# Patient Record
Sex: MALE | Race: WHITE | NOT HISPANIC OR LATINO | Employment: OTHER | URBAN - METROPOLITAN AREA
[De-identification: names, ages, dates, MRNs, and addresses within clinical notes are randomized per-mention and may not be internally consistent; named-entity substitution may affect disease eponyms.]

---

## 2019-11-04 PROBLEM — H26.9 CATARACT OF BOTH EYES: Status: ACTIVE | Noted: 2017-03-31

## 2019-11-04 PROBLEM — E66.9 OBESITY WITH BODY MASS INDEX 30 OR GREATER: Status: ACTIVE | Noted: 2019-11-04

## 2019-11-04 PROBLEM — R06.00 DYSPNEA: Status: ACTIVE | Noted: 2018-02-05

## 2019-11-04 PROBLEM — R06.2 WHEEZING: Status: ACTIVE | Noted: 2019-11-04

## 2020-01-10 PROBLEM — E11.65 TYPE 2 DIABETES MELLITUS WITH HYPERGLYCEMIA, WITHOUT LONG-TERM CURRENT USE OF INSULIN (HCC): Status: ACTIVE | Noted: 2020-01-10

## 2020-01-10 PROBLEM — N17.9 AKI (ACUTE KIDNEY INJURY) (HCC): Status: ACTIVE | Noted: 2020-01-10

## 2020-01-10 PROBLEM — J18.9 SEPSIS DUE TO PNEUMONIA (HCC): Status: ACTIVE | Noted: 2020-01-10

## 2020-01-10 PROBLEM — A41.9 SEPSIS DUE TO PNEUMONIA (HCC): Status: ACTIVE | Noted: 2020-01-10

## 2020-01-10 PROBLEM — E11.9 TYPE 2 DIABETES MELLITUS WITHOUT COMPLICATION, WITHOUT LONG-TERM CURRENT USE OF INSULIN (HCC): Status: ACTIVE | Noted: 2020-01-10

## 2020-01-11 PROBLEM — R65.20 SEPSIS DUE TO STREPTOCOCCUS PNEUMONIAE WITH ACUTE RENAL FAILURE WITHOUT SEPTIC SHOCK (HCC): Status: ACTIVE | Noted: 2020-01-10

## 2020-01-11 PROBLEM — B95.3 BACTEREMIA DUE TO STREPTOCOCCUS PNEUMONIAE: Status: ACTIVE | Noted: 2020-01-11

## 2020-01-11 PROBLEM — N17.9 AKI (ACUTE KIDNEY INJURY) (HCC): Status: RESOLVED | Noted: 2020-01-10 | Resolved: 2020-01-11

## 2020-01-11 PROBLEM — N17.9 SEPSIS DUE TO STREPTOCOCCUS PNEUMONIAE WITH ACUTE RENAL FAILURE WITHOUT SEPTIC SHOCK (HCC): Status: ACTIVE | Noted: 2020-01-10

## 2020-01-11 PROBLEM — A40.3 SEPSIS DUE TO STREPTOCOCCUS PNEUMONIAE WITHOUT ACUTE ORGAN DYSFUNCTION (HCC): Status: ACTIVE | Noted: 2020-01-10

## 2020-01-14 PROBLEM — I50.21 ACUTE SYSTOLIC HEART FAILURE (HCC): Status: RESOLVED | Noted: 2020-01-12 | Resolved: 2020-01-14

## 2020-01-14 PROBLEM — N17.9 SEPSIS DUE TO STREPTOCOCCUS PNEUMONIAE WITH ACUTE RENAL FAILURE WITHOUT SEPTIC SHOCK (HCC): Status: RESOLVED | Noted: 2020-01-10 | Resolved: 2020-01-14

## 2020-01-17 PROBLEM — F33.1 MODERATE EPISODE OF RECURRENT MAJOR DEPRESSIVE DISORDER (HCC): Status: ACTIVE | Noted: 2020-01-17

## 2020-01-17 PROBLEM — R06.2 WHEEZING: Status: RESOLVED | Noted: 2019-11-04 | Resolved: 2020-01-17

## 2020-01-17 PROBLEM — A40.3 SEPSIS DUE TO STREPTOCOCCUS PNEUMONIAE WITH ACUTE RENAL FAILURE WITHOUT SEPTIC SHOCK (HCC): Status: RESOLVED | Noted: 2020-01-10 | Resolved: 2020-01-17

## 2020-01-17 PROBLEM — Z86.19 HISTORY OF INFECTION DUE TO STREPTOCOCCUS PNEUMONIAE: Status: ACTIVE | Noted: 2020-01-17

## 2020-01-17 PROBLEM — N17.9 SEPSIS DUE TO STREPTOCOCCUS PNEUMONIAE WITH ACUTE RENAL FAILURE WITHOUT SEPTIC SHOCK (HCC): Status: RESOLVED | Noted: 2020-01-10 | Resolved: 2020-01-17

## 2020-02-17 PROBLEM — H57.12 LEFT EYE PAIN: Status: ACTIVE | Noted: 2020-02-17

## 2022-01-04 PROBLEM — J40 BRONCHITIS: Status: ACTIVE | Noted: 2022-01-04

## 2022-01-04 PROBLEM — J98.01 BRONCHOSPASM: Status: ACTIVE | Noted: 2022-01-04

## 2022-02-02 PROBLEM — I10 PRIMARY HYPERTENSION: Status: ACTIVE | Noted: 2022-02-02

## 2022-03-23 PROBLEM — J40 BRONCHITIS: Status: RESOLVED | Noted: 2022-01-04 | Resolved: 2022-03-23

## 2022-05-18 DIAGNOSIS — I10 HYPERTENSION GOAL BP (BLOOD PRESSURE) < 140/90: ICD-10-CM

## 2022-05-18 DIAGNOSIS — I50.21 ACUTE SYSTOLIC (CONGESTIVE) HEART FAILURE (HCC): ICD-10-CM

## 2022-05-18 DIAGNOSIS — E11.65 TYPE 2 DIABETES MELLITUS WITH HYPERGLYCEMIA, WITHOUT LONG-TERM CURRENT USE OF INSULIN (HCC): Primary | ICD-10-CM

## 2022-07-13 ENCOUNTER — TELEPHONE (OUTPATIENT)
Dept: SPEECH THERAPY | Facility: CLINIC | Age: 77
End: 2022-07-13

## 2022-07-20 ENCOUNTER — OFFICE VISIT (OUTPATIENT)
Dept: FAMILY MEDICINE CLINIC | Facility: CLINIC | Age: 77
End: 2022-07-20
Payer: MEDICARE

## 2022-07-20 VITALS
SYSTOLIC BLOOD PRESSURE: 120 MMHG | HEART RATE: 56 BPM | RESPIRATION RATE: 14 BRPM | WEIGHT: 238 LBS | HEIGHT: 72 IN | DIASTOLIC BLOOD PRESSURE: 70 MMHG | BODY MASS INDEX: 32.23 KG/M2 | TEMPERATURE: 97 F | OXYGEN SATURATION: 96 %

## 2022-07-20 DIAGNOSIS — F43.22 ADJUSTMENT DISORDER WITH ANXIOUS MOOD: ICD-10-CM

## 2022-07-20 DIAGNOSIS — F33.1 MODERATE EPISODE OF RECURRENT MAJOR DEPRESSIVE DISORDER (HCC): Primary | ICD-10-CM

## 2022-07-20 PROCEDURE — 99213 OFFICE O/P EST LOW 20 MIN: CPT | Performed by: NURSE PRACTITIONER

## 2022-07-20 NOTE — PROGRESS NOTES
Assessment/Plan:    1  Moderate episode of recurrent major depressive disorder ([address] )    2  Adjustment disorder with anxious mood    pt has been doing well on increased dosing of sertraline 100 mg daily  Continue medications as directed  Return for Next scheduled follow up  Subjective:      Patient ID: Anna Doctor is a 64 y o  male  Chief Complaint   Patient presents with   Alessandrasd Ruiz is a 64year old male with depression and anxiety who returns to the office, accompanied by his wife, for a recheck of anxiety and mood swings  Pt has increased his dosing of sertraline and reports that he feels well overall  Pt's wife reports a notable difference  No new acute comlaints today         The following portions of the patient's history were reviewed and updated as appropriate: allergies, current medications, past family history, past medical history, past social history, past surgical history and problem list     Review of Systems      Current Outpatient Medications   Medication Sig Dispense Refill    acetaminophen (TYLENOL) 325 mg tablet Take 2 tablets (650 mg total) by mouth every 6 (six) hours as needed for mild pain 30 tablet 0    apixaban (Eliquis) 5 mg Take 1 tablet (5 mg total) by mouth 2 (two) times a day 180 tablet 3    Blood Glucose Monitoring Suppl (BLOOD GLUCOSE MONITOR SYSTEM) w/Device KIT by Does not apply route      finasteride (PROSCAR) 5 mg tablet Take 1 tablet (5 mg total) by mouth daily 30 tablet 5    glipiZIDE (GLUCOTROL XL) 10 mg 24 hr tablet TAKE 1 TABLET BY MOUTH EVERY DAY WITH BREAKFAST 90 tablet 3    Glucosamine-Chondroitin (GLUCOSAMINE CHONDR COMPLEX PO) Take by mouth 2 (two) times a day      LORazepam (Ativan) 0 5 mg tablet Take 1 tablet (0 5 mg total) by mouth daily as needed for anxiety 10 tablet 0    metFORMIN (GLUCOPHAGE) 500 mg tablet TAKE TWO TABLETS BY MOUTH EVERY MORNING AND TAKE one TABLET BY MOUTH IN THE EVENING (GENERIC GLUCOPHAGE) 270 tablet 3    metoprolol succinate (TOPROL-XL) 25 mg 24 hr tablet Take 1 tablet (25 mg total) by mouth every morning 90 tablet 3    Multiple Vitamins-Minerals (MEGAVITE FRUITS & VEGGIES PO) Take by mouth in the morning      Multiple Vitamins-Minerals (MULTIVITAMIN ADULT PO) Take 1 tablet by mouth      ramipril (ALTACE) 5 mg capsule Take 1 capsule (5 mg total) by mouth every morning 90 capsule 3    sertraline (ZOLOFT) 100 mg tablet Take 1 tablet (100 mg total) by mouth daily 90 tablet 0    tamsulosin (FLOMAX) 0 4 mg TAKE 1 CAPSULE BY MOUTH EVERYDAY AT BEDTIME 90 capsule 3     No current facility-administered medications for this visit         Objective:    /70   Pulse 56   Temp (!) 97 °F (36 1 °C) (Temporal)   Resp 14   Ht 6' (1 829 m)   Wt 108 kg (238 lb)   SpO2 96%   BMI 32 28 kg/m²        Physical Exam           LATANYA Donald

## 2022-08-11 ENCOUNTER — OFFICE VISIT (OUTPATIENT)
Dept: FAMILY MEDICINE CLINIC | Facility: CLINIC | Age: 77
End: 2022-08-11
Payer: MEDICARE

## 2022-08-11 VITALS
BODY MASS INDEX: 31.69 KG/M2 | RESPIRATION RATE: 20 BRPM | WEIGHT: 234 LBS | HEART RATE: 85 BPM | TEMPERATURE: 97.6 F | DIASTOLIC BLOOD PRESSURE: 72 MMHG | HEIGHT: 72 IN | OXYGEN SATURATION: 95 % | SYSTOLIC BLOOD PRESSURE: 122 MMHG

## 2022-08-11 DIAGNOSIS — J40 BRONCHITIS: Primary | ICD-10-CM

## 2022-08-11 DIAGNOSIS — J06.9 UPPER RESPIRATORY TRACT INFECTION, UNSPECIFIED TYPE: ICD-10-CM

## 2022-08-11 PROCEDURE — 99213 OFFICE O/P EST LOW 20 MIN: CPT | Performed by: NURSE PRACTITIONER

## 2022-08-11 RX ORDER — DOXYCYCLINE HYCLATE 100 MG/1
100 CAPSULE ORAL EVERY 12 HOURS SCHEDULED
Qty: 14 CAPSULE | Refills: 0 | Status: SHIPPED | OUTPATIENT
Start: 2022-08-11 | End: 2022-08-18

## 2022-08-11 RX ORDER — PREDNISONE 20 MG/1
TABLET ORAL
Qty: 11 TABLET | Refills: 0 | Status: SHIPPED | OUTPATIENT
Start: 2022-08-11 | End: 2022-08-19 | Stop reason: ALTCHOICE

## 2022-08-11 NOTE — PROGRESS NOTES
Assessment/Plan:    1  Bronchitis  -     doxycycline hyclate (VIBRAMYCIN) 100 mg capsule; Take 1 capsule (100 mg total) by mouth every 12 (twelve) hours for 7 days  -     predniSONE 20 mg tablet; Take 2 tablets PO daily x's 3 days, then take 1 tablet daily x's 3 days, then take 1/2 tablet daily x's 3 days    2  Upper respiratory tract infection, unspecified type  -     doxycycline hyclate (VIBRAMYCIN) 100 mg capsule; Take 1 capsule (100 mg total) by mouth every 12 (twelve) hours for 7 days  -     predniSONE 20 mg tablet; Take 2 tablets PO daily x's 3 days, then take 1 tablet daily x's 3 days, then take 1/2 tablet daily x's 3 days            Patient Instructions   Increase fluid intake as tolerated  Rest and humidification   Continue medications as directed   - antibiotic for full course  - pro-biotic to protect stomach while on medication   - Flonase OTC 1-2 sprays each nostril daily PRN post nasal drip   - Mucinex OTC to loosen secretions   Return to office in one week if symptoms persist or worsen        Return in about 1 week (around 8/18/2022), or if symptoms worsen or fail to improve  Subjective:      Patient ID: Marleny Caro is a 64 y o  male  Chief Complaint   Patient presents with    Cough    Nasal Congestion    Fatigue       Cough  This is a new problem  The current episode started in the past 7 days (4 days ago)  The problem has been unchanged  The cough is non-productive  Associated symptoms include nasal congestion, shortness of breath and wheezing  Pertinent negatives include no chest pain, chills, ear congestion, ear pain, fever, headaches, myalgias, postnasal drip, rash, rhinorrhea or sore throat  Nothing aggravates the symptoms  He has tried nothing for the symptoms  His past medical history is significant for bronchitis         The following portions of the patient's history were reviewed and updated as appropriate: allergies, current medications, past family history, past medical history, past social history, past surgical history and problem list     Review of Systems   Constitutional: Positive for fatigue  Negative for chills, diaphoresis and fever  HENT: Positive for congestion  Negative for ear discharge, ear pain, postnasal drip, rhinorrhea, sinus pressure, sinus pain and sore throat  Eyes: Negative for pain and discharge  Respiratory: Positive for shortness of breath and wheezing  Negative for cough and chest tightness  Cardiovascular: Negative for chest pain  Gastrointestinal: Negative for diarrhea, nausea and vomiting  Musculoskeletal: Negative for myalgias  Skin: Negative for rash  Neurological: Negative for dizziness and headaches  Hematological: Negative for adenopathy           Current Outpatient Medications   Medication Sig Dispense Refill    acetaminophen (TYLENOL) 325 mg tablet Take 2 tablets (650 mg total) by mouth every 6 (six) hours as needed for mild pain 30 tablet 0    apixaban (Eliquis) 5 mg Take 1 tablet (5 mg total) by mouth 2 (two) times a day 180 tablet 3    Blood Glucose Monitoring Suppl (BLOOD GLUCOSE MONITOR SYSTEM) w/Device KIT by Does not apply route      doxycycline hyclate (VIBRAMYCIN) 100 mg capsule Take 1 capsule (100 mg total) by mouth every 12 (twelve) hours for 7 days 14 capsule 0    finasteride (PROSCAR) 5 mg tablet Take 1 tablet (5 mg total) by mouth daily 30 tablet 5    glipiZIDE (GLUCOTROL XL) 10 mg 24 hr tablet TAKE 1 TABLET BY MOUTH EVERY DAY WITH BREAKFAST 90 tablet 3    Glucosamine-Chondroitin (GLUCOSAMINE CHONDR COMPLEX PO) Take by mouth 2 (two) times a day      LORazepam (Ativan) 0 5 mg tablet Take 1 tablet (0 5 mg total) by mouth daily as needed for anxiety 10 tablet 0    metFORMIN (GLUCOPHAGE) 500 mg tablet TAKE TWO TABLETS BY MOUTH EVERY MORNING AND TAKE one TABLET BY MOUTH IN THE EVENING (GENERIC GLUCOPHAGE) 270 tablet 3    metoprolol succinate (TOPROL-XL) 25 mg 24 hr tablet Take 1 tablet (25 mg total) by mouth every morning 90 tablet 3    Multiple Vitamins-Minerals (MEGAVITE FRUITS & VEGGIES PO) Take by mouth in the morning      predniSONE 20 mg tablet Take 2 tablets PO daily x's 3 days, then take 1 tablet daily x's 3 days, then take 1/2 tablet daily x's 3 days 11 tablet 0    ramipril (ALTACE) 5 mg capsule Take 1 capsule (5 mg total) by mouth every morning 90 capsule 3    sertraline (ZOLOFT) 100 mg tablet Take 1 tablet (100 mg total) by mouth daily 90 tablet 0    tamsulosin (FLOMAX) 0 4 mg TAKE 1 CAPSULE BY MOUTH EVERYDAY AT BEDTIME 90 capsule 3     No current facility-administered medications for this visit  Objective:    /72   Pulse 85   Temp 97 6 °F (36 4 °C) (Temporal)   Resp 20   Ht 6' (1 829 m)   Wt 106 kg (234 lb)   SpO2 95%   BMI 31 74 kg/m²        Physical Exam  Vitals reviewed  Constitutional:       General: He is not in acute distress  Appearance: Normal appearance  He is well-developed  He is not diaphoretic  HENT:      Head: Normocephalic and atraumatic  Right Ear: Tympanic membrane, ear canal and external ear normal       Left Ear: Tympanic membrane, ear canal and external ear normal       Nose: Nose normal       Mouth/Throat:      Pharynx: Oropharynx is clear  No posterior oropharyngeal erythema  Eyes:      General: Lids are normal          Right eye: No discharge  Left eye: No discharge  Conjunctiva/sclera: Conjunctivae normal    Neck:      Thyroid: No thyromegaly  Cardiovascular:      Rate and Rhythm: Normal rate and regular rhythm  Heart sounds: Normal heart sounds  Pulmonary:      Effort: Pulmonary effort is normal       Breath sounds: Wheezing and rhonchi present  No decreased breath sounds or rales  Comments: Course lung sounds throughout with wheezing  Musculoskeletal:      Cervical back: Normal range of motion and neck supple  Lymphadenopathy:      Cervical: No cervical adenopathy  Skin:     General: Skin is warm and dry        Findings: No rash  Neurological:      Mental Status: He is alert and oriented to person, place, and time  Psychiatric:         Behavior: Behavior normal          Thought Content:  Thought content normal          Judgment: Judgment normal                 LATANYA Morrison

## 2022-08-19 ENCOUNTER — OFFICE VISIT (OUTPATIENT)
Dept: FAMILY MEDICINE CLINIC | Facility: CLINIC | Age: 77
End: 2022-08-19
Payer: MEDICARE

## 2022-08-19 ENCOUNTER — APPOINTMENT (OUTPATIENT)
Dept: RADIOLOGY | Facility: CLINIC | Age: 77
End: 2022-08-19
Payer: MEDICARE

## 2022-08-19 VITALS
HEIGHT: 72 IN | SYSTOLIC BLOOD PRESSURE: 128 MMHG | DIASTOLIC BLOOD PRESSURE: 64 MMHG | HEART RATE: 84 BPM | BODY MASS INDEX: 31.15 KG/M2 | RESPIRATION RATE: 16 BRPM | WEIGHT: 230 LBS | TEMPERATURE: 96.8 F | OXYGEN SATURATION: 95 %

## 2022-08-19 DIAGNOSIS — J98.01 BRONCHOSPASM: ICD-10-CM

## 2022-08-19 DIAGNOSIS — I48.0 PAROXYSMAL ATRIAL FIBRILLATION (HCC): ICD-10-CM

## 2022-08-19 DIAGNOSIS — J40 BRONCHITIS: ICD-10-CM

## 2022-08-19 DIAGNOSIS — I10 HYPERTENSION GOAL BP (BLOOD PRESSURE) < 140/90: ICD-10-CM

## 2022-08-19 DIAGNOSIS — J40 BRONCHITIS: Primary | ICD-10-CM

## 2022-08-19 PROBLEM — H57.12 LEFT EYE PAIN: Status: RESOLVED | Noted: 2020-02-17 | Resolved: 2022-08-19

## 2022-08-19 PROBLEM — I50.21 ACUTE SYSTOLIC (CONGESTIVE) HEART FAILURE (HCC): Status: RESOLVED | Noted: 2020-01-12 | Resolved: 2022-08-19

## 2022-08-19 PROBLEM — J45.31 MILD PERSISTENT ASTHMA WITH ACUTE EXACERBATION: Status: ACTIVE | Noted: 2017-04-12

## 2022-08-19 PROBLEM — U07.1 COVID: Status: RESOLVED | Noted: 2022-01-11 | Resolved: 2022-08-19

## 2022-08-19 PROCEDURE — 71046 X-RAY EXAM CHEST 2 VIEWS: CPT

## 2022-08-19 PROCEDURE — 99213 OFFICE O/P EST LOW 20 MIN: CPT | Performed by: FAMILY MEDICINE

## 2022-08-19 RX ORDER — PREDNISONE 20 MG/1
TABLET ORAL
Qty: 14 TABLET | Refills: 0 | Status: SHIPPED | OUTPATIENT
Start: 2022-08-19 | End: 2022-09-01 | Stop reason: ALTCHOICE

## 2022-08-19 RX ORDER — LEVOFLOXACIN 500 MG/1
500 TABLET, FILM COATED ORAL EVERY 24 HOURS
Qty: 10 TABLET | Refills: 0 | Status: SHIPPED | OUTPATIENT
Start: 2022-08-19 | End: 2022-08-29

## 2022-08-19 NOTE — PROGRESS NOTES
Assessment/Plan:    No problem-specific Assessment & Plan notes found for this encounter  Diagnoses and all orders for this visit:    Bronchitis  -     XR chest pa & lateral; Future  -     levofloxacin (LEVAQUIN) 500 mg tablet; Take 1 tablet (500 mg total) by mouth every 24 hours for 10 days  -     predniSONE 20 mg tablet; 2 PO QD X 4 DAYS, THEN 1 PO QD X 4 DAYS, THEN 1/2 PO QD X 4 DAYS    Paroxysmal atrial fibrillation (HCC)    Hypertension goal BP (blood pressure) < 140/90    Bronchospasm        Patient Instructions   PLENTY FLUIDS  REST  MUCINEX  MEDICATION AS DIRECTED  IF SYMPTOMS PERSIST OR WORSEN, PLEASE CALL    CXR    Rv monday        Return in about 3 days (around 8/22/2022) for Recheck  Subjective:      Patient ID: Marleny Caro is a 64 y o  male  Chief Complaint   Patient presents with    Cold Like Symptoms     Pt still c/o cough, chest congestion and fatigue  FOLLOW UP    PATIENT COMPLETED COURSE OF ANTIBIOTIC / PREDNISONE  CONTINUES TO COUGH AND WHEEZE  DENIES ANY FEVER OR CHILLS  NOTES NO CP, SOB, PALPITATIONS  NO NVD    HAS NOT NOTED ANY RASHES      The following portions of the patient's history were reviewed and updated as appropriate: allergies, current medications, past family history, past medical history, past social history, past surgical history and problem list     Review of Systems   Constitutional: Negative for chills, fatigue and fever  HENT: Positive for congestion  Negative for sore throat  Eyes: Negative for discharge  Respiratory: Positive for cough and wheezing  Negative for chest tightness  Cardiovascular: Negative for chest pain and palpitations  Gastrointestinal: Negative for abdominal pain, diarrhea, nausea and vomiting  Musculoskeletal: Negative for arthralgias and gait problem  Neurological: Negative for dizziness, weakness and headaches  Hematological: Negative for adenopathy  Psychiatric/Behavioral: The patient is not nervous/anxious  Current Outpatient Medications   Medication Sig Dispense Refill    acetaminophen (TYLENOL) 325 mg tablet Take 2 tablets (650 mg total) by mouth every 6 (six) hours as needed for mild pain 30 tablet 0    apixaban (Eliquis) 5 mg Take 1 tablet (5 mg total) by mouth 2 (two) times a day 180 tablet 3    Blood Glucose Monitoring Suppl (BLOOD GLUCOSE MONITOR SYSTEM) w/Device KIT by Does not apply route      finasteride (PROSCAR) 5 mg tablet Take 1 tablet (5 mg total) by mouth daily 30 tablet 5    glipiZIDE (GLUCOTROL XL) 10 mg 24 hr tablet TAKE 1 TABLET BY MOUTH EVERY DAY WITH BREAKFAST 90 tablet 3    Glucosamine-Chondroitin (GLUCOSAMINE CHONDR COMPLEX PO) Take by mouth 2 (two) times a day      levofloxacin (LEVAQUIN) 500 mg tablet Take 1 tablet (500 mg total) by mouth every 24 hours for 10 days 10 tablet 0    LORazepam (Ativan) 0 5 mg tablet Take 1 tablet (0 5 mg total) by mouth daily as needed for anxiety 10 tablet 0    metFORMIN (GLUCOPHAGE) 500 mg tablet TAKE TWO TABLETS BY MOUTH EVERY MORNING AND TAKE one TABLET BY MOUTH IN THE EVENING (GENERIC GLUCOPHAGE) 270 tablet 3    metoprolol succinate (TOPROL-XL) 25 mg 24 hr tablet Take 1 tablet (25 mg total) by mouth every morning 90 tablet 3    Multiple Vitamins-Minerals (MEGAVITE FRUITS & VEGGIES PO) Take by mouth in the morning      predniSONE 20 mg tablet 2 PO QD X 4 DAYS, THEN 1 PO QD X 4 DAYS, THEN 1/2 PO QD X 4 DAYS 14 tablet 0    ramipril (ALTACE) 5 mg capsule Take 1 capsule (5 mg total) by mouth every morning 90 capsule 3    sertraline (ZOLOFT) 100 mg tablet Take 1 tablet (100 mg total) by mouth daily 90 tablet 0    tamsulosin (FLOMAX) 0 4 mg TAKE 1 CAPSULE BY MOUTH EVERYDAY AT BEDTIME 90 capsule 3     No current facility-administered medications for this visit         Objective:    /64 (BP Location: Left arm, Patient Position: Sitting, Cuff Size: Large)   Pulse 84   Temp (!) 96 8 °F (36 °C) (Temporal)   Resp 16   Ht 6' (1 829 m)   Wt 104 kg (230 lb)   SpO2 95%   BMI 31 19 kg/m²        Physical Exam  Constitutional:       Appearance: He is well-developed  HENT:      Head: Normocephalic and atraumatic  Eyes:      General:         Right eye: No discharge  Left eye: No discharge  Conjunctiva/sclera: Conjunctivae normal       Pupils: Pupils are equal, round, and reactive to light  Neck:      Thyroid: No thyromegaly  Vascular: No JVD  Comments: MINIMAL JVD  Cardiovascular:      Rate and Rhythm: Normal rate and regular rhythm  Heart sounds: Murmur heard  Pulmonary:      Effort: Pulmonary effort is normal       Breath sounds: Wheezing, rhonchi and rales present  Comments: ADEQUATE AIR MOVEMENT  BILATERAL LOWER FIELD RALES  SCATTERED RHONCHI  EXP WHEEZING  Abdominal:      General: Bowel sounds are normal       Palpations: Abdomen is soft  There is no mass  Tenderness: There is no abdominal tenderness  There is no guarding or rebound  Musculoskeletal:         General: No tenderness or deformity  Normal range of motion  Cervical back: Neck supple  Lymphadenopathy:      Cervical: No cervical adenopathy  Skin:     General: Skin is warm and dry  Findings: No erythema or rash  Neurological:      Mental Status: He is alert and oriented to person, place, and time  Psychiatric:         Behavior: Behavior normal          Thought Content:  Thought content normal          Judgment: Judgment normal                 Virginia Jiménez MD

## 2022-08-19 NOTE — PATIENT INSTRUCTIONS
PLENTY FLUIDS  REST  MUCINEX  MEDICATION AS DIRECTED  IF SYMPTOMS PERSIST OR WORSEN, PLEASE CALL    CXR    Rv monday

## 2022-08-22 ENCOUNTER — OFFICE VISIT (OUTPATIENT)
Dept: FAMILY MEDICINE CLINIC | Facility: CLINIC | Age: 77
End: 2022-08-22
Payer: MEDICARE

## 2022-08-22 VITALS
OXYGEN SATURATION: 99 % | BODY MASS INDEX: 30.88 KG/M2 | SYSTOLIC BLOOD PRESSURE: 126 MMHG | HEART RATE: 72 BPM | RESPIRATION RATE: 16 BRPM | TEMPERATURE: 96.4 F | HEIGHT: 72 IN | WEIGHT: 228 LBS | DIASTOLIC BLOOD PRESSURE: 70 MMHG

## 2022-08-22 DIAGNOSIS — J45.31 MILD PERSISTENT ASTHMA WITH ACUTE EXACERBATION: ICD-10-CM

## 2022-08-22 DIAGNOSIS — E11.65 TYPE 2 DIABETES MELLITUS WITH HYPERGLYCEMIA, WITHOUT LONG-TERM CURRENT USE OF INSULIN (HCC): ICD-10-CM

## 2022-08-22 DIAGNOSIS — J40 BRONCHITIS: Primary | ICD-10-CM

## 2022-08-22 PROCEDURE — 99213 OFFICE O/P EST LOW 20 MIN: CPT | Performed by: FAMILY MEDICINE

## 2022-08-22 NOTE — PATIENT INSTRUCTIONS
CONTINUE CURRENT TREATMENT  DISCUSSED FURTHER USE OF A CONTROLLER MEDICATION    RV 1 WEEK, CALL SOONER PRN

## 2022-08-22 NOTE — PROGRESS NOTES
Assessment/Plan:    No problem-specific Assessment & Plan notes found for this encounter  Diagnoses and all orders for this visit:    Bronchitis    Mild persistent asthma with acute exacerbation    Type 2 diabetes mellitus with hyperglycemia, without long-term current use of insulin ([facility] [address] )        Patient Instructions   CONTINUE CURRENT TREATMENT  DISCUSSED FURTHER USE OF A CONTROLLER MEDICATION    RV 1 WEEK, CALL SOONER PRN      Return in about 1 week (around 8/29/2022) for Recheck  Subjective:      Patient ID: Gabby Hdze is a 64 y o  male  Chief Complaint   Patient presents with    Follow-up     THREE DAY        PATIENT FEELS BETTER  BREATHING EASIER  COUGH IMPROVED    CONTINUES TO WHEEZE  DENIES ANY FEVER OR CHILLS  NO NVD      The following portions of the patient's history were reviewed and updated as appropriate: allergies, current medications, past family history, past medical history, past social history, past surgical history and problem list     Review of Systems   Constitutional: Negative for chills, fatigue and fever  HENT: Negative for sore throat  Eyes: Negative for discharge  Respiratory: Positive for cough and wheezing  Negative for chest tightness  Cardiovascular: Negative for chest pain and palpitations  Gastrointestinal: Negative for abdominal pain, diarrhea, nausea and vomiting  Musculoskeletal: Negative for arthralgias and gait problem  Neurological: Negative for dizziness, weakness and headaches  Hematological: Negative for adenopathy  Psychiatric/Behavioral: The patient is not nervous/anxious            Current Outpatient Medications   Medication Sig Dispense Refill    acetaminophen (TYLENOL) 325 mg tablet Take 2 tablets (650 mg total) by mouth every 6 (six) hours as needed for mild pain 30 tablet 0    apixaban (Eliquis) 5 mg Take 1 tablet (5 mg total) by mouth 2 (two) times a day 180 tablet 3    Blood Glucose Monitoring Suppl (BLOOD GLUCOSE MONITOR SYSTEM) w/Device KIT by Does not apply route      glipiZIDE (GLUCOTROL XL) 10 mg 24 hr tablet TAKE 1 TABLET BY MOUTH EVERY DAY WITH BREAKFAST 90 tablet 3    Glucosamine-Chondroitin (GLUCOSAMINE CHONDR COMPLEX PO) Take by mouth 2 (two) times a day      levofloxacin (LEVAQUIN) 500 mg tablet Take 1 tablet (500 mg total) by mouth every 24 hours for 10 days 10 tablet 0    LORazepam (Ativan) 0 5 mg tablet Take 1 tablet (0 5 mg total) by mouth daily as needed for anxiety 10 tablet 0    metFORMIN (GLUCOPHAGE) 500 mg tablet TAKE TWO TABLETS BY MOUTH EVERY MORNING AND TAKE one TABLET BY MOUTH IN THE EVENING (GENERIC GLUCOPHAGE) 270 tablet 3    metoprolol succinate (TOPROL-XL) 25 mg 24 hr tablet Take 1 tablet (25 mg total) by mouth every morning 90 tablet 3    Multiple Vitamins-Minerals (MEGAVITE FRUITS & VEGGIES PO) Take by mouth in the morning      predniSONE 20 mg tablet 2 PO QD X 4 DAYS, THEN 1 PO QD X 4 DAYS, THEN 1/2 PO QD X 4 DAYS 14 tablet 0    ramipril (ALTACE) 5 mg capsule Take 1 capsule (5 mg total) by mouth every morning 90 capsule 3    sertraline (ZOLOFT) 100 mg tablet Take 1 tablet (100 mg total) by mouth daily 90 tablet 0    tamsulosin (FLOMAX) 0 4 mg TAKE 1 CAPSULE BY MOUTH EVERYDAY AT BEDTIME 90 capsule 3    finasteride (PROSCAR) 5 mg tablet Take 1 tablet (5 mg total) by mouth daily 30 tablet 5     No current facility-administered medications for this visit  Objective:    /70   Pulse 72   Temp (!) 96 4 °F (35 8 °C) (Temporal)   Resp 16   Ht 6' (1 829 m)   Wt 103 kg (228 lb)   SpO2 99%   BMI 30 92 kg/m²        Physical Exam  Constitutional:       Appearance: He is well-developed  HENT:      Head: Normocephalic and atraumatic  Eyes:      General:         Right eye: No discharge  Left eye: No discharge  Conjunctiva/sclera: Conjunctivae normal       Pupils: Pupils are equal, round, and reactive to light  Neck:      Thyroid: No thyromegaly  Vascular: No JVD  Cardiovascular:      Rate and Rhythm: Normal rate and regular rhythm  Heart sounds: Normal heart sounds  No murmur heard  Pulmonary:      Effort: Pulmonary effort is normal       Breath sounds: Wheezing present  No rales  Comments: IMPROVED AIR MOVEMENT  CONTINUE EXP WHEEZE  SCATTERED RHONCHI - IMPROVED  Abdominal:      General: Bowel sounds are normal       Palpations: Abdomen is soft  There is no mass  Tenderness: There is no abdominal tenderness  There is no guarding or rebound  Musculoskeletal:         General: No tenderness or deformity  Normal range of motion  Cervical back: Neck supple  Lymphadenopathy:      Cervical: No cervical adenopathy  Skin:     General: Skin is warm and dry  Findings: No erythema or rash  Neurological:      Mental Status: He is alert and oriented to person, place, and time  Psychiatric:         Behavior: Behavior normal          Thought Content:  Thought content normal          Judgment: Judgment normal                 Mariana Dsouza MD

## 2022-08-23 ENCOUNTER — RA CDI HCC (OUTPATIENT)
Dept: OTHER | Facility: HOSPITAL | Age: 77
End: 2022-08-23

## 2022-08-23 NOTE — PROGRESS NOTES
[facility]  coding opportunities        I11 0  Chart Reviewed number of suggestions sent to Provider: 1     Patients Insurance     Medicare Insurance: [employer]

## 2022-09-01 ENCOUNTER — OFFICE VISIT (OUTPATIENT)
Dept: FAMILY MEDICINE CLINIC | Facility: CLINIC | Age: 77
End: 2022-09-01
Payer: MEDICARE

## 2022-09-01 VITALS
WEIGHT: 235 LBS | HEIGHT: 72 IN | OXYGEN SATURATION: 96 % | HEART RATE: 88 BPM | BODY MASS INDEX: 31.83 KG/M2 | DIASTOLIC BLOOD PRESSURE: 70 MMHG | SYSTOLIC BLOOD PRESSURE: 140 MMHG | TEMPERATURE: 97.5 F | RESPIRATION RATE: 20 BRPM

## 2022-09-01 DIAGNOSIS — J40 BRONCHITIS: Primary | ICD-10-CM

## 2022-09-01 DIAGNOSIS — J45.31 MILD PERSISTENT ASTHMA WITH ACUTE EXACERBATION: ICD-10-CM

## 2022-09-01 PROCEDURE — 99213 OFFICE O/P EST LOW 20 MIN: CPT | Performed by: FAMILY MEDICINE

## 2022-09-01 NOTE — PROGRESS NOTES
Assessment/Plan:    No problem-specific Assessment & Plan notes found for this encounter  Diagnoses and all orders for this visit:    Bronchitis    Mild persistent asthma with acute exacerbation        Patient Instructions   CONTINUE CURRENT TREATMENT PLAN  TRIAL OF ADVAIR  MONITOR SYMPTOMS    CALL IF SYMPTOMS WORSEN    WARM COMPRESS TO LEG    IF INCREASED SWELLING OR PAIN - CALL ASAP      No follow-ups on file  Subjective:      Patient ID: Aleta Ernandez is a 64 y o  male  Chief Complaint   Patient presents with    Follow-up     Pt here for a lung f/u  FOLLOW UP    PATIENT FEELS MUCH BETTER  NO FEVER OR CHILLS  BREATHING EASIER    STILL SOME WHEEZING    RECENT LOWER L CALF PAIN  NO SWELLING  IMPROVING DAILY  ON ELIQUIS      The following portions of the patient's history were reviewed and updated as appropriate: allergies, current medications, past family history, past medical history, past social history, past surgical history and problem list     Review of Systems   Constitutional: Negative for chills, fatigue and fever  HENT: Negative for sore throat  Eyes: Negative for discharge  Respiratory: Positive for wheezing  Negative for cough and chest tightness  Cardiovascular: Negative for chest pain and palpitations  Gastrointestinal: Negative for abdominal pain, diarrhea, nausea and vomiting  Musculoskeletal: Positive for arthralgias  Negative for gait problem  Neurological: Negative for dizziness, weakness and headaches  Hematological: Negative for adenopathy  Psychiatric/Behavioral: The patient is not nervous/anxious            Current Outpatient Medications   Medication Sig Dispense Refill    acetaminophen (TYLENOL) 325 mg tablet Take 2 tablets (650 mg total) by mouth every 6 (six) hours as needed for mild pain 30 tablet 0    apixaban (Eliquis) 5 mg Take 1 tablet (5 mg total) by mouth 2 (two) times a day 180 tablet 3    Blood Glucose Monitoring Suppl (BLOOD GLUCOSE MONITOR SYSTEM) w/Device KIT by Does not apply route      finasteride (PROSCAR) 5 mg tablet Take 1 tablet (5 mg total) by mouth daily 30 tablet 5    glipiZIDE (GLUCOTROL XL) 10 mg 24 hr tablet TAKE 1 TABLET BY MOUTH EVERY DAY WITH BREAKFAST 90 tablet 3    Glucosamine-Chondroitin (GLUCOSAMINE CHONDR COMPLEX PO) Take by mouth 2 (two) times a day      LORazepam (Ativan) 0 5 mg tablet Take 1 tablet (0 5 mg total) by mouth daily as needed for anxiety 10 tablet 0    metFORMIN (GLUCOPHAGE) 500 mg tablet TAKE TWO TABLETS BY MOUTH EVERY MORNING AND TAKE one TABLET BY MOUTH IN THE EVENING (GENERIC GLUCOPHAGE) 270 tablet 3    metoprolol succinate (TOPROL-XL) 25 mg 24 hr tablet Take 1 tablet (25 mg total) by mouth every morning 90 tablet 3    Multiple Vitamins-Minerals (MEGAVITE FRUITS & VEGGIES PO) Take by mouth in the morning      ramipril (ALTACE) 5 mg capsule Take 1 capsule (5 mg total) by mouth every morning 90 capsule 3    sertraline (ZOLOFT) 100 mg tablet Take 1 tablet (100 mg total) by mouth daily 90 tablet 0    tamsulosin (FLOMAX) 0 4 mg TAKE 1 CAPSULE BY MOUTH EVERYDAY AT BEDTIME 90 capsule 3     No current facility-administered medications for this visit  Objective:    /70 (BP Location: Left arm, Patient Position: Sitting, Cuff Size: Adult)   Pulse 88   Temp 97 5 °F (36 4 °C) (Temporal)   Resp 20   Ht 6' (1 829 m)   Wt 107 kg (235 lb)   SpO2 96%   BMI 31 87 kg/m²        Physical Exam  Constitutional:       Appearance: He is well-developed  HENT:      Head: Normocephalic and atraumatic  Eyes:      General:         Right eye: No discharge  Left eye: No discharge  Conjunctiva/sclera: Conjunctivae normal       Pupils: Pupils are equal, round, and reactive to light  Neck:      Thyroid: No thyromegaly  Vascular: No JVD  Cardiovascular:      Rate and Rhythm: Normal rate and regular rhythm  Heart sounds: Normal heart sounds  No murmur heard    Pulmonary:      Effort: Pulmonary effort is normal       Breath sounds: Wheezing present  No rales  Comments: COARSE BS  PROLONGED EXP PHASE  OCC WHEEZE  Abdominal:      General: Bowel sounds are normal       Palpations: Abdomen is soft  There is no mass  Tenderness: There is no abdominal tenderness  There is no guarding or rebound  Musculoskeletal:         General: Tenderness present  No deformity  Normal range of motion  Cervical back: Neck supple  Comments: MILD MID L CALF TENDERNESS  NO SWELLING  NEG HOMANS   Lymphadenopathy:      Cervical: No cervical adenopathy  Skin:     General: Skin is warm and dry  Findings: No erythema or rash  Neurological:      Mental Status: He is alert and oriented to person, place, and time  Psychiatric:         Behavior: Behavior normal          Thought Content:  Thought content normal          Judgment: Judgment normal                 Qing Castellanos MD

## 2022-09-01 NOTE — PATIENT INSTRUCTIONS
CONTINUE CURRENT TREATMENT PLAN  TRIAL OF ADVAIR  MONITOR SYMPTOMS    CALL IF SYMPTOMS WORSEN    WARM COMPRESS TO LEG    IF INCREASED SWELLING OR PAIN - CALL ASAP

## 2022-09-03 ENCOUNTER — HOSPITAL ENCOUNTER (EMERGENCY)
Facility: HOSPITAL | Age: 77
Discharge: HOME | End: 2022-09-03
Attending: EMERGENCY MEDICINE | Admitting: EMERGENCY MEDICINE
Payer: MEDICARE

## 2022-09-03 ENCOUNTER — APPOINTMENT (EMERGENCY)
Dept: RADIOLOGY | Facility: HOSPITAL | Age: 77
End: 2022-09-03
Payer: MEDICARE

## 2022-09-03 VITALS
BODY MASS INDEX: 32.35 KG/M2 | TEMPERATURE: 98.7 F | OXYGEN SATURATION: 98 % | RESPIRATION RATE: 20 BRPM | HEART RATE: 89 BPM | DIASTOLIC BLOOD PRESSURE: 80 MMHG | SYSTOLIC BLOOD PRESSURE: 163 MMHG | WEIGHT: 238.54 LBS

## 2022-09-03 DIAGNOSIS — M79.662 PAIN OF LEFT CALF: Primary | ICD-10-CM

## 2022-09-03 DIAGNOSIS — M79.89 LEFT LEG SWELLING: ICD-10-CM

## 2022-09-03 LAB
ALBUMIN SERPL BCP-MCNC: 3 G/DL (ref 3.5–5)
ALP SERPL-CCNC: 50 U/L (ref 46–116)
ALT SERPL W P-5'-P-CCNC: 19 U/L (ref 12–78)
AST SERPL W P-5'-P-CCNC: 16 U/L (ref 5–45)
BASOPHILS # BLD AUTO: 0.03 THOUSANDS/ΜL (ref 0–0.1)
BASOPHILS NFR BLD AUTO: 0 % (ref 0–1)
BILIRUB DIRECT SERPL-MCNC: 0.15 MG/DL (ref 0–0.2)
BILIRUB SERPL-MCNC: 0.51 MG/DL (ref 0.2–1)
CK SERPL-CCNC: 65 U/L (ref 39–308)
D DIMER PPP FEU-MCNC: 1.99 UG/ML FEU
EOSINOPHIL # BLD AUTO: 0.17 THOUSAND/ΜL (ref 0–0.61)
EOSINOPHIL NFR BLD AUTO: 2 % (ref 0–6)
ERYTHROCYTE [DISTWIDTH] IN BLOOD BY AUTOMATED COUNT: 16 % (ref 11.6–15.1)
HCT VFR BLD AUTO: 33.7 % (ref 36.5–49.3)
HGB BLD-MCNC: 10.3 G/DL (ref 12–17)
IMM GRANULOCYTES # BLD AUTO: 0.03 THOUSAND/UL (ref 0–0.2)
IMM GRANULOCYTES NFR BLD AUTO: 0 % (ref 0–2)
LYMPHOCYTES # BLD AUTO: 2.11 THOUSANDS/ΜL (ref 0.6–4.47)
LYMPHOCYTES NFR BLD AUTO: 21 % (ref 14–44)
MCH RBC QN AUTO: 28 PG (ref 26.8–34.3)
MCHC RBC AUTO-ENTMCNC: 30.6 G/DL (ref 31.4–37.4)
MCV RBC AUTO: 92 FL (ref 82–98)
MONOCYTES # BLD AUTO: 0.77 THOUSAND/ΜL (ref 0.17–1.22)
MONOCYTES NFR BLD AUTO: 8 % (ref 4–12)
NEUTROPHILS # BLD AUTO: 7.06 THOUSANDS/ΜL (ref 1.85–7.62)
NEUTS SEG NFR BLD AUTO: 69 % (ref 43–75)
NRBC BLD AUTO-RTO: 0 /100 WBCS
PLATELET # BLD AUTO: 185 THOUSANDS/UL (ref 149–390)
PMV BLD AUTO: 10.1 FL (ref 8.9–12.7)
PROT SERPL-MCNC: 6.7 G/DL (ref 6.4–8.4)
RBC # BLD AUTO: 3.68 MILLION/UL (ref 3.88–5.62)
WBC # BLD AUTO: 10.17 THOUSAND/UL (ref 4.31–10.16)

## 2022-09-03 PROCEDURE — 82550 ASSAY OF CK (CPK): CPT | Performed by: EMERGENCY MEDICINE

## 2022-09-03 PROCEDURE — 36415 COLL VENOUS BLD VENIPUNCTURE: CPT | Performed by: EMERGENCY MEDICINE

## 2022-09-03 PROCEDURE — 99282 EMERGENCY DEPT VISIT SF MDM: CPT | Performed by: EMERGENCY MEDICINE

## 2022-09-03 PROCEDURE — 85025 COMPLETE CBC W/AUTO DIFF WBC: CPT | Performed by: EMERGENCY MEDICINE

## 2022-09-03 PROCEDURE — 85379 FIBRIN DEGRADATION QUANT: CPT | Performed by: EMERGENCY MEDICINE

## 2022-09-03 PROCEDURE — 93971 EXTREMITY STUDY: CPT

## 2022-09-03 PROCEDURE — 80076 HEPATIC FUNCTION PANEL: CPT | Performed by: EMERGENCY MEDICINE

## 2022-09-03 PROCEDURE — 99284 EMERGENCY DEPT VISIT MOD MDM: CPT

## 2022-09-03 NOTE — ED PROVIDER NOTES
History  Chief Complaint   Patient presents with    Leg Pain     Started with tightness in LLE 5 days ago  Progressed, Just came from care now for eval of poss dvt  Currently on eliquis     Patient is a 28-year-old male  He was sent here from urgent care for evaluation of possible DVT  Patient has no prior history of DVT  Currently is on Eliquis for atrial fibrillation  Starting on Monday he started having left calf pain and swelling  This did seem to improve a little mid week  However, now it is back  He has no associated chest pain or shortness of breath  No fever  No injury  Symptoms are moderate in severity without aggravating or relieving factors  Prior to Admission Medications   Prescriptions Last Dose Informant Patient Reported? Taking?    Blood Glucose Monitoring Suppl (BLOOD GLUCOSE MONITOR SYSTEM) w/Device KIT  Self Yes No   Sig: by Does not apply route   Glucosamine-Chondroitin (GLUCOSAMINE CHONDR COMPLEX PO)  Self Yes No   Sig: Take by mouth 2 (two) times a day   LORazepam (Ativan) 0 5 mg tablet  Self No No   Sig: Take 1 tablet (0 5 mg total) by mouth daily as needed for anxiety   Multiple Vitamins-Minerals (MEGAVITE FRUITS & VEGGIES PO)  Self Yes No   Sig: Take by mouth in the morning   acetaminophen (TYLENOL) 325 mg tablet  Self No No   Sig: Take 2 tablets (650 mg total) by mouth every 6 (six) hours as needed for mild pain   apixaban (Eliquis) 5 mg  Self No No   Sig: Take 1 tablet (5 mg total) by mouth 2 (two) times a day   finasteride (PROSCAR) 5 mg tablet  Self No No   Sig: Take 1 tablet (5 mg total) by mouth daily   glipiZIDE (GLUCOTROL XL) 10 mg 24 hr tablet  Self No No   Sig: TAKE 1 TABLET BY MOUTH EVERY DAY WITH BREAKFAST   metFORMIN (GLUCOPHAGE) 500 mg tablet  Self No No   Sig: TAKE TWO TABLETS BY MOUTH EVERY MORNING AND TAKE one TABLET BY MOUTH IN THE EVENING (GENERIC GLUCOPHAGE)   metoprolol succinate (TOPROL-XL) 25 mg 24 hr tablet  Self No No   Sig: Take 1 tablet (25 mg total) by mouth every morning   ramipril (ALTACE) 5 mg capsule  Self No No   Sig: Take 1 capsule (5 mg total) by mouth every morning   sertraline (ZOLOFT) 100 mg tablet  Self No No   Sig: Take 1 tablet (100 mg total) by mouth daily   tamsulosin (FLOMAX) 0 4 mg  Self No No   Sig: TAKE 1 CAPSULE BY MOUTH EVERYDAY AT BEDTIME      Facility-Administered Medications: None       Past Medical History:   Diagnosis Date    Anxiety     Arthritis     fingers , knee    BPH (benign prostatic hypertrophy)     Cataract     currently left eye- to have surgery on 4/10/17    Cough variant asthma 2017    Diabetes mellitus ([facility] )     type 2, last assessed 2017    Hernia, umbilical     currently has    HL (hearing loss)     b/l hearing aids    Labyrinthitis     Moderate obstructive sleep apnea 2017    New onset a-fib ([facility] ) 1/10/2020    Obesity with body mass index 30 or greater     Umbilical hernia        Past Surgical History:   Procedure Laterality Date    CATARACT EXTRACTION Right 2017    COLONOSCOPY      yrs ago    EYE SURGERY Bilateral     cataracts with IOL       Family History   Problem Relation Age of Onset    Cancer Mother         ovarian    Diabetes Father     Cancer Sister         stomach to brain    Substance Abuse Family     Substance Abuse Son     Mental illness Neg Hx      I have reviewed and agree with the history as documented  E-Cigarette/Vaping    E-Cigarette Use Never User      E-Cigarette/Vaping Substances    Nicotine No     THC No     CBD No     Flavoring No     Other No     Unknown No      Social History     Tobacco Use    Smoking status: Former Smoker     Packs/day: 0 50     Years: 15 00     Pack years: 7 50     Types: Cigarettes     Quit date:      Years since quittin 6    Smokeless tobacco: Never Used   Vaping Use    Vaping Use: Never used   Substance Use Topics    Alcohol use:  Yes     Alcohol/week: 3 0 standard drinks     Types: 3 Standard drinks or equivalent per week     Comment: socially    Drug use: Yes     Types: Marijuana     Comment: most everyday        Review of Systems   Constitutional: Negative for chills and fever  HENT: Negative for rhinorrhea and sore throat  Eyes: Negative for pain, redness and visual disturbance  Respiratory: Negative for cough and shortness of breath  Cardiovascular: Positive for leg swelling  Negative for chest pain  Gastrointestinal: Negative for abdominal pain, diarrhea and vomiting  Endocrine: Negative for polydipsia and polyuria  Genitourinary: Negative for dysuria, frequency and hematuria  Musculoskeletal: Negative for back pain and neck pain  Skin: Negative for rash and wound  Allergic/Immunologic: Negative for immunocompromised state  Neurological: Negative for weakness, numbness and headaches  Psychiatric/Behavioral: Negative for hallucinations and suicidal ideas  All other systems reviewed and are negative  Physical Exam  Physical Exam  Vitals reviewed  Constitutional:       General: He is not in acute distress  Appearance: He is not toxic-appearing  HENT:      Head: Normocephalic and atraumatic  Nose: Nose normal       Mouth/Throat:      Mouth: Mucous membranes are moist    Eyes:      General:         Right eye: No discharge  Left eye: No discharge  Conjunctiva/sclera: Conjunctivae normal    Cardiovascular:      Rate and Rhythm: Normal rate and regular rhythm  Pulses: Normal pulses  Heart sounds: Normal heart sounds  No murmur heard  No friction rub  No gallop  Pulmonary:      Effort: Pulmonary effort is normal  No respiratory distress  Breath sounds: Normal breath sounds  No stridor  No wheezing, rhonchi or rales  Abdominal:      General: Bowel sounds are normal  There is no distension  Palpations: Abdomen is soft  Tenderness: There is no abdominal tenderness   There is no right CVA tenderness, left CVA tenderness, guarding or rebound  Musculoskeletal:         General: Swelling present  No tenderness, deformity or signs of injury  Normal range of motion  Cervical back: Normal range of motion and neck supple  No rigidity  Right lower leg: No edema  Left lower leg: Edema present  Comments: The left calf is swollen and tight  There is normal distal pulse  Skin:     General: Skin is warm and dry  Coloration: Skin is not jaundiced or pale  Findings: No bruising, erythema or rash  Neurological:      General: No focal deficit present  Mental Status: He is alert and oriented to person, place, and time  Cranial Nerves: No facial asymmetry  Sensory: No sensory deficit  Motor: Motor function is intact     Psychiatric:         Mood and Affect: Mood normal          Behavior: Behavior normal          Vital Signs  ED Triage Vitals [09/03/22 1740]   Temperature Pulse Respirations Blood Pressure SpO2   98 9 °F (37 2 °C) (!) 110 22 170/75 97 %      Temp Source Heart Rate Source Patient Position - Orthostatic VS BP Location FiO2 (%)   Tympanic Monitor Sitting Right arm --      Pain Score       3           Vitals:    09/03/22 1740 09/03/22 1801 09/03/22 1845 09/03/22 1915   BP: 170/75 155/68  163/80   Pulse: (!) 110  102 89   Patient Position - Orthostatic VS: Sitting   Sitting         Visual Acuity      ED Medications  Medications - No data to display    Diagnostic Studies  Results Reviewed     Procedure Component Value Units Date/Time    Hepatic function panel [[id number]]  (Abnormal) Collected: 09/03/22 1825    Lab Status: Final result Specimen: Blood Updated: 09/03/22 1847     Total Bilirubin 0 51 mg/dL      Bilirubin, Direct 0 15 mg/dL      Alkaline Phosphatase 50 U/L      AST 16 U/L      ALT 19 U/L      Total Protein 6 7 g/dL      Albumin 3 0 g/dL     CK Total with Reflex CKMB [[id number]]  (Normal) Collected: 09/03/22 1825    Lab Status: Final result Specimen: Blood Updated: 09/03/22 1847     Total CK 65 U/L     D-Dimer [[id number]]  (Abnormal) Collected: 09/03/22 1825    Lab Status: Final result Specimen: Blood Updated: 09/03/22 1845     D-Dimer, Quant 1 99 ug/ml FEU     Narrative: In the evaluation for possible pulmonary embolism, in the appropriate (Well's Score of 4 or less) patient, the age adjusted d-dimer cutoff for this patient can be calculated as:    Age x 0 01 (in ug/mL) for Age-adjusted D-dimer exclusion threshold for a patient over 50 years  CBC and differential [[id number]]  (Abnormal) Collected: 09/03/22 1825    Lab Status: Final result Specimen: Blood Updated: 09/03/22 1830     WBC 10 17 Thousand/uL      RBC 3 68 Million/uL      Hemoglobin 10 3 g/dL      Hematocrit 33 7 %      MCV 92 fL      MCH 28 0 pg      MCHC 30 6 g/dL      RDW 16 0 %      MPV 10 1 fL      Platelets 787 Thousands/uL      nRBC 0 /100 WBCs      Neutrophils Relative 69 %      Immat GRANS % 0 %      Lymphocytes Relative 21 %      Monocytes Relative 8 %      Eosinophils Relative 2 %      Basophils Relative 0 %      Neutrophils Absolute 7 06 Thousands/µL      Immature Grans Absolute 0 03 Thousand/uL      Lymphocytes Absolute 2 11 Thousands/µL      Monocytes Absolute 0 77 Thousand/µL      Eosinophils Absolute 0 17 Thousand/µL      Basophils Absolute 0 03 Thousands/µL                  VAS lower limb venous duplex study, unilateral/limited    (Results Pending)              Procedures  Procedures         ED Course                               SBIRT 20yo+    Flowsheet Row Most Recent Value   SBIRT (23 yo +)    In order to provide better care to our patients, we are screening all of our patients for alcohol and drug use  Would it be okay to ask you these screening questions? Yes Filed at: 09/03/2022 1830   Initial Alcohol Screen: US AUDIT-C     1  How often do you have a drink containing alcohol? 1 Filed at: 09/03/2022 1830   2   How many drinks containing alcohol do you have on a typical day you are drinking? 0 Filed at: 09/03/2022 1830   3a  Male UNDER 65: How often do you have five or more drinks on one occasion? 0 Filed at: 09/03/2022 1830   3b  FEMALE Any Age, or MALE 65+: How often do you have 4 or more drinks on one occassion? 0 Filed at: 09/03/2022 1830   Audit-C Score 1 Filed at: 09/03/2022 1830   DAVON: How many times in the past year have you    Used an illegal drug or used a prescription medication for non-medical reasons? Never Filed at: 09/03/2022 1830                    MDM  Number of Diagnoses or Management Options  Diagnosis management comments: Ultrasound was negative for DVT  Laboratory evaluation was nonspecific  This is not compartment syndrome  Patient does not have significant pain  There is no trauma  There is no rhabdomyolysis  No hematoma  No Baker cyst   Etiology of the pain is unclear  Will recommend ice and elevation  Follow-up primary MD  Appropriate for discharge and outpatient management  Amount and/or Complexity of Data Reviewed  Clinical lab tests: ordered and reviewed  Tests in the radiology section of CPT®: ordered and reviewed        Disposition  Final diagnoses:   Pain of left calf   Left leg swelling     Time reflects when diagnosis was documented in both MDM as applicable and the Disposition within this note     Time User Action Codes Description Comment    9/3/2022  8:06 PM Ndaira Kingsley Add [R10 [id number]] Pain of left calf     9/3/2022  8:06 PM Nadira Kingsley Add [Q09 47] Left leg swelling       ED Disposition     ED Disposition   Discharge    Condition   Stable    Date/Time   Sat Sep 3, 2022  8:05 PM    Comment   [facility] discharge to home/self care                 Follow-up Information     Follow up With Specialties Details Why Contact Info    Eric Nelson MD Family Medicine In 3 days  [address] [id number] [phone]            Patient's Medications   Discharge Prescriptions    No medications on file       No discharge procedures on file      PDMP Review       Value Time User    PDMP Reviewed  Yes 6/22/2022  2:49 PM Y[facility] Wynn, [facility]           ED Provider  Electronically Signed by           Elizabeth Wong MD  09/03/22 2007

## 2022-09-04 PROCEDURE — 93971 EXTREMITY STUDY: CPT | Performed by: SURGERY

## 2022-09-06 ENCOUNTER — OFFICE VISIT (OUTPATIENT)
Dept: FAMILY MEDICINE CLINIC | Facility: CLINIC | Age: 77
End: 2022-09-06
Payer: MEDICARE

## 2022-09-06 VITALS
HEIGHT: 72 IN | WEIGHT: 237 LBS | TEMPERATURE: 97.3 F | RESPIRATION RATE: 12 BRPM | OXYGEN SATURATION: 97 % | HEART RATE: 92 BPM | BODY MASS INDEX: 32.1 KG/M2 | SYSTOLIC BLOOD PRESSURE: 140 MMHG | DIASTOLIC BLOOD PRESSURE: 80 MMHG

## 2022-09-06 DIAGNOSIS — M79.89 LEG SWELLING: Primary | ICD-10-CM

## 2022-09-06 DIAGNOSIS — T14.8XXA MUSCLE TEAR: ICD-10-CM

## 2022-09-06 PROBLEM — J40 BRONCHITIS: Status: RESOLVED | Noted: 2022-01-04 | Resolved: 2022-09-06

## 2022-09-06 PROBLEM — J98.01 BRONCHOSPASM: Status: RESOLVED | Noted: 2022-01-04 | Resolved: 2022-09-06

## 2022-09-06 PROBLEM — R61 DIAPHORESIS: Status: RESOLVED | Noted: 2020-02-17 | Resolved: 2022-09-06

## 2022-09-06 PROCEDURE — 99213 OFFICE O/P EST LOW 20 MIN: CPT | Performed by: FAMILY MEDICINE

## 2022-09-06 NOTE — PATIENT INSTRUCTIONS
REST  REASSURANCE  WARM COMPRESS TO THE AREA  TYLENOL FOR PAIN    IF SYMPTOMS WORSEN - CALL ASAP / RV

## 2022-09-06 NOTE — PROGRESS NOTES
Assessment/Plan:    No problem-specific Assessment & Plan notes found for this encounter  Diagnoses and all orders for this visit:    Leg swelling    Muscle tear        Patient Instructions   REST  REASSURANCE  WARM COMPRESS TO THE AREA  TYLENOL FOR PAIN    IF SYMPTOMS WORSEN - CALL ASAP / RV      Return if symptoms worsen or fail to improve, for Next scheduled follow up  Subjective:      Patient ID: Carolyn Norwood is a 64 y o  male  Chief Complaint   Patient presents with    Leg Swelling     Pt here for left leg pain and swelling  Did start with pain  Went to ER on 9/3       FOLLOW UP    S/P ER  VISIT    L LEG INCREASED IN SWELLING  HAD BEEN INSTRUCTED TO GO TO THE ER    US DOPPLER WAS NEGATIVE    PAIN IMPROVED SLIGHTLY  SOME BLACK AND BLUE BY L ANKLE    DENIES ANY CP, SOB, PALPITATIONS      The following portions of the patient's history were reviewed and updated as appropriate: allergies, current medications, past family history, past medical history, past social history, past surgical history and problem list     Review of Systems   Constitutional: Negative for chills, fatigue and fever  HENT: Negative for sore throat  Eyes: Negative for discharge  Respiratory: Negative for cough and chest tightness  Cardiovascular: Positive for leg swelling  Negative for chest pain and palpitations  Gastrointestinal: Negative for abdominal pain, diarrhea, nausea and vomiting  Musculoskeletal: Positive for arthralgias  Negative for gait problem  Neurological: Negative for dizziness, weakness and headaches  Hematological: Negative for adenopathy  Psychiatric/Behavioral: The patient is not nervous/anxious            Current Outpatient Medications   Medication Sig Dispense Refill    apixaban (Eliquis) 5 mg Take 1 tablet (5 mg total) by mouth 2 (two) times a day 180 tablet 3    Blood Glucose Monitoring Suppl (BLOOD GLUCOSE MONITOR SYSTEM) w/Device KIT by Does not apply route      finasteride (PROSCAR) 5 mg tablet Take 1 tablet (5 mg total) by mouth daily 30 tablet 5    glipiZIDE (GLUCOTROL XL) 10 mg 24 hr tablet TAKE 1 TABLET BY MOUTH EVERY DAY WITH BREAKFAST 90 tablet 3    Glucosamine-Chondroitin (GLUCOSAMINE CHONDR COMPLEX PO) Take by mouth 2 (two) times a day      LORazepam (Ativan) 0 5 mg tablet Take 1 tablet (0 5 mg total) by mouth daily as needed for anxiety 10 tablet 0    metFORMIN (GLUCOPHAGE) 500 mg tablet TAKE TWO TABLETS BY MOUTH EVERY MORNING AND TAKE one TABLET BY MOUTH IN THE EVENING (GENERIC GLUCOPHAGE) 270 tablet 3    metoprolol succinate (TOPROL-XL) 25 mg 24 hr tablet Take 1 tablet (25 mg total) by mouth every morning 90 tablet 3    Multiple Vitamins-Minerals (MEGAVITE FRUITS & VEGGIES PO) Take by mouth in the morning      ramipril (ALTACE) 5 mg capsule Take 1 capsule (5 mg total) by mouth every morning 90 capsule 3    sertraline (ZOLOFT) 100 mg tablet Take 1 tablet (100 mg total) by mouth daily 90 tablet 0    acetaminophen (TYLENOL) 325 mg tablet Take 2 tablets (650 mg total) by mouth every 6 (six) hours as needed for mild pain (Patient not taking: Reported on 9/6/2022) 30 tablet 0    tamsulosin (FLOMAX) 0 4 mg TAKE 1 CAPSULE BY MOUTH EVERYDAY AT BEDTIME 90 capsule 3     No current facility-administered medications for this visit  Objective:    /80   Pulse 92   Temp (!) 97 3 °F (36 3 °C) (Temporal)   Resp 12   Ht 6' (1 829 m)   Wt 108 kg (237 lb)   SpO2 97%   BMI 32 14 kg/m²        Physical Exam  Constitutional:       Appearance: He is well-developed  HENT:      Head: Normocephalic and atraumatic  Eyes:      General:         Right eye: No discharge  Left eye: No discharge  Conjunctiva/sclera: Conjunctivae normal       Pupils: Pupils are equal, round, and reactive to light  Neck:      Thyroid: No thyromegaly  Vascular: No JVD  Cardiovascular:      Rate and Rhythm: Normal rate and regular rhythm        Heart sounds: Normal heart sounds  No murmur heard  Pulmonary:      Effort: Pulmonary effort is normal       Breath sounds: Normal breath sounds  No wheezing or rales  Abdominal:      General: Bowel sounds are normal       Palpations: Abdomen is soft  There is no mass  Tenderness: There is no abdominal tenderness  There is no guarding or rebound  Musculoskeletal:         General: Swelling present  No tenderness or deformity  Normal range of motion  Cervical back: Neck supple  Comments: TENDER MID L CALF  LEG SL MORE SWOLLEN  SOME ECCHYMOSIS NOTED LAYERING OUT L ANKLE   Lymphadenopathy:      Cervical: No cervical adenopathy  Skin:     General: Skin is warm and dry  Findings: No erythema or rash  Neurological:      Mental Status: He is alert and oriented to person, place, and time  Psychiatric:         Behavior: Behavior normal          Thought Content:  Thought content normal          Judgment: Judgment normal                 Geovanna Carney MD

## 2022-09-27 ENCOUNTER — TELEPHONE (OUTPATIENT)
Dept: CARDIOLOGY CLINIC | Facility: CLINIC | Age: 77
End: 2022-09-27

## 2022-09-27 NOTE — TELEPHONE ENCOUNTER
Please call wife Dr Kennedy Loud office faxed clearance over and have been calling  Patient having dental procedure tomorrow   Needs to know if Eliquis needs to be stopped and also if antibiotic is needed prior to procedure  Please get back to wife   Sees dr Anshul Santo

## 2022-09-27 NOTE — TELEPHONE ENCOUNTER
Pre  Op  Clearance Note- Cardiology    Jolynn Garcia   64 y o   male  1945    Dr Khanh Chapman:     Jolynn Garcia :   Patient's chart was reviewed for preop clearance and has no major cardiac contra-indication to proceed  Patient cardiac risk for surgery is ***  Continue current cardiac medications  Patient can hold  Aspirin for  5-7 days as required for surgery  Patient can hold Eliquis/Xarelto/Pradaxa for 3 days before the procedure  Please restart after the procedure when okay from surgical point of view and advise patient to contact our office  If you have any question please do not hesitate to call us at our office of [facility]    Phone # [phone]        Lab Results   Component Value Date    WBC 10 17 (H) 09/03/2022    HGB 10 3 (L) 09/03/2022    HCT 33 7 (L) 09/03/2022    MCV 92 09/03/2022     09/03/2022     Lab Results   Component Value Date    CREATININE 0 97 11/16/2020     Lab Results   Component Value Date    GLUF 204 (H) 10/22/2020       Vianey Tang MD  9/27/2022  4:31 PM

## 2022-10-17 ENCOUNTER — OFFICE VISIT (OUTPATIENT)
Dept: FAMILY MEDICINE CLINIC | Facility: CLINIC | Age: 77
End: 2022-10-17
Payer: MEDICARE

## 2022-10-17 VITALS
DIASTOLIC BLOOD PRESSURE: 82 MMHG | SYSTOLIC BLOOD PRESSURE: 136 MMHG | HEART RATE: 93 BPM | TEMPERATURE: 97.8 F | RESPIRATION RATE: 16 BRPM | HEIGHT: 72 IN | BODY MASS INDEX: 30.88 KG/M2 | WEIGHT: 228 LBS | OXYGEN SATURATION: 95 %

## 2022-10-17 DIAGNOSIS — I48.91 NEW ONSET A-FIB (HCC): ICD-10-CM

## 2022-10-17 DIAGNOSIS — E11.65 TYPE 2 DIABETES MELLITUS WITH HYPERGLYCEMIA, WITHOUT LONG-TERM CURRENT USE OF INSULIN (HCC): ICD-10-CM

## 2022-10-17 DIAGNOSIS — F33.1 MODERATE EPISODE OF RECURRENT MAJOR DEPRESSIVE DISORDER (HCC): ICD-10-CM

## 2022-10-17 DIAGNOSIS — J40 BRONCHITIS: Primary | ICD-10-CM

## 2022-10-17 DIAGNOSIS — J98.01 BRONCHOSPASM: ICD-10-CM

## 2022-10-17 PROCEDURE — 99213 OFFICE O/P EST LOW 20 MIN: CPT | Performed by: FAMILY MEDICINE

## 2022-10-17 RX ORDER — PROMETHAZINE HYDROCHLORIDE AND CODEINE PHOSPHATE 6.25; 1 MG/5ML; MG/5ML
5 SYRUP ORAL EVERY 4 HOURS PRN
Qty: 240 ML | Refills: 0 | Status: SHIPPED | OUTPATIENT
Start: 2022-10-17

## 2022-10-17 RX ORDER — PREDNISONE 20 MG/1
TABLET ORAL
Qty: 14 TABLET | Refills: 0 | Status: SHIPPED | OUTPATIENT
Start: 2022-10-17

## 2022-10-17 RX ORDER — SERTRALINE HYDROCHLORIDE 100 MG/1
100 TABLET, FILM COATED ORAL DAILY
Qty: 90 TABLET | Refills: 1 | Status: SHIPPED | OUTPATIENT
Start: 2022-10-17

## 2022-10-17 RX ORDER — AMOXICILLIN AND CLAVULANATE POTASSIUM 500; 125 MG/1; MG/1
1 TABLET, FILM COATED ORAL EVERY 12 HOURS SCHEDULED
Qty: 20 TABLET | Refills: 0 | Status: SHIPPED | OUTPATIENT
Start: 2022-10-17 | End: 2022-10-27

## 2022-10-17 RX ORDER — METOPROLOL SUCCINATE 25 MG/1
25 TABLET, EXTENDED RELEASE ORAL EVERY MORNING
Qty: 90 TABLET | Refills: 3 | Status: SHIPPED | OUTPATIENT
Start: 2022-10-17

## 2022-10-17 NOTE — PROGRESS NOTES
Name: Natalya Carpenter      : 1945      MRN: [id number]  Encounter Provider: Mariana Dsouza MD  Encounter Date: 10/17/2022   Encounter department: [id number]     1  Bronchitis  -     amoxicillin-clavulanate (AUGMENTIN) 500-125 mg per tablet; Take 1 tablet by mouth every 12 (twelve) hours for 10 days  -     predniSONE 20 mg tablet; 2 PO QD X 4 DAYS, THEN 1 PO QD X 4 DAYS, THEN 1/2 PO QD X 4 DAYS  -     promethazine-codeine (PHENERGAN WITH CODEINE) 6 25-10 mg/5 mL syrup; Take 5 mL by mouth every 4 (four) hours as needed for cough    2  Bronchospasm  -     amoxicillin-clavulanate (AUGMENTIN) 500-125 mg per tablet; Take 1 tablet by mouth every 12 (twelve) hours for 10 days  -     predniSONE 20 mg tablet; 2 PO QD X 4 DAYS, THEN 1 PO QD X 4 DAYS, THEN 1/2 PO QD X 4 DAYS  -     promethazine-codeine (PHENERGAN WITH CODEINE) 6 25-10 mg/5 mL syrup; Take 5 mL by mouth every 4 (four) hours as needed for cough         Subjective      Cough  This is a recurrent problem  The current episode started 1 to 4 weeks ago  The problem occurs every few minutes  The cough is productive of sputum  Associated symptoms include nasal congestion, postnasal drip, rhinorrhea, a sore throat and wheezing  Pertinent negatives include no chest pain, chills, ear congestion, ear pain, fever, headaches, heartburn, hemoptysis, myalgias, rash, shortness of breath, sweats or weight loss  Nothing aggravates the symptoms  He has tried OTC cough suppressant for the symptoms  The treatment provided mild relief  His past medical history is significant for asthma  Review of Systems   Constitutional: Negative for chills, fatigue, fever and weight loss  HENT: Positive for postnasal drip, rhinorrhea and sore throat  Negative for ear pain  Eyes: Negative for discharge  Respiratory: Positive for cough and wheezing  Negative for hemoptysis, chest tightness and shortness of breath  Cardiovascular: Negative for chest pain and palpitations  Gastrointestinal: Negative for abdominal pain, diarrhea, heartburn, nausea and vomiting  Musculoskeletal: Negative for arthralgias, gait problem and myalgias  Skin: Negative for rash  Neurological: Negative for dizziness, weakness and headaches  Hematological: Negative for adenopathy  Psychiatric/Behavioral: The patient is not nervous/anxious          Current Outpatient Medications on File Prior to Visit   Medication Sig   • apixaban (Eliquis) 5 mg Take 1 tablet (5 mg total) by mouth 2 (two) times a day   • Blood Glucose Monitoring Suppl (BLOOD GLUCOSE MONITOR SYSTEM) w/Device KIT by Does not apply route   • finasteride (PROSCAR) 5 mg tablet Take 1 tablet (5 mg total) by mouth daily   • Glucosamine-Chondroitin (GLUCOSAMINE CHONDR COMPLEX PO) Take by mouth 2 (two) times a day   • metoprolol succinate (TOPROL-XL) 25 mg 24 hr tablet Take 1 tablet (25 mg total) by mouth every morning   • Multiple Vitamins-Minerals (MEGAVITE FRUITS & VEGGIES PO) Take by mouth in the morning   • ramipril (ALTACE) 5 mg capsule Take 1 capsule (5 mg total) by mouth every morning   • sertraline (ZOLOFT) 100 mg tablet Take 1 tablet (100 mg total) by mouth daily   • tamsulosin (FLOMAX) 0 4 mg TAKE 1 CAPSULE BY MOUTH EVERYDAY AT BEDTIME   • acetaminophen (TYLENOL) 325 mg tablet Take 2 tablets (650 mg total) by mouth every 6 (six) hours as needed for mild pain (Patient not taking: No sig reported)   • glipiZIDE (GLUCOTROL XL) 10 mg 24 hr tablet TAKE 1 TABLET BY MOUTH EVERY DAY WITH BREAKFAST   • LORazepam (Ativan) 0 5 mg tablet Take 1 tablet (0 5 mg total) by mouth daily as needed for anxiety   • metFORMIN (GLUCOPHAGE) 500 mg tablet TAKE TWO TABLETS BY MOUTH EVERY MORNING AND TAKE one TABLET BY MOUTH IN THE EVENING (GENERIC GLUCOPHAGE)       Objective     /82   Pulse 93   Temp 97 8 °F (36 6 °C) (Temporal)   Resp 16   Ht 6' (1 829 m)   Wt 103 kg (228 lb)   SpO2 95% BMI 30 92 kg/m²     Physical Exam  Constitutional:       Appearance: He is well-developed  HENT:      Head: Normocephalic and atraumatic  Right Ear: Ear canal normal  A middle ear effusion is present  Left Ear: Ear canal normal  A middle ear effusion is present  Nose: Mucosal edema and rhinorrhea present  Mouth/Throat:      Pharynx: Uvula midline  Posterior oropharyngeal erythema present  Eyes:      General:         Right eye: No discharge  Left eye: No discharge  Conjunctiva/sclera:      Right eye: Right conjunctiva is injected  Left eye: Left conjunctiva is injected  Pupils: Pupils are equal, round, and reactive to light  Neck:      Thyroid: No thyromegaly  Cardiovascular:      Rate and Rhythm: Normal rate and regular rhythm  Heart sounds: Normal heart sounds  No murmur heard  Pulmonary:      Effort: Pulmonary effort is normal  No accessory muscle usage or respiratory distress  Breath sounds: Examination of the right-middle field reveals rhonchi  Examination of the left-middle field reveals rhonchi  Examination of the right-lower field reveals rhonchi  Examination of the left-lower field reveals rhonchi  Wheezing and rhonchi present  Chest:      Chest wall: No tenderness  Abdominal:      General: Bowel sounds are normal  There is no distension  Palpations: Abdomen is soft  There is no mass  Tenderness: There is no abdominal tenderness  There is no guarding or rebound  Musculoskeletal:         General: No tenderness  Normal range of motion  Cervical back: Normal range of motion and neck supple  Lymphadenopathy:      Cervical: No cervical adenopathy  Skin:     General: Skin is warm and dry  Neurological:      Mental Status: He is alert and oriented to person, place, and time  Psychiatric:         Behavior: Behavior normal          Thought Content:  Thought content normal          Judgment: Judgment normal        [facility] Susan Fox MD

## 2022-10-25 ENCOUNTER — RA CDI HCC (OUTPATIENT)
Dept: OTHER | Facility: HOSPITAL | Age: 77
End: 2022-10-25

## 2022-11-05 DIAGNOSIS — N40.0 BENIGN PROSTATIC HYPERPLASIA, UNSPECIFIED WHETHER LOWER URINARY TRACT SYMPTOMS PRESENT: ICD-10-CM

## 2022-11-07 RX ORDER — FINASTERIDE 5 MG/1
TABLET, FILM COATED ORAL
Qty: 30 TABLET | Refills: 5 | Status: SHIPPED | OUTPATIENT
Start: 2022-11-07

## 2022-11-09 ENCOUNTER — OFFICE VISIT (OUTPATIENT)
Dept: UROLOGY | Facility: CLINIC | Age: 77
End: 2022-11-09

## 2022-11-09 VITALS
HEIGHT: 72 IN | RESPIRATION RATE: 18 BRPM | BODY MASS INDEX: 32.32 KG/M2 | SYSTOLIC BLOOD PRESSURE: 150 MMHG | HEART RATE: 75 BPM | DIASTOLIC BLOOD PRESSURE: 80 MMHG | WEIGHT: 238.6 LBS | OXYGEN SATURATION: 97 %

## 2022-11-09 DIAGNOSIS — N40.0 ENLARGED PROSTATE: Primary | ICD-10-CM

## 2022-11-09 NOTE — PROGRESS NOTES
11/9/2022   Assessment and Plan    1  BPH with LUTS  - continue tamsulosin and finasteride dual therapy  - f/u cystoscopy and TRUS for workup of LOVELACE      History of Present Illness  Patricia Hein is a 76 y o  male here for follow up evaluation of BPH with lower urinary tract symptoms  Patient had been managed on tamsulosin monotherapy  As last visit he was endorsing some worsening symptoms  He was started on finasteride  Initially noticed improvement, however over the past 6 months symptoms are progressively worsening  He denies any prior  surgical manipulation  He does endorse drinking a lot of diet soda  PSA 2 1 (11/15/19)  Denies any family history of  malignancy  Urine dip leukocyte, nitrite, blood negative  PVR 0mL    AUA SYMPTOM SCORE      Most Recent Value   AUA SYMPTOM SCORE    How often have you had a sensation of not emptying your bladder completely after you finished urinating? 2 (P)     How often have you had to urinate again less than two hours after you finished urinating? 3 (P)     How often have you found you stopped and started again several times when you urinate? 3 (P)     How often have you found it difficult to postpone urination? 2 (P)     How often have you had a weak urinary stream? 2 (P)     How often have you had to push or strain to begin urination? 3 (P)     How many times did you most typically get up to urinate from the time you went to bed at night until the time you got up in the morning? 1 (P)     Quality of Life: If you were to spend the rest of your life with your urinary condition just the way it is now, how would you feel about that? 2 (P)     AUA SYMPTOM SCORE 16 (P)               Review of Systems   Constitutional: Negative for chills and fever  Respiratory: Negative for shortness of breath  Cardiovascular: Negative for chest pain  Gastrointestinal: Negative for abdominal pain     Genitourinary: Positive for difficulty urinating, frequency and urgency  Negative for dysuria, flank pain and hematuria  Neurological: Negative for dizziness                    Past Medical History  Past Medical History:   Diagnosis Date   • Anxiety    • Arthritis     fingers , knee   • BPH (benign prostatic hypertrophy)    • Cataract     currently left eye- to have surgery on 4/10/17   • Cough variant asthma 2017   • Diabetes mellitus ([place] [address] )     type 2, last assessed 2017   • Hernia, umbilical     currently has   • HL (hearing loss)     b/l hearing aids   • Labyrinthitis    • Moderate obstructive sleep apnea 2017   • New onset a-fib ([facility] ) 1/10/2020   • Obesity with body mass index 30 or greater    • Umbilical hernia        Past Social History  Past Surgical History:   Procedure Laterality Date   • CATARACT EXTRACTION Right 2017   • COLONOSCOPY      yrs ago   • EYE SURGERY Bilateral     cataracts with IOL     Social History     Tobacco Use   Smoking Status Former Smoker   • Packs/day: 0 50   • Years: 15 00   • Pack years: 7 50   • Types: Cigarettes   • Quit date:    • Years since quittin 8   Smokeless Tobacco Never Used       Past Family History  Family History   Problem Relation Age of Onset   • Cancer Mother         ovarian   • Diabetes Father    • Cancer Sister         stomach to brain   • Substance Abuse Family    • Substance Abuse Son    • Mental illness Neg Hx        Past Social history  Social History     Socioeconomic History   • Marital status: /Civil Union     Spouse name: Not on file   • Number of children: Not on file   • Years of education: Not on file   • Highest education level: Not on file   Occupational History   • Not on file   Tobacco Use   • Smoking status: Former Smoker     Packs/day: 0 50     Years: 15 00     Pack years: 7 50     Types: Cigarettes     Quit date:      Years since quittin 8   • Smokeless tobacco: Never Used   Vaping Use   • Vaping Use: Never used   Substance and Sexual Activity   • Alcohol use:  Yes     Alcohol/week: 3 0 standard drinks     Types: 3 Standard drinks or equivalent per week     Comment: socially   • Drug use: Yes     Types: Marijuana     Comment: most everyday    • Sexual activity: Not on file   Other Topics Concern   • Not on file   Social History Narrative    Active advance directive    Caffeine use    Dental care, regularly    Drinks coffee     Social Determinants of Health     Financial Resource Strain: Not on file   Food Insecurity: Not on file   Transportation Needs: Not on file   Physical Activity: Not on file   Stress: Not on file   Social Connections: Not on file   Intimate Partner Violence: Not on file   Housing Stability: Not on file       Current Medications  Current Outpatient Medications   Medication Sig Dispense Refill   • acetaminophen (TYLENOL) 325 mg tablet Take 2 tablets (650 mg total) by mouth every 6 (six) hours as needed for mild pain (Patient not taking: No sig reported) 30 tablet 0   • apixaban (Eliquis) 5 mg Take 1 tablet (5 mg total) by mouth 2 (two) times a day 180 tablet 3   • Blood Glucose Monitoring Suppl (BLOOD GLUCOSE MONITOR SYSTEM) w/Device KIT by Does not apply route     • finasteride (PROSCAR) 5 mg tablet TAKE ONE TABLET BY MOUTH EVERY DAY 30 tablet 5   • glipiZIDE (GLUCOTROL XL) 10 mg 24 hr tablet TAKE 1 TABLET BY MOUTH EVERY DAY WITH BREAKFAST 90 tablet 3   • Glucosamine-Chondroitin (GLUCOSAMINE CHONDR COMPLEX PO) Take by mouth 2 (two) times a day     • LORazepam (Ativan) 0 5 mg tablet Take 1 tablet (0 5 mg total) by mouth daily as needed for anxiety 10 tablet 0   • metFORMIN (GLUCOPHAGE) 500 mg tablet TAKE TWO TABLETS BY MOUTH EVERY MORNING AND TAKE one TABLET BY MOUTH IN THE EVENING (GENERIC GLUCOPHAGE) 270 tablet 3   • metoprolol succinate (TOPROL-XL) 25 mg 24 hr tablet Take 1 tablet (25 mg total) by mouth every morning 90 tablet 3   • Multiple Vitamins-Minerals (MEGAVITE FRUITS & VEGGIES PO) Take by mouth in the morning     • predniSONE 20 mg tablet 2 PO QD X 4 DAYS, THEN 1 PO QD X 4 DAYS, THEN 1/2 PO QD X 4 DAYS 14 tablet 0   • promethazine-codeine (PHENERGAN WITH CODEINE) 6 25-10 mg/5 mL syrup Take 5 mL by mouth every 4 (four) hours as needed for cough 240 mL 0   • ramipril (ALTACE) 5 mg capsule Take 1 capsule (5 mg total) by mouth every morning 90 capsule 3   • sertraline (ZOLOFT) 100 mg tablet Take 1 tablet (100 mg total) by mouth daily 90 tablet 1   • tamsulosin (FLOMAX) 0 4 mg TAKE 1 CAPSULE BY MOUTH EVERYDAY AT BEDTIME 90 capsule 3     No current facility-administered medications for this visit  Allergies  Allergies   Allergen Reactions   • Ceftin [Cefuroxime] GI Intolerance and Vomiting         The following portions of the patient's history were reviewed and updated as appropriate: allergies, current medications, past medical history, past social history, past surgical history and problem list       Vitals  There were no vitals filed for this visit  Physical Exam  Physical Exam  Constitutional:       Appearance: Normal appearance  HENT:      Head: Normocephalic and atraumatic  Right Ear: External ear normal       Left Ear: External ear normal    Eyes:      General: No scleral icterus  Conjunctiva/sclera: Conjunctivae normal    Cardiovascular:      Pulses: Normal pulses  Pulmonary:      Effort: Pulmonary effort is normal    Musculoskeletal:         General: Normal range of motion  Cervical back: Normal range of motion  Neurological:      General: No focal deficit present  Mental Status: He is alert and oriented to person, place, and time  Psychiatric:         Mood and Affect: Mood normal          Behavior: Behavior normal          Thought Content:  Thought content normal          Judgment: Judgment normal            Results  No results found for this or any previous visit (from the past 1 hour(s)) ]  Lab Results   Component Value Date    PSA 2 1 11/15/2019    PSA 4 8 (H) 03/29/2017     Lab Results Component Value Date    GLUCOSE 132 (H) 03/31/2017    CALCIUM 9 1 10/22/2020     03/31/2017    K 5 6 (H) 11/16/2020    CO2 26 11/16/2020     11/16/2020    BUN 17 11/16/2020    CREATININE 0 97 11/16/2020     Lab Results   Component Value Date    WBC 10 17 (H) 09/03/2022    HGB 10 3 (L) 09/03/2022    HCT 33 7 (L) 09/03/2022    MCV 92 09/03/2022     09/03/2022           Orders  No orders of the defined types were placed in this encounter        Erving Anger

## 2022-12-09 ENCOUNTER — CLINICAL SUPPORT (OUTPATIENT)
Dept: FAMILY MEDICINE CLINIC | Facility: CLINIC | Age: 77
End: 2022-12-09

## 2022-12-09 DIAGNOSIS — Z23 NEED FOR INFLUENZA VACCINATION: Primary | ICD-10-CM

## 2022-12-14 DIAGNOSIS — I48.91 NEW ONSET A-FIB (HCC): ICD-10-CM

## 2022-12-14 RX ORDER — APIXABAN 5 MG/1
TABLET, FILM COATED ORAL
Qty: 60 TABLET | Refills: 8 | Status: SHIPPED | OUTPATIENT
Start: 2022-12-14

## 2023-01-10 ENCOUNTER — PROCEDURE VISIT (OUTPATIENT)
Dept: UROLOGY | Facility: CLINIC | Age: 78
End: 2023-01-10

## 2023-01-10 VITALS
SYSTOLIC BLOOD PRESSURE: 126 MMHG | BODY MASS INDEX: 32.23 KG/M2 | WEIGHT: 238 LBS | DIASTOLIC BLOOD PRESSURE: 74 MMHG | HEIGHT: 72 IN

## 2023-01-10 DIAGNOSIS — N40.0 BENIGN PROSTATIC HYPERPLASIA, UNSPECIFIED WHETHER LOWER URINARY TRACT SYMPTOMS PRESENT: Primary | ICD-10-CM

## 2023-01-10 NOTE — PROGRESS NOTES
Cystoscopy     Date/Time 1/10/2023 2:32 PM     Performed by  Charleen Rasheed MD     Authorized by Charleen Rasheed MD          Procedure Details:  Procedure type: cystoscopy       Office Cystoscopy Procedure Note    Indication:    Medically refractory lower urinary tract symptoms    Informed consent   The risks, benefits, complications, treatment options, and expected outcomes were discussed with the patient  The patient concurred with the proposed plan and provided informed consent  Anesthesia  Lidocaine jelly 2%    Procedure  The patient was placed in the supineposition, was prepped and draped in the usual manner using sterile technique, and 2% lidocaine jelly instilled into the urethra  A 17 F flexible cystoscope was then inserted into the urethra and the urethra and bladder carefully examined  The following findings were noted:    Findings:  Urethra:  Normal  Prostate:    Bilobar hyperplasia  Bladder:  Normal  Ureteral orifices:  Normal  Other findings:  None       Office TRUS    Indication    Prostate volumetrics for surgical planning    Transrectal ultrasonography  After completing the cystoscopy, the patient was placed in the left lateral decubitus position  After an attentive digital rectal examination, a 7 5 mHz sidefire ultrasound probe was gently inserted into the rectum and biplanar imaging of the prostate was done with the findings noted below  Images were taken of any abnormal findings and also to document prostate size  Bladder  The bladder base appeared normal     Prostate      Ultrasound size measurements:  -Volume:  70 cm3    Ultrasound findings:  -Cysts: None  -Masses: None  -Median lobe: absent        Specimens: None                 Complications:    None; patient tolerated the procedure well           Disposition: To home after 30 minute observation             Condition: Stable

## 2023-01-10 NOTE — PROGRESS NOTES
Referring Physician: Smith Simms MD  A copy of this note was sent to the referring physician  Diagnoses and all orders for this visit:    Benign prostatic hyperplasia, unspecified whether lower urinary tract symptoms present  -     Cystoscopy            Assessment and plan:       1  Refractory lower urinary tract symptoms  -Patient has failed tamsulosin and finasteride in  Combination      We reviewed the options for treating BPH/LUTS which include but are not limited to expectant management, medical therapy, transurethral resection of prostate (TURP)  We also discussed minimally invasive options  Compared and contrasted the differential effectiveness in terms of AUA symptom score, symptom complex improvement, as well as the data regarding longevity of each surgical option  We also discussed the differential recovery time between each modality  At this point, the patient wishes to proceed with Urolift  This is a great option for the patient based on the following criteria:    - cystoscopy revealed pure bilobar hyperplasia  - no active urinary tract infection  - no documented allergy to nickel    - He has failed the following treatment options: Tamsulosin, finasteride  The risks of Urolift include but are not limited to bleeding, infection, reaction to anesthesia such as heart attack, stroke, DVT/PE, hyponatremia, bladder neck contracture, urethral stricture, injury to surrounding structures (ureters, rectum, etc), complications from implants including capsular tap perforation, development of bladder calculi  We discussed that additional risks of trans urethral resection procedures such as incontinence, retrograde ejaculation, and erectile dysfunction have been only rarely reported with the Uro lift procedure  We did discuss that this is a new were procedure and a permanent implant    We discussed that there may be some long-term implications that her on for seen with this newer technology, such as perhaps complicating treatment for prostate cancer if indicated down the line  Finally, I told him that he may require additional procedures secondary to some of these complications  Informed consent was obtained for the Uro lift procedure  This will be scheduled in the near future to be performed under IV sedation  We will request medical clearance and the patient's primary care physician to discontinue Eliquis 2 days prior to the UroLift, and to be resumed on postoperative day #2-4 100 upon the degree of postoperative hematuria (3-5 days total)    Daily Mejia MD      Chief Complaint     BPH follow-up      History of Present Illness     Elie Javier is a 76 y o  returns in follow-up for medically refractory BPH    Detailed Urologic History     - please refer to HPI    Review of Systems     Review of Systems   Constitutional: Negative for activity change and fatigue  HENT: Negative for congestion  Eyes: Negative for visual disturbance  Respiratory: Negative for shortness of breath and wheezing  Cardiovascular: Negative for chest pain and leg swelling  Gastrointestinal: Negative for abdominal pain  Endocrine: Negative for polyuria  Genitourinary: Negative for dysuria, flank pain, hematuria and urgency  Musculoskeletal: Negative for back pain  Allergic/Immunologic: Negative for immunocompromised state  Neurological: Negative for dizziness and numbness  Psychiatric/Behavioral: Negative for dysphoric mood  All other systems reviewed and are negative  AUA SYMPTOM SCORE    Flowsheet Row Most Recent Value   AUA SYMPTOM SCORE    How often have you had a sensation of not emptying your bladder completely after you finished urinating? 3   How often have you had to urinate again less than two hours after you finished urinating? 2   How often have you found you stopped and started again several times when you urinate?  3   How often have you found it difficult to postpone urination? 0   How often have you had a weak urinary stream? 2   How often have you had to push or strain to begin urination? 2   How many times did you most typically get up to urinate from the time you went to bed at night until the time you got up in the morning? 2   Quality of Life: If you were to spend the rest of your life with your urinary condition just the way it is now, how would you feel about that? 3   AUA SYMPTOM SCORE 14            Allergies     Allergies   Allergen Reactions   • Ceftin [Cefuroxime] GI Intolerance and Vomiting       Physical Exam       Physical Exam  Constitutional:       General: He is not in acute distress  Appearance: He is well-developed  HENT:      Head: Normocephalic and atraumatic  Cardiovascular:      Comments: Negative lower extremity edema  Pulmonary:      Effort: Pulmonary effort is normal       Breath sounds: Normal breath sounds  Abdominal:      Palpations: Abdomen is soft  Musculoskeletal:         General: Normal range of motion  Cervical back: Normal range of motion  Skin:     General: Skin is warm  Neurological:      Mental Status: He is alert and oriented to person, place, and time     Psychiatric:         Behavior: Behavior normal            Vital Signs  Vitals:    01/10/23 1345   BP: 126/74   BP Location: Left arm   Patient Position: Sitting   Cuff Size: Adult   Weight: 108 kg (238 lb)   Height: 6' (1 829 m)         Current Medications       Current Outpatient Medications:   •  acetaminophen (TYLENOL) 325 mg tablet, Take 2 tablets (650 mg total) by mouth every 6 (six) hours as needed for mild pain, Disp: 30 tablet, Rfl: 0  •  Blood Glucose Monitoring Suppl (BLOOD GLUCOSE MONITOR SYSTEM) w/Device KIT, by Does not apply route, Disp: , Rfl:   •  Eliquis 5 MG, TAKE 1 TABLET BY MOUTH TWICE A DAY, Disp: 60 tablet, Rfl: 8  •  finasteride (PROSCAR) 5 mg tablet, TAKE ONE TABLET BY MOUTH EVERY DAY, Disp: 30 tablet, Rfl: 5  •  glipiZIDE (GLUCOTROL XL) 10 mg 24 hr tablet, TAKE 1 TABLET BY MOUTH EVERY DAY WITH BREAKFAST, Disp: 90 tablet, Rfl: 3  •  Glucosamine-Chondroitin (GLUCOSAMINE CHONDR COMPLEX PO), Take by mouth 2 (two) times a day, Disp: , Rfl:   •  LORazepam (Ativan) 0 5 mg tablet, Take 1 tablet (0 5 mg total) by mouth daily as needed for anxiety, Disp: 10 tablet, Rfl: 0  •  metFORMIN (GLUCOPHAGE) 500 mg tablet, TAKE TWO TABLETS BY MOUTH EVERY MORNING AND TAKE one TABLET BY MOUTH IN THE EVENING (GENERIC GLUCOPHAGE), Disp: 270 tablet, Rfl: 3  •  metoprolol succinate (TOPROL-XL) 25 mg 24 hr tablet, Take 1 tablet (25 mg total) by mouth every morning, Disp: 90 tablet, Rfl: 3  •  Multiple Vitamins-Minerals (MEGAVITE FRUITS & VEGGIES PO), Take by mouth in the morning, Disp: , Rfl:   •  predniSONE 20 mg tablet, 2 PO QD X 4 DAYS, THEN 1 PO QD X 4 DAYS, THEN 1/2 PO QD X 4 DAYS, Disp: 14 tablet, Rfl: 0  •  promethazine-codeine (PHENERGAN WITH CODEINE) 6 25-10 mg/5 mL syrup, Take 5 mL by mouth every 4 (four) hours as needed for cough, Disp: 240 mL, Rfl: 0  •  ramipril (ALTACE) 5 mg capsule, Take 1 capsule (5 mg total) by mouth every morning, Disp: 90 capsule, Rfl: 3  •  sertraline (ZOLOFT) 100 mg tablet, Take 1 tablet (100 mg total) by mouth daily, Disp: 90 tablet, Rfl: 1  •  tamsulosin (FLOMAX) 0 4 mg, TAKE 1 CAPSULE BY MOUTH EVERYDAY AT BEDTIME, Disp: 90 capsule, Rfl: 3      Active Problems     Patient Active Problem List   Diagnosis   • Adjustment disorder with anxious mood   • Cataract of both eyes   • Chronic allergic rhinitis   • Cough variant asthma   • Dyspnea   • Enlarged prostate   • Knee pain   • Mass of knee   • Mild persistent asthma without complication   • Moderate obstructive sleep apnea   • Obesity with body mass index 30 or greater   • Left knee DJD   • Pes anserinus tendinitis or bursitis   • Restrictive lung disease   • Sciatica   • Tear of medial meniscus of knee   • Tinnitus   • Paroxysmal atrial fibrillation (HCC)   • Type 2 diabetes mellitus with hyperglycemia, without long-term current use of insulin (HCC)   • Moderate episode of recurrent major depressive disorder (HCC)   • History of infection due to Streptococcus pneumoniae   • Arthralgia   • Hypertension goal BP (blood pressure) < 140/90         Past Medical History     Past Medical History:   Diagnosis Date   • Anxiety    • Arthritis     fingers , knee   • BPH (benign prostatic hypertrophy)    • Cataract     currently left eye- to have surgery on 4/10/17   • Cough variant asthma 2017   • Diabetes mellitus ([facility] )     type 2, last assessed 2017   • Hernia, umbilical     currently has   • HL (hearing loss)     b/l hearing aids   • Labyrinthitis    • Moderate obstructive sleep apnea 2017   • [facility] onset [facility]fib ([facility] ) 1/10/2020   • Obesity with body mass index 30 or greater 7990   • Umbilical hernia          Surgical History     Past Surgical History:   Procedure Laterality Date   • CATARACT EXTRACTION Right 2017   • COLONOSCOPY      yrs ago   • EYE SURGERY Bilateral     cataracts with IOL         Family History     Family History   Problem Relation Age of Onset   • Cancer Mother         ovarian   • Diabetes Father    • Cancer Sister         stomach to brain   • Substance Abuse Family    • Substance Abuse Son    • Mental illness Neg Hx          Social History     Social History     Social History     Tobacco Use   Smoking Status Former   • Packs/day: 0 50   • Years: 15 00   • Pack years: 7 50   • Types: Cigarettes   • Quit date:    • Years since quittin 0   Smokeless Tobacco Never         Pertinent Lab Values     Lab Results   Component Value Date    CREATININE 0 97 2020       Lab Results   Component Value Date    PSA 2 1 11/15/2019    PSA 4 8 (H) 2017       @RESULTRCNT(1H])@      Pertinent Imaging      - n/a    Portions of the record may have been created with voice recognition software    Occasional wrong word or "sound a like" substitutions may have occurred due to the inherent limitations of voice recognition software  Read the chart carefully and recognize, using context, where substitutions have occurred

## 2023-01-17 ENCOUNTER — OFFICE VISIT (OUTPATIENT)
Dept: CARDIOLOGY CLINIC | Facility: CLINIC | Age: 78
End: 2023-01-17

## 2023-01-17 VITALS
OXYGEN SATURATION: 95 % | BODY MASS INDEX: 32.51 KG/M2 | TEMPERATURE: 98 F | SYSTOLIC BLOOD PRESSURE: 138 MMHG | WEIGHT: 240 LBS | DIASTOLIC BLOOD PRESSURE: 82 MMHG | HEART RATE: 60 BPM | HEIGHT: 72 IN

## 2023-01-17 DIAGNOSIS — G47.33 MODERATE OBSTRUCTIVE SLEEP APNEA: ICD-10-CM

## 2023-01-17 DIAGNOSIS — I10 ESSENTIAL HYPERTENSION: ICD-10-CM

## 2023-01-17 DIAGNOSIS — E11.65 TYPE 2 DIABETES MELLITUS WITH HYPERGLYCEMIA, WITHOUT LONG-TERM CURRENT USE OF INSULIN (HCC): ICD-10-CM

## 2023-01-17 DIAGNOSIS — I48.91 NEW ONSET A-FIB (HCC): Primary | ICD-10-CM

## 2023-01-17 RX ORDER — RAMIPRIL 5 MG/1
5 CAPSULE ORAL EVERY MORNING
Qty: 90 CAPSULE | Refills: 3 | Status: SHIPPED | OUTPATIENT
Start: 2023-01-17

## 2023-01-17 NOTE — PROGRESS NOTES
[facility]  [address]  [address], [address]   [place], [address]  Cardiology Consultation  Taran Flow  1945  [phone]  [facility]  [address]  [address] [place] [address] 100, [address]  [phone]    1  New onset a-fib [facility])  POCT ECG      2  Essential hypertension  POCT ECG      3  Type 2 diabetes mellitus with hyperglycemia, without long-term current use of insulin (HCC)  POCT ECG      4  Moderate obstructive sleep apnea  POCT ECG         Discussion/Summary:   Paroxysmal afib- eliquis 5mg bid + [id number] in rmoning  He understands his elevated chads Vasc score-4  Acute systolic cardiomyopathy- likely secondary to AFib with RVR  Continue metoprolol plus lisinopril  No evidence of decompensated heart failure  Dm2- tight glucose control  TRISTIN- moderate sleep apnea  Will follow-up with sleep specialist   Htn- ramipril 5mg morning    Interval History:   65 yo gentleman with recent hospitalization for upper respiratory infection found to have paroxysmal atrial fibrillation  He had successful cardioversion and remains in normal sinus rhythm  His heart rates have been adequately controlled  He denies having any significant exertional dyspnea  Denies having significant bleeding or bruising  Denies feeling dizziness or lightheadedness  His heart rate and blood pressure controlled  He is currently in sinus bradycardia 57 beats per minute  10/27/2020:  He will follow up with a sleep specialist   We discussed about need for continued anticoagulation with his paroxysmal atrial fibrillation  Currently his heart rate is suppressed  His blood pressure is mildly elevated  He will start back his ramipril  12/02/2021: We reviewed through his last sleep study  He denies having major palpitations  We reviewed his medications  He understands about the increased risk of stroke with atrial fibrillation    He is willing to try CPAP  2023: Reviewed through his last abnormal sleep study  He is compliant with medications  Past Medical History:   Diagnosis Date   • Anxiety    • Arthritis     fingers , knee   • BPH (benign prostatic hypertrophy)    • Cataract     currently left eye- to have surgery on 4/10/17   • Cough variant asthma 2017   • Diabetes mellitus ([place] [address] )     type 2, last assessed 2017   • Hernia, umbilical     currently has   • HL (hearing loss)     b/l hearing aids   • Labyrinthitis    • Moderate obstructive sleep apnea 2017   • New onset a-fib ([facility] ) 1/10/2020   • Obesity with body mass index 30 or greater    • Umbilical hernia      Social History     Socioeconomic History   • Marital status: /Civil Union     Spouse name: Not on file   • Number of children: Not on file   • Years of education: Not on file   • Highest education level: Not on file   Occupational History   • Not on file   Tobacco Use   • Smoking status: Former     Packs/day: 0 50     Years: 15 00     Pack years: 7 50     Types: Cigarettes     Quit date: 26     Years since quittin 0   • Smokeless tobacco: Never   Vaping Use   • Vaping Use: Never used   Substance and Sexual Activity   • Alcohol use:  Yes     Alcohol/week: 3 0 standard drinks     Types: 3 Standard drinks or equivalent per week     Comment: socially   • Drug use: Yes     Types: Marijuana     Comment: most everyday    • Sexual activity: Not on file   Other Topics Concern   • Not on file   Social History Narrative    Active advance directive    Caffeine use    Dental care, regularly    Drinks coffee     Social Determinants of Health     Financial Resource Strain: Not on file   Food Insecurity: Not on file   Transportation Needs: Not on file   Physical Activity: Not on file   Stress: Not on file   Social Connections: Not on file   Intimate Partner Violence: Not on file   Housing Stability: Not on file      Family History   Problem Relation Age of Onset   • Cancer Mother         ovarian   • Diabetes Father    • Cancer Sister         stomach to brain   • Substance Abuse Family    • Substance Abuse Son    • Mental illness Neg Hx      Past Surgical History:   Procedure Laterality Date   • CATARACT EXTRACTION Right 04/04/2017   • COLONOSCOPY      yrs ago   • EYE SURGERY Bilateral     cataracts with IOL       Current Outpatient Medications:   •  Blood Glucose Monitoring Suppl (BLOOD GLUCOSE MONITOR SYSTEM) w/Device KIT, by Does not apply route, Disp: , Rfl:   •  Eliquis 5 MG, TAKE 1 TABLET BY MOUTH TWICE A DAY, Disp: 60 tablet, Rfl: 8  •  finasteride (PROSCAR) 5 mg tablet, TAKE ONE TABLET BY MOUTH EVERY DAY, Disp: 30 tablet, Rfl: 5  •  glipiZIDE (GLUCOTROL XL) 10 mg 24 hr tablet, TAKE 1 TABLET BY MOUTH EVERY DAY WITH BREAKFAST, Disp: 90 tablet, Rfl: 3  •  Glucosamine-Chondroitin (GLUCOSAMINE CHONDR COMPLEX PO), Take by mouth 2 (two) times a day, Disp: , Rfl:   •  LORazepam (Ativan) 0 5 mg tablet, Take 1 tablet (0 5 mg total) by mouth daily as needed for anxiety, Disp: 10 tablet, Rfl: 0  •  metFORMIN (GLUCOPHAGE) 500 mg tablet, TAKE TWO TABLETS BY MOUTH EVERY MORNING AND TAKE one TABLET BY MOUTH IN THE EVENING (GENERIC GLUCOPHAGE), Disp: 270 tablet, Rfl: 3  •  metoprolol succinate (TOPROL-XL) 25 mg 24 hr tablet, Take 1 tablet (25 mg total) by mouth every morning, Disp: 90 tablet, Rfl: 3  •  Multiple Vitamins-Minerals (MEGAVITE FRUITS & VEGGIES PO), Take by mouth in the morning, Disp: , Rfl:   •  ramipril (ALTACE) 5 mg capsule, Take 1 capsule (5 mg total) by mouth every morning, Disp: 90 capsule, Rfl: 3  •  sertraline (ZOLOFT) 100 mg tablet, Take 1 tablet (100 mg total) by mouth daily, Disp: 90 tablet, Rfl: 1  •  tamsulosin (FLOMAX) 0 4 mg, TAKE 1 CAPSULE BY MOUTH EVERYDAY AT BEDTIME, Disp: 90 capsule, Rfl: 3  •  acetaminophen (TYLENOL) 325 mg tablet, Take 2 tablets (650 mg total) by mouth every 6 (six) hours as needed for mild pain, Disp: 30 tablet, Rfl: 0  • predniSONE 20 mg tablet, 2 PO QD X 4 DAYS, THEN 1 PO QD X 4 DAYS, THEN 1/2 PO QD X 4 DAYS, Disp: 14 tablet, Rfl: 0  •  promethazine-codeine (PHENERGAN WITH CODEINE) 6 25-10 mg/5 mL syrup, Take 5 mL by mouth every 4 (four) hours as needed for cough, Disp: 240 mL, Rfl: 0  Allergies   Allergen Reactions   • Ceftin [Cefuroxime] GI Intolerance and Vomiting     Vitals:    01/17/23 1512   BP: 138/82   BP Location: Right arm   Patient Position: Sitting   Cuff Size: Standard   Pulse: 60   Temp: 98 °F (36 7 °C)   SpO2: 95%   Weight: 109 kg (240 lb)   Height: 6' (1 829 m)       Review of Systems:   Review of Systems   Constitutional: Negative  HENT: Negative  Eyes: Negative  Respiratory: Negative  Cardiovascular: Positive for palpitations  Gastrointestinal: Negative  Endocrine: Negative  Genitourinary: Negative  Musculoskeletal: Negative  Skin: Negative  Allergic/Immunologic: Negative  Neurological: Negative  Hematological: Negative  Psychiatric/Behavioral: Negative  Vitals:    01/17/23 1512   BP: 138/82   BP Location: Right arm   Patient Position: Sitting   Cuff Size: Standard   Pulse: 60   Temp: 98 °F (36 7 °C)   SpO2: 95%   Weight: 109 kg (240 lb)   Height: 6' (1 829 m)     Physical Examination:   Physical Exam  Constitutional:       General: He is not in acute distress  Appearance: He is well-developed  He is not diaphoretic  HENT:      Head: Normocephalic and atraumatic  Right Ear: External ear normal       Left Ear: External ear normal    Eyes:      General: No scleral icterus  Right eye: No discharge  Left eye: No discharge  Conjunctiva/sclera: Conjunctivae normal       Pupils: Pupils are equal, round, and reactive to light  Neck:      Thyroid: No thyromegaly  Vascular: No JVD  Trachea: No tracheal deviation  Cardiovascular:      Rate and Rhythm: Normal rate  Rhythm irregular  Heart sounds: Murmur heard  No friction rub  Gallop present  Pulmonary:      Effort: Pulmonary effort is normal  No respiratory distress  Breath sounds: Normal breath sounds  No stridor  No wheezing or rales  Chest:      Chest wall: No tenderness  Abdominal:      General: Bowel sounds are normal  There is no distension  Palpations: Abdomen is soft  There is no mass  Tenderness: There is no abdominal tenderness  There is no guarding or rebound  Musculoskeletal:         General: No tenderness or deformity  Normal range of motion  Cervical back: Normal range of motion and neck supple  Skin:     General: Skin is warm and dry  Coloration: Skin is not pale  Findings: No erythema or rash  Neurological:      Mental Status: He is alert and oriented to person, place, and time  Cranial Nerves: No cranial nerve deficit  Motor: No abnormal muscle tone  Coordination: Coordination normal       Deep Tendon Reflexes: Reflexes are normal and symmetric  Reflexes normal    Psychiatric:         Behavior: Behavior normal          Thought Content:  Thought content normal          Judgment: Judgment normal          Labs:     Lab Results   Component Value Date    WBC 10 17 (H) 09/03/2022    HGB 10 3 (L) 09/03/2022    HCT 33 7 (L) 09/03/2022    MCV 92 09/03/2022    RDW 16 0 (H) 09/03/2022     09/03/2022     BMP:  Lab Results   Component Value Date    SODIUM 138 11/16/2020    K 5 6 (H) 11/16/2020     11/16/2020    CO2 26 11/16/2020    BUN 17 11/16/2020    CREATININE 0 97 11/16/2020    GLUC 162 (H) 11/16/2020    GLUF 204 (H) 10/22/2020    CALCIUM 9 1 10/22/2020    EGFR 59 10/22/2020    MG 1 6 01/13/2020     LFT:  Lab Results   Component Value Date    AST 16 09/03/2022    ALT 19 09/03/2022    ALKPHOS 50 09/03/2022    TP 6 7 09/03/2022    ALB 3 0 (L) 09/03/2022      Lab Results   Component Value Date    [id number] 0 831 10/22/2020     Lab Results   Component Value Date    HGBA1C 7 3 (A) 06/22/2022     Lipid Profile: Lab Results   Component Value Date    CHOLESTEROL 184 10/22/2020    HDL 44 10/22/2020    LDLCALC 104 (H) 10/22/2020    TRIG 178 (H) 10/22/2020     Lab Results   Component Value Date    CHOLESTEROL 184 10/22/2020     Lab Results   Component Value Date    CKTOTAL 65 2022    TROPONINI <0 02 01/10/2020     Lab Results   Component Value Date    NTBNP 1,781 (H) 01/10/2020      No results found for this or any previous visit (from the past 672 hour(s))  Imaging & Testing   I have personally reviewed pertinent reports  Cardiac Testing     Results for orders placed during the hospital encounter of 01/10/20   Echo complete with contrast if indicated    Narrative [address]  [address] [facility] [phone]    Transthoracic Echocardiogram  2D, M-mode, Doppler, and Color Doppler    Study date:  2020    Patient: Gagandeep Silva  MR number: [id number]  Account number: [de-identified]  : 1945  Age: 66 years  Gender: Male  Status: Inpatient  Location: Bedside  Height: 72 in  Weight: 249 5 lb  BP: 139/ 73 mmHg    Indications: Atrial Fibrillation    Diagnoses: I48 0 - Atrial fibrillation    Sonographer:  Dominguez Bonner RDCS  Referring Physician:  Juana Hyatt MD  Group:  [facility]  Interpreting Physician:  Frankie Parra MD    SUMMARY    LEFT VENTRICLE:  Systolic function was mildly reduced  Ejection fraction was estimated in the range of 45 % to 50 %  There was mild diffuse hypokinesis  Wall thickness was mildly increased  LEFT ATRIUM:  The atrium was moderately dilated  RIGHT ATRIUM:  The atrium was moderately dilated  AORTIC VALVE:  Although there was no diagnostic evidence for vegetation, this study is not adequate to completely exclude the possibility  HISTORY: PRIOR HISTORY: Diabetes Mellitus, Obstructive Sleep Apnea, Asthma    PROCEDURE: The procedure was performed at the bedside  This was a routine study   The transthoracic approach was used  The study included complete 2D imaging, M-mode, complete spectral Doppler, and color Doppler  The heart rate was 88 bpm,  at the start of the study  Intravenous contrast ( 1 2 ml Definity in NSS, 1 ml) was administered to opacify the left ventricle  Echocardiographic views were limited due to decreased penetration and lung interference  This was a technically  difficult study  LEFT VENTRICLE: Size was normal  Systolic function was mildly reduced  Ejection fraction was estimated in the range of 45 % to 50 %  There was mild diffuse hypokinesis  Wall thickness was mildly increased  No evidence of apical thrombus  DOPPLER: The study was not technically sufficient to allow evaluation of LV diastolic function  RIGHT VENTRICLE: The size was normal  Systolic function was normal with TAPSE-2 2cm Wall thickness was normal     LEFT ATRIUM: The atrium was moderately dilated  RIGHT ATRIUM: The atrium was moderately dilated  MITRAL VALVE: Valve structure was normal  There was normal leaflet separation  DOPPLER: The transmitral velocity was within the normal range  There was no evidence for stenosis  There was no significant regurgitation  AORTIC VALVE: Leaflets exhibited mildly increased thickness, mild calcification, lower normal cuspal separation, and sclerosis  Although there was no diagnostic evidence for vegetation, this study is not adequate to completely exclude the  possibility  DOPPLER: Transaortic velocity was within the normal range  There was no evidence for stenosis  There was no significant regurgitation  TRICUSPID VALVE: The valve structure was normal  There was normal leaflet separation  DOPPLER: The transtricuspid velocity was within the normal range  There was no evidence for stenosis  There was no significant regurgitation  PULMONIC VALVE: Leaflets exhibited normal thickness, no calcification, and normal cuspal separation   DOPPLER: The transpulmonic velocity was within the normal range  There was no significant regurgitation  PERICARDIUM: There was no pericardial effusion  The pericardium was normal in appearance  AORTA: The root exhibited normal size  SYSTEMIC VEINS: IVC: The inferior vena cava was normal in size  SYSTEM MEASUREMENT TABLES    2D mode  AoR Diam 2D: 3 4 cm  LA Diam (2D): 3 7 cm  LA/Ao (2D): 1 09  FS (2D Teich): 22 7 %  IVSd (2D): 1 17 cm  LVDEV: 98 8 cmï¾³  LVESV: 53 7 cmï¾³  LVIDd(2D): 4 63 cm  LVISd (2D): 3 58 cm  LVOT Area 2D: 3 14 cmï¾²  LVPWd (2D): 1 15 cm  SV (Teich): 45 1 cmï¾³    Apical four chamber  LVEF A4C: 46 %    Unspecified Scan Mode  KENTON Cont Eq (Peak Josh): 2 21 cmï¾²  LVOT Diam : 2 cm  LVOT Vmax: 824 mm/s  LVOT Vmax; Mean: 824 mm/s  Peak Grad ; Mean: 3 mm[Hg]  MV Peak A Josh: 326 mm/s  MV Peak E Josh   Mean: 928 mm/s  MVA (PHT): 2 97 cmï¾²  PHT: 74 ms  Max P mm[Hg]  V Max: 2460 mm/s  Vmax: 2310 mm/s  RA Area: 17 cmï¾²  RA Volume: 45 1 cmï¾³  TAPSE: 2 2 cm    [facility]    Prepared and electronically signed by    Leticia Crawley MD  Signed 2020 13:46:20       Results for orders placed during the hospital encounter of 01/10/20   ANGELA    Narrative [address]  [address] [phone]    Transesophageal Echocardiogram    Study date:  2020    Patient: Mick Ibarra  MR number: [id number]  Account number: [de-identified]  : 1945  Age: 66 years  Gender: Male  Status: Inpatient  Location: Cath lab  Height: 72 in  Weight: 250 lb  BP: 118/ 60 mmHg    Indications: Atrial Fibrillation    Diagnoses: 427 31 - ATRIAL FIBRILLATION    Sonographer:  JAGUAR Serrano  Primary Physician:  Harriet Reyna  Referring Physician:  Leticia Crawley MD  Group:  [facility]  RN:  Elizabeth Messer RN  Interpreting Physician:  Leticia Crawley MD    SUMMARY    LEFT VENTRICLE:  Systolic function was mildly reduced  Ejection fraction was estimated in the range of 45 % to 50 % to be 50 %  There was mild diffuse hypokinesis  LEFT ATRIUM:  The atrium was dilated  ATRIAL SEPTUM:  No defect or patent foramen ovale was identified  Contrast injection was performed  There was no right-to-left shunt, with provocative maneuvers to increase right atrial pressure  RIGHT ATRIUM:  The atrium was dilated  MITRAL VALVE:  There was mild regurgitation  There was no echocardiographic evidence of vegetation  AORTIC VALVE:  There was no echocardiographic evidence of vegetation  HISTORY: PRIOR HISTORY: DM, TRISTIN, Asthma    PROCEDURE: The procedure was performed in the catheterization laboratory  This was a routine study  The risks and alternatives of the procedure were explained to the patient and informed consent was obtained  The transesophageal approach  was used  The heart rate was 92 bpm, at the start of the study  An adult omniplane probe was inserted by the attending cardiologist  Intubated with ease  One intubation attempt(s)  There was no blood detected on the probe  Intravenous  contrast (agitated saline, 10 ml) was administered to evaluate shunting  LEFT VENTRICLE: Size was normal  Systolic function was mildly reduced  Ejection fraction was estimated in the range of 45 % to 50 % to be 50 %  There was mild diffuse hypokinesis  Wall thickness was normal  No evidence of apical thrombus  DOPPLER: Left ventricular diastolic function parameters were normal     RIGHT VENTRICLE: The size was normal  Systolic function was normal  Wall thickness was normal     LEFT ATRIUM: The atrium was dilated  ATRIAL SEPTUM: No defect or patent foramen ovale was identified  Contrast injection was performed  There was no right-to-left shunt, with provocative maneuvers to increase right atrial pressure  RIGHT ATRIUM: The atrium was dilated      MITRAL VALVE: Valve structure was normal  There was normal leaflet separation  There was no echocardiographic evidence of vegetation  DOPPLER: The transmitral velocity was within the normal range  There was no evidence for stenosis  There  was mild regurgitation  AORTIC VALVE: The valve was trileaflet  Leaflets exhibited mildly increased thickness, calcification, and normal cuspal separation  There was no echocardiographic evidence of vegetation  DOPPLER: Transaortic velocity was within the normal  range  There was no evidence for stenosis  There was no significant regurgitation  TRICUSPID VALVE: The valve structure was normal  There was normal leaflet separation  DOPPLER: The transtricuspid velocity was within the normal range  There was no evidence for stenosis  There was no significant regurgitation  PULMONIC VALVE: Leaflets exhibited normal thickness, no calcification, and normal cuspal separation  DOPPLER: The transpulmonic velocity was within the normal range  There was no significant regurgitation  PERICARDIUM: There was no pericardial effusion  The pericardium was normal in appearance  AORTA: The root exhibited normal size  There was no significant atheroma noted of the transverse and descending aorta    SYSTEMIC VEINS: IVC: The inferior vena cava was normal in size  [address] Accredited Echocardiography Laboratory    Prepared and electronically signed by    Jeff Sexton MD  Signed 14-Jan-2020 10:49:20         EKG: Personally reviewed  Sinus bradycardia no acute st/t wave changes    AHI- 19 time an hour      Jeff Sexton MD [facility]  [phone]  Please call with any questions or suggestions    Counseling :  A description of the counseling:   Goals and Barriers:  Patient's ability to self care:  Medication side effect reviewed with patient in detail and all their questions answered  "Portions of the record may have been created with voice recognition software   Occasional wrong word or "sound a like" substitutions may have occurred due to the inherent limitations of voice recognition software  Read the chart carefully and recognize, using context, where substitutions have occurred   Please call if you have any questions  "

## 2023-01-18 DIAGNOSIS — I48.91 NEW ONSET A-FIB (HCC): ICD-10-CM

## 2023-02-06 ENCOUNTER — TELEMEDICINE (OUTPATIENT)
Dept: FAMILY MEDICINE CLINIC | Facility: CLINIC | Age: 78
End: 2023-02-06

## 2023-02-06 VITALS — BODY MASS INDEX: 32.51 KG/M2 | WEIGHT: 240 LBS | HEIGHT: 72 IN | TEMPERATURE: 100.6 F

## 2023-02-06 DIAGNOSIS — U07.1 COVID-19: Primary | ICD-10-CM

## 2023-02-06 RX ORDER — MULTIVIT WITH MINERALS/LUTEIN
1000 TABLET ORAL DAILY
COMMUNITY

## 2023-02-06 RX ORDER — MULTIVITAMIN WITH IRON
TABLET ORAL DAILY
COMMUNITY

## 2023-02-06 RX ORDER — VITAMIN B COMPLEX
1 CAPSULE ORAL DAILY
COMMUNITY

## 2023-02-06 RX ORDER — NIRMATRELVIR AND RITONAVIR 150-100 MG
2 KIT ORAL 2 TIMES DAILY
Qty: 20 TABLET | Refills: 0 | Status: SHIPPED | OUTPATIENT
Start: 2023-02-06 | End: 2023-02-11

## 2023-02-06 NOTE — PROGRESS NOTES
19 y o    female at 35w1d EGA for PNV  BP : 130/78  TW  NST reassuring  Denies contractions/lOF or vaginal bleeding  Good FM  BPs good at home per patient  MELONIE 14 today  GBS collected  SVE /-3  Discussed IOL at 37 wks, scheduled for  at 8pm  Consent signed  Discusssed perez/cytotec/pitocin/Epidural  RTO in one week  COVID-19 Outpatient Progress Note    Assessment/Plan:    Problem List Items Addressed This Visit    None  Visit Diagnoses     COVID-19    -  Primary    Relevant Medications    nirmatrelvir & ritonavir (Paxlovid, 150/100,) tablet therapy pack         Disposition:     Discussed symptom directed medication options with patient  Discussed vitamin D, vitamin C, and/or zinc supplementation with patient  POSITIVE COVID TEST 2/5    Patient meets criteria for PAXLOVID and they have been counseled appropriately according to EUA documentation released by the FDA  After discussion, patient agrees to treatment  Wilhemenia Landau is an investigational medicine used to treat mild-to-moderate COVID-19 in adults and children (15years of age and older weighing at least 80 pounds (40 kg)) with positive results of direct SARS-CoV-2 viral testing, and who are at high risk for progression to severe COVID-19, including hospitalization or death  PAXLOVID is investigational because it is still being studied  There is limited information about the safety and effectiveness of using PAXLOVID to treat people with mild-to-moderate COVID-19  The FDA has authorized the emergency use of PAXLOVID for the treatment of mild-tomoderate COVID-19 in adults and children (15years of age and older weighing at least 80 pounds (40 kg)) with a positive test for the virus that causes COVID-19, and who are at high risk for progression to severe COVID-19, including hospitalization or death, under an EUA  What should I tell my healthcare provider before I take PAXLOVID? Tell your healthcare provider if you:  - Have any allergies  - Have liver or kidney disease  - Are pregnant or plan to become pregnant  - Are breastfeeding a child  - Have any serious illnesses    Tell your healthcare provider about all the medicines you take, including prescription and over-the-counter medicines, vitamins, and herbal supplements   Some medicines may interact with PAXLOVID and may cause serious side effects  Keep a list of your medicines to show your healthcare provider and pharmacist when you get a new medicine  You can ask your healthcare provider or pharmacist for a list of medicines that interact with PAXLOVID  Do not start taking a new medicine without telling your healthcare provider  Your healthcare provider can tell you if it is safe to take PAXLOVID with other medicines  Tell your healthcare provider if you are taking combined hormonal contraceptive  PAXLOVID may affect how your birth control pills work  Females who are able to become pregnant should use another effective alternative form of contraception or an additional barrier method of contraception  Talk to your healthcare provider if you have any questions about contraceptive methods that might be right for you  How do I take PAXLOVID? PAXLOVID consists of 2 medicines: nirmatrelvir and ritonavir  - Take 2 pink tablets of nirmatrelvir with 1 white tablet of ritonavir by mouth 2 times each day (in the morning and in the evening) for 5 days  For each dose, take all 3 tablets at the same time  - If you have kidney disease, talk to your healthcare provider  You may need a different dose  - Swallow the tablets whole  Do not chew, break, or crush the tablets  - Take PAXLOVID with or without food  - Do not stop taking PAXLOVID without talking to your healthcare provider, even if you feel better  - If you miss a dose of PAXLOVID within 8 hours of the time it is usually taken, take it as soon as you remember  If you miss a dose by more than 8 hours, skip the missed dose and take the next dose at your regular time  Do not take 2 doses of PAXLOVID at the same time  - If you take too much PAXLOVID, call your healthcare provider or go to the nearest hospital emergency room right away    - If you are taking a ritonavir- or cobicistat-containing medicine to treat hepatitis C or Human Immunodeficiency Virus (HIV), you should continue to take your medicine as prescribed by your healthcare provider   - Talk to your healthcare provider if you do not feel better or if you feel worse after 5 days  Who should generally not take PAXLOVID? Do not take PAXLOVID if:  You are allergic to nirmatrelvir, ritonavir, or any of the ingredients in PAXLOVID  You are taking any of the following medicines:  - Alfuzosin  - Pethidine, piroxicam, propoxyphene  - Ranolazine  - Amiodarone, dronedarone, flecainide, propafenone, quinidine  - Colchicine  - Lurasidone, pimozide, clozapine  - Dihydroergotamine, ergotamine, methylergonovine  - Lovastatin, simvastatin  - Sildenafil (Revatio®) for pulmonary arterial hypertension (PAH)  - Triazolam, oral midazolam  - Apalutamide  - Carbamazepine, phenobarbital, phenytoin  - Rifampin  - St  Jamel’s Wort (hypericum perforatum)    What are the important possible side effects of PAXLOVID? Possible side effects of PAXLOVID are:  - Liver Problems  Tell your healthcare provider right away if you have any of these signs and symptoms of liver problems: loss of appetite, yellowing of your skin and the whites of eyes (jaundice), dark-colored urine, pale colored stools and itchy skin, stomach area (abdominal) pain  - Resistance to HIV Medicines  If you have untreated HIV infection, PAXLOVID may lead to some HIV medicines not working as well in the future  - Other possible side effects include: altered sense of taste, diarrhea, high blood pressure, or muscle aches    These are not all the possible side effects of PAXLOVID  Not many people have taken PAXLOVID  Serious and unexpected side effects may happen  Creed Layman is still being studied, so it is possible that all of the risks are not known at this time  What other treatment choices are there? Like Jodine Breenice may allow for the emergency use of other medicines to treat people with COVID-19   Go to https://CLIPPATE/ for information on the emergency use of other medicines that are authorized by FDA to treat people with COVID-19  Your healthcare provider may talk with you about clinical trials for which you may be eligible  It is your choice to be treated or not to be treated with PAXLOVID  Should you decide not to receive it or for your child not to receive it, it will not change your standard medical care  What if I am pregnant or breastfeeding? There is no experience treating pregnant women or breastfeeding mothers with PAXLOVID  For a mother and unborn baby, the benefit of taking PAXLOVID may be greater than the risk from the treatment  If you are pregnant, discuss your options and specific situation with your healthcare provider  It is recommended that you use effective barrier contraception or do not have sexual activity while taking PAXLOVID  If you are breastfeeding, discuss your options and specific situation with your healthcare provider  How do I report side effects with PAXLOVID? Contact your healthcare provider if you have any side effects that bother you or do not go away  Report side effects to FDA MedWatch at www fda gov/medwatch or call 2-217-SBM8851 or you can report side effects to [employer]  at the contact information provided below  Website Fax number Telephone number   [employer] [phone]     How should I store [address]? Store PAXLOVID tablets at room temperature between 68°F to 77°F (20°C to 25°C)  Full fact sheet for patients, parents, and caregivers can be found at: [employer]    I have spent 15 minutes directly with the patient  Greater than 50% of this time was spent in counseling/coordination of care regarding: instructions for management and impressions         Encounter provider: Evangelist Banda MD     Provider located at: [address] [place] [id number]     Recent Visits  No visits were found meeting these conditions  Showing recent visits within past 7 days and meeting all other requirements  Today's Visits  Date Type Provider Dept   02/06/23 Telemedicine Evangelist Banda MD [facility]   Showing today's visits and meeting all other requirements  Future Appointments  No visits were found meeting these conditions  Showing future appointments within next 150 days and meeting all other requirements     This virtual check-in was done via [employer] [address] and patient was informed that this is a secure, HIPAA-compliant platform  He agrees to proceed  Patient agrees to participate in a virtual check in via telephone or video visit instead of presenting to the office to address urgent/immediate medical needs  Patient is aware this is a billable service  He acknowledged consent and understanding of privacy and security of the video platform  The patient has agreed to participate and understands they can discontinue the visit at any time  After connecting through [facility], the patient was identified by name and date of birth  Nyla Martinez was informed that this was a telemedicine visit and that the exam was being conducted confidentially over secure lines  My office door was closed  No one else was in the room  Nyla Martinez acknowledged consent and understanding of privacy and security of the telemedicine visit  I informed the patient that I have reviewed his record in Epic and presented the opportunity for him to ask any questions regarding the visit today  The patient agreed to participate  Verification of patient location:  Patient is located in the following state in which I hold an active license: [place]    Subjective:    Nyla Martinez is a 76 y o  male who is concerned about COVID-19  Patient's symptoms include fatigue, nasal congestion, sore throat and cough  Patient denies fever, chills, malaise, rhinorrhea, anosmia, loss of taste, shortness of breath, chest tightness, abdominal pain, nausea, vomiting, diarrhea, myalgias and headaches  - Date of symptom onset: 2/5/2023  - Date of exposure: 2/4/2023      COVID-19 vaccination status: Fully vaccinated (primary series)    Exposure:   Contact with a person who is under investigation (PUI) for or who is positive for COVID-19 within the last 14 days?: Yes    Hospitalized recently for fever and/or lower respiratory symptoms?: No      Currently a healthcare worker that is involved in direct patient care?: No      Works in a special setting where the risk of COVID-19 transmission may be high? (this may include long-term care, correctional and long-term facilities; homeless shelters; assisted-living facilities and group homes ): No      Resident in a special setting where the risk of COVID-19 transmission may be high? (this may include long-term care, correctional and long-term facilities; homeless shelters; assisted-living facilities and group homes ): No      Lab Results   Component Value Date    SARSCOV2 Positive (A) 01/04/2022    SARSCOV2 NOT DETECTED 01/03/2021       Review of Systems   Constitutional: Positive for fatigue  Negative for chills and fever  HENT: Positive for congestion and sore throat  Negative for rhinorrhea  Eyes: Negative for discharge  Respiratory: Positive for cough  Negative for chest tightness and shortness of breath  Cardiovascular: Negative for chest pain and palpitations  Gastrointestinal: Negative for abdominal pain, diarrhea, nausea and vomiting  Musculoskeletal: Negative for arthralgias, gait problem and myalgias  Neurological: Negative for dizziness, weakness and headaches  Hematological: Negative for adenopathy  Psychiatric/Behavioral: The patient is not nervous/anxious  Current Outpatient Medications on File Prior to Visit   Medication Sig   • apixaban (Eliquis) 5 mg Take 1 tablet (5 mg total) by mouth 2 (two) times a day   • Ascorbic Acid (vitamin C) 1000 MG tablet Take 1,000 mg by mouth daily   • b complex vitamins capsule Take 1 capsule by mouth daily   • Blood Glucose Monitoring Suppl (BLOOD GLUCOSE MONITOR SYSTEM) w/Device KIT by Does not apply route   • MYRIAM ZINC PO Take by mouth in the morning   • finasteride (PROSCAR) 5 mg tablet TAKE ONE TABLET BY MOUTH EVERY DAY   • glipiZIDE (GLUCOTROL XL) 10 mg 24 hr tablet TAKE 1 TABLET BY MOUTH EVERY DAY WITH BREAKFAST   • Glucosamine-Chondroitin (GLUCOSAMINE CHONDR COMPLEX PO) Take by mouth 2 (two) times a day   • LORazepam (Ativan) 0 5 mg tablet Take 1 tablet (0 5 mg total) by mouth daily as needed for anxiety   • Magnesium 250 MG TABS Take by mouth in the morning   • metFORMIN (GLUCOPHAGE) 500 mg tablet TAKE TWO TABLETS BY MOUTH EVERY MORNING AND TAKE one TABLET BY MOUTH IN THE EVENING (GENERIC GLUCOPHAGE)   • metoprolol succinate (TOPROL-XL) 25 mg 24 hr tablet Take 1 tablet (25 mg total) by mouth every morning   • Multiple Vitamins-Minerals (MEGAVITE FRUITS & VEGGIES PO) Take by mouth in the morning   • ramipril (ALTACE) 5 mg capsule Take 1 capsule (5 mg total) by mouth every morning   • sertraline (ZOLOFT) 100 mg tablet Take 1 tablet (100 mg total) by mouth daily   • tamsulosin (FLOMAX) 0 4 mg TAKE 1 CAPSULE BY MOUTH EVERYDAY AT BEDTIME   • [DISCONTINUED] acetaminophen (TYLENOL) 325 mg tablet Take 2 tablets (650 mg total) by mouth every 6 (six) hours as needed for mild pain   • [DISCONTINUED] predniSONE 20 mg tablet 2 PO QD X 4 DAYS, THEN 1 PO QD X 4 DAYS, THEN 1/2 PO QD X 4 DAYS   • [DISCONTINUED] promethazine-codeine (PHENERGAN WITH CODEINE) 6 25-10 mg/5 mL syrup Take 5 mL by mouth every 4 (four) hours as needed for cough       Objective:    Temp (!) 100 6 °F (38 1 °C) (Oral)   Ht 6' (1 829 m)   Wt 109 kg (240 lb)   BMI 32 55 kg/m²      Physical Exam     PATIENT VISUALLY APPEARS  IN NO OBVIOUS DISTRESS    Asael Stoner MD

## 2023-02-06 NOTE — PATIENT INSTRUCTIONS
PLENTY FLUIDS  REST  MUCINEX  MEDICATION AS DIRECTED  IF SYMPTOMS PERSIST OR WORSEN, PLEASE CALL    DECREASE ELIQUIS TO 5 MG DAILY FOR THE 5 DAYS HE IS ON PAXLOVID  CAN RESUME BID DOSAGE WHEN COMPLETED WITH COURSE    COVID-19 Home Care Guidelines    Your healthcare provider and/or public health staff have evaluated you and have determined that you do not need to remain in the hospital at this time  At this time you can be isolated at home where you will be monitored by staff from your local or state health department  You should carefully follow the prevention and isolation steps below until a healthcare provider or local or state health department says that you can return to your normal activities  Stay home except to get medical care    People who are mildly ill with COVID-19 are able to isolate at home during their illness  You should restrict activities outside your home, except for getting medical care  Do not go to work, school, or public areas  Avoid using public transportation, ride-sharing, or taxis  Separate yourself from other people and animals in your home    People: As much as possible, you should stay in a specific room and away from other people in your home  Also, you should use a separate bathroom, if available  Animals: You should restrict contact with pets and other animals while you are sick with COVID-19, just like you would around other people  Although there have not been reports of pets or other animals becoming sick with COVID-19, it is still recommended that people sick with COVID-19 limit contact with animals until more information is known about the virus  When possible, have another member of your household care for your animals while you are sick  If you are sick with COVID-19, avoid contact with your pet, including petting, snuggling, being kissed or licked, and sharing food   If you must care for your pet or be around animals while you are sick, wash your hands before and after you interact with pets and wear a facemask  See COVID-19 and Animals for more information  Call ahead before visiting your doctor    If you have a medical appointment, call the healthcare provider and tell them that you have or may have COVID-19  This will help the healthcare provider’s office take steps to keep other people from getting infected or exposed  Wear a facemask    You should wear a facemask when you are around other people (e g , sharing a room or vehicle) or pets and before you enter a healthcare provider’s office  If you are not able to wear a facemask (for example, because it causes trouble breathing), then people who live with you should not stay in the same room with you, or they should wear a facemask if they enter your room  Cover your coughs and sneezes    Cover your mouth and nose with a tissue when you cough or sneeze  Throw used tissues in a lined trash can  Immediately wash your hands with soap and water for at least 20 seconds or, if soap and water are not available, clean your hands with an alcohol-based hand  that contains at least 60% alcohol  Clean your hands often    Wash your hands often with soap and water for at least 20 seconds, especially after blowing your nose, coughing, or sneezing; going to the bathroom; and before eating or preparing food  If soap and water are not readily available, use an alcohol-based hand  with at least 60% alcohol, covering all surfaces of your hands and rubbing them together until they feel dry  Soap and water are the best option if hands are visibly dirty  Avoid touching your eyes, nose, and mouth with unwashed hands  Avoid sharing personal household items    You should not share dishes, drinking glasses, cups, eating utensils, towels, or bedding with other people or pets in your home  After using these items, they should be washed thoroughly with soap and water      Clean all “high-touch” surfaces everyday    High touch surfaces include counters, tabletops, doorknobs, bathroom fixtures, toilets, phones, keyboards, tablets, and bedside tables  Also, clean any surfaces that may have blood, stool, or body fluids on them  Use a household cleaning spray or wipe, according to the label instructions  Labels contain instructions for safe and effective use of the cleaning product including precautions you should take when applying the product, such as wearing gloves and making sure you have good ventilation during use of the product  Monitor your symptoms    Seek prompt medical attention if your illness is worsening (e g , difficulty breathing)  Before seeking care, call your healthcare provider and tell them that you have, or are being evaluated for, COVID-19  Put on a facemask before you enter the facility  These steps will help the healthcare provider’s office to keep other people in the office or waiting room from getting infected or exposed  Ask your healthcare provider to call the local or [facility] health department  Persons who are placed under active monitoring or facilitated self-monitoring should follow instructions provided by their local health department or occupational health professionals, as appropriate  If you have a medical emergency and need to call 911, notify the dispatch personnel that you have, or are being evaluated for COVID-19  If possible, put on a facemask before emergency medical services arrive  Discontinuing home isolation    Patients with confirmed COVID-19 should remain under home isolation precautions until the following conditions are met:    They have had no fever for at least 24 hours (that is one full day of no fever without the use medicine that reduces fevers)  AND  other symptoms have improved (for example, when their cough or shortness of breath have improved)  AND  If had mild or moderate illness, at least 10 days have passed since their symptoms first appeared or if severe illness (needed oxygen) or immunosuppressed, at least 20 days have passed since symptoms first appeared  Patients with confirmed COVID-19 should also notify close contacts (including their workplace) and ask that they self-quarantine  Currently, close contact is defined as being within 6 feet for 15 minutes or more from the period 24 hours starting 48 hours before symptom onset to the time at which the patient went into isolation  Close contacts of patients diagnosed with COVID-19 should be instructed by the patient to self-quarantine for 14 days from the last time of their last contact with the patient       Source: [employer]

## 2023-02-11 DIAGNOSIS — E11.9 TYPE 2 DIABETES MELLITUS WITHOUT COMPLICATION, WITHOUT LONG-TERM CURRENT USE OF INSULIN (HCC): ICD-10-CM

## 2023-02-21 ENCOUNTER — TELEPHONE (OUTPATIENT)
Dept: CARDIOLOGY CLINIC | Facility: CLINIC | Age: 78
End: 2023-02-21

## 2023-02-21 NOTE — TELEPHONE ENCOUNTER
Pt's life partner contacted office to inform that Pt was sent xarelto 20 mg for 90 days supply from [address] [place] [address] [place]  She wanted to know if that was a replacement of Eliquis and if 1 tablet a day is the right dosage      Pls advise

## 2023-03-14 ENCOUNTER — OFFICE VISIT (OUTPATIENT)
Dept: FAMILY MEDICINE CLINIC | Facility: CLINIC | Age: 78
End: 2023-03-14

## 2023-03-14 VITALS
BODY MASS INDEX: 32.64 KG/M2 | DIASTOLIC BLOOD PRESSURE: 72 MMHG | HEIGHT: 72 IN | TEMPERATURE: 97.7 F | SYSTOLIC BLOOD PRESSURE: 148 MMHG | WEIGHT: 241 LBS | OXYGEN SATURATION: 98 % | HEART RATE: 62 BPM | RESPIRATION RATE: 14 BRPM

## 2023-03-14 DIAGNOSIS — E11.65 TYPE 2 DIABETES MELLITUS WITH HYPERGLYCEMIA, WITHOUT LONG-TERM CURRENT USE OF INSULIN (HCC): ICD-10-CM

## 2023-03-14 DIAGNOSIS — R39.15 URINARY URGENCY: Primary | ICD-10-CM

## 2023-03-14 PROBLEM — F33.1 MODERATE EPISODE OF RECURRENT MAJOR DEPRESSIVE DISORDER (HCC): Status: RESOLVED | Noted: 2020-01-17 | Resolved: 2023-03-14

## 2023-03-14 LAB
SL AMB  POCT GLUCOSE, UA: ABNORMAL
SL AMB LEUKOCYTE ESTERASE,UA: ABNORMAL
SL AMB POCT BILIRUBIN,UA: ABNORMAL
SL AMB POCT BLOOD,UA: ABNORMAL
SL AMB POCT CLARITY,UA: CLEAR
SL AMB POCT COLOR,UA: YELLOW
SL AMB POCT KETONES,UA: 15
SL AMB POCT NITRITE,UA: ABNORMAL
SL AMB POCT PH,UA: 5
SL AMB POCT SPECIFIC GRAVITY,UA: 1.02
SL AMB POCT URINE PROTEIN: 30
SL AMB POCT UROBILINOGEN: ABNORMAL

## 2023-03-14 NOTE — PROGRESS NOTES
Name: Vlad Garcia      : 1945      MRN: [id number]  Encounter Provider: Bertin Burns MD  Encounter Date: 3/14/2023   Encounter department: [address] [place] [address]     1  Urinary urgency  -     POCT urine dip auto non-scope  -     Urine culture    2  Type 2 diabetes mellitus with hyperglycemia, without long-term current use of insulin (HCC)  -     Microalbumin / creatinine urine ratio    BMI Counseling: Body mass index is 32 69 kg/m²  The BMI is above normal  Nutrition recommendations include encouraging healthy choices of fruits and vegetables and moderation in carbohydrate intake  Exercise recommendations include exercising 3-5 times per week  No pharmacotherapy was ordered  Rationale for BMI follow-up plan is due to patient being overweight or obese  Subjective      PATIENT HAS HAD SEVERAL EPISODES OVER THE PAST SEVERAL MONTHS OF FECAL INCONTINENCE  NO WARNING  FEELS HE HAS TO GO AND LOSES HIS BM  VERY WATER  NO RECENT ILLNESS  EPISODES ARE RANDOM    LAST EPISODE YESTERDAY    HAD NORMAL BM SEVERAL HOURS BEFORE    Review of Systems   Constitutional: Negative for chills, fatigue and fever  HENT: Negative for sore throat  Eyes: Negative for discharge  Respiratory: Negative for cough and chest tightness  Cardiovascular: Negative for chest pain and palpitations  Gastrointestinal: Positive for diarrhea  Negative for abdominal pain, nausea and vomiting  Musculoskeletal: Negative for arthralgias and gait problem  Neurological: Negative for dizziness, weakness and headaches  Hematological: Negative for adenopathy  Psychiatric/Behavioral: The patient is not nervous/anxious          Current Outpatient Medications on File Prior to Visit   Medication Sig   • apixaban (Eliquis) 5 mg Take 1 tablet (5 mg total) by mouth 2 (two) times a day (Patient taking differently: Take 20 mg by mouth in the morning)   • Ascorbic Acid (vitamin C) 1000 MG tablet Take 1,000 mg by mouth daily   • b complex vitamins capsule Take 1 capsule by mouth daily   • Blood Glucose Monitoring Suppl (BLOOD GLUCOSE MONITOR SYSTEM) w/Device KIT by Does not apply route   • MYRIAM ZINC PO Take by mouth in the morning   • finasteride (PROSCAR) 5 mg tablet TAKE ONE TABLET BY MOUTH EVERY DAY   • glipiZIDE (GLUCOTROL XL) 10 mg 24 hr tablet TAKE 1 TABLET BY MOUTH EVERY DAY WITH BREAKFAST   • Glucosamine-Chondroitin (GLUCOSAMINE CHONDR COMPLEX PO) Take by mouth 2 (two) times a day   • LORazepam (Ativan) 0 5 mg tablet Take 1 tablet (0 5 mg total) by mouth daily as needed for anxiety   • metFORMIN (GLUCOPHAGE) 500 mg tablet TAKE 2 TABLETS BY MOUTH EVERY MORNING AND TAKE 1 TABLET BY MOUTH IN THE EVENING (GENERIC FOR GLUCOPHAGE)   • metoprolol succinate (TOPROL-XL) 25 mg 24 hr tablet Take 1 tablet (25 mg total) by mouth every morning   • Multiple Vitamins-Minerals (MEGAVITE FRUITS & VEGGIES PO) Take by mouth in the morning   • ramipril (ALTACE) 5 mg capsule Take 1 capsule (5 mg total) by mouth every morning   • sertraline (ZOLOFT) 100 mg tablet Take 1 tablet (100 mg total) by mouth daily (Patient taking differently: Take 50 mg by mouth daily)   • tamsulosin (FLOMAX) 0 4 mg TAKE 1 CAPSULE BY MOUTH EVERYDAY AT BEDTIME   • [DISCONTINUED] Magnesium 250 MG TABS Take by mouth in the morning (Patient not taking: Reported on 3/14/2023)       Objective     /72 (BP Location: Left arm, Patient Position: Sitting, Cuff Size: Large)   Pulse 62   Temp 97 7 °F (36 5 °C) (Temporal)   Resp 14   Ht 6' (1 829 m)   Wt 109 kg (241 lb)   SpO2 98%   BMI 32 69 kg/m²     Physical Exam  Constitutional:       Appearance: Normal appearance  He is well-developed  He is obese  HENT:      Head: Normocephalic and atraumatic  Eyes:      General:         Right eye: No discharge  Left eye: No discharge  Conjunctiva/sclera: Conjunctivae normal       Pupils: Pupils are equal, round, and reactive to light  Neck:      Thyroid: No thyromegaly  Vascular: No JVD  Cardiovascular:      Rate and Rhythm: Normal rate and regular rhythm  Heart sounds: Normal heart sounds  No murmur heard  Pulmonary:      Effort: Pulmonary effort is normal       Breath sounds: Normal breath sounds  No wheezing or rales  Abdominal:      General: Bowel sounds are normal  There is no distension  Palpations: Abdomen is soft  There is no mass  Tenderness: There is no abdominal tenderness  There is no right CVA tenderness, left CVA tenderness, guarding or rebound  Hernia: A hernia is present  Musculoskeletal:         General: No tenderness or deformity  Normal range of motion  Cervical back: Neck supple  Lymphadenopathy:      Cervical: No cervical adenopathy  Skin:     General: Skin is warm and dry  Findings: No erythema or rash  Neurological:      General: No focal deficit present  Mental Status: He is alert and oriented to person, place, and time  Psychiatric:         Mood and Affect: Mood normal          Behavior: Behavior normal          Thought Content:  Thought content normal          Judgment: Judgment normal        Margareth Melendez MD

## 2023-03-14 NOTE — PATIENT INSTRUCTIONS
HYDRATION  MONITOR SYMPTOMS - DIARY  PROBIOTIC - ALIGN OR CULTURELLE  AVOID RECENT OFFENDING FOODS    IF SYMPTOMS CONTINUE - CONSIDER GI CONSULT

## 2023-03-15 LAB
ALBUMIN/CREAT UR: 219 MG/G CREAT (ref 0–29)
BACTERIA UR CULT: NORMAL
CREAT UR-MCNC: 138.5 MG/DL
Lab: NO GROWTH
MICROALBUMIN UR-MCNC: 303.3 UG/ML

## 2023-03-22 DIAGNOSIS — R19.7 DIARRHEA OF PRESUMED INFECTIOUS ORIGIN: ICD-10-CM

## 2023-03-22 DIAGNOSIS — R15.2 FECAL URGENCY: Primary | ICD-10-CM

## 2023-03-28 ENCOUNTER — OFFICE VISIT (OUTPATIENT)
Dept: SLEEP CENTER | Facility: CLINIC | Age: 78
End: 2023-03-28

## 2023-03-28 VITALS
SYSTOLIC BLOOD PRESSURE: 140 MMHG | DIASTOLIC BLOOD PRESSURE: 70 MMHG | OXYGEN SATURATION: 97 % | HEIGHT: 72 IN | WEIGHT: 239.2 LBS | HEART RATE: 59 BPM | BODY MASS INDEX: 32.4 KG/M2

## 2023-03-28 DIAGNOSIS — R06.2 WHEEZING: ICD-10-CM

## 2023-03-28 DIAGNOSIS — G47.33 MODERATE OBSTRUCTIVE SLEEP APNEA: Primary | ICD-10-CM

## 2023-03-28 NOTE — PATIENT INSTRUCTIONS
Sleep Apnea   AMBULATORY CARE:   Sleep apnea  is a condition that causes you to stop breathing often during sleep  Types of sleep apnea:   Obstructive sleep apnea (TRISTIN)  is the most common kind  The muscles and tissues around your throat relax and block air from passing through  Obesity, use of alcohol or cigarettes, or a family history are common causes  TRISTIN may increase your risk for complications after surgery  Central sleep apnea (CSA)  means your brain does not send signals to the muscles that control breathing  You do not take a breath even though your airway is open  Common causes include medical conditions such as heart failure, being older than 40, or use of opioids  Complex (or mixed) sleep apnea  means you have both obstructive and central sleep apnea  Common signs and symptoms:   Loud snoring or long pauses in breathing    Feeling sleepy, slow, and tired during the day    Snorting, gasping, or choking while you sleep, and waking up suddenly because of these    Feeling irritable during the day    Dry mouth or a headache in the mornings    Heavy night sweating    A hard time thinking, remembering things, or focusing on your tasks the following day    Call your local emergency number (911 in the [address] [facility] [address]) if:   You have chest pain or trouble breathing  Call your doctor if:   You have new or worsening signs or symptoms  You have questions or concerns about your condition or care  Treatment  depends on the kind of apnea you have  A mouth device  may be needed if you have mild sleep apnea  These are designed to keep your throat open  Ask your dentist or healthcare provider about the best mouth device for you  A machine  may be used to help you get more air during sleep  A mask may be placed over your nose and mouth, or just your nose  The mask is hooked to the machine  You will get air through the mask      A continuous positive airway pressure (CPAP) machine  is used to keep your airway open during sleep  The machine blows a gentle stream of air into the mask when you breathe  This helps keep your airway open so you can breathe more regularly  Extra oxygen may be given through the machine  A bilevel positive airway pressure (BiPAP) machine  gives air but lowers the pressure when you breathe out  An adaptive servo-ventilator (ASV)  is a machine that learns your usual breathing pattern  Then, it uses pressure to give you air and prevent stops in your breathing  Surgery  to expand your airway or remove extra tissues may be needed  Surgery is usually only considered if other treatments do not work  Manage or prevent sleep apnea:   Reach and maintain a healthy weight  Ask your healthcare provider what a healthy weight is for you  Ask your provider to help you create a safe weight loss plan if you are overweight  Even a small goal of a 10% weight loss can improve your symptoms  Do not smoke  Nicotine and other chemicals in cigarettes and cigars can cause lung damage  Ask your healthcare provider for information if you currently smoke and need help to quit  E-cigarettes or smokeless tobacco still contain nicotine  Talk to your healthcare provider before you use these products  Do not drink alcohol or take sedative medicine before you go to sleep  Alcohol and sedatives can relax the muscles and tissues around your throat  This can block the airflow to your lungs  Sleep on your side or use pillows designed to prevent sleep apnea  This prevents your tongue or other tissues from blocking your throat  You can also raise the head of your bed  Follow up with your doctor or specialist as directed: You may need to have blood tests during your follow-up visits  Work with your provider to find the right breathing support equipment and settings for you  Write down your questions so you remember to ask them during your visits    © Copyright [employer] 2022 Information is for End User's use only and may not be sold, redistributed or otherwise used for commercial purposes  The above information is an  only  It is not intended as medical advice for individual conditions or treatments  Talk to your doctor, nurse or pharmacist before following any medical regimen to see if it is safe and effective for you

## 2023-03-28 NOTE — PROGRESS NOTES
Sleep Consultation   Shaquille Chan 76 y o  male MRN: [id number]      Reason for consultation: TRISTIN    Requesting physician: Claude Meredith MD    Assessment/Plan  43-year-old male with past medical history of atrial fibrillation on anticoagulation, hypertension, diabetes, depression, BPH, and moderate TRISTIN is referred for evaluation and management of TRISTIN  1  TRISTIN- Moderate TRISTIN on PSG in 2017 and HST in 2020 with AHI of 19 8 and MANDY of 19 respectively  He was never treated for TRISTIN in the past  He is willing to try CPAP  -Auto CPAP 5-20 cm H2O ordered    -Follow up with compliance visit in 31-90 days    -We discussed the consequences of untreated sleep apnea including uncontrolled BP, atrial fibrillation, and stroke  Discussed that adequate management of TRISTIN results in better control of atrial fibrillation    -Follow up in 3 months  2  Wheezing- Minimal wheezing was audible during the conversation  No wheezing on lung exam and no stridor  CXR from the past looks unremarkable  No issues with breathing  Quit smoking about 40 years ago  PFT from 2017 showed mild restriction but not evidence of obstructive disease  -ENT referral to evaluate vocal cords    -Follow up in 3 months  History of Present Illness   HPI:  Shaquille Chan is a 76 y o  male with PMH of atrial fibrillation on anticoagulation, hypertension, diabetes, depression, BPH, and moderate TRISTIN is referred for evaluation and management of TRISTIN  Patient had a diagnostic PSG in 2017 that showed AHI of 19 8 events per hour and home sleep study in 2020 that showed MANDY of 19 events per hour  He does not know why the sleep study was ordered at that time  As reported by wife, he does not have any significant snoring but reports multiple awakenings during the night and excessive daytime sleepiness with ESS of 15/24  He also wakes up occasionally with choking/gasping  Denies any witnessed apneas    He denies any symptoms of restless legs, sleep paralysis, hallucinations, symptoms of narcolepsy/cataplexy, or any other abnormal sleep related behavior  Sleep schedule: He goes to bed at 2 AM and falls asleep by 3 AM  He wakes up about 4 times a night to use the bathroom and falls back asleep easily  He wakes up between 11 AM and noon  He takes naps on most days than not but does not feel refreshed after the naps             Review of Systems      Genitourinary need to urinate more than twice a night   Cardiology none   Gastrointestinal none   Neurology none   Constitutional fatigue   Integumentary none   Psychiatry depression   Musculoskeletal none   Pulmonary wheezing and snoring   ENT none   Endocrine frequent urination   Hematological none               Historical Information   Past Medical History:   Diagnosis Date   • Anxiety    • Arthritis     fingers , knee   • BPH (benign prostatic hypertrophy)    • Cataract     currently left eye- to have surgery on 4/10/17   • Cough variant asthma 4/12/2017   • Diabetes mellitus ([place] [address] )     type 2, last assessed 4/12/2017   • Hernia, umbilical     currently has   • HL (hearing loss)     b/l hearing aids   • Labyrinthitis    • Moderate obstructive sleep apnea 4/11/2017   • [facility] 1/10/2020   • Obesity with body mass index 30 or greater 86/2/5139   • Umbilical hernia      Past Surgical History:   Procedure Laterality Date   • CATARACT EXTRACTION Right 04/04/2017   • COLONOSCOPY      yrs ago   • EYE SURGERY Bilateral     cataracts with IOL     Family History   Problem Relation Age of Onset   • Cancer Mother         ovarian   • Diabetes Father    • Cancer Sister         stomach to brain   • Substance Abuse Family    • Substance Abuse Son    • Mental illness Neg Hx      Social History     Socioeconomic History   • Marital status: /Civil Union     Spouse name: Not on file   • Number of children: Not on file   • Years of education: Not on file   • Highest education level: Not on file   Occupational History   • Not on file   Tobacco Use   • Smoking status: Former     Packs/day: 0 50     Years: 15 00     Pack years: 7 50     Types: Cigarettes     Quit date: 24     Years since quittin 2   • Smokeless tobacco: Never   Vaping Use   • Vaping Use: Never used   Substance and Sexual Activity   • Alcohol use: Yes     Alcohol/week: 3 0 standard drinks     Types: 3 Standard drinks or equivalent per week     Comment: socially   • Drug use: Yes     Types: Marijuana     Comment: most everyday    • Sexual activity: Not on file   Other Topics Concern   • Not on file   Social History Narrative    Active advance directive    Caffeine use    Dental care, regularly    Drinks coffee     Social Determinants of Health     Financial Resource Strain: Not on file   Food Insecurity: Not on file   Transportation Needs: Not on file   Physical Activity: Not on file   Stress: Not on file   Social Connections: Not on file   Intimate Partner Violence: Not on file   Housing Stability: Not on file       Occupational History: Retired    Meds/Allergies   Allergies   Allergen Reactions   • Ceftin [Cefuroxime] GI Intolerance and Vomiting       Home medications:  Prior to Admission medications    Medication Sig Start Date End Date Taking?  Authorizing Provider   apixaban (Eliquis) 5 mg Take 1 tablet (5 mg total) by mouth 2 (two) times a day  Patient taking differently: Take 20 mg by mouth in the morning 23  Yes Saeid Guillen MD   Ascorbic Acid (vitamin C) 1000 MG tablet Take 1,000 mg by mouth daily   Yes Historical Provider, MD   b complex vitamins capsule Take 1 capsule by mouth daily   Yes Historical Provider, MD   Blood Glucose Monitoring Suppl (BLOOD GLUCOSE MONITOR SYSTEM) w/Device KIT by Does not apply route 2/17/15  Yes Historical Provider, MD   [facility] Take by mouth in the morning   Yes Historical Provider, MD   finasteride (PROSCAR) 5 mg tablet TAKE ONE TABLET BY MOUTH EVERY DAY 22  Yes Noah Deleon PA-C   glipiZIDE (GLUCOTROL XL) 10 mg 24 hr tablet TAKE 1 TABLET BY MOUTH EVERY DAY WITH BREAKFAST 8/24/20  Yes Carlos Benitez MD   Glucosamine-Chondroitin (GLUCOSAMINE CHONDR COMPLEX PO) Take by mouth 2 (two) times a day   Yes Historical Provider, MD   LORazepam (Ativan) 0 5 mg tablet Take 1 tablet (0 5 mg total) by mouth daily as needed for anxiety 6/22/22  Yes LATANYA iWlkes   metFORMIN (GLUCOPHAGE) 500 mg tablet TAKE 2 TABLETS BY MOUTH EVERY MORNING AND TAKE 1 TABLET BY MOUTH IN THE EVENING (GENERIC FOR GLUCOPHAGE) 2/13/23  Yes Romayne Saner Mimms,    metoprolol succinate (TOPROL-XL) 25 mg 24 hr tablet Take 1 tablet (25 mg total) by mouth every morning 10/17/22  Yes Carlos Benitez MD   Multiple Vitamins-Minerals (MEGAVITE FRUITS & VEGGIES PO) Take by mouth in the morning   Yes Historical Provider, MD   ramipril (ALTACE) 5 mg capsule Take 1 capsule (5 mg total) by mouth every morning 1/17/23  Yes Janette Romero MD   sertraline (ZOLOFT) 100 mg tablet Take 1 tablet (100 mg total) by mouth daily  Patient taking differently: Take 50 mg by mouth daily 10/17/22  Yes Carlos Benitez MD   tamsulosin (FLOMAX) 0 4 mg TAKE 1 CAPSULE BY MOUTH EVERYDAY AT BEDTIME 3/14/22  Yes LATANYA Wilkes       Vitals:   Blood pressure 140/70, pulse 59, height 6' (1 829 m), weight 109 kg (239 lb 3 2 oz), SpO2 97 %  , Body mass index is 32 44 kg/m²    Neck Circumference: 17    Physical Exam  General: Obese, Awake alert and oriented x 3, conversant without conversational dyspnea, NAD, normal affect  HEENT:  PERRL, Sclera noninjected, nonicteric OU, Nares patent,  no craniofacial abnormalities, Mucous membranes, moist, no oral lesions, normal dentition, Mallampati class 4  NECK: Trachea midline, no accessory muscle use, no stridor, no cervical or supraclavicular adenopathy, JVP not elevated  CARDIAC: Reg, single s1/S2, no m/r/g  PULM: CTA bilaterally no wheezing, rhonchi or rales, mild wheezing noted during conversation but no wheezing on auscultation, no stridor  EXT: No cyanosis, no clubbing, no edema, normal capillary refill  NEURO: no focal neurologic deficits, AAOx3, moving all extremities appropriately    Labs: I have personally reviewed pertinent lab results  metabolic panel, sleep studies, PFTs    Lab Results   Component Value Date    WBC 10 17 (H) 09/03/2022    HGB 10 3 (L) 09/03/2022    HCT 33 7 (L) 09/03/2022    MCV 92 09/03/2022     09/03/2022      Lab Results   Component Value Date    GLUCOSE 132 (H) 03/31/2017    CALCIUM 9 1 10/22/2020     03/31/2017    K 5 6 (H) 11/16/2020    CO2 26 11/16/2020     11/16/2020    BUN 17 11/16/2020    CREATININE 0 97 11/16/2020     No results found for: IRON, TIBC, FERRITIN  No results found for: [id number]  No results found for: FOLATE        Sleep studies:  Diagnostic: HST 3/09/2020, MANDY 19, PSG 4/7/2017, AHI 19 7    Conception Section, MD  [facility]

## 2023-03-28 NOTE — ASSESSMENT & PLAN NOTE
Patient has had moderate TRISTIN diagnosed via PSG and HST in 2017 2020  AHI is 19  He has never been treated with TRISTIN in the past       He is amenable for trying CPAP  We have ordered nasal CPAP 5-20 cm H2O  We will follow-up compliance in 1 to 3 months after CPAP initiation  Symptoms of nocturia could be related to untreated obstructive sleep apnea, but in this case is likely related to his history of BPH  This symptom may not improve with treatment of sleep apnea  He does have excessive daytime sleepiness likely related to untreated obstructive sleep apnea  Hopefully CPAP use would improve energy levels

## 2023-03-28 NOTE — ASSESSMENT & PLAN NOTE
He has been noted to have noisy breathing with expiration in the upper airways by his wife for the past 18 years at least   He had pulmonary function testing done in the past that did not show any flow volume abnormalities in expiratory limb  On examination today he has no lower airway wheezing  He has no wheezing on inspiration  On expiration in his trachea he does have some noisy rhonchi with expiration  He has no reported voice changes  He has no stridor  We referred him to ENT

## 2023-03-29 ENCOUNTER — TELEPHONE (OUTPATIENT)
Dept: SLEEP CENTER | Facility: CLINIC | Age: 78
End: 2023-03-29

## 2023-03-29 NOTE — TELEPHONE ENCOUNTER
Rx for CPAP and clinical information sent to [address] [place], [place] [address]  via [email or web address]

## 2023-03-31 LAB

## 2023-05-09 ENCOUNTER — OFFICE VISIT (OUTPATIENT)
Dept: FAMILY MEDICINE CLINIC | Facility: CLINIC | Age: 78
End: 2023-05-09

## 2023-05-09 VITALS
DIASTOLIC BLOOD PRESSURE: 60 MMHG | HEIGHT: 72 IN | HEART RATE: 75 BPM | BODY MASS INDEX: 31.56 KG/M2 | RESPIRATION RATE: 14 BRPM | WEIGHT: 233 LBS | SYSTOLIC BLOOD PRESSURE: 132 MMHG | TEMPERATURE: 97.1 F | OXYGEN SATURATION: 93 %

## 2023-05-09 DIAGNOSIS — J98.4 RESTRICTIVE LUNG DISEASE: ICD-10-CM

## 2023-05-09 DIAGNOSIS — J98.01 BRONCHOSPASM: Primary | ICD-10-CM

## 2023-05-09 DIAGNOSIS — J40 BRONCHITIS: ICD-10-CM

## 2023-05-09 DIAGNOSIS — R06.2 WHEEZING: ICD-10-CM

## 2023-05-09 RX ORDER — PREDNISONE 20 MG/1
TABLET ORAL
Qty: 11 TABLET | Refills: 0 | Status: SHIPPED | OUTPATIENT
Start: 2023-05-09

## 2023-05-09 RX ORDER — AMOXICILLIN AND CLAVULANATE POTASSIUM 500; 125 MG/1; MG/1
1 TABLET, FILM COATED ORAL EVERY 12 HOURS SCHEDULED
Qty: 20 TABLET | Refills: 0 | Status: SHIPPED | OUTPATIENT
Start: 2023-05-09 | End: 2023-05-19

## 2023-05-09 RX ORDER — FLUTICASONE PROPIONATE AND SALMETEROL 250; 50 UG/1; UG/1
1 POWDER RESPIRATORY (INHALATION) 2 TIMES DAILY
Qty: 60 BLISTER | Refills: 3 | Status: SHIPPED | OUTPATIENT
Start: 2023-05-09

## 2023-05-09 NOTE — PROGRESS NOTES
Assessment/Plan:    1  Bronchospasm  -     Fluticasone-Salmeterol (Advair Diskus) 250-50 mcg/dose inhaler; Inhale 1 puff 2 (two) times a day Rinse mouth after use  -     predniSONE 20 mg tablet; Take 2 tablets PO daily x's 3 days, then take 1 tablet daily x's 3 days, then take 1/2 tablet daily x's 3 days    2  Wheezing  -     Fluticasone-Salmeterol (Advair Diskus) 250-50 mcg/dose inhaler; Inhale 1 puff 2 (two) times a day Rinse mouth after use  -     predniSONE 20 mg tablet; Take 2 tablets PO daily x's 3 days, then take 1 tablet daily x's 3 days, then take 1/2 tablet daily x's 3 days    3  Bronchitis  -     Fluticasone-Salmeterol (Advair Diskus) 250-50 mcg/dose inhaler; Inhale 1 puff 2 (two) times a day Rinse mouth after use  -     amoxicillin-clavulanate (AUGMENTIN) 500-125 mg per tablet; Take 1 tablet by mouth every 12 (twelve) hours for 10 days  -     predniSONE 20 mg tablet; Take 2 tablets PO daily x's 3 days, then take 1 tablet daily x's 3 days, then take 1/2 tablet daily x's 3 days    4  Restrictive lung disease  Assessment & Plan:  Chronic wheezing, recurrent bronchitis  Recommend advair use  Monitor for improvement in symptoms    Orders:  -     Fluticasone-Salmeterol (Advair Diskus) 250-50 mcg/dose inhaler; Inhale 1 puff 2 (two) times a day Rinse mouth after use  Patient Instructions   Increase fluid intake as tolerated  Rest and humidification   Continue medications as directed   - antibiotic for full course  - pro-biotic to protect stomach while on medication   - Flonase OTC 1-2 sprays each nostril daily PRN post nasal drip   - Mucinex OTC to loosen secretions   Return to office in one week if symptoms persist or worsen        Return for Diabetic Follow Up, AWV  Subjective:      Patient ID: Crescencio Marie is a 76 y o  male  Chief Complaint   Patient presents with   • Cold Like Symptoms     Pt here for bronchitis for approximately one week  Cough  This is a new problem   The current episode started 1 to 4 weeks ago  The problem has been unchanged  The problem occurs every few minutes  The cough is non-productive  Associated symptoms include nasal congestion, postnasal drip, rhinorrhea and wheezing  Pertinent negatives include no chest pain, chills, ear congestion, ear pain, fever, headaches, sore throat or shortness of breath  Nothing aggravates the symptoms  He has tried nothing for the symptoms  The treatment provided no relief  His past medical history is significant for bronchitis  The following portions of the patient's history were reviewed and updated as appropriate: allergies, current medications, past family history, past medical history, past social history, past surgical history and problem list     Review of Systems   Constitutional: Negative for chills, fatigue and fever  HENT: Positive for postnasal drip and rhinorrhea  Negative for congestion, ear pain, sinus pressure, sinus pain and sore throat  Respiratory: Positive for cough and wheezing  Negative for chest tightness and shortness of breath  Cardiovascular: Negative for chest pain  Neurological: Negative for headaches  Current Outpatient Medications   Medication Sig Dispense Refill   • amoxicillin-clavulanate (AUGMENTIN) 500-125 mg per tablet Take 1 tablet by mouth every 12 (twelve) hours for 10 days 20 tablet 0   • Ascorbic Acid (vitamin C) 1000 MG tablet Take 1,000 mg by mouth daily     • b complex vitamins capsule Take 1 capsule by mouth daily     • MYRIAM ZINC PO Take by mouth in the morning     • finasteride (PROSCAR) 5 mg tablet TAKE ONE TABLET BY MOUTH EVERY DAY 30 tablet 5   • Fluticasone-Salmeterol (Advair Diskus) 250-50 mcg/dose inhaler Inhale 1 puff 2 (two) times a day Rinse mouth after use   60 blister 3   • glipiZIDE (GLUCOTROL XL) 10 mg 24 hr tablet TAKE 1 TABLET BY MOUTH EVERY DAY WITH BREAKFAST 90 tablet 3   • Glucosamine-Chondroitin (GLUCOSAMINE CHONDR COMPLEX PO) Take by mouth 2 (two) times a day     • LORazepam (Ativan) 0 5 mg tablet Take 1 tablet (0 5 mg total) by mouth daily as needed for anxiety 10 tablet 0   • metFORMIN (GLUCOPHAGE) 500 mg tablet TAKE 2 TABLETS BY MOUTH EVERY MORNING AND TAKE 1 TABLET BY MOUTH IN THE EVENING (GENERIC FOR GLUCOPHAGE) 270 tablet 3   • metoprolol succinate (TOPROL-XL) 25 mg 24 hr tablet Take 1 tablet (25 mg total) by mouth every morning 90 tablet 3   • Multiple Vitamins-Minerals (MEGAVITE FRUITS & VEGGIES PO) Take by mouth in the morning     • predniSONE 20 mg tablet Take 2 tablets PO daily x's 3 days, then take 1 tablet daily x's 3 days, then take 1/2 tablet daily x's 3 days 11 tablet 0   • ramipril (ALTACE) 5 mg capsule Take 1 capsule (5 mg total) by mouth every morning 90 capsule 3   • rivaroxaban (Xarelto) 20 mg tablet in the morning     • sertraline (ZOLOFT) 100 mg tablet TAKE ONE TABLET BY MOUTH EVERY DAY 90 tablet 1   • tamsulosin (FLOMAX) 0 4 mg TAKE 1 CAPSULE BY MOUTH EVERYDAY AT BEDTIME 90 capsule 3   • Blood Glucose Monitoring Suppl (BLOOD GLUCOSE MONITOR SYSTEM) w/Device KIT by Does not apply route (Patient not taking: Reported on 5/9/2023)       No current facility-administered medications for this visit  Objective:    /60 (BP Location: Left arm, Patient Position: Sitting, Cuff Size: Large)   Pulse 75   Temp (!) 97 1 °F (36 2 °C) (Temporal)   Resp 14   Ht 6' (1 829 m)   Wt 106 kg (233 lb)   SpO2 93%   BMI 31 60 kg/m²        Physical Exam  Vitals reviewed  Constitutional:       General: He is not in acute distress  Appearance: Normal appearance  He is well-developed  He is not diaphoretic  HENT:      Head: Normocephalic and atraumatic  Right Ear: Tympanic membrane, ear canal and external ear normal  No drainage, swelling or tenderness  No middle ear effusion  Left Ear: Tympanic membrane, ear canal and external ear normal  No drainage, swelling or tenderness  No middle ear effusion        Nose: No mucosal edema or rhinorrhea  Right Sinus: No maxillary sinus tenderness or frontal sinus tenderness  Left Sinus: No maxillary sinus tenderness or frontal sinus tenderness  Mouth/Throat:      Pharynx: Uvula midline  No oropharyngeal exudate or posterior oropharyngeal erythema  Eyes:      General:         Right eye: No discharge  Left eye: No discharge  Conjunctiva/sclera: Conjunctivae normal    Neck:      Thyroid: No thyromegaly  Cardiovascular:      Rate and Rhythm: Normal rate and regular rhythm  Heart sounds: Normal heart sounds  Pulmonary:      Effort: Pulmonary effort is normal       Breath sounds: Wheezing present  No decreased breath sounds, rhonchi or rales  Comments: Expiratory wheezing throughout   Musculoskeletal:      Cervical back: Normal range of motion and neck supple  Lymphadenopathy:      Cervical: No cervical adenopathy  Skin:     General: Skin is warm and dry  Findings: No rash  Neurological:      Mental Status: He is alert and oriented to person, place, and time  Psychiatric:         Behavior: Behavior normal          Thought Content:  Thought content normal                 LATANYA Joy

## 2023-05-15 DIAGNOSIS — R52 PAINFUL COUGH: Primary | ICD-10-CM

## 2023-05-15 DIAGNOSIS — R05.8 PAINFUL COUGH: Primary | ICD-10-CM

## 2023-05-15 RX ORDER — GUAIFENESIN AND CODEINE PHOSPHATE 100; 10 MG/5ML; MG/5ML
5 SOLUTION ORAL 3 TIMES DAILY PRN
Qty: 75 ML | Refills: 0 | Status: SHIPPED | OUTPATIENT
Start: 2023-05-15 | End: 2023-05-20

## 2023-05-17 DIAGNOSIS — R35.1 BENIGN PROSTATIC HYPERPLASIA WITH NOCTURIA: ICD-10-CM

## 2023-05-17 DIAGNOSIS — N40.1 BENIGN PROSTATIC HYPERPLASIA WITH NOCTURIA: ICD-10-CM

## 2023-05-17 RX ORDER — TAMSULOSIN HYDROCHLORIDE 0.4 MG/1
CAPSULE ORAL
Qty: 90 CAPSULE | Refills: 3 | Status: SHIPPED | OUTPATIENT
Start: 2023-05-17

## 2023-05-22 ENCOUNTER — OFFICE VISIT (OUTPATIENT)
Dept: FAMILY MEDICINE CLINIC | Facility: CLINIC | Age: 78
End: 2023-05-22

## 2023-05-22 VITALS
DIASTOLIC BLOOD PRESSURE: 70 MMHG | WEIGHT: 235 LBS | TEMPERATURE: 97.6 F | HEART RATE: 84 BPM | RESPIRATION RATE: 16 BRPM | SYSTOLIC BLOOD PRESSURE: 122 MMHG | HEIGHT: 72 IN | BODY MASS INDEX: 31.83 KG/M2 | OXYGEN SATURATION: 96 %

## 2023-05-22 DIAGNOSIS — J98.01 BRONCHOSPASM: ICD-10-CM

## 2023-05-22 DIAGNOSIS — J40 BRONCHITIS: Primary | ICD-10-CM

## 2023-05-22 RX ORDER — PREDNISONE 20 MG/1
TABLET ORAL
Qty: 14 TABLET | Refills: 0 | Status: SHIPPED | OUTPATIENT
Start: 2023-05-22

## 2023-05-22 RX ORDER — BENZONATATE 200 MG/1
200 CAPSULE ORAL 3 TIMES DAILY PRN
Qty: 20 CAPSULE | Refills: 0 | Status: SHIPPED | OUTPATIENT
Start: 2023-05-22

## 2023-05-22 RX ORDER — LEVOFLOXACIN 250 MG/1
250 TABLET ORAL DAILY
Qty: 10 TABLET | Refills: 0 | Status: SHIPPED | OUTPATIENT
Start: 2023-05-22 | End: 2023-06-01

## 2023-05-22 NOTE — PROGRESS NOTES
Name: Ema Curry      : 1945      MRN: [id number]  Encounter Provider: Solitario Maguire MD  Encounter Date: 2023   Encounter department: [facility]     1  Bronchitis  -     levofloxacin (LEVAQUIN) 250 mg tablet; Take 1 tablet (250 mg total) by mouth daily for 10 days  -     benzonatate (TESSALON) 200 MG capsule; Take 1 capsule (200 mg total) by mouth 3 (three) times a day as needed for cough    2  Bronchospasm  -     predniSONE 20 mg tablet; 2 PO QD X 4 DAYS, THEN 1 PO QD X 4 DAYS, THEN 1/2 PO QD X 4 DAYS  -     benzonatate (TESSALON) 200 MG capsule; Take 1 capsule (200 mg total) by mouth 3 (three) times a day as needed for cough         Subjective      PERSISTENT COUGH  THICK SPUTUM  WHEEZING  NOT TOO MUCH BETTER WITH MEDICATIONS  DENIES ANY FEVER OR CHILLS  NO NVD  SOME NASAL CONGESTION      Review of Systems   Constitutional: Negative for chills, fatigue and fever  HENT: Negative for sore throat  Eyes: Negative for discharge  Respiratory: Positive for cough and wheezing  Negative for chest tightness  Cardiovascular: Negative for chest pain and palpitations  Gastrointestinal: Negative for abdominal pain, diarrhea, nausea and vomiting  Musculoskeletal: Negative for arthralgias and gait problem  Neurological: Negative for dizziness, weakness and headaches  Hematological: Negative for adenopathy  Psychiatric/Behavioral: The patient is not nervous/anxious  Current Outpatient Medications on File Prior to Visit   Medication Sig   • Ascorbic Acid (vitamin C) 1000 MG tablet Take 1,000 mg by mouth daily   • b complex vitamins capsule Take 1 capsule by mouth daily   • MYRIAM ZINC PO Take by mouth in the morning   • finasteride (PROSCAR) 5 mg tablet TAKE ONE TABLET BY MOUTH EVERY DAY   • Fluticasone-Salmeterol (Advair Diskus) 250-50 mcg/dose inhaler Inhale 1 puff 2 (two) times a day Rinse mouth after use     • glipiZIDE (GLUCOTROL XL) 10 mg 24 hr tablet TAKE 1 TABLET BY MOUTH EVERY DAY WITH BREAKFAST   • Glucosamine-Chondroitin (GLUCOSAMINE CHONDR COMPLEX PO) Take by mouth 2 (two) times a day   • LORazepam (Ativan) 0 5 mg tablet Take 1 tablet (0 5 mg total) by mouth daily as needed for anxiety   • metFORMIN (GLUCOPHAGE) 500 mg tablet TAKE 2 TABLETS BY MOUTH EVERY MORNING AND TAKE 1 TABLET BY MOUTH IN THE EVENING (GENERIC FOR GLUCOPHAGE)   • metoprolol succinate (TOPROL-XL) 25 mg 24 hr tablet Take 1 tablet (25 mg total) by mouth every morning   • Multiple Vitamins-Minerals (MEGAVITE FRUITS & VEGGIES PO) Take by mouth in the morning   • ramipril (ALTACE) 5 mg capsule Take 1 capsule (5 mg total) by mouth every morning   • rivaroxaban (XARELTO) 20 mg tablet in the morning   • sertraline (ZOLOFT) 100 mg tablet TAKE ONE TABLET BY MOUTH EVERY DAY   • tamsulosin (FLOMAX) 0 4 mg TAKE 1 CAPSULE BY MOUTH EVERYDAY AT BEDTIME   • [DISCONTINUED] Blood Glucose Monitoring Suppl (BLOOD GLUCOSE MONITOR SYSTEM) w/Device KIT by Does not apply route (Patient not taking: Reported on 5/9/2023)   • [DISCONTINUED] predniSONE 20 mg tablet Take 2 tablets PO daily x's 3 days, then take 1 tablet daily x's 3 days, then take 1/2 tablet daily x's 3 days (Patient not taking: Reported on 5/22/2023)       Objective     /70 (BP Location: Left arm, Patient Position: Sitting, Cuff Size: Large)   Pulse 84   Temp 97 6 °F (36 4 °C) (Temporal)   Resp 16   Ht 6' (1 829 m)   Wt 107 kg (235 lb)   SpO2 96%   BMI 31 87 kg/m²     Physical Exam  Constitutional:       Appearance: He is well-developed  HENT:      Head: Normocephalic and atraumatic  Right Ear: Ear canal normal  A middle ear effusion is present  Left Ear: Ear canal normal  A middle ear effusion is present  Nose: Mucosal edema and rhinorrhea present  Mouth/Throat:      Pharynx: Uvula midline  Posterior oropharyngeal erythema present  Eyes:      General:         Right eye: No discharge  Left eye: No discharge  Conjunctiva/sclera:      Right eye: Right conjunctiva is injected  Left eye: Left conjunctiva is injected  Pupils: Pupils are equal, round, and reactive to light  Neck:      Thyroid: No thyromegaly  Cardiovascular:      Rate and Rhythm: Normal rate and regular rhythm  Heart sounds: Normal heart sounds  No murmur heard  Pulmonary:      Effort: Pulmonary effort is normal  No accessory muscle usage or respiratory distress  Breath sounds: Examination of the right-middle field reveals rhonchi  Examination of the left-middle field reveals rhonchi  Examination of the right-lower field reveals rhonchi  Examination of the left-lower field reveals rhonchi  Wheezing and rhonchi present  Chest:      Chest wall: No tenderness  Abdominal:      General: Bowel sounds are normal  There is no distension  Palpations: Abdomen is soft  There is no mass  Tenderness: There is no abdominal tenderness  There is no guarding or rebound  Musculoskeletal:         General: No tenderness  Normal range of motion  Cervical back: Normal range of motion and neck supple  Lymphadenopathy:      Cervical: No cervical adenopathy  Skin:     General: Skin is warm and dry  Neurological:      Mental Status: He is alert and oriented to person, place, and time  Psychiatric:         Behavior: Behavior normal          Thought Content:  Thought content normal          Judgment: Judgment normal        Nelly Quiles MD

## 2023-05-22 NOTE — PATIENT INSTRUCTIONS
PLENTY FLUIDS  REST  MUCINEX  MEDICATION AS DIRECTED  IF SYMPTOMS PERSIST OR WORSEN, PLEASE CALL    CONSIDER NEB TX TWICE A DAY    CALL Wednesday / Thursday WITH UPDATE, SOONER PRN    RV 1 WEEK

## 2023-06-05 ENCOUNTER — TELEPHONE (OUTPATIENT)
Dept: ADMINISTRATIVE | Facility: OTHER | Age: 78
End: 2023-06-05

## 2023-06-05 ENCOUNTER — OFFICE VISIT (OUTPATIENT)
Dept: FAMILY MEDICINE CLINIC | Facility: CLINIC | Age: 78
End: 2023-06-05
Payer: MEDICARE

## 2023-06-05 ENCOUNTER — APPOINTMENT (OUTPATIENT)
Dept: RADIOLOGY | Facility: CLINIC | Age: 78
End: 2023-06-05
Payer: MEDICARE

## 2023-06-05 VITALS
SYSTOLIC BLOOD PRESSURE: 140 MMHG | HEIGHT: 72 IN | TEMPERATURE: 97.1 F | BODY MASS INDEX: 31.69 KG/M2 | DIASTOLIC BLOOD PRESSURE: 72 MMHG | WEIGHT: 234 LBS | RESPIRATION RATE: 16 BRPM | OXYGEN SATURATION: 96 % | HEART RATE: 80 BPM

## 2023-06-05 DIAGNOSIS — J98.01 BRONCHOSPASM: ICD-10-CM

## 2023-06-05 DIAGNOSIS — R06.02 SHORTNESS OF BREATH: ICD-10-CM

## 2023-06-05 DIAGNOSIS — J40 BRONCHITIS: ICD-10-CM

## 2023-06-05 DIAGNOSIS — J40 BRONCHITIS: Primary | ICD-10-CM

## 2023-06-05 PROBLEM — R06.2 WHEEZING: Status: RESOLVED | Noted: 2019-11-04 | Resolved: 2023-06-05

## 2023-06-05 PROCEDURE — 93000 ELECTROCARDIOGRAM COMPLETE: CPT | Performed by: FAMILY MEDICINE

## 2023-06-05 PROCEDURE — 71046 X-RAY EXAM CHEST 2 VIEWS: CPT

## 2023-06-05 PROCEDURE — 99214 OFFICE O/P EST MOD 30 MIN: CPT | Performed by: FAMILY MEDICINE

## 2023-06-05 RX ORDER — PREDNISONE 20 MG/1
TABLET ORAL
Qty: 14 TABLET | Refills: 0 | Status: SHIPPED | OUTPATIENT
Start: 2023-06-05

## 2023-06-05 RX ORDER — DOXYCYCLINE HYCLATE 100 MG
100 TABLET ORAL 2 TIMES DAILY
Qty: 28 TABLET | Refills: 0 | Status: SHIPPED | OUTPATIENT
Start: 2023-06-05 | End: 2023-06-19

## 2023-06-05 NOTE — TELEPHONE ENCOUNTER
Upon review of the In Basket request and the patient's chart, initial outreach has been made via fax to facility  Please see Contacts section for details       Thank you  Gigi Spears MA

## 2023-06-05 NOTE — LETTER
Diabetic Eye Exam Form    Date Requested: 23  Patient: Parris Montelongo  Patient : 1945   Referring Provider: Ana John MD      DIABETIC Eye Exam Date _______________________________      Type of Exam MUST be documented for Diabetic Eye Exams  Please CHECK ONE  Retinal Exam       Dilated Retinal Exam       OCT       Optomap-Iris Exam      Fundus Photography       Left Eye - Please check Retinopathy or No Retinopathy        Exam did show retinopathy    Exam did not show retinopathy       Right Eye - Please check Retinopathy or No Retinopathy       Exam did show retinopathy    Exam did not show retinopathy       Comments __________________________________________________________    Practice Providing Exam ______________________________________________    Exam Performed By (print name) _______________________________________      Provider Signature ___________________________________________________      These reports are needed for  compliance  Please fax this completed form and a copy of the Diabetic Eye Exam report to our office located at [place] [address] as soon as possible via Fax [phone] attention Cee: Phone [phone]  We thank you for your assistance in treating our mutual patient

## 2023-06-05 NOTE — PATIENT INSTRUCTIONS
PLENTY FLUIDS  REST  MUCINEX  MEDICATION AS DIRECTED  IF SYMPTOMS PERSIST OR WORSEN, PLEASE CALL  NEB TREATMENT TWICE A DAY    RV THURSDAY      Pt is due for an AWV

## 2023-06-05 NOTE — TELEPHONE ENCOUNTER
----- Message from [place] Jimenez sent at 6/5/2023 10:19 AM EDT -----  Regarding: care gap request - DM eye exam  06/05/23 10:19 AM    Hello, our patient attached above has had Diabetic Eye Exam completed/performed  Please assist in updating the patient chart by making an External outreach to Dr Alexsander Lino facility located in [place], [place]  The date of service is 2022      Thank you,  [place] Jimenez  [address]

## 2023-06-05 NOTE — PROGRESS NOTES
Name: Killian Palacio      : 1945      MRN: [id number]  Encounter Provider: Jossue Thompson MD  Encounter Date: 2023   Encounter department: [facility]     1  Bronchitis  -     XR chest pa & lateral; Future; Expected date: 2023  -     doxycycline hyclate (VIBRA-TABS) 100 mg tablet; Take 1 tablet (100 mg total) by mouth 2 (two) times a day for 14 days  -     POCT ECG    2  Bronchospasm  -     XR chest pa & lateral; Future; Expected date: 2023  -     doxycycline hyclate (VIBRA-TABS) 100 mg tablet; Take 1 tablet (100 mg total) by mouth 2 (two) times a day for 14 days  -     predniSONE 20 mg tablet; 2 PO QD X 4 DAYS, THEN 1 PO QD X 4 DAYS, THEN 1/2 PO QD X 4 DAYS  -     POCT ECG    3  Shortness of breath  -     POCT ECG           Subjective      FOLLOW UP  STILL COUGHING A LOT  SOME SPUTUM PRODUCTION  FEELS SOB AT TIMES - FRANK WITH EXERTION  DENIES ANY CP, PALPITATIONS  NO FEVER OR CHILLS  NO NVD  NOTES NO MELENA OR HEMATOCHEZIA    Review of Systems   Constitutional: Positive for fatigue  Negative for chills and fever  HENT: Negative for sore throat  Eyes: Negative for discharge  Respiratory: Positive for cough, shortness of breath and wheezing  Negative for chest tightness  Cardiovascular: Negative for chest pain and palpitations  Gastrointestinal: Negative for abdominal pain, diarrhea, nausea and vomiting  Musculoskeletal: Negative for arthralgias and gait problem  Neurological: Negative for dizziness, weakness and headaches  Hematological: Negative for adenopathy  Psychiatric/Behavioral: The patient is not nervous/anxious          Current Outpatient Medications on File Prior to Visit   Medication Sig   • Ascorbic Acid (vitamin C) 1000 MG tablet Take 1,000 mg by mouth daily   • b complex vitamins capsule Take 1 capsule by mouth daily   • benzonatate (TESSALON) 200 MG capsule Take 1 capsule (200 mg total) by mouth 3 (three) times a day as needed for cough   • MYRIAM ZINC PO Take by mouth in the morning   • finasteride (PROSCAR) 5 mg tablet TAKE ONE TABLET BY MOUTH EVERY DAY   • Fluticasone-Salmeterol (Advair Diskus) 250-50 mcg/dose inhaler Inhale 1 puff 2 (two) times a day Rinse mouth after use  • glipiZIDE (GLUCOTROL XL) 10 mg 24 hr tablet TAKE 1 TABLET BY MOUTH EVERY DAY WITH BREAKFAST   • Glucosamine-Chondroitin (GLUCOSAMINE CHONDR COMPLEX PO) Take by mouth 2 (two) times a day   • LORazepam (Ativan) 0 5 mg tablet Take 1 tablet (0 5 mg total) by mouth daily as needed for anxiety   • metFORMIN (GLUCOPHAGE) 500 mg tablet TAKE 2 TABLETS BY MOUTH EVERY MORNING AND TAKE 1 TABLET BY MOUTH IN THE EVENING (GENERIC FOR GLUCOPHAGE)   • metoprolol succinate (TOPROL-XL) 25 mg 24 hr tablet Take 1 tablet (25 mg total) by mouth every morning   • Multiple Vitamins-Minerals (MEGAVITE FRUITS & VEGGIES PO) Take by mouth in the morning   • ramipril (ALTACE) 5 mg capsule Take 1 capsule (5 mg total) by mouth every morning   • rivaroxaban (XARELTO) 20 mg tablet in the morning   • sertraline (ZOLOFT) 100 mg tablet TAKE ONE TABLET BY MOUTH EVERY DAY   • tamsulosin (FLOMAX) 0 4 mg TAKE 1 CAPSULE BY MOUTH EVERYDAY AT BEDTIME   • [DISCONTINUED] predniSONE 20 mg tablet 2 PO QD X 4 DAYS, THEN 1 PO QD X 4 DAYS, THEN 1/2 PO QD X 4 DAYS (Patient not taking: Reported on 6/5/2023)       Objective     /72 (BP Location: Right arm, Patient Position: Sitting, Cuff Size: Large)   Pulse 80   Temp (!) 97 1 °F (36 2 °C) (Temporal)   Resp 16   Ht 6' (1 829 m)   Wt 106 kg (234 lb)   SpO2 96%   BMI 31 74 kg/m²     Physical Exam  Constitutional:       General: He is not in acute distress  Appearance: Normal appearance  He is well-developed  He is obese  He is not ill-appearing  HENT:      Head: Normocephalic and atraumatic        Right Ear: Tympanic membrane normal       Left Ear: Tympanic membrane normal       Nose: Nose normal       Mouth/Throat: Pharynx: Oropharynx is clear  Eyes:      General:         Right eye: No discharge  Left eye: No discharge  Conjunctiva/sclera: Conjunctivae normal       Pupils: Pupils are equal, round, and reactive to light  Neck:      Thyroid: No thyromegaly  Vascular: No JVD  Cardiovascular:      Rate and Rhythm: Normal rate and regular rhythm  Heart sounds: Normal heart sounds  No murmur heard  Comments: RATE 95  Pulmonary:      Effort: Pulmonary effort is normal       Breath sounds: Rhonchi present  No wheezing or rales  Comments: COARSE BW  SCATTERED RHONCHI  OCC EXP WHEEZE    Abdominal:      General: Bowel sounds are normal       Palpations: Abdomen is soft  There is no mass  Tenderness: There is no abdominal tenderness  There is no guarding or rebound  Musculoskeletal:         General: No tenderness or deformity  Normal range of motion  Cervical back: Neck supple  Right lower leg: No edema  Left lower leg: No edema  Lymphadenopathy:      Cervical: No cervical adenopathy  Skin:     General: Skin is warm and dry  Findings: No erythema or rash  Neurological:      General: No focal deficit present  Mental Status: He is alert and oriented to person, place, and time  Psychiatric:         Behavior: Behavior normal          Thought Content:  Thought content normal          Judgment: Judgment normal        Shaw Fenton MD

## 2023-06-06 NOTE — TELEPHONE ENCOUNTER
Upon review of the In Basket request we Per Office respond  ,patient last Dm eye exam was 2/24/2020   Already in the chart    Any additional questions or concerns should be emailed to the Practice Liaisons via the appropriate education email address, please do not reply via In Basket      Thank you  Sophia Blair MA

## 2023-06-26 NOTE — PROGRESS NOTES
Sleep Medicine Outpatient Follow Up Note   Zenobia Byrnes 76 y o  male MRN: [id number]  6/27/2023      Reason for Consultation:    Chief Complaint   Patient presents with   • Sleep Apnea       Assessment/Plan:    1  Moderate obstructive sleep apnea  Assessment & Plan: Moderate obstructive sleep apnea with MANDY 19 in March 2020 HST  This is associated with overnight awakenings, snoring  Has had trouble tolerating nasal mask due to the sensation of suffocation with his mask  Very willing to try fullface mask despite history of claustrophobia  Met with DME today for mask fitting  Compliance inadequate due to inadequate mask tolerance    Follow-up in 3 to 4 months to reevaluate compliance  Discussed alternative options including oral appliance therapy, weight loss, inspire (patient not interested in surgery)      2  Moderate persistent asthma without complication  Assessment & Plan:  Patient has a history of lower airway wheezing, chronic allergic rhinitis  Has only been on Advair 250-50 twice daily  Recently had episode of bronchitis with slow recovery  Pulmonary function testing in 2018 show decreased vital capacity, mildly decreased diffusion  This was consistent with moderate restrictive defect, appears extrapulmonary in nature  CT chest and LVHN results noted without significant parenchymal disease  Recommend every 4 to 6 hours albuterol MDI/nebulized as needed, wean as symptoms improved  Recommend use of short acting beta agonist before exercise  Start Singulair 10 mg daily      3  Mild persistent asthma with acute exacerbation  Assessment & Plan:  Patient has a history of lower airway wheezing, chronic allergic rhinitis  Has only been on Advair 250-50 twice daily  Recently had episode of bronchitis with slow recovery  Pulmonary function testing in 2018 show decreased vital capacity, mildly decreased diffusion    This was consistent with moderate restrictive defect, appears extrapulmonary in nature  CT chest and LVHN results noted without significant parenchymal disease  Recommend every 4 to 6 hours albuterol MDI/nebulized as needed, wean as symptoms improved  Recommend use of short acting beta agonist before exercise  Start Singulair 10 mg daily    Orders:  -     montelukast (SINGULAIR) 10 mg tablet; Take 1 tablet (10 mg total) by mouth daily at bedtime  -     [facility] Allergy Panel, Adult; Future  -     CBC and differential; Future  -     albuterol (Ventolin HFA) 90 mcg/act inhaler; Inhale 2 puffs every 6 (six) hours as needed for wheezing  -     albuterol (2 5 mg/3 mL) 0 083 % nebulizer solution; Take 3 mL (2 5 mg total) by nebulization every 6 (six) hours as needed for wheezing or shortness of breath  -     CBC and differential        Health Maintenance  Immunization History   Administered Date(s) Administered   • COVID-19 PFIZER VACCINE 0 3 ML IM 02/23/2021, 03/16/2021   • H1N1, All Formulations 12/03/2009   • INFLUENZA 01/05/2018, 09/02/2019   • Influenza Split High Dose Preservative Free IM 09/07/2016, 01/10/2018   • Influenza, high dose seasonal 0 7 mL 12/09/2022   • Influenza, seasonal, injectable 11/03/2010, 10/06/2015   • Meningococcal C/Y-HIB PRP 12/03/2009   • Pneumococcal Conjugate 13-Valent 09/07/2016   • Pneumococcal Polysaccharide PPV23 11/03/2010   • Tdap 11/03/2010        Return in about 3 months (around 9/27/2023)  History of Present Illness   HPI:  Herson Silver is a 76 y o  male who has a past medical history of past medical history atrial fibrillation on anticoagulation, hypertension, diabetes mellitus, BPH, history of moderate TRISTIN who is presenting for follow up of TRISTIN and wheezing    Patient originally had a diagnostic PSG 2017 that showed AHI 19 8  He never underwent treatment  He underwent repeat home sleep study in 2020 that showed MANDY 19 events per hour  We ordered CPAP last visit in 3/23     Symptoms of nocturia could be related to untreated obstructive sleep apnea, but in this case is likely related to his history of BPH    Last visit on expiration in his trachea he does have some noisy rhonchi with expiration  He has no reported voice changes  He has no stridor  We referred him to ENT  He has not seen them yet  Follow up today - not using CPAP due to claustrophobia associated with nasal mask  Feels like it is very suffocating  Wife still notes some nighttime awakenings, snoring  Mask fitting today with DME - he is willing to try full face mask - he actually thinks he will do better with that despite his claustrophobia  He is willing to try wearing it during the day for acclimation    Him and his wife live with a 9year-old great grandchild, and therefore gets bronchitis here there  Wife notes that he has been wheezing most of his life  He had a bronchitis episode in early June  They have not fully recovered from that  He still endorses some lower airway mucus production, wheezing, fatigue and dyspnea with exertion  He notes some lower extremity swelling as well  Chest x-ray in early June ordered by primary care physician showed no significant pulmonary edema, consolidation, effusions  He notes consistent upper airway congestion, PND, allergies  Works in his garden often  Uses Advair BID but does not have albuterol inhaler/nebulized solution  Wife lets him use her albuterol nebulized at times  Pulmonary function testing in 2018 at [facility] showed normal spirometry  Used to see Dr Rebeca Gray with [facility] pulmonary  Quit smoking 35 years ago  30 pack year history  Still Former marijuana use  Initial Sleep History:    [place] 15  Patient is not snoring  He does have issues with sleep maintenance with multiple awakenings during the night    He has a excessive daytime sleepiness      No significant symptoms of restless legs, sleep paralysis, cataplexy, circadian rhythm disorder, abnormal movements or behaviors of sleep      Patient used to work in [employer] and is used to sleeping late  He goes to bed around 2 AM and falls asleep around 3 AM   He has nocturia approximately 3-4 times a night, and falls back asleep easily  He wakes up between 11 AM and noon  He does have to take naps on most days      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 and 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 working in [employer]      Medications used include sertraline 50 mg daily, lorazepam 0 5 mg as needed, glipizide, metoprolol, ramipril, apixaban  Patient does have BPH and uses finasteride and tamsulosin      Palo: 10 today (15 last visit)    Historical Information   Past Medical History:   Diagnosis Date   • Anxiety    • Arthritis     fingers , knee   • BPH (benign prostatic hypertrophy)    • Cataract     currently left eye- to have surgery on 4/10/17   • Cough variant asthma 4/12/2017   • Diabetes mellitus ([facility] [address] )     type 2, last assessed 4/12/2017   • Hernia, umbilical     currently has   • HL (hearing loss)     b/l hearing aids   • Labyrinthitis    • Moderate obstructive sleep apnea 4/11/2017   • New onset a-fib ([facility] [address] ) 1/10/2020   • Obesity with body mass index 30 or greater 96/4/8184   • Umbilical hernia      Past Surgical History:   Procedure Laterality Date   • CATARACT EXTRACTION Right 04/04/2017   • COLONOSCOPY      yrs ago   • EYE SURGERY Bilateral     cataracts with IOL     Family History   Problem Relation Age of Onset   • Cancer Mother         ovarian   • Diabetes Father    • Cancer Sister         stomach to brain   • Substance Abuse Family    • Substance Abuse Son    • Mental illness Neg Hx          Meds/Allergies     Current Outpatient Medications:   •  albuterol (2 5 mg/3 mL) 0 083 % nebulizer solution, Take 3 mL (2 5 mg total) by nebulization every 6 (six) hours as needed for wheezing or shortness of breath, Disp: 60 mL, Rfl: 6  •  albuterol (Ventolin HFA) 90 mcg/act inhaler, Inhale 2 puffs every 6 (six) hours as needed for wheezing, Disp: 18 g, Rfl: 3  •  Ascorbic Acid (vitamin C) 1000 MG tablet, Take 1,000 mg by mouth daily, Disp: , Rfl:   •  b complex vitamins capsule, Take 1 capsule by mouth daily, Disp: , Rfl:   •  benzonatate (TESSALON) 200 MG capsule, Take 1 capsule (200 mg total) by mouth 3 (three) times a day as needed for cough, Disp: 20 capsule, Rfl: 0  •  finasteride (PROSCAR) 5 mg tablet, TAKE ONE TABLET BY MOUTH EVERY DAY, Disp: 30 tablet, Rfl: 5  •  Fluticasone-Salmeterol (Advair Diskus) 250-50 mcg/dose inhaler, Inhale 1 puff 2 (two) times a day Rinse mouth after use , Disp: 60 blister, Rfl: 3  •  glipiZIDE (GLUCOTROL XL) 10 mg 24 hr tablet, TAKE 1 TABLET BY MOUTH EVERY DAY WITH BREAKFAST, Disp: 90 tablet, Rfl: 3  •  Glucosamine-Chondroitin (GLUCOSAMINE CHONDR COMPLEX PO), Take by mouth 2 (two) times a day, Disp: , Rfl:   •  LORazepam (Ativan) 0 5 mg tablet, Take 1 tablet (0 5 mg total) by mouth daily as needed for anxiety, Disp: 10 tablet, Rfl: 0  •  metFORMIN (GLUCOPHAGE) 500 mg tablet, TAKE 2 TABLETS BY MOUTH EVERY MORNING AND TAKE 1 TABLET BY MOUTH IN THE EVENING (GENERIC FOR GLUCOPHAGE), Disp: 270 tablet, Rfl: 3  •  metoprolol succinate (TOPROL-XL) 25 mg 24 hr tablet, Take 1 tablet (25 mg total) by mouth every morning, Disp: 90 tablet, Rfl: 3  •  montelukast (SINGULAIR) 10 mg tablet, Take 1 tablet (10 mg total) by mouth daily at bedtime, Disp: 90 tablet, Rfl: 3  •  Multiple Vitamins-Minerals (MEGAVITE FRUITS & VEGGIES PO), Take by mouth in the morning, Disp: , Rfl:   •  ramipril (ALTACE) 5 mg capsule, Take 1 capsule (5 mg total) by mouth every morning, Disp: 90 capsule, Rfl: 3  •  rivaroxaban (XARELTO) 20 mg tablet, in the morning, Disp: , Rfl:   •  sertraline (ZOLOFT) 100 mg tablet, TAKE ONE TABLET BY MOUTH EVERY DAY, Disp: 90 tablet, Rfl: 1  •  tamsulosin (FLOMAX) 0 4 mg, TAKE 1 CAPSULE BY MOUTH EVERYDAY AT BEDTIME, Disp: 90 capsule, Rfl: 3  •  MYRIAM ZINC PO, Take by mouth in the morning, "Disp: , Rfl:   •  predniSONE 20 mg tablet, 2 PO QD X 4 DAYS, THEN 1 PO QD X 4 DAYS, THEN 1/2 PO QD X 4 DAYS (Patient not taking: Reported on 6/27/2023), Disp: 14 tablet, Rfl: 0  Allergies   Allergen Reactions   • Ceftin [Cefuroxime] GI Intolerance and Vomiting       Vitals: Blood pressure 128/68, pulse 95, height 6' (1 829 m), weight 107 kg (235 lb), SpO2 97 %  Body mass index is 31 87 kg/m²  Oxygen Therapy  SpO2: 97 %    Physical Exam  Vitals and nursing note reviewed  Constitutional:       General: He is not in acute distress  Appearance: He is well-developed  He is not ill-appearing, toxic-appearing or diaphoretic  HENT:      Head: Normocephalic and atraumatic  Eyes:      Conjunctiva/sclera: Conjunctivae normal    Cardiovascular:      Rate and Rhythm: Normal rate and regular rhythm  Heart sounds: No murmur heard  Pulmonary:      Effort: Pulmonary effort is normal  No respiratory distress  Breath sounds: No stridor  Wheezing present  Comments: Right lower lobe wheezing  Abdominal:      Palpations: Abdomen is soft  Tenderness: There is no abdominal tenderness  Musculoskeletal:         General: No swelling  Cervical back: Normal range of motion and neck supple  Right lower leg: Edema present  Left lower leg: Edema present  Skin:     General: Skin is warm and dry  Capillary Refill: Capillary refill takes less than 2 seconds  Neurological:      Mental Status: He is alert  Psychiatric:         Mood and Affect: Mood normal              Labs: I have personally reviewed pertinent lab results      ABG: No results found for: \"PHART\", \"YGQ1XAI\", \"PO2ART\", \"GMX2KEU\", \"T6LYCKXG\", \"BEART\", \"SOURCE\",   BNP: No results found for: \"BNP\",   CBC:  Lab Results   Component Value Date    WBC 10 17 (H) 09/03/2022    HGB 10 3 (L) 09/03/2022    HCT 33 7 (L) 09/03/2022    MCV 92 09/03/2022     09/03/2022    EOSPCT 2 09/03/2022    EOSABS 0 17 09/03/2022    NEUTOPHILPCT 69 " "09/03/2022    LYMPHOPCT 21 09/03/2022   ,   CMP:   Lab Results   Component Value Date    SODIUM 138 11/16/2020    K 5 6 (H) 11/16/2020     11/16/2020    CO2 26 11/16/2020    BUN 17 11/16/2020    CREATININE 0 97 11/16/2020    GLUCOSE 132 (H) 03/31/2017    CALCIUM 9 1 10/22/2020    AST 16 09/03/2022    ALT 19 09/03/2022    ALKPHOS 50 09/03/2022    EGFR 59 10/22/2020   ,   PT/INR:   Lab Results   Component Value Date    INR 1 10 (H) 01/10/2020   ,   Ferrtin: No components found for: \"FERRTIN\",  Magensium: No results found for: \"MAGNESIUM\",      Imaging and other studies: I have personally reviewed pertinent reports  and I have personally reviewed pertinent films in PACS  Pulmonary function testing February 2018 at [address] [place] [address] volumes show that   total lung capacity is normal but vital capacity is decreased   Spirometry   shows adequate effort   Forced vital capacity is decreased   Flow rates   are decreased except FEV1/FVC is normal   Diffusing capacity is decreased     The patient has a moderate restrictive ventilatory defect which appears   extrapulmonary in nature  Sleep Study:  Diagnostic: HST 3/09/2020, MANDY 19, PSG 4/7/2017, AHI 19 7    Compliance data:  Compliance inadequate due to difficulty with mask fitting  13% use over the past 76 days  AirSense 11 5-20 cm H2O with EPR set at 1  Median pressure 5, 95th percentile pressure 6 3  Leaks median 3, 95th percentile 24 6  Residual AHI 1 1    Transthoracic Echo:  January 2020  LEFT VENTRICLE:  Systolic function was mildly reduced  Ejection fraction was estimated in the range of 45 % to 50 %  There was mild diffuse hypokinesis  Wall thickness was mildly increased      LEFT ATRIUM:  The atrium was moderately dilated      RIGHT ATRIUM:  The atrium was moderately dilated      AORTIC VALVE:  Although there was no diagnostic evidence for vegetation, this study is not adequate to completely exclude the possibility          Beryle Server, MD  Pulmonary, " "Critical Care and Sleep Medicine  [facility]     Portions of the record may have been created with voice recognition software  Occasional wrong word or \"sound a like\" substitutions may have occurred due to the inherent limitations of voice recognition software  Please read the chart carefully and recognize, using context, where substitutions have occurred     "

## 2023-06-27 ENCOUNTER — OFFICE VISIT (OUTPATIENT)
Dept: SLEEP CENTER | Facility: CLINIC | Age: 78
End: 2023-06-27
Payer: MEDICARE

## 2023-06-27 VITALS
HEIGHT: 72 IN | HEART RATE: 95 BPM | BODY MASS INDEX: 31.83 KG/M2 | DIASTOLIC BLOOD PRESSURE: 68 MMHG | OXYGEN SATURATION: 97 % | SYSTOLIC BLOOD PRESSURE: 128 MMHG | WEIGHT: 235 LBS

## 2023-06-27 DIAGNOSIS — G47.33 MODERATE OBSTRUCTIVE SLEEP APNEA: Primary | ICD-10-CM

## 2023-06-27 DIAGNOSIS — J45.40 MODERATE PERSISTENT ASTHMA WITHOUT COMPLICATION: ICD-10-CM

## 2023-06-27 DIAGNOSIS — J45.31 MILD PERSISTENT ASTHMA WITH ACUTE EXACERBATION: ICD-10-CM

## 2023-06-27 PROCEDURE — 99214 OFFICE O/P EST MOD 30 MIN: CPT | Performed by: INTERNAL MEDICINE

## 2023-06-27 RX ORDER — ALBUTEROL SULFATE 2.5 MG/3ML
2.5 SOLUTION RESPIRATORY (INHALATION) EVERY 6 HOURS PRN
Qty: 60 ML | Refills: 6 | Status: SHIPPED | OUTPATIENT
Start: 2023-06-27

## 2023-06-27 RX ORDER — ALBUTEROL SULFATE 90 UG/1
2 AEROSOL, METERED RESPIRATORY (INHALATION) EVERY 6 HOURS PRN
Qty: 18 G | Refills: 3 | Status: SHIPPED | OUTPATIENT
Start: 2023-06-27

## 2023-06-27 RX ORDER — MONTELUKAST SODIUM 10 MG/1
10 TABLET ORAL
Qty: 90 TABLET | Refills: 3 | Status: SHIPPED | OUTPATIENT
Start: 2023-06-27

## 2023-06-27 NOTE — ASSESSMENT & PLAN NOTE
Moderate obstructive sleep apnea with MANDY 19 in March 2020 HST  This is associated with overnight awakenings, snoring  Has had trouble tolerating nasal mask due to the sensation of suffocation with his mask  Very willing to try fullface mask despite history of claustrophobia  Met with DME today for mask fitting    Compliance inadequate due to inadequate mask tolerance    Follow-up in 3 to 4 months to reevaluate compliance  Discussed alternative options including oral appliance therapy, weight loss, inspire (patient not interested in surgery)

## 2023-06-27 NOTE — PATIENT INSTRUCTIONS
CPAP MAINTENANCE AND MANAGEMENT    1  Continue use of CPAP equipment nightly - use any time you are laying down to rest, watch TV, etc to increase use and in case you fall asleep to prevent falling asleep without it  2  If air is too hot, turn down the tubing heating setting  3  If excessive dry mouth in the morning, turn up humidifier setting and be sure to only use distilled water in your humidifier  You can also turn down the tubing heating setting  4  Change your filter regularly and wash the non-disposable filter  5  Continue to clean your equipment, as discussed, using mild soap and water  You can use unscented baby wipe on the mask  6   Contact the [facility] with any questions or concerns prior to your next visit, as needed  7  Schedule visit for follow-up in 3 months  You should see your sleep physician at least once a year  HOW TO CLEAN YOUR AUTO CPAP MACHINE    Mask: Clean the cushion of your mask daily  Dish soap and warm water  (Usually eligible for mask cushions every 3 months)  2  Headgear: Once a week  I find when you wash it daily it eats the material of the Velcro faster   (Usually eligible for new headgear every 6 months)  3  Heated Tubing: Clean the tube every 3-7 days  One drop of dish soap run warm water through the tube then hang it over your shower curtain and let it drip dry  (Usually eligible every 3 months)  4  Humidifier: Rinse out every 1-3 days  Dish soap warm water or , top shelf  (eligible every 6 months) **DISTILLED WATER ONLY**  5  Filters:  Dark blue (reusable for 6 months)- Rinse under warm water (no soap) sit and drip dry every 2-3 weeks  (eligible for a new one every 6 months)  Light Blue (disposable) throw away every 2-4 weeks depending on how dirty it looks  (eligible for 6 every 3 months)    Check with your insurance and durable medical equipment company regarding supply replacements       Nursing Support:  When: Monday through Friday 7A-5PM except holidays  Where: Our direct line is [phone]  If you are having a true emergency please call 911  In the event that the line is busy or it is after hours please leave a voice message and we will return your call  Please speak clearly, leaving your full name, birth date, best number to reach you and the reason for your call  Medication refills: We will need the name of the medication, the dosage, the ordering provider, whether you get a 30 or 90 day refill, and the pharmacy name and address  Medications will be ordered by the provider only  Nurses cannot call in prescriptions  Please allow 7 days for medication refills  Physician requested updates: If your provider requested that you call with an update after starting medication, please be ready to provide us the medication and dosage, what time you take your medication, the time you attempt to fall asleep, time you fall asleep, when you wake up, and what time you get out of bed  Sleep Study Results: We will contact you with sleep study results and/or next steps after the physician has reviewed your testing  Usually one of our nurses will call to talk about the results within 1-2 weeks of testing

## 2023-06-27 NOTE — ASSESSMENT & PLAN NOTE
Patient has a history of lower airway wheezing, chronic allergic rhinitis  Has only been on Advair 250-50 twice daily  Recently had episode of bronchitis with slow recovery  Pulmonary function testing in 2018 show decreased vital capacity, mildly decreased diffusion  This was consistent with moderate restrictive defect, appears extrapulmonary in nature  CT chest and LVHN results noted without significant parenchymal disease  Recommend every 4 to 6 hours albuterol MDI/nebulized as needed, wean as symptoms improved  Recommend use of short acting beta agonist before exercise      Start Singulair 10 mg daily

## 2023-07-13 ENCOUNTER — TELEPHONE (OUTPATIENT)
Dept: FAMILY MEDICINE CLINIC | Facility: CLINIC | Age: 78
End: 2023-07-13

## 2023-07-13 NOTE — TELEPHONE ENCOUNTER
Spoke to RENETTA - relayed Dr. Krause Prior message to her. I wanted more clarification with HP, I wanted to make sure he saw that he was not currently having chills and fever. He suggested to either be scheduled for tomorrow in his last spot or go to the ER. RENETTA chose to see HP tomorrow. I advised that if he gets any worse to go to the ER.

## 2023-07-13 NOTE — TELEPHONE ENCOUNTER
Called and stated that Zoila Bolden started with the shakes yesterday afternoon,  He had a temp 101.4. Started going down after advil and lots of juice and water. At 4 this morning, it was normal. Covid negative. But he is extremely exhausted, cannot walk from the car to the house. He is totally wiped out. Even walking through the house. His Oxygen was 95. Please advise.  ( you only have 1 opening tomorrow besides the one before your lunch)

## 2023-07-14 ENCOUNTER — HOSPITAL ENCOUNTER (INPATIENT)
Facility: HOSPITAL | Age: 78
LOS: 4 days | Discharge: HOME | DRG: 871 | End: 2023-07-18
Attending: EMERGENCY MEDICINE | Admitting: INTERNAL MEDICINE
Payer: MEDICARE

## 2023-07-14 ENCOUNTER — OFFICE VISIT (OUTPATIENT)
Dept: FAMILY MEDICINE CLINIC | Facility: CLINIC | Age: 78
End: 2023-07-14
Payer: MEDICARE

## 2023-07-14 ENCOUNTER — APPOINTMENT (EMERGENCY)
Dept: RADIOLOGY | Facility: HOSPITAL | Age: 78
DRG: 871 | End: 2023-07-14
Payer: MEDICARE

## 2023-07-14 ENCOUNTER — APPOINTMENT (INPATIENT)
Dept: CT IMAGING | Facility: HOSPITAL | Age: 78
DRG: 871 | End: 2023-07-14
Payer: MEDICARE

## 2023-07-14 VITALS
OXYGEN SATURATION: 94 % | HEART RATE: 117 BPM | SYSTOLIC BLOOD PRESSURE: 158 MMHG | BODY MASS INDEX: 31.97 KG/M2 | TEMPERATURE: 96 F | HEIGHT: 72 IN | DIASTOLIC BLOOD PRESSURE: 80 MMHG | RESPIRATION RATE: 18 BRPM | WEIGHT: 236 LBS

## 2023-07-14 DIAGNOSIS — K59.00 CONSTIPATION: ICD-10-CM

## 2023-07-14 DIAGNOSIS — R68.89 RIGORS: ICD-10-CM

## 2023-07-14 DIAGNOSIS — R50.9 FEVER, UNSPECIFIED FEVER CAUSE: ICD-10-CM

## 2023-07-14 DIAGNOSIS — R65.20 SEVERE SEPSIS (HCC): ICD-10-CM

## 2023-07-14 DIAGNOSIS — K92.2 GI BLEED: ICD-10-CM

## 2023-07-14 DIAGNOSIS — R00.0 TACHYCARDIA: ICD-10-CM

## 2023-07-14 DIAGNOSIS — D50.9 IRON DEFICIENCY ANEMIA, UNSPECIFIED IRON DEFICIENCY ANEMIA TYPE: ICD-10-CM

## 2023-07-14 DIAGNOSIS — R06.09 DYSPNEA ON EXERTION: ICD-10-CM

## 2023-07-14 DIAGNOSIS — R05.1 ACUTE COUGH: Primary | ICD-10-CM

## 2023-07-14 DIAGNOSIS — A41.9 SEVERE SEPSIS (HCC): ICD-10-CM

## 2023-07-14 DIAGNOSIS — D64.9 ANEMIA, UNSPECIFIED TYPE: ICD-10-CM

## 2023-07-14 DIAGNOSIS — J18.9 PNEUMONIA: Primary | ICD-10-CM

## 2023-07-14 LAB
2HR DELTA HS TROPONIN: -1 NG/L
ABO GROUP BLD: NORMAL
ABO GROUP BLD: NORMAL
ALBUMIN SERPL BCP-MCNC: 3.6 G/DL (ref 3.5–5)
ALP SERPL-CCNC: 48 U/L (ref 34–104)
ALT SERPL W P-5'-P-CCNC: 9 U/L (ref 7–52)
ANION GAP SERPL CALCULATED.3IONS-SCNC: 11 MMOL/L
APTT PPP: 48 SECONDS (ref 23–37)
AST SERPL W P-5'-P-CCNC: 13 U/L (ref 13–39)
BACTERIA UR QL AUTO: NORMAL /HPF
BASOPHILS # BLD AUTO: 0.06 THOUSANDS/ÂΜL (ref 0–0.1)
BASOPHILS NFR BLD AUTO: 1 % (ref 0–1)
BILIRUB SERPL-MCNC: 0.53 MG/DL (ref 0.2–1)
BILIRUB UR QL STRIP: NEGATIVE
BLD GP AB SCN SERPL QL: NEGATIVE
BNP SERPL-MCNC: 123 PG/ML (ref 0–100)
BUN SERPL-MCNC: 17 MG/DL (ref 5–25)
CALCIUM SERPL-MCNC: 8.4 MG/DL (ref 8.4–10.2)
CARDIAC TROPONIN I PNL SERPL HS: 5 NG/L
CARDIAC TROPONIN I PNL SERPL HS: 6 NG/L
CHLORIDE SERPL-SCNC: 105 MMOL/L (ref 96–108)
CLARITY UR: CLEAR
CO2 SERPL-SCNC: 21 MMOL/L (ref 21–32)
COLOR UR: ABNORMAL
CREAT SERPL-MCNC: 0.99 MG/DL (ref 0.6–1.3)
EOSINOPHIL # BLD AUTO: 0.04 THOUSAND/ÂΜL (ref 0–0.61)
EOSINOPHIL NFR BLD AUTO: 0 % (ref 0–6)
ERYTHROCYTE [DISTWIDTH] IN BLOOD BY AUTOMATED COUNT: 16.8 % (ref 11.6–15.1)
FLUAV RNA RESP QL NAA+PROBE: NEGATIVE
FLUBV RNA RESP QL NAA+PROBE: NEGATIVE
GFR SERPL CREATININE-BSD FRML MDRD: 72 ML/MIN/1.73SQ M
GLUCOSE SERPL-MCNC: 280 MG/DL (ref 65–140)
GLUCOSE UR STRIP-MCNC: ABNORMAL MG/DL
HCT VFR BLD AUTO: 22.3 % (ref 36.5–49.3)
HCT VFR BLD AUTO: 24.1 % (ref 36.5–49.3)
HGB BLD-MCNC: 6.6 G/DL (ref 12–17)
HGB BLD-MCNC: 7 G/DL (ref 12–17)
HGB UR QL STRIP.AUTO: NEGATIVE
IMM GRANULOCYTES # BLD AUTO: 0.09 THOUSAND/UL (ref 0–0.2)
IMM GRANULOCYTES NFR BLD AUTO: 1 % (ref 0–2)
INR PPP: 2.04 (ref 0.84–1.19)
KETONES UR STRIP-MCNC: NEGATIVE MG/DL
LACTATE SERPL-SCNC: 1.9 MMOL/L (ref 0.5–2)
LACTATE SERPL-SCNC: 2.7 MMOL/L (ref 0.5–2)
LACTATE SERPL-SCNC: 2.8 MMOL/L (ref 0.5–2)
LACTATE SERPL-SCNC: 2.8 MMOL/L (ref 0.5–2)
LACTATE SERPL-SCNC: 3.2 MMOL/L (ref 0.5–2)
LEUKOCYTE ESTERASE UR QL STRIP: NEGATIVE
LYMPHOCYTES # BLD AUTO: 1.68 THOUSANDS/ÂΜL (ref 0.6–4.47)
LYMPHOCYTES NFR BLD AUTO: 13 % (ref 14–44)
MCH RBC QN AUTO: 24.2 PG (ref 26.8–34.3)
MCHC RBC AUTO-ENTMCNC: 29 G/DL (ref 31.4–37.4)
MCV RBC AUTO: 83 FL (ref 82–98)
MONOCYTES # BLD AUTO: 0.95 THOUSAND/ÂΜL (ref 0.17–1.22)
MONOCYTES NFR BLD AUTO: 8 % (ref 4–12)
NEUTROPHILS # BLD AUTO: 9.88 THOUSANDS/ÂΜL (ref 1.85–7.62)
NEUTS SEG NFR BLD AUTO: 77 % (ref 43–75)
NITRITE UR QL STRIP: NEGATIVE
NON-SQ EPI CELLS URNS QL MICRO: NORMAL /HPF
NRBC BLD AUTO-RTO: 0 /100 WBCS
PH UR STRIP.AUTO: 5 [PH]
PLATELET # BLD AUTO: 257 THOUSANDS/UL (ref 149–390)
PMV BLD AUTO: 9.7 FL (ref 8.9–12.7)
POTASSIUM SERPL-SCNC: 4.1 MMOL/L (ref 3.5–5.3)
PROCALCITONIN SERPL-MCNC: 0.38 NG/ML
PROT SERPL-MCNC: 6.5 G/DL (ref 6.4–8.4)
PROT UR STRIP-MCNC: ABNORMAL MG/DL
PROTHROMBIN TIME: 23.3 SECONDS (ref 11.6–14.5)
RBC # BLD AUTO: 2.89 MILLION/UL (ref 3.88–5.62)
RBC #/AREA URNS AUTO: NORMAL /HPF
RH BLD: POSITIVE
RH BLD: POSITIVE
RSV RNA RESP QL NAA+PROBE: NEGATIVE
SARS-COV-2 RNA RESP QL NAA+PROBE: NEGATIVE
SL AMB  POCT GLUCOSE, UA: ABNORMAL
SL AMB LEUKOCYTE ESTERASE,UA: 25
SL AMB POCT BILIRUBIN,UA: ABNORMAL
SL AMB POCT BLOOD,UA: ABNORMAL
SL AMB POCT CLARITY,UA: CLEAR
SL AMB POCT COLOR,UA: YELLOW
SL AMB POCT KETONES,UA: ABNORMAL
SL AMB POCT NITRITE,UA: ABNORMAL
SL AMB POCT PH,UA: 5
SL AMB POCT SPECIFIC GRAVITY,UA: 1.02
SL AMB POCT URINE PROTEIN: ABNORMAL
SL AMB POCT UROBILINOGEN: ABNORMAL
SODIUM SERPL-SCNC: 137 MMOL/L (ref 135–147)
SP GR UR STRIP.AUTO: 1.02 (ref 1–1.03)
SPECIMEN EXPIRATION DATE: NORMAL
UROBILINOGEN UR STRIP-ACNC: <2 MG/DL
WBC # BLD AUTO: 12.7 THOUSAND/UL (ref 4.31–10.16)
WBC #/AREA URNS AUTO: NORMAL /HPF

## 2023-07-14 PROCEDURE — 81001 URINALYSIS AUTO W/SCOPE: CPT | Performed by: EMERGENCY MEDICINE

## 2023-07-14 PROCEDURE — 83605 ASSAY OF LACTIC ACID: CPT

## 2023-07-14 PROCEDURE — 83540 ASSAY OF IRON: CPT

## 2023-07-14 PROCEDURE — P9016 RBC LEUKOCYTES REDUCED: HCPCS

## 2023-07-14 PROCEDURE — 36415 COLL VENOUS BLD VENIPUNCTURE: CPT

## 2023-07-14 PROCEDURE — 85730 THROMBOPLASTIN TIME PARTIAL: CPT | Performed by: EMERGENCY MEDICINE

## 2023-07-14 PROCEDURE — 86900 BLOOD TYPING SEROLOGIC ABO: CPT

## 2023-07-14 PROCEDURE — 93005 ELECTROCARDIOGRAM TRACING: CPT

## 2023-07-14 PROCEDURE — 96375 TX/PRO/DX INJ NEW DRUG ADDON: CPT

## 2023-07-14 PROCEDURE — 87040 BLOOD CULTURE FOR BACTERIA: CPT | Performed by: EMERGENCY MEDICINE

## 2023-07-14 PROCEDURE — 85014 HEMATOCRIT: CPT

## 2023-07-14 PROCEDURE — 74177 CT ABD & PELVIS W/CONTRAST: CPT

## 2023-07-14 PROCEDURE — 84145 PROCALCITONIN (PCT): CPT | Performed by: EMERGENCY MEDICINE

## 2023-07-14 PROCEDURE — 86901 BLOOD TYPING SEROLOGIC RH(D): CPT

## 2023-07-14 PROCEDURE — 71045 X-RAY EXAM CHEST 1 VIEW: CPT

## 2023-07-14 PROCEDURE — 81003 URINALYSIS AUTO W/O SCOPE: CPT | Performed by: FAMILY MEDICINE

## 2023-07-14 PROCEDURE — 80053 COMPREHEN METABOLIC PANEL: CPT | Performed by: EMERGENCY MEDICINE

## 2023-07-14 PROCEDURE — 83605 ASSAY OF LACTIC ACID: CPT | Performed by: INTERNAL MEDICINE

## 2023-07-14 PROCEDURE — 99285 EMERGENCY DEPT VISIT HI MDM: CPT

## 2023-07-14 PROCEDURE — 86850 RBC ANTIBODY SCREEN: CPT

## 2023-07-14 PROCEDURE — 85025 COMPLETE CBC W/AUTO DIFF WBC: CPT | Performed by: EMERGENCY MEDICINE

## 2023-07-14 PROCEDURE — 83605 ASSAY OF LACTIC ACID: CPT | Performed by: EMERGENCY MEDICINE

## 2023-07-14 PROCEDURE — 84484 ASSAY OF TROPONIN QUANT: CPT | Performed by: EMERGENCY MEDICINE

## 2023-07-14 PROCEDURE — 96368 THER/DIAG CONCURRENT INF: CPT

## 2023-07-14 PROCEDURE — 85018 HEMOGLOBIN: CPT

## 2023-07-14 PROCEDURE — 96367 TX/PROPH/DG ADDL SEQ IV INF: CPT

## 2023-07-14 PROCEDURE — 83550 IRON BINDING TEST: CPT

## 2023-07-14 PROCEDURE — 96365 THER/PROPH/DIAG IV INF INIT: CPT

## 2023-07-14 PROCEDURE — 93000 ELECTROCARDIOGRAM COMPLETE: CPT | Performed by: FAMILY MEDICINE

## 2023-07-14 PROCEDURE — C9113 INJ PANTOPRAZOLE SODIUM, VIA: HCPCS | Performed by: EMERGENCY MEDICINE

## 2023-07-14 PROCEDURE — 85610 PROTHROMBIN TIME: CPT | Performed by: EMERGENCY MEDICINE

## 2023-07-14 PROCEDURE — 86920 COMPATIBILITY TEST SPIN: CPT

## 2023-07-14 PROCEDURE — 99214 OFFICE O/P EST MOD 30 MIN: CPT | Performed by: FAMILY MEDICINE

## 2023-07-14 PROCEDURE — 0241U HB NFCT DS VIR RESP RNA 4 TRGT: CPT | Performed by: EMERGENCY MEDICINE

## 2023-07-14 PROCEDURE — 82728 ASSAY OF FERRITIN: CPT

## 2023-07-14 PROCEDURE — 83880 ASSAY OF NATRIURETIC PEPTIDE: CPT | Performed by: EMERGENCY MEDICINE

## 2023-07-14 PROCEDURE — G1004 CDSM NDSC: HCPCS

## 2023-07-14 RX ORDER — VITAMIN B COMPLEX
1 CAPSULE ORAL DAILY
Status: DISCONTINUED | OUTPATIENT
Start: 2023-07-15 | End: 2023-07-14

## 2023-07-14 RX ORDER — METOPROLOL SUCCINATE 25 MG/1
25 TABLET, EXTENDED RELEASE ORAL EVERY MORNING
Status: DISCONTINUED | OUTPATIENT
Start: 2023-07-15 | End: 2023-07-18 | Stop reason: HOSPADM

## 2023-07-14 RX ORDER — SERTRALINE HYDROCHLORIDE 100 MG/1
100 TABLET, FILM COATED ORAL DAILY
Status: DISCONTINUED | OUTPATIENT
Start: 2023-07-15 | End: 2023-07-18 | Stop reason: HOSPADM

## 2023-07-14 RX ORDER — SODIUM CHLORIDE, SODIUM GLUCONATE, SODIUM ACETATE, POTASSIUM CHLORIDE, MAGNESIUM CHLORIDE, SODIUM PHOSPHATE, DIBASIC, AND POTASSIUM PHOSPHATE .53; .5; .37; .037; .03; .012; .00082 G/100ML; G/100ML; G/100ML; G/100ML; G/100ML; G/100ML; G/100ML
1000 INJECTION, SOLUTION INTRAVENOUS ONCE
Status: COMPLETED | OUTPATIENT
Start: 2023-07-14 | End: 2023-07-14

## 2023-07-14 RX ORDER — ALBUTEROL SULFATE 90 UG/1
2 AEROSOL, METERED RESPIRATORY (INHALATION) EVERY 6 HOURS PRN
Status: DISCONTINUED | OUTPATIENT
Start: 2023-07-14 | End: 2023-07-18 | Stop reason: HOSPADM

## 2023-07-14 RX ORDER — FLUTICASONE FUROATE AND VILANTEROL 200; 25 UG/1; UG/1
1 POWDER RESPIRATORY (INHALATION)
Status: DISCONTINUED | OUTPATIENT
Start: 2023-07-15 | End: 2023-07-18 | Stop reason: HOSPADM

## 2023-07-14 RX ORDER — METRONIDAZOLE 500 MG/100ML
500 INJECTION, SOLUTION INTRAVENOUS EVERY 8 HOURS
Status: DISCONTINUED | OUTPATIENT
Start: 2023-07-14 | End: 2023-07-15

## 2023-07-14 RX ORDER — ACETAMINOPHEN 325 MG/1
650 TABLET ORAL EVERY 6 HOURS PRN
Status: DISCONTINUED | OUTPATIENT
Start: 2023-07-14 | End: 2023-07-18 | Stop reason: HOSPADM

## 2023-07-14 RX ORDER — ASCORBIC ACID 500 MG
1000 TABLET ORAL DAILY
Status: DISCONTINUED | OUTPATIENT
Start: 2023-07-15 | End: 2023-07-18 | Stop reason: HOSPADM

## 2023-07-14 RX ORDER — MONTELUKAST SODIUM 10 MG/1
10 TABLET ORAL
Status: DISCONTINUED | OUTPATIENT
Start: 2023-07-14 | End: 2023-07-18 | Stop reason: HOSPADM

## 2023-07-14 RX ORDER — ONDANSETRON 2 MG/ML
4 INJECTION INTRAMUSCULAR; INTRAVENOUS EVERY 6 HOURS PRN
Status: DISCONTINUED | OUTPATIENT
Start: 2023-07-14 | End: 2023-07-14

## 2023-07-14 RX ORDER — FINASTERIDE 5 MG/1
5 TABLET, FILM COATED ORAL DAILY
Status: DISCONTINUED | OUTPATIENT
Start: 2023-07-15 | End: 2023-07-18 | Stop reason: HOSPADM

## 2023-07-14 RX ORDER — POLYETHYLENE GLYCOL 3350 17 G/17G
17 POWDER, FOR SOLUTION ORAL DAILY PRN
Status: DISCONTINUED | OUTPATIENT
Start: 2023-07-14 | End: 2023-07-18 | Stop reason: HOSPADM

## 2023-07-14 RX ORDER — LISINOPRIL 20 MG/1
20 TABLET ORAL DAILY
Status: DISCONTINUED | OUTPATIENT
Start: 2023-07-15 | End: 2023-07-18 | Stop reason: HOSPADM

## 2023-07-14 RX ORDER — ALBUTEROL SULFATE 2.5 MG/3ML
2.5 SOLUTION RESPIRATORY (INHALATION) EVERY 6 HOURS PRN
Status: DISCONTINUED | OUTPATIENT
Start: 2023-07-14 | End: 2023-07-18 | Stop reason: HOSPADM

## 2023-07-14 RX ORDER — TAMSULOSIN HYDROCHLORIDE 0.4 MG/1
0.4 CAPSULE ORAL
Status: DISCONTINUED | OUTPATIENT
Start: 2023-07-14 | End: 2023-07-18 | Stop reason: HOSPADM

## 2023-07-14 RX ORDER — PANTOPRAZOLE SODIUM 40 MG/10ML
40 INJECTION, POWDER, LYOPHILIZED, FOR SOLUTION INTRAVENOUS ONCE
Status: COMPLETED | OUTPATIENT
Start: 2023-07-14 | End: 2023-07-14

## 2023-07-14 RX ORDER — SODIUM CHLORIDE, SODIUM GLUCONATE, SODIUM ACETATE, POTASSIUM CHLORIDE, MAGNESIUM CHLORIDE, SODIUM PHOSPHATE, DIBASIC, AND POTASSIUM PHOSPHATE .53; .5; .37; .037; .03; .012; .00082 G/100ML; G/100ML; G/100ML; G/100ML; G/100ML; G/100ML; G/100ML
75 INJECTION, SOLUTION INTRAVENOUS CONTINUOUS
Status: DISCONTINUED | OUTPATIENT
Start: 2023-07-14 | End: 2023-07-15

## 2023-07-14 RX ADMIN — SODIUM CHLORIDE, SODIUM GLUCONATE, SODIUM ACETATE, POTASSIUM CHLORIDE, MAGNESIUM CHLORIDE, SODIUM PHOSPHATE, DIBASIC, AND POTASSIUM PHOSPHATE 150 ML/HR: .53; .5; .37; .037; .03; .012; .00082 INJECTION, SOLUTION INTRAVENOUS at 20:29

## 2023-07-14 RX ADMIN — SODIUM CHLORIDE, SODIUM GLUCONATE, SODIUM ACETATE, POTASSIUM CHLORIDE, MAGNESIUM CHLORIDE, SODIUM PHOSPHATE, DIBASIC, AND POTASSIUM PHOSPHATE 1000 ML: .53; .5; .37; .037; .03; .012; .00082 INJECTION, SOLUTION INTRAVENOUS at 14:31

## 2023-07-14 RX ADMIN — TAMSULOSIN HYDROCHLORIDE 0.4 MG: 0.4 CAPSULE ORAL at 21:28

## 2023-07-14 RX ADMIN — MONTELUKAST 10 MG: 10 TABLET, FILM COATED ORAL at 21:28

## 2023-07-14 RX ADMIN — PANTOPRAZOLE SODIUM 40 MG: 40 INJECTION, POWDER, FOR SOLUTION INTRAVENOUS at 14:46

## 2023-07-14 RX ADMIN — IOHEXOL 100 ML: 350 INJECTION, SOLUTION INTRAVENOUS at 19:47

## 2023-07-14 RX ADMIN — CEFEPIME 2000 MG: 2 INJECTION, POWDER, FOR SOLUTION INTRAVENOUS at 14:46

## 2023-07-14 RX ADMIN — METRONIDAZOLE 500 MG: 500 INJECTION, SOLUTION INTRAVENOUS at 22:07

## 2023-07-14 RX ADMIN — VANCOMYCIN HYDROCHLORIDE 2000 MG: 5 INJECTION, POWDER, LYOPHILIZED, FOR SOLUTION INTRAVENOUS at 14:50

## 2023-07-14 RX ADMIN — CEFTRIAXONE 2000 MG: 2 INJECTION, POWDER, FOR SOLUTION INTRAMUSCULAR; INTRAVENOUS at 21:25

## 2023-07-14 RX ADMIN — SODIUM CHLORIDE, SODIUM GLUCONATE, SODIUM ACETATE, POTASSIUM CHLORIDE, MAGNESIUM CHLORIDE, SODIUM PHOSPHATE, DIBASIC, AND POTASSIUM PHOSPHATE 1000 ML: .53; .5; .37; .037; .03; .012; .00082 INJECTION, SOLUTION INTRAVENOUS at 15:37

## 2023-07-14 NOTE — ASSESSMENT & PLAN NOTE
Lab Results   Component Value Date    HGBA1C 7.3 (A) 06/22/2022       No results for input(s): "POCGLU" in the last 72 hours.     Blood Sugar Average: Last 72 hrs:  · Insulin sliding scale

## 2023-07-14 NOTE — QUICK NOTE
Hemoglobin dropped from 7.0 to 6.6. Blood transfusion consent obtained. Ordered transfusion RBC x1.     Criselda Anderson MD  Transitional Year PGY-1

## 2023-07-14 NOTE — SEPSIS NOTE
Sepsis Note   Ying Heart 76 y.o. male MRN: [id number]  Unit/Bed#: ED-22 Encounter: [id number]       Initial Sepsis Screening     Row Name 07/14/23 1545 07/14/23 1453             Is the patient's history suggestive of a new or worsening infection? -- Yes (Proceed)  -RP       Suspected source of infection -- pneumonia  -RP       Indicate SIRS criteria -- Tachycardia > 90 bpm;Tachypnea > 20 resp per min  -RP       Are two or more of the above signs & symptoms of infection both present and new to the patient? -- Yes (Proceed)  -RP       Assess for evidence of organ dysfunction: Are any of the below criteria present within 6 hours of suspected infection and SIRS criteria that are NOT considered to be chronic conditions? -- Lactate > 2.0  -RP       Date of presentation of severe sepsis 07/14/23  -RP --       Time of presentation of severe sepsis 1400  -RP --       Sepsis Note: Click "NEXT" below (NOT "close") to generate sepsis note based on above information. -- --             User Key  (r) = Recorded By, (t) = Taken By, (c) = Cosigned By    [address] [place] [address] Name Provider Type    RP Rick Hilton MD Physician                    Body mass index is 32.11 kg/m².   Wt Readings from Last 1 Encounters:   07/14/23 107 kg (236 lb 12.4 oz)     IBW (Ideal Body Weight): 77.6 kg    Ideal body weight: 77.6 kg (171 lb 1.2 oz)  Adjusted ideal body weight: 89.5 kg (197 lb 5.7 oz)

## 2023-07-14 NOTE — ED PROVIDER NOTES
History  Chief Complaint   Patient presents with   • Shortness of Breath     Chest congestion, fatigue, fever, and SOB, sent from pcp to r/o PNA. Wife reported he was running fevers a few days ago, but not today. Pt also noted urinary frequency, but not going very much, and feeling constipated. Patient with hx of asthma. Recent several courses of antibiotics and prednisone for bronchitis. Ongoing cough, wheezing and sputum. 2 days ago 101.4 at home. Describes rigors. Improved with advil. Increased weakness. Ongoing cough. Saw PCP today and sent for concern for pneumonia. Has been on several different antibiotics including prednisone. No abdominal pain. No black or bloody stool. Had a negative COVID test at home 2 days ago. No urinary complaints. He has had a persistent cough and wheezing. Wife notes that the patient has had COVID 3 times. Differential diagnosis includes no pneumonia, UTI, evaluate for sepsis,        External note was reviewed and patient had UA in office and had + leuks in office. Prior to Admission Medications   Prescriptions Last Dose Informant Patient Reported? Taking? Ascorbic Acid (vitamin C) 1000 MG tablet 7/13/2023 Spouse/Significant Other Yes Yes   Sig: Take 1,000 mg by mouth daily   MYRIAM ZINC PO 7/13/2023 Spouse/Significant Other Yes Yes   Sig: Take by mouth in the morning   Fluticasone-Salmeterol (Advair Diskus) 250-50 mcg/dose inhaler Past Week Spouse/Significant Other No Yes   Sig: Inhale 1 puff 2 (two) times a day Rinse mouth after use.    Glucosamine-Chondroitin (GLUCOSAMINE CHONDR COMPLEX PO) 7/13/2023 Spouse/Significant Other Yes Yes   Sig: Take by mouth 2 (two) times a day   Multiple Vitamins-Minerals (MEGAVITE FRUITS & VEGGIES PO) 7/13/2023 Spouse/Significant Other Yes Yes   Sig: Take by mouth in the morning   albuterol (2.5 mg/3 mL) 0.083 % nebulizer solution Past Week Spouse/Significant Other No Yes   Sig: Take 3 mL (2.5 mg total) by nebulization every 6 (six) hours as needed for wheezing or shortness of breath   albuterol (Ventolin HFA) 90 mcg/act inhaler Past Week Spouse/Significant Other No Yes   Sig: Inhale 2 puffs every 6 (six) hours as needed for wheezing   b complex vitamins capsule 7/13/2023 Spouse/Significant Other Yes Yes   Sig: Take 1 capsule by mouth daily   finasteride (PROSCAR) 5 mg tablet 7/13/2023 Spouse/Significant Other No Yes   Sig: TAKE ONE TABLET BY MOUTH EVERY DAY   glipiZIDE (GLUCOTROL XL) 10 mg 24 hr tablet 7/13/2023 Spouse/Significant Other No Yes   Sig: TAKE 1 TABLET BY MOUTH EVERY DAY WITH BREAKFAST   metFORMIN (GLUCOPHAGE) 500 mg tablet 7/14/2023 Spouse/Significant Other No Yes   Sig: TAKE 2 TABLETS BY MOUTH EVERY MORNING AND TAKE 1 TABLET BY MOUTH IN THE EVENING (GENERIC FOR GLUCOPHAGE)   metoprolol succinate (TOPROL-XL) 25 mg 24 hr tablet 7/14/2023 Spouse/Significant Other No Yes   Sig: Take 1 tablet (25 mg total) by mouth every morning   montelukast (SINGULAIR) 10 mg tablet 7/14/2023 Spouse/Significant Other No Yes   Sig: Take 1 tablet (10 mg total) by mouth daily at bedtime   ramipril (ALTACE) 5 mg capsule 7/13/2023 Spouse/Significant Other No Yes   Sig: Take 1 capsule (5 mg total) by mouth every morning   rivaroxaban (XARELTO) 20 mg tablet 7/14/2023 Spouse/Significant Other Yes Yes   Sig: in the morning   sertraline (ZOLOFT) 100 mg tablet 7/14/2023 Spouse/Significant Other No Yes   Sig: TAKE ONE TABLET BY MOUTH EVERY DAY   tamsulosin (FLOMAX) 0.4 mg 7/13/2023 Spouse/Significant Other No Yes   Sig: TAKE 1 CAPSULE BY MOUTH EVERYDAY AT BEDTIME      Facility-Administered Medications: None       Past Medical History:   Diagnosis Date   • Anxiety    • Arthritis     fingers , knee   • BPH (benign prostatic hypertrophy)    • Cataract     currently left eye- to have surgery on 4/10/17   • Cough variant asthma 4/12/2017   • Diabetes mellitus ([address])     type 2, last assessed 4/12/2017   • Hernia, umbilical     currently has   • HL (hearing loss) b/l hearing aids   • Labyrinthitis    • Moderate obstructive sleep apnea 2017   • New onset a-fib ([address]) 1/10/2020   • Obesity with body mass index 30 or greater    • Umbilical hernia        Past Surgical History:   Procedure Laterality Date   • CATARACT EXTRACTION Right 2017   • COLONOSCOPY      yrs ago   • EYE SURGERY Bilateral     cataracts with IOL       Family History   Problem Relation Age of Onset   • Cancer Mother         ovarian   • Diabetes Father    • Cancer Sister         stomach to brain   • Substance Abuse Family    • Substance Abuse Son    • Mental illness Neg Hx      I have reviewed and agree with the history as documented. E-Cigarette/Vaping   • E-Cigarette Use Never User      E-Cigarette/Vaping Substances   • Nicotine No    • THC No    • CBD No    • Flavoring No    • Other No    • Unknown No      Social History     Tobacco Use   • Smoking status: Former     Packs/day: 0.50     Years: 15.00     Total pack years: 7.50     Types: Cigarettes     Quit date: 24     Years since quittin.5   • Smokeless tobacco: Never   Vaping Use   • Vaping Use: Never used   Substance Use Topics   • Alcohol use: Yes     Alcohol/week: 3.0 standard drinks of alcohol     Types: 3 Standard drinks or equivalent per week     Comment: socially   • Drug use: Yes     Types: Marijuana     Comment: most everyday        Review of Systems   Constitutional: Positive for chills and fever. Negative for activity change and appetite change. HENT: Negative for congestion, ear pain, rhinorrhea and sore throat. Eyes: Negative for pain and redness. Respiratory: Positive for cough, shortness of breath and wheezing. Cardiovascular: Negative for chest pain and palpitations. Gastrointestinal: Negative for abdominal pain, diarrhea, nausea and vomiting. Endocrine: Negative for polyuria. Genitourinary: Negative for difficulty urinating, dysuria, frequency and urgency.    Musculoskeletal: Negative for arthralgias and myalgias. Skin: Negative for color change and rash. Allergic/Immunologic: Negative for immunocompromised state. Neurological: Negative for dizziness, syncope and light-headedness. Hematological: Does not bruise/bleed easily. Psychiatric/Behavioral: Negative for confusion. All other systems reviewed and are negative. Physical Exam  Physical Exam  Vitals and nursing note reviewed. Constitutional:       General: He is not in acute distress. Appearance: He is well-developed. HENT:      Head: Normocephalic and atraumatic. Nose: Nose normal.   Eyes:      General: No scleral icterus. Conjunctiva/sclera: Conjunctivae normal.   Cardiovascular:      Rate and Rhythm: Normal rate and regular rhythm. Heart sounds: Normal heart sounds. Pulmonary:      Effort: Pulmonary effort is normal. No respiratory distress. Breath sounds: No stridor. Examination of the right-upper field reveals wheezing. Examination of the left-upper field reveals wheezing. Examination of the right-middle field reveals wheezing. Examination of the left-middle field reveals wheezing. Examination of the right-lower field reveals wheezing. Examination of the left-lower field reveals wheezing. Wheezing present. No decreased breath sounds, rhonchi or rales. Abdominal:      General: There is no distension. Palpations: Abdomen is soft. Tenderness: There is no abdominal tenderness. There is no guarding or rebound. Genitourinary:     Rectum: Guaiac result positive ( Brown stool). Musculoskeletal:         General: No deformity. Cervical back: Normal range of motion and neck supple. Skin:     General: Skin is warm and dry. Findings: No rash. Neurological:      Mental Status: He is alert and oriented to person, place, and time. Psychiatric:         Thought Content:  Thought content normal.         Vital Signs  ED Triage Vitals   Temperature Pulse Respirations Blood Pressure SpO2 07/14/23 1232 07/14/23 1232 07/14/23 1232 07/14/23 1232 07/14/23 1232   98.2 °F (36.8 °C) (!) 111 18 139/63 95 %      Temp Source Heart Rate Source Patient Position - Orthostatic VS BP Location FiO2 (%)   07/14/23 1232 07/14/23 1232 07/14/23 1238 07/14/23 1232 --   Oral Monitor Lying Right arm       Pain Score       07/14/23 1238       No Pain           Vitals:    07/14/23 1630 07/14/23 1743 07/14/23 2139 07/14/23 2210   BP: 111/58 147/65 159/70 161/69   Pulse: 86 92 79 76   Patient Position - Orthostatic VS: Sitting Lying Lying          Visual Acuity      ED Medications  Medications   multi-electrolyte (PLASMALYTE-A/ISOLYTE-S PH 7.4) IV solution (150 mL/hr Intravenous New Bag 7/14/23 2029)   albuterol inhalation solution 2.5 mg (has no administration in time range)   albuterol (PROVENTIL HFA,VENTOLIN HFA) inhaler 2 puff (has no administration in time range)   ascorbic acid (VITAMIN C) tablet 1,000 mg (has no administration in time range)   finasteride (PROSCAR) tablet 5 mg (has no administration in time range)   fluticasone-vilanterol 200-25 mcg/actuation 1 puff (has no administration in time range)   metoprolol succinate (TOPROL-XL) 24 hr tablet 25 mg (has no administration in time range)   montelukast (SINGULAIR) tablet 10 mg (10 mg Oral Given 7/14/23 2128)   multivitamin-minerals (CENTRUM) tablet 1 tablet (1 tablet Oral Not Given 7/14/23 1737)   lisinopril (ZESTRIL) tablet 20 mg (has no administration in time range)   rivaroxaban (XARELTO) tablet 20 mg (20 mg Oral Not Given 7/14/23 1738)   sertraline (ZOLOFT) tablet 100 mg (has no administration in time range)   tamsulosin (FLOMAX) capsule 0.4 mg (0.4 mg Oral Given 7/14/23 2128)   polyethylene glycol (MIRALAX) packet 17 g (has no administration in time range)   acetaminophen (TYLENOL) tablet 650 mg (has no administration in time range)   multivitamin stress formula tablet 1 tablet (has no administration in time range)   trimethobenzamide (TIGAN) IM injection 200 mg (has no administration in time range)   cefTRIAXone (ROCEPHIN) 2,000 mg in dextrose 5 % 50 mL IVPB (0 mg Intravenous Stopped 7/14/23 2207)   metroNIDAZOLE (FLAGYL) IVPB (premix) 500 mg 100 mL (500 mg Intravenous New Bag 7/14/23 2207)   vancomycin (VANCOCIN) 2,000 mg in sodium chloride 0.9 % 500 mL IVPB (0 mg Intravenous Stopped 7/14/23 1650)   multi-electrolyte (ISOLYTE-S PH 7.4) bolus 1,000 mL (0 mL Intravenous Stopped 7/14/23 1509)   cefepime (MAXIPIME) 2 g/50 mL dextrose IVPB (0 mg Intravenous Stopped 7/14/23 1516)   pantoprazole (PROTONIX) injection 40 mg (40 mg Intravenous Given 7/14/23 1446)   multi-electrolyte (ISOLYTE-S PH 7.4) bolus 1,000 mL (0 mL Intravenous Stopped 7/14/23 1650)   iohexol (OMNIPAQUE) 350 MG/ML injection (SINGLE-DOSE) 100 mL (100 mL Intravenous Given 7/14/23 1947)       Diagnostic Studies  Results Reviewed     Procedure Component Value Units Date/Time    Iron Saturation % [[id number]] Collected: 07/14/23 1254    Lab Status: In process Specimen: Blood from Arm, Left Updated: 07/14/23 1744    Ferritin [[id number]] Collected: 07/14/23 1254    Lab Status: In process Specimen: Blood from Arm, Left Updated: 07/14/23 1744    Blood culture #1 [[id number]] Collected: 07/14/23 1254    Lab Status: Preliminary result Specimen: Blood from Arm, Right Updated: 07/14/23 1701     Blood Culture Received in Microbiology Lab. Culture in Progress. Blood culture #2 [[id number]] Collected: 07/14/23 1254    Lab Status: Preliminary result Specimen: Blood from Arm, Left Updated: 07/14/23 1701     Blood Culture Received in Microbiology Lab. Culture in Progress.     FLU/RSV/COVID - if FLU/RSV clinically relevant [[id number]]  (Normal) Collected: 07/14/23 1502    Lab Status: Final result Specimen: Nares from Nose Updated: 07/14/23 9654     SARS-CoV-2 Negative     INFLUENZA A PCR Negative     INFLUENZA B PCR Negative     RSV PCR Negative    Narrative:      FOR PEDIATRIC PATIENTS - copy/paste COVID Guidelines URL to browser: [email or web address]    SARS-CoV-2 assay is a Nucleic Acid Amplification assay intended for the  qualitative detection of nucleic acid from SARS-CoV-2 in nasopharyngeal  swabs. Results are for the presumptive identification of SARS-CoV-2 RNA. Positive results are indicative of infection with SARS-CoV-2, the virus  causing COVID-19, but do not rule out bacterial infection or co-infection  with other viruses. Laboratories within the [facility] and its  territories are required to report all positive results to the appropriate  public health authorities. Negative results do not preclude SARS-CoV-2  infection and should not be used as the sole basis for treatment or other  patient management decisions. Negative results must be combined with  clinical observations, patient history, and epidemiological information. This test has not been FDA cleared or approved. This test has been authorized by FDA under an Emergency Use Authorization  (EUA). This test is only authorized for the duration of time the  declaration that circumstances exist justifying the authorization of the  emergency use of an in vitro diagnostic tests for detection of SARS-CoV-2  virus and/or diagnosis of COVID-19 infection under section 564(b)(1) of  the Act, 21 U. S.C. 319NZI-8(Z)(0), unless the authorization is terminated  or revoked sooner. The test has been validated but independent review by FDA  and CLIA is pending. Test performed using [employer] GeneXpert: This RT-PCR assay targets N2,  a region unique to SARS-CoV-2. A conserved region in the E-gene was chosen  for pan-Sarbecovirus detection which includes SARS-CoV-2. According to CMS-2020-01-R, this platform meets the definition of high-throughput technology.     HS Troponin I 2hr [[id number]]  (Normal) Collected: 07/14/23 1502    Lab Status: Final result Specimen: Blood from Arm, Left Updated: 07/14/23 1532     hs TnI 2hr 5 ng/L      Delta 2hr hsTnI -1 ng/L     Lactic acid 2 Hours [[id number]]  (Abnormal) Collected: 07/14/23 1502    Lab Status: Final result Specimen: Blood from Arm, Left Updated: 07/14/23 1530     LACTIC ACID 3.2 mmol/L     Narrative:      Result may be elevated if tourniquet was used during collection. Urine Microscopic [[id number]]  (Normal) Collected: 07/14/23 1430    Lab Status: Final result Specimen: Urine, Clean Catch Updated: 07/14/23 1501     RBC, UA None Seen /hpf      WBC, UA 1-2 /hpf      Epithelial Cells None Seen /hpf      Bacteria, UA None Seen /hpf     UA w Reflex to Microscopic w Reflex to Culture [[id number]]  (Abnormal) Collected: 07/14/23 1430    Lab Status: Final result Specimen: Urine, Clean Catch Updated: 07/14/23 1500     Color, UA Light Yellow     Clarity, UA Clear     Specific Gravity, UA 1.019     pH, UA 5.0     Leukocytes, UA Negative     Nitrite, UA Negative     Protein, UA Trace mg/dl      Glucose,  (1/2%) mg/dl      Ketones, UA Negative mg/dl      Urobilinogen, UA <2.0 mg/dl      Bilirubin, UA Negative     Occult Blood, UA Negative    Lactic acid, plasma (w/reflex if result > 2.0) [[id number]]  (Abnormal) Collected: 07/14/23 1254    Lab Status: Final result Specimen: Blood from Arm, Left Updated: 07/14/23 1357     LACTIC ACID 2.8 mmol/L     Narrative:      Result may be elevated if tourniquet was used during collection.     Protime-INR [[id number]]  (Abnormal) Collected: 07/14/23 1254    Lab Status: Final result Specimen: Blood from Arm, Left Updated: 07/14/23 1343     Protime 23.3 seconds      INR 2.04    APTT [[id number]]  (Abnormal) Collected: 07/14/23 1254    Lab Status: Final result Specimen: Blood from Arm, Left Updated: 07/14/23 1343     PTT 48 seconds     Procalcitonin [[id number]]  (Abnormal) Collected: 07/14/23 1254    Lab Status: Final result Specimen: Blood from Arm, Left Updated: 07/14/23 1342     Procalcitonin 0.38 ng/ml     HS Troponin 0hr (reflex protocol) [[id number]] (Normal) Collected: 07/14/23 1254    Lab Status: Final result Specimen: Blood from Arm, Left Updated: 07/14/23 1339     hs TnI 0hr 6 ng/L     B-Type Natriuretic Peptide(BNP) [[id number]]  (Abnormal) Collected: 07/14/23 1254    Lab Status: Final result Specimen: Blood from Arm, Left Updated: 07/14/23 1338      pg/mL     Comprehensive metabolic panel [[id number]]  (Abnormal) Collected: 07/14/23 1254    Lab Status: Final result Specimen: Blood from Arm, Left Updated: 07/14/23 1332     Sodium 137 mmol/L      Potassium 4.1 mmol/L      Chloride 105 mmol/L      CO2 21 mmol/L      ANION GAP 11 mmol/L      BUN 17 mg/dL      Creatinine 0.99 mg/dL      Glucose 280 mg/dL      Calcium 8.4 mg/dL      AST 13 U/L      ALT 9 U/L      Alkaline Phosphatase 48 U/L      Total Protein 6.5 g/dL      Albumin 3.6 g/dL      Total Bilirubin 0.53 mg/dL      eGFR 72 ml/min/1.73sq m     Narrative:      National Kidney Disease Foundation guidelines for Chronic Kidney Disease (CKD):   •  Stage 1 with normal or high GFR (GFR > 90 mL/min/1.73 square meters)  •  Stage 2 Mild CKD (GFR = 60-89 mL/min/1.73 square meters)  •  Stage 3A Moderate CKD (GFR = 45-59 mL/min/1.73 square meters)  •  Stage 3B Moderate CKD (GFR = 30-44 mL/min/1.73 square meters)  •  Stage 4 Severe CKD (GFR = 15-29 mL/min/1.73 square meters)  •  Stage 5 End Stage CKD (GFR <15 mL/min/1.73 square meters)  Note: GFR calculation is accurate only with a steady state creatinine    CBC and differential [[id number]]  (Abnormal) Collected: 07/14/23 1254    Lab Status: Final result Specimen: Blood from Arm, Left Updated: 07/14/23 1316     WBC 12.70 Thousand/uL      RBC 2.89 Million/uL      Hemoglobin 7.0 g/dL      Hematocrit 24.1 %      MCV 83 fL      MCH 24.2 pg      MCHC 29.0 g/dL      RDW 16.8 %      MPV 9.7 fL      Platelets 408 Thousands/uL      nRBC 0 /100 WBCs      Neutrophils Relative 77 %      Immat GRANS % 1 %      Lymphocytes Relative 13 %      Monocytes Relative 8 % Eosinophils Relative 0 %      Basophils Relative 1 %      Neutrophils Absolute 9.88 Thousands/µL      Immature Grans Absolute 0.09 Thousand/uL      Lymphocytes Absolute 1.68 Thousands/µL      Monocytes Absolute 0.95 Thousand/µL      Eosinophils Absolute 0.04 Thousand/µL      Basophils Absolute 0.06 Thousands/µL                  XR chest 1 view portable   Final Result by Yudy Mcclure MD (07/14 1614)      No acute cardiopulmonary disease. Workstation performed: [id number]         CT abdomen pelvis w contrast    (Results Pending)              Procedures  CriticalCare Time    Date/Time: 7/14/2023 10:23 PM    Performed by: Johnson Tarango MD  Authorized by: Johnson Tarango MD    Critical care provider statement:     Critical care time (minutes):  35    Critical care time was exclusive of:  Separately billable procedures and treating other patients and teaching time    Critical care was necessary to treat or prevent imminent or life-threatening deterioration of the following conditions:  Sepsis    Critical care was time spent personally by me on the following activities:  Obtaining history from patient or surrogate, development of treatment plan with patient or surrogate, discussions with consultants, examination of patient, evaluation of patient's response to treatment, interpretation of cardiac output measurements, ordering and performing treatments and interventions, ordering and review of laboratory studies, ordering and review of radiographic studies and re-evaluation of patient's condition             ED Course  ED Course as of 07/14/23 2225 Fri Jul 14, 2023   1415 External chart review shows the patient has been on doxycycline, levofloxacin, and doses of prednisone since May 2023.   1428 Drip score is 4. Patient has been on recent antibiotics. History of chronic lung disease. 1535 Lactate increased. Recheck of patient. Heart rate is 90.   Blood pressure within normal limits. Vancomycin and is currently infusing. Patient advised of admission. Normal cap refill. Not in any acute distress at this time. Initial Sepsis Screening     Row Name 07/14/23 1545 07/14/23 1453             Is the patient's history suggestive of a new or worsening infection? -- Yes (Proceed)  -RP       Suspected source of infection -- pneumonia  -RP       Indicate SIRS criteria -- Tachycardia > 90 bpm;Tachypnea > 20 resp per min  -RP       Are two or more of the above signs & symptoms of infection both present and new to the patient? -- Yes (Proceed)  -RP       Assess for evidence of organ dysfunction: Are any of the below criteria present within 6 hours of suspected infection and SIRS criteria that are NOT considered to be chronic conditions? -- Lactate > 2.0  -RP       Date of presentation of severe sepsis 07/14/23  -RP --       Time of presentation of severe sepsis 1400  -RP --       Sepsis Note: Click "NEXT" below (NOT "close") to generate sepsis note based on above information. -- --             User Key  (r) = Recorded By, (t) = Taken By, (c) = Cosigned By    [address] [place] [address] Name Provider Type    RP Miguel Walls MD Physician              Default Flowsheet Data (last 720 hours)     Sepsis Reassess     Row Name 07/14/23 1546                   Repeat Volume Status and Tissue Perfusion Assessment Performed    Date of Reassessment: 07/14/23  -        Time of Reassessment: 0697  -RP        Sepsis Reassessment Note: Click "NEXT" below (NOT "close") to generate sepsis reassessment note. --        Repeat Volume Status and Tissue Perfusion Assessment Performed --              User Key  (r) = Recorded By, (t) = Taken By, (c) = Cosigned By    [address] [place] [address] Name Provider Type    RP Miguel Walls MD Physician              SBIRT 22yo+    Flowsheet Row Most Recent Value   Initial Alcohol Screen: US AUDIT-C     1.  How often do you have a drink containing alcohol? 0 Filed at: 07/14/2023 1249   2. How many drinks containing alcohol do you have on a typical day you are drinking? 0 Filed at: 07/14/2023 1249   3a. Male UNDER 65: How often do you have five or more drinks on one occasion? 0 Filed at: 07/14/2023 1249   3b. FEMALE Any Age, or MALE 65+: How often do you have 4 or more drinks on one occassion? 0 Filed at: 07/14/2023 1249   Audit-C Score 0 Filed at: 07/14/2023 1249   DAVON: How many times in the past year have you. .. Used an illegal drug or used a prescription medication for non-medical reasons? Never Filed at: 07/14/2023 1249                    Medical Decision Making  Patient with suspected pneumonia, started on cefepime and vancomycin. He does meet severe sepsis criteria. Sepsis alert was called. Patient received IV fluids. Reassessment was  performed. Patient was also noted to be trace heme positive on guaiac exam.    Amount and/or Complexity of Data Reviewed  External Data Reviewed: notes. Details: External note reviewed. Patient was seen by his PCP today and concern for pneumonia sent to the emergency department for evaluation. Labs: ordered. Radiology: ordered. Risk  Prescription drug management. Decision regarding hospitalization. Disposition  Final diagnoses:   Pneumonia   Severe sepsis ([address])   GI bleed     Time reflects when diagnosis was documented in both MDM as applicable and the Disposition within this note     Time User Action Codes Description Comment    7/14/2023  3:52 PM Christi Antis Add [J18.9] Pneumonia     7/14/2023  3:52 PM Jimmy Gonzalez Add [A41.9,  R65.20] Severe sepsis ([address])     7/14/2023  3:52 PM Christi Antis Add [K92.2] GI bleed       ED Disposition     ED Disposition   Admit    Condition   Stable    Date/Time   Fri Jul 14, 2023  3:52 PM    Comment   Case was discussed with Dr. Nolberto Garcia and the patient's admission status was agreed to be Admission Status: inpatient status to the service of Dr. Nolberto Garcia . Follow-up Information    None         Current Discharge Medication List      CONTINUE these medications which have NOT CHANGED    Details   albuterol (2.5 mg/3 mL) 0.083 % nebulizer solution Take 3 mL (2.5 mg total) by nebulization every 6 (six) hours as needed for wheezing or shortness of breath  Qty: 60 mL, Refills: 6    Associated Diagnoses: Mild persistent asthma with acute exacerbation      albuterol (Ventolin HFA) 90 mcg/act inhaler Inhale 2 puffs every 6 (six) hours as needed for wheezing  Qty: 18 g, Refills: 3    Comments: Substitution to a formulary equivalent within the same pharmaceutical class is authorized. Associated Diagnoses: Mild persistent asthma with acute exacerbation      Ascorbic Acid (vitamin C) 1000 MG tablet Take 1,000 mg by mouth daily      b complex vitamins capsule Take 1 capsule by mouth daily      MYRIAM ZINC PO Take by mouth in the morning      finasteride (PROSCAR) 5 mg tablet TAKE ONE TABLET BY MOUTH EVERY DAY  Qty: 30 tablet, Refills: 5    Associated Diagnoses: Benign prostatic hyperplasia, unspecified whether lower urinary tract symptoms present      Fluticasone-Salmeterol (Advair Diskus) 250-50 mcg/dose inhaler Inhale 1 puff 2 (two) times a day Rinse mouth after use. Qty: 60 blister, Refills: 3    Comments: Substitution to a formulary equivalent within the same pharmaceutical class is authorized. Associated Diagnoses: Bronchospasm; Wheezing; Bronchitis;  Restrictive lung disease      glipiZIDE (GLUCOTROL XL) 10 mg 24 hr tablet TAKE 1 TABLET BY MOUTH EVERY DAY WITH BREAKFAST  Qty: 90 tablet, Refills: 3    Associated Diagnoses: Type 2 diabetes mellitus without complication, without long-term current use of insulin (HCC)      Glucosamine-Chondroitin (GLUCOSAMINE CHONDR COMPLEX PO) Take by mouth 2 (two) times a day      metFORMIN (GLUCOPHAGE) 500 mg tablet TAKE 2 TABLETS BY MOUTH EVERY MORNING AND TAKE 1 TABLET BY MOUTH IN THE EVENING (GENERIC FOR GLUCOPHAGE)  Qty: 270 tablet, Refills: 3    Associated Diagnoses: Type 2 diabetes mellitus without complication, without long-term current use of insulin ([facility])      metoprolol succinate (TOPROL-XL) 25 mg 24 hr tablet Take 1 tablet (25 mg total) by mouth every morning  Qty: 90 tablet, Refills: 3    Associated Diagnoses: New onset a-fib ([address]); Type 2 diabetes mellitus with hyperglycemia, without long-term current use of insulin ([facility])      montelukast (SINGULAIR) 10 mg tablet Take 1 tablet (10 mg total) by mouth daily at bedtime  Qty: 90 tablet, Refills: 3    Associated Diagnoses: Mild persistent asthma with acute exacerbation      Multiple Vitamins-Minerals (MEGAVITE FRUITS & VEGGIES PO) Take by mouth in the morning      ramipril (ALTACE) 5 mg capsule Take 1 capsule (5 mg total) by mouth every morning  Qty: 90 capsule, Refills: 3    Associated Diagnoses: Essential hypertension      rivaroxaban (XARELTO) 20 mg tablet in the morning      sertraline (ZOLOFT) 100 mg tablet TAKE ONE TABLET BY MOUTH EVERY DAY  Qty: 90 tablet, Refills: 1    Associated Diagnoses: Moderate episode of recurrent major depressive disorder ([facility])      tamsulosin (FLOMAX) 0.4 mg TAKE 1 CAPSULE BY MOUTH EVERYDAY AT BEDTIME  Qty: 90 capsule, Refills: 3    Associated Diagnoses: Benign prostatic hyperplasia with nocturia             No discharge procedures on file.     PDMP Review       Value Time User    PDMP Reviewed  Yes 5/15/2023  4:01 PM Evelina Bruner, [address] [place] [address]          ED Provider  Electronically Signed by           Mychal Sánchez MD  07/14/23 [phone]

## 2023-07-14 NOTE — PLAN OF CARE
Problem: Potential for Falls  Goal: Patient will remain free of falls  Description: INTERVENTIONS:  - Educate patient/family on patient safety including physical limitations  - Instruct patient to call for assistance with activity   - Consult OT/PT to assist with strengthening/mobility   - Keep Call bell within reach  - Keep bed low and locked with side rails adjusted as appropriate  - Keep care items and personal belongings within reach  - Initiate and maintain comfort rounds  - Make Fall Risk Sign visible to staff  - Apply yellow socks and bracelet for high fall risk patients  - Consider moving patient to room near nurses station  Outcome: Progressing     Problem: PAIN - ADULT  Goal: Verbalizes/displays adequate comfort level or baseline comfort level  Description: Interventions:  - Encourage patient to monitor pain and request assistance  - Assess pain using appropriate pain scale  - Administer analgesics based on type and severity of pain and evaluate response  - Implement non-pharmacological measures as appropriate and evaluate response  - Consider cultural and social influences on pain and pain management  - Notify physician/advanced practitioner if interventions unsuccessful or patient reports new pain  Outcome: Progressing     Problem: INFECTION - ADULT  Goal: Absence or prevention of progression during hospitalization  Description: INTERVENTIONS:  - Assess and monitor for signs and symptoms of infection  - Monitor lab/diagnostic results  - Monitor all insertion sites, i.e. indwelling lines, tubes, and drains  - Monitor endotracheal if appropriate and nasal secretions for changes in amount and color  - [place] appropriate cooling/warming therapies per order  - Administer medications as ordered  - Instruct and encourage patient and family to use good hand hygiene technique  - Identify and instruct in appropriate isolation precautions for identified infection/condition  Outcome: Progressing     Problem: SAFETY ADULT  Goal: Patient will remain free of falls  Description: INTERVENTIONS:  - Educate patient/family on patient safety including physical limitations  - Instruct patient to call for assistance with activity   - Consult OT/PT to assist with strengthening/mobility   - Keep Call bell within reach  - Keep bed low and locked with side rails adjusted as appropriate  - Keep care items and personal belongings within reach  - Initiate and maintain comfort rounds  - Make Fall Risk Sign visible to staff  - Apply yellow socks and bracelet for high fall risk patients  - Consider moving patient to room near nurses station  Outcome: Progressing  Goal: Maintain or return to baseline ADL function  Description: INTERVENTIONS:  -  Assess patient's ability to carry out ADLs; assess patient's baseline for ADL function and identify physical deficits which impact ability to perform ADLs (bathing, care of mouth/teeth, toileting, grooming, dressing, etc.)  - Assess/evaluate cause of self-care deficits   - Assess range of motion  - Assess patient's mobility; develop plan if impaired  - Assess patient's need for assistive devices and provide as appropriate  - Encourage maximum independence but intervene and supervise when necessary  - Involve family in performance of ADLs  - Assess for home care needs following discharge   - Consider OT consult to assist with ADL evaluation and planning for discharge  - Provide patient education as appropriate  Outcome: Progressing  Goal: Maintains/Returns to pre admission functional level  Description: INTERVENTIONS:  - Perform BMAT or MOVE assessment daily.   - Set and communicate daily mobility goal to care team and patient/family/caregiver.    - Collaborate with rehabilitation services on mobility goals if consulted  - Out of bed for toileting  - Record patient progress and toleration of activity level   Outcome: Progressing     Problem: DISCHARGE PLANNING  Goal: Discharge to home or other facility with appropriate resources  Description: INTERVENTIONS:  - Identify barriers to discharge w/patient and caregiver  - Arrange for needed discharge resources and transportation as appropriate  - Identify discharge learning needs (meds, wound care, etc.)  - Arrange for interpretive services to assist at discharge as needed  - Refer to Case Management Department for coordinating discharge planning if the patient needs post-hospital services based on physician/advanced practitioner order or complex needs related to functional status, cognitive ability, or social support system  Outcome: Progressing     Problem: Knowledge Deficit  Goal: Patient/family/caregiver demonstrates understanding of disease process, treatment plan, medications, and discharge instructions  Description: Complete learning assessment and assess knowledge base.   Interventions:  - Provide teaching at level of understanding  - Provide teaching via preferred learning methods  Outcome: Progressing     Problem: RESPIRATORY - ADULT  Goal: Achieves optimal ventilation and oxygenation  Description: INTERVENTIONS:  - Assess for changes in respiratory status  - Assess for changes in mentation and behavior  - Position to facilitate oxygenation and minimize respiratory effort  - Oxygen administered by appropriate delivery if ordered  - Initiate smoking cessation education as indicated  - Encourage broncho-pulmonary hygiene including cough, deep breathe, Incentive Spirometry  - Assess the need for suctioning and aspirate as needed  - Assess and instruct to report SOB or any respiratory difficulty  - Respiratory Therapy support as indicated  Outcome: Progressing

## 2023-07-14 NOTE — PROGRESS NOTES
Name: Sindhu Thomas      : 1945      MRN: [id number]  Encounter Provider: Varsha King MD  Encounter Date: 2023   Encounter department: [facility]     1. Acute cough    2. Dyspnea on exertion    3. Fever, unspecified fever cause  -     POCT urine dip auto non-scope  -     Urine culture    4. Rigors  -     POCT urine dip auto non-scope  -     Urine culture    5. Tachycardia  -     POCT ECG           Subjective      PATIENT STATES THAT FOR THE PAST WEEK HE HAS BEEN EXPERIENCING INCREASING [place]  ON WEDNESDAY DEVELOPED A FEVER  AND SHAKING CHILLS THROUGHOUT THE DAY  HAS HAD NONE SINCE  FEELS EXHAUSTED  NOTE A SL COUGH  DENIES ANY CP, PALPITATIONS  NO URINARY FREQ OR BURNING  NO NVD  NO EDEMA  SLEPT FLAT LAST NIGHT  NO PND    DENIES ANY RASH OR RECENT TICK REMOVALS  HAS HX OF RECURRENT BRONCHITIS AND RESTRICTIVE LUNG DISEASE    Review of Systems   Constitutional: Positive for appetite change, chills, fatigue and fever. HENT: Negative for congestion, ear discharge, ear pain, mouth sores, postnasal drip, sore throat and trouble swallowing. Eyes: Negative for pain, discharge and visual disturbance. Respiratory: Positive for cough and shortness of breath. Negative for wheezing. Cardiovascular: Negative for chest pain, palpitations and leg swelling. Gastrointestinal: Negative for abdominal distention, abdominal pain, blood in stool, diarrhea and nausea. Endocrine: Negative for polydipsia, polyphagia and polyuria. Genitourinary: Negative for dysuria, frequency, hematuria and urgency. Musculoskeletal: Positive for arthralgias. Negative for gait problem and joint swelling. Skin: Negative for pallor and rash. Neurological: Negative for dizziness, syncope, speech difficulty, weakness, light-headedness, numbness and headaches. Hematological: Negative for adenopathy.    Psychiatric/Behavioral: Negative for behavioral problems, confusion and sleep disturbance. The patient is not nervous/anxious. Current Outpatient Medications on File Prior to Visit   Medication Sig   • albuterol (2.5 mg/3 mL) 0.083 % nebulizer solution Take 3 mL (2.5 mg total) by nebulization every 6 (six) hours as needed for wheezing or shortness of breath   • albuterol (Ventolin HFA) 90 mcg/act inhaler Inhale 2 puffs every 6 (six) hours as needed for wheezing   • finasteride (PROSCAR) 5 mg tablet TAKE ONE TABLET BY MOUTH EVERY DAY   • Fluticasone-Salmeterol (Advair Diskus) 250-50 mcg/dose inhaler Inhale 1 puff 2 (two) times a day Rinse mouth after use.    • glipiZIDE (GLUCOTROL XL) 10 mg 24 hr tablet TAKE 1 TABLET BY MOUTH EVERY DAY WITH BREAKFAST   • Glucosamine-Chondroitin (GLUCOSAMINE CHONDR COMPLEX PO) Take by mouth 2 (two) times a day (Patient not taking: Reported on 7/20/2023)   • metFORMIN (GLUCOPHAGE) 500 mg tablet TAKE 2 TABLETS BY MOUTH EVERY MORNING AND TAKE 1 TABLET BY MOUTH IN THE EVENING (GENERIC FOR GLUCOPHAGE)   • metoprolol succinate (TOPROL-XL) 25 mg 24 hr tablet Take 1 tablet (25 mg total) by mouth every morning   • montelukast (SINGULAIR) 10 mg tablet Take 1 tablet (10 mg total) by mouth daily at bedtime   • Multiple Vitamins-Minerals (MEGAVITE FRUITS & VEGGIES PO) Take by mouth in the morning (Patient not taking: Reported on 7/20/2023)   • ramipril (ALTACE) 5 mg capsule Take 1 capsule (5 mg total) by mouth every morning   • rivaroxaban (XARELTO) 20 mg tablet in the morning   • sertraline (ZOLOFT) 100 mg tablet TAKE ONE TABLET BY MOUTH EVERY DAY   • tamsulosin (FLOMAX) 0.4 mg TAKE 1 CAPSULE BY MOUTH EVERYDAY AT BEDTIME   • [DISCONTINUED] Ascorbic Acid (vitamin C) 1000 MG tablet Take 1,000 mg by mouth daily (Patient not taking: Reported on 7/20/2023)   • [DISCONTINUED] b complex vitamins capsule Take 1 capsule by mouth daily (Patient not taking: Reported on 7/20/2023)   • [DISCONTINUED] MYRIAM ZINC PO Take by mouth in the morning (Patient not taking: Reported on 7/20/2023)       Objective     /80 (BP Location: Left arm, Patient Position: Sitting, Cuff Size: Large)   Pulse (!) 117   Temp (!) 96 °F (35.6 °C) (Temporal)   Resp 18   Ht 6' (1.829 m)   Wt 107 kg (236 lb)   SpO2 94%   BMI 32.01 kg/m²     Physical Exam  Constitutional:       General: He is not in acute distress. Appearance: He is ill-appearing. HENT:      Head: Normocephalic and atraumatic. Right Ear: Tympanic membrane normal.      Left Ear: Tympanic membrane normal.      Nose: Nose normal. No congestion. Mouth/Throat:      Mouth: Mucous membranes are moist.   Eyes:      Extraocular Movements: Extraocular movements intact. Pupils: Pupils are equal, round, and reactive to light. Comments: CONJUNCTIVA PALE   Neck:      Comments: NO JVD NOTED  Cardiovascular:      Rate and Rhythm: Regular rhythm. Tachycardia present. Pulses: Pulses are weak. Dorsalis pedis pulses are 1+ on the right side. Posterior tibial pulses are 1+ on the right side. Pulmonary:      Breath sounds: Rales present. Comments: BILATERAL RALES IN BOTH LOWER LUNG FIELDS  NO E TO A CHANGES  PROLONGED EXP PHASE  Abdominal:      General: Abdomen is flat. Bowel sounds are normal. There is no distension. Palpations: Abdomen is soft. Tenderness: There is no abdominal tenderness. Musculoskeletal:      Cervical back: Normal range of motion and neck supple. Right lower leg: No edema. Left lower leg: No edema. Comments: MODERATE DJD CHANGES   Feet:      Right foot:      Skin integrity: No ulcer, skin breakdown, erythema, warmth, callus or dry skin. Skin:     Coloration: Skin is pale. Neurological:      General: No focal deficit present. Mental Status: He is alert. Psychiatric:         Mood and Affect: Mood normal.         Behavior: Behavior normal.         Thought Content:  Thought content normal.         Judgment: Judgment normal. Diabetic Foot Exam    Patient's shoes and socks removed. Right Foot/Ankle   Right Foot Inspection  Skin Exam: skin normal and skin intact. No dry skin, no warmth, no callus, no erythema, no maceration, no abnormal color, no pre-ulcer, no ulcer and no callus. Toe Exam: ROM and strength within normal limits. Sensory   Monofilament testing: diminished    Vascular  The right DP pulse is 1+. The right PT pulse is 1+.        Assign Risk Category  No deformity present  Loss of protective sensation  Weak pulses  Risk: 2        DISCUSSED FINDINGS WITH PATIENT AND WIFE  RECOMMEND ER EVALUATION    ATTEMPT TO GIVE REPORT - NO ANSWER    Ramona Pina MD

## 2023-07-14 NOTE — ASSESSMENT & PLAN NOTE
Assessment:  · Patient presented tachycardic, tachypneic, and elevated lactate. History of recent bronchitis. · 7/14 CXR: No acute cardiopulmonary process. UA negative for bacteria. Pro-Norberto 0.38. WBC 12.7  · Given IV fluids, vanc, and cefepime in ED    Plan:  · CT abdomen ordered to rule out intra-abdominal source  · Ceftriaxone and metronidazole antibiotics while blood cultures are pending.   Consider discontinuing if no source identified  · Continue aggressive hydration

## 2023-07-14 NOTE — PATIENT INSTRUCTIONS
DISCUSSED FINDINGS WITH FRANKLIN AND HIS WIFE    RECOMMEND ER EVALUATION    PATIENT UNDERSTANDS AND IS AGREEABLE      FURTHER PLANS PENDING EVALUATION

## 2023-07-14 NOTE — SEPSIS NOTE
Sepsis Note   Johny Sigala 76 y.o. male MRN: [id number]  Unit/Bed#: ED-22 Encounter: [id number]       Initial Sepsis Screening     Row Name 07/14/23 1545 07/14/23 1453             Is the patient's history suggestive of a new or worsening infection? -- Yes (Proceed)  -RP       Suspected source of infection -- pneumonia  -RP       Indicate SIRS criteria -- Tachycardia > 90 bpm;Tachypnea > 20 resp per min  -RP       Are two or more of the above signs & symptoms of infection both present and new to the patient? -- Yes (Proceed)  -RP       Assess for evidence of organ dysfunction: Are any of the below criteria present within 6 hours of suspected infection and SIRS criteria that are NOT considered to be chronic conditions? -- Lactate > 2.0  -RP       Date of presentation of severe sepsis 07/14/23  -RP --       Time of presentation of severe sepsis 1400  -RP --       Sepsis Note: Click "NEXT" below (NOT "close") to generate sepsis note based on above information. -- --             User Key  (r) = Recorded By, (t) = Taken By, (c) = Cosigned By    [address] [place] [address] Name Provider Type    RP Alex Gordon MD Physician                Default Flowsheet Data (last 720 hours)     Sepsis Reassess     Row Name 07/14/23 1546                   Repeat Volume Status and Tissue Perfusion Assessment Performed    Date of Reassessment: 07/14/23  -        Time of Reassessment: 8781  -RP        Sepsis Reassessment Note: Click "NEXT" below (NOT "close") to generate sepsis reassessment note. --        Repeat Volume Status and Tissue Perfusion Assessment Performed --              User Key  (r) = Recorded By, (t) = Taken By, (c) = Cosigned By    [address] [place] [address] Name Provider Type    RP Alex Gordon MD Physician                Body mass index is 32.11 kg/m².   Wt Readings from Last 1 Encounters:   07/14/23 107 kg (236 lb 12.4 oz)     IBW (Ideal Body Weight): 77.6 kg    Ideal body weight: 77.6 kg (171 lb 1.2 oz)  Adjusted ideal body weight: 89.5 kg (197 lb 5.7 oz)

## 2023-07-14 NOTE — H&P
[facility]  H&P  Name: Abe Mendenhall 76 y.o. male I MRN: [id number]  Unit/Bed#: S -01 I Date of Admission: 7/14/2023   Date of Service: 7/14/2023 I Hospital Day: 0      Assessment/Plan   Anemia  Assessment & Plan  Assessment:  · Patient presented with hemoglobin 7.0 on admission with last recorded hemoglobin 10.3 in 9/2022  · Stool guaiac "mildly positive" in ED    Plan:  · Transfuse if hemoglobin less than 7. H&H every 8 hours  · GI consulted for further recommendations and to rule out possible GI bleed. Patient plans to be n.p.o. at midnight  · Iron studies pending    * Sepsis [facility])  Assessment & Plan  Assessment:  · Patient presented tachycardic, tachypneic, and elevated lactate. History of recent bronchitis. · 7/14 CXR: No acute cardiopulmonary process. UA negative for bacteria. Pro-Norberto 0.38. WBC 12.7  · Given IV fluids, vanc, and cefepime in ED    Plan:  · CT abdomen ordered to rule out intra-abdominal source  · Ceftriaxone and metronidazole antibiotics while blood cultures are pending. Consider discontinuing if no source identified  · Continue aggressive hydration    Type 2 diabetes mellitus with hyperglycemia, without long-term current use of insulin (formerly [facility])  Assessment & Plan  Lab Results   Component Value Date    HGBA1C 7.3 (A) 06/22/2022       No results for input(s): "POCGLU" in the last 72 hours. Blood Sugar Average: Last 72 hrs:  · Insulin sliding scale       VTE Pharmacologic Prophylaxis: VTE Score: 7 Moderate Risk (Score 3-4) - Pharmacological DVT Prophylaxis Ordered: apixaban (Eliquis). Code Status: Level 1 - Full Code   Discussion with family: Updated  (wife) at bedside. Anticipated Length of Stay: Patient will be admitted on an inpatient basis with an anticipated length of stay of greater than 2 midnights secondary to Sepsis.     Chief Complaint: Dyspnea on exertion    History of Present Illness:  Abe Mendenhall is a 76 y.o. male with a PMH of A-fib on anticoagulation, hypertension, diabetes, BPH, sleep apnea, asthma, who presents with 1 week history of shortness of breath. He reports having a fever of 101 2 days ago and experienced shaking chills. He reports having bronchitis in the past 1 to 2 months and was treated with levo, Doxy and steroids. He still reports having cough and sputum. Patient was recently prescribed Singulair, Advair and also CPAP for his TRISTIN which she has used 2-3 times in the past 2 weeks. Patient endorses fevers, shortness of breath on exertion, coughing, frequent nocturnal urination and urgency and 2-day history of constipation. In the ED, patient was tachypneic, tachycardic, elevated WBC initially met sepsis criteria. Initial lactate 2.8 and hemoglobin 7. Patient was given cefepime and vancomycin. Patient was admitted for further work-up of his sepsis. Review of Systems:  Review of Systems   Constitutional: Positive for fatigue and fever. Negative for chills. HENT: Negative for ear pain and sore throat. Eyes: Negative for pain and visual disturbance. Respiratory: Positive for cough and shortness of breath. Negative for chest tightness. Cardiovascular: Negative for chest pain, palpitations and leg swelling. Gastrointestinal: Positive for constipation. Negative for abdominal pain, nausea and vomiting. Genitourinary: Positive for frequency and urgency. Negative for dysuria and hematuria. Musculoskeletal: Negative for arthralgias and back pain. Skin: Negative for color change and rash. Neurological: Negative for seizures and syncope. All other systems reviewed and are negative.       Past Medical and Surgical History:   Past Medical History:   Diagnosis Date   • Anxiety    • Arthritis     fingers , knee   • BPH (benign prostatic hypertrophy)    • Cataract     currently left eye- to have surgery on 4/10/17   • Cough variant asthma 4/12/2017   • Diabetes mellitus ([address])     type 2, last assessed 4/12/2017   • Hernia, umbilical     currently has   • HL (hearing loss)     b/l hearing aids   • Labyrinthitis    • Moderate obstructive sleep apnea 4/11/2017   • New onset a-fib ([address]) 1/10/2020   • Obesity with body mass index 30 or greater 35/5/8467   • Umbilical hernia        Past Surgical History:   Procedure Laterality Date   • CATARACT EXTRACTION Right 04/04/2017   • COLONOSCOPY      yrs ago   • EYE SURGERY Bilateral     cataracts with IOL       Meds/Allergies:  Prior to Admission medications    Medication Sig Start Date End Date Taking? Authorizing Provider   albuterol (2.5 mg/3 mL) 0.083 % nebulizer solution Take 3 mL (2.5 mg total) by nebulization every 6 (six) hours as needed for wheezing or shortness of breath 6/27/23   Solo Clements MD   albuterol (Ventolin HFA) 90 mcg/act inhaler Inhale 2 puffs every 6 (six) hours as needed for wheezing 6/27/23   Solo Clements MD   Ascorbic Acid (vitamin C) 1000 MG tablet Take 1,000 mg by mouth daily    Historical Provider, MD   b complex vitamins capsule Take 1 capsule by mouth daily    Historical Provider, MD Charlie Matute Take by mouth in the morning    Historical Provider, MD   finasteride (PROSCAR) 5 mg tablet TAKE ONE TABLET BY MOUTH EVERY DAY 11/7/22   Meryle Holm, PA-C   Fluticasone-Salmeterol (Advair Diskus) 250-50 mcg/dose inhaler Inhale 1 puff 2 (two) times a day Rinse mouth after use.  5/9/23   LATANYA Mary   glipiZIDE (GLUCOTROL XL) 10 mg 24 hr tablet TAKE 1 TABLET BY MOUTH EVERY DAY WITH BREAKFAST 8/24/20   Rick He MD   Glucosamine-Chondroitin (GLUCOSAMINE CHONDR COMPLEX PO) Take by mouth 2 (two) times a day    Historical Provider, MD   metFORMIN (GLUCOPHAGE) 500 mg tablet TAKE 2 TABLETS BY MOUTH EVERY MORNING AND TAKE 1 TABLET BY MOUTH IN THE EVENING (GENERIC FOR GLUCOPHAGE) 2/13/23   Maura Schmitz DO   metoprolol succinate (TOPROL-XL) 25 mg 24 hr tablet Take 1 tablet (25 mg total) by mouth every morning 10/17/22   Yoni Main MD   montelukast (SINGULAIR) 10 mg tablet Take 1 tablet (10 mg total) by mouth daily at bedtime 6/27/23   Alvina Holder MD   Multiple Vitamins-Minerals (MEGAVITE FRUITS & VEGGIES PO) Take by mouth in the morning    Historical Provider, MD   ramipril (ALTACE) 5 mg capsule Take 1 capsule (5 mg total) by mouth every morning 1/17/23   Chito Quick MD   rivaroxaban (XARELTO) 20 mg tablet in the morning    Historical Provider, MD   sertraline (ZOLOFT) 100 mg tablet TAKE ONE TABLET BY MOUTH EVERY DAY 4/13/23   Yoni Main MD   tamsulosin (FLOMAX) 0.4 mg TAKE 1 CAPSULE BY MOUTH EVERYDAY AT BEDTIME 5/17/23   LATANYA Carey   benzonatate (TESSALON) 200 MG capsule Take 1 capsule (200 mg total) by mouth 3 (three) times a day as needed for cough  Patient not taking: Reported on 7/14/2023 5/22/23 7/14/23  Yoni Main MD   LORazepam (Ativan) 0.5 mg tablet Take 1 tablet (0.5 mg total) by mouth daily as needed for anxiety  Patient not taking: Reported on 7/14/2023 6/22/22 7/14/23  LATANYA Carey   predniSONE 20 mg tablet 2 PO QD X 4 DAYS, THEN 1 PO QD X 4 DAYS, THEN 1/2 PO QD X 4 DAYS 6/5/23 7/14/23  Yoni Main MD     I have reviewed home medications with patient personally. Allergies:    Allergies   Allergen Reactions   • Ceftin [Cefuroxime] GI Intolerance and Vomiting       Social History:  Marital Status: /Civil Union   Occupation:   Patient Pre-hospital Living Situation: Home  Patient Pre-hospital Level of Mobility: Walks becomes short of breath  Patient Pre-hospital Diet Restrictions:   Substance Use History:   Social History     Substance and Sexual Activity   Alcohol Use Yes   • Alcohol/week: 3.0 standard drinks of alcohol   • Types: 3 Standard drinks or equivalent per week    Comment: socially     Social History     Tobacco Use   Smoking Status Former   • Packs/day: 0.50   • Years: 15.00   • Total pack years: 7.50   • Types: Cigarettes   • Quit date: 26   • Years since quittin.5   Smokeless Tobacco Never     Social History     Substance and Sexual Activity   Drug Use Yes   • Types: Marijuana    Comment: most everyday        Family History:  Family History   Problem Relation Age of Onset   • Cancer Mother         ovarian   • Diabetes Father    • Cancer Sister         stomach to brain   • Substance Abuse Family    • Substance Abuse Son    • Mental illness Neg Hx        Physical Exam:     Vitals:   Blood Pressure: 147/65 (23)  Pulse: 92 (23)  Temperature: 98.7 °F (37.1 °C) (23)  Temp Source: Oral (23)  Respirations: 20 (23)  Height: 6' 1" (185.4 cm) (23)  Weight - Scale: 108 kg (238 lb) (23)  SpO2: 94 % (23)    Physical Exam  Vitals and nursing note reviewed. Constitutional:       General: He is not in acute distress. Appearance: He is obese. He is not ill-appearing. HENT:      Head: Normocephalic and atraumatic. Mouth/Throat:      Mouth: Mucous membranes are moist.   Eyes:      Conjunctiva/sclera: Conjunctivae normal.   Cardiovascular:      Rate and Rhythm: Normal rate and regular rhythm. Pulses: Normal pulses. Heart sounds: Normal heart sounds. No murmur heard. Pulmonary:      Effort: No respiratory distress. Comments: Increased work of breathing. Abdominal:      General: Bowel sounds are normal. There is no distension. Palpations: Abdomen is soft. Tenderness: There is no abdominal tenderness. Musculoskeletal:         General: No swelling or tenderness. Cervical back: Neck supple. Right lower leg: No edema. Left lower leg: No edema. Skin:     General: Skin is warm and dry. Capillary Refill: Capillary refill takes less than 2 seconds. Neurological:      Mental Status: He is alert and oriented to person, place, and time.    Psychiatric:         Mood and Affect: Mood normal.         Additional Data:     Lab Results:  Results from last 7 days   Lab Units 07/14/23  1813 07/14/23  1254   WBC Thousand/uL  --  12.70*   HEMOGLOBIN g/dL 6.6* 7.0*   HEMATOCRIT % 22.3* 24.1*   PLATELETS Thousands/uL  --  257   NEUTROS PCT %  --  77*   LYMPHS PCT %  --  13*   MONOS PCT %  --  8   EOS PCT %  --  0     Results from last 7 days   Lab Units 07/14/23  1254   SODIUM mmol/L 137   POTASSIUM mmol/L 4.1   CHLORIDE mmol/L 105   CO2 mmol/L 21   BUN mg/dL 17   CREATININE mg/dL 0.99   ANION GAP mmol/L 11   CALCIUM mg/dL 8.4   ALBUMIN g/dL 3.6   TOTAL BILIRUBIN mg/dL 0.53   ALK PHOS U/L 48   ALT U/L 9   AST U/L 13   GLUCOSE RANDOM mg/dL 280*     Results from last 7 days   Lab Units 07/14/23  1254   INR  2.04*             Results from last 7 days   Lab Units 07/14/23  1757 07/14/23  1502 07/14/23  1254   LACTIC ACID mmol/L 2.8* 3.2* 2.8*   PROCALCITONIN ng/ml  --   --  0.38*       Lines/Drains:  Invasive Devices     Peripheral Intravenous Line  Duration           Peripheral IV 07/14/23 Left;Proximal;Ventral (anterior) Forearm <1 day    Peripheral IV 07/14/23 Proximal;Right;Ventral (anterior) Forearm <1 day                    Imaging: Reviewed radiology reports from this admission including: chest xray  XR chest 1 view portable   Final Result by Rossy Sinclair MD (07/14 1614)      No acute cardiopulmonary disease. Workstation performed: [id number]         CT abdomen pelvis w contrast    (Results Pending)       EKG and Other Studies Reviewed on Admission:   · EKG: Sinus tach and RBBB. ** Please Note: This note has been constructed using a voice recognition system.  **

## 2023-07-14 NOTE — ASSESSMENT & PLAN NOTE
Assessment:  · Patient presented with hemoglobin 7.0 on admission with last recorded hemoglobin 10.3 in 9/2022  · Stool guaiac "mildly positive" in ED    Plan:  · Transfuse if hemoglobin less than 7. H&H every 8 hours  · GI consulted for further recommendations and to rule out possible GI bleed.   Patient plans to be n.p.o. at midnight  · Iron studies pending

## 2023-07-15 LAB
ABO GROUP BLD BPU: NORMAL
ANION GAP SERPL CALCULATED.3IONS-SCNC: 6 MMOL/L
BACTERIA UR CULT: NORMAL
BASOPHILS # BLD AUTO: 0.04 THOUSANDS/ÂΜL (ref 0–0.1)
BASOPHILS NFR BLD AUTO: 0 % (ref 0–1)
BPU ID: NORMAL
BUN SERPL-MCNC: 11 MG/DL (ref 5–25)
CALCIUM SERPL-MCNC: 7.9 MG/DL (ref 8.4–10.2)
CHLORIDE SERPL-SCNC: 107 MMOL/L (ref 96–108)
CO2 SERPL-SCNC: 23 MMOL/L (ref 21–32)
CREAT SERPL-MCNC: 0.85 MG/DL (ref 0.6–1.3)
CROSSMATCH: NORMAL
EOSINOPHIL # BLD AUTO: 0.14 THOUSAND/ÂΜL (ref 0–0.61)
EOSINOPHIL NFR BLD AUTO: 1 % (ref 0–6)
ERYTHROCYTE [DISTWIDTH] IN BLOOD BY AUTOMATED COUNT: 16.9 % (ref 11.6–15.1)
FERRITIN SERPL-MCNC: 18 NG/ML (ref 24–336)
GFR SERPL CREATININE-BSD FRML MDRD: 82 ML/MIN/1.73SQ M
GLUCOSE SERPL-MCNC: 177 MG/DL (ref 65–140)
HCT VFR BLD AUTO: 24.5 % (ref 36.5–49.3)
HCT VFR BLD AUTO: 24.6 % (ref 36.5–49.3)
HCT VFR BLD AUTO: 25.5 % (ref 36.5–49.3)
HCT VFR BLD AUTO: 27.3 % (ref 36.5–49.3)
HGB BLD-MCNC: 7.3 G/DL (ref 12–17)
HGB BLD-MCNC: 7.4 G/DL (ref 12–17)
HGB BLD-MCNC: 7.5 G/DL (ref 12–17)
HGB BLD-MCNC: 8 G/DL (ref 12–17)
IMM GRANULOCYTES # BLD AUTO: 0.08 THOUSAND/UL (ref 0–0.2)
IMM GRANULOCYTES NFR BLD AUTO: 1 % (ref 0–2)
IRON SATN MFR SERPL: 4 % (ref 20–50)
IRON SERPL-MCNC: 15 UG/DL (ref 65–175)
LYMPHOCYTES # BLD AUTO: 1.08 THOUSANDS/ÂΜL (ref 0.6–4.47)
LYMPHOCYTES NFR BLD AUTO: 11 % (ref 14–44)
Lab: NO GROWTH
MCH RBC QN AUTO: 25.3 PG (ref 26.8–34.3)
MCHC RBC AUTO-ENTMCNC: 30.2 G/DL (ref 31.4–37.4)
MCV RBC AUTO: 84 FL (ref 82–98)
MONOCYTES # BLD AUTO: 0.8 THOUSAND/ÂΜL (ref 0.17–1.22)
MONOCYTES NFR BLD AUTO: 8 % (ref 4–12)
NEUTROPHILS # BLD AUTO: 8.16 THOUSANDS/ÂΜL (ref 1.85–7.62)
NEUTS SEG NFR BLD AUTO: 79 % (ref 43–75)
NRBC BLD AUTO-RTO: 0 /100 WBCS
PLATELET # BLD AUTO: 232 THOUSANDS/UL (ref 149–390)
PMV BLD AUTO: 9.9 FL (ref 8.9–12.7)
POTASSIUM SERPL-SCNC: 4.3 MMOL/L (ref 3.5–5.3)
PROCALCITONIN SERPL-MCNC: 0.31 NG/ML
RBC # BLD AUTO: 2.92 MILLION/UL (ref 3.88–5.62)
SODIUM SERPL-SCNC: 136 MMOL/L (ref 135–147)
TIBC SERPL-MCNC: 400 UG/DL (ref 250–450)
UNIT DISPENSE STATUS: NORMAL
UNIT PRODUCT CODE: NORMAL
UNIT PRODUCT VOLUME: 350 ML
UNIT RH: NORMAL
WBC # BLD AUTO: 10.3 THOUSAND/UL (ref 4.31–10.16)

## 2023-07-15 PROCEDURE — 85014 HEMATOCRIT: CPT

## 2023-07-15 PROCEDURE — 99222 1ST HOSP IP/OBS MODERATE 55: CPT | Performed by: INTERNAL MEDICINE

## 2023-07-15 PROCEDURE — 84145 PROCALCITONIN (PCT): CPT

## 2023-07-15 PROCEDURE — 99232 SBSQ HOSP IP/OBS MODERATE 35: CPT | Performed by: INTERNAL MEDICINE

## 2023-07-15 PROCEDURE — 85025 COMPLETE CBC W/AUTO DIFF WBC: CPT

## 2023-07-15 PROCEDURE — 85018 HEMOGLOBIN: CPT

## 2023-07-15 PROCEDURE — 80048 BASIC METABOLIC PNL TOTAL CA: CPT

## 2023-07-15 RX ORDER — PANTOPRAZOLE SODIUM 40 MG/1
40 TABLET, DELAYED RELEASE ORAL
Status: DISCONTINUED | OUTPATIENT
Start: 2023-07-15 | End: 2023-07-18 | Stop reason: HOSPADM

## 2023-07-15 RX ADMIN — METOPROLOL SUCCINATE 25 MG: 25 TABLET, EXTENDED RELEASE ORAL at 08:02

## 2023-07-15 RX ADMIN — PANTOPRAZOLE SODIUM 40 MG: 40 TABLET, DELAYED RELEASE ORAL at 18:41

## 2023-07-15 RX ADMIN — MONTELUKAST 10 MG: 10 TABLET, FILM COATED ORAL at 20:50

## 2023-07-15 RX ADMIN — CEFTRIAXONE 2000 MG: 2 INJECTION, POWDER, FOR SOLUTION INTRAMUSCULAR; INTRAVENOUS at 20:50

## 2023-07-15 RX ADMIN — SODIUM CHLORIDE, SODIUM GLUCONATE, SODIUM ACETATE, POTASSIUM CHLORIDE, MAGNESIUM CHLORIDE, SODIUM PHOSPHATE, DIBASIC, AND POTASSIUM PHOSPHATE 150 ML/HR: .53; .5; .37; .037; .03; .012; .00082 INJECTION, SOLUTION INTRAVENOUS at 12:01

## 2023-07-15 RX ADMIN — FLUTICASONE FUROATE AND VILANTEROL TRIFENATATE 1 PUFF: 200; 25 POWDER RESPIRATORY (INHALATION) at 07:56

## 2023-07-15 RX ADMIN — FINASTERIDE 5 MG: 5 TABLET, FILM COATED ORAL at 08:02

## 2023-07-15 RX ADMIN — B-COMPLEX W/ C & FOLIC ACID TAB 1 TABLET: TAB at 08:02

## 2023-07-15 RX ADMIN — METRONIDAZOLE 500 MG: 500 INJECTION, SOLUTION INTRAVENOUS at 06:10

## 2023-07-15 RX ADMIN — TRIMETHOBENZAMIDE HYDROCHLORIDE 200 MG: 100 INJECTION INTRAMUSCULAR at 07:56

## 2023-07-15 RX ADMIN — TAMSULOSIN HYDROCHLORIDE 0.4 MG: 0.4 CAPSULE ORAL at 20:50

## 2023-07-15 RX ADMIN — LISINOPRIL 20 MG: 20 TABLET ORAL at 08:02

## 2023-07-15 RX ADMIN — SERTRALINE 100 MG: 100 TABLET, FILM COATED ORAL at 08:02

## 2023-07-15 RX ADMIN — OXYCODONE HYDROCHLORIDE AND ACETAMINOPHEN 1000 MG: 500 TABLET ORAL at 08:02

## 2023-07-15 NOTE — PLAN OF CARE
Problem: Potential for Falls  Goal: Patient will remain free of falls  Description: INTERVENTIONS:  - Educate patient/family on patient safety including physical limitations  - Instruct patient to call for assistance with activity   - Consult OT/PT to assist with strengthening/mobility   - Keep Call bell within reach  - Keep bed low and locked with side rails adjusted as appropriate  - Keep care items and personal belongings within reach  - Initiate and maintain comfort rounds  - Make Fall Risk Sign visible to staff  - Apply yellow socks and bracelet for high fall risk patients  - Consider moving patient to room near nurses station  Outcome: Progressing     Problem: PAIN - ADULT  Goal: Verbalizes/displays adequate comfort level or baseline comfort level  Description: Interventions:  - Encourage patient to monitor pain and request assistance  - Assess pain using appropriate pain scale  - Administer analgesics based on type and severity of pain and evaluate response  - Implement non-pharmacological measures as appropriate and evaluate response  - Consider cultural and social influences on pain and pain management  - Notify physician/advanced practitioner if interventions unsuccessful or patient reports new pain  Outcome: Progressing     Problem: INFECTION - ADULT  Goal: Absence or prevention of progression during hospitalization  Description: INTERVENTIONS:  - Assess and monitor for signs and symptoms of infection  - Monitor lab/diagnostic results  - Monitor all insertion sites, i.e. indwelling lines, tubes, and drains  - Monitor endotracheal if appropriate and nasal secretions for changes in amount and color  - Omer appropriate cooling/warming therapies per order  - Administer medications as ordered  - Instruct and encourage patient and family to use good hand hygiene technique  - Identify and instruct in appropriate isolation precautions for identified infection/condition  Outcome: Progressing     Problem: SAFETY ADULT  Goal: Patient will remain free of falls  Description: INTERVENTIONS:  - Educate patient/family on patient safety including physical limitations  - Instruct patient to call for assistance with activity   - Consult OT/PT to assist with strengthening/mobility   - Keep Call bell within reach  - Keep bed low and locked with side rails adjusted as appropriate  - Keep care items and personal belongings within reach  - Initiate and maintain comfort rounds  - Make Fall Risk Sign visible to staffs  - Consider moving patient to room near nurses station  Outcome: Progressing  Goal: Maintain or return to baseline ADL function  Description: INTERVENTIONS:  -  Assess patient's ability to carry out ADLs; assess patient's baseline for ADL function and identify physical deficits which impact ability to perform ADLs (bathing, care of mouth/teeth, toileting, grooming, dressing, etc.)  - Assess/evaluate cause of self-care deficits   - Assess range of motion  - Assess patient's mobility; develop plan if impaired  - Assess patient's need for assistive devices and provide as appropriate  - Encourage maximum independence but intervene and supervise when necessary  - Involve family in performance of ADLs  - Assess for home care needs following discharge   - Consider OT consult to assist with ADL evaluation and planning for discharge  - Provide patient education as appropriate  Outcome: Progressing  Goal: Maintains/Returns to pre admission functional level  Description: INTERVENTIONS:  - Perform BMAT or MOVE assessment daily.   - Set and communicate daily mobility goal to care team and patient/family/caregiver.    - Collaborate with rehabilitation services on mobility goals if consulted  - Out of bed for toileting  - Record patient progress and toleration of activity level   Outcome: Progressing     Problem: DISCHARGE PLANNING  Goal: Discharge to home or other facility with appropriate resources  Description: INTERVENTIONS:  - Identify barriers to discharge w/patient and caregiver  - Arrange for needed discharge resources and transportation as appropriate  - Identify discharge learning needs (meds, wound care, etc.)  - Arrange for interpretive services to assist at discharge as needed  - Refer to Case Management Department for coordinating discharge planning if the patient needs post-hospital services based on physician/advanced practitioner order or complex needs related to functional status, cognitive ability, or social support system  Outcome: Progressing     Problem: Knowledge Deficit  Goal: Patient/family/caregiver demonstrates understanding of disease process, treatment plan, medications, and discharge instructions  Description: Complete learning assessment and assess knowledge base.   Interventions:  - Provide teaching at level of understanding  - Provide teaching via preferred learning methods  Outcome: Progressing     Problem: RESPIRATORY - ADULT  Goal: Achieves optimal ventilation and oxygenation  Description: INTERVENTIONS:  - Assess for changes in respiratory status  - Assess for changes in mentation and behavior  - Position to facilitate oxygenation and minimize respiratory effort  - Oxygen administered by appropriate delivery if ordered  - Initiate smoking cessation education as indicated  - Encourage broncho-pulmonary hygiene including cough, deep breathe, Incentive Spirometry  - Assess the need for suctioning and aspirate as needed  - Assess and instruct to report SOB or any respiratory difficulty  - Respiratory Therapy support as indicated  Outcome: Progressing

## 2023-07-15 NOTE — ASSESSMENT & PLAN NOTE
Assessment:  · Patient presented with hemoglobin 7.0 on admission with last recorded hemoglobin 10.3 in 9/2022  · Stool guaiac "mildly positive" in ED  · GI consulted appreciated input currently on clear liquids. No endoscopy for over the weekend. · Patient s/p 1 unit PRBC.   Denies actively bleeding  · Iron panel showing low saturation, low iron and normal TIBC 400    Plan:  · Transfuse if hemoglobin less than 7.    · H&H every 8 hours  Follow-up GI recommendations

## 2023-07-15 NOTE — PROGRESS NOTES
[address] [facility]  Progress Note  Name: Jessica Vila  MRN: [id number]  Unit/Bed#: S -01 I Date of Admission: 7/14/2023   Date of Service: 7/15/2023 I Hospital Day: 1    Assessment/Plan   * Sepsis [facility])  Assessment & Plan  Assessment:  · Patient presented tachycardic, tachypneic, and elevated lactate. History of recent bronchitis. · 7/14 CXR: No acute cardiopulmonary process. UA negative for bacteria. Pro-Norberto 0.38. WBC 12.7  · Given IV fluids, vanc, and cefepime in ED  · Procalcitonin slight elevated 0.3  · CT abdomen and pelvis showing Probable bilateral lung base pneumonia which may be on the basis of aspiration given the distribution. Plan:  · We will continue ceftriaxone and will stop metronidazole in the setting of no abdominal acute infection  · Monitor a.m. labs    Type 2 diabetes mellitus with hyperglycemia, without long-term current use of insulin ([facility])  Assessment & Plan  Lab Results   Component Value Date    HGBA1C 7.3 (A) 06/22/2022       No results for input(s): "POCGLU" in the last 72 hours. Blood Sugar Average: Last 72 hrs:  · Insulin sliding scale    Anemia  Assessment & Plan  Assessment:  · Patient presented with hemoglobin 7.0 on admission with last recorded hemoglobin 10.3 in 9/2022  · Stool guaiac "mildly positive" in ED  · GI consulted appreciated input currently on clear liquids. No endoscopy for over the weekend. · Patient s/p 1 unit PRBC. Denies actively bleeding  · Iron panel showing low saturation, low iron and normal TIBC 400    Plan:  · Transfuse if hemoglobin less than 7.    · H&H every 8 hours  Follow-up GI recommendations    Paroxysmal atrial fibrillation (HCC)  Assessment & Plan  Currently on Xarelto 20 mg daily  Following up with cardiology. Stable, no changes today. VTE Pharmacologic Prophylaxis: VTE Score: 7 High Risk (Score >/= 5) - Pharmacological DVT Prophylaxis Ordered: rivaroxaban (Xarelto).  Sequential Compression Devices Ordered. Patient Centered Rounds: I performed bedside rounds with nursing staff today. Discussions with Specialists or Other Care Team Provider:     Education and Discussions with Family / Patient: Patient declined call to . Current Length of Stay: 1 day(s)  Current Patient Status: Inpatient   Discharge Plan: Anticipate discharge in 48-72 hrs to home. Code Status: Level 1 - Full Code    Subjective:   Patient was lying in bed comfortable. Denied any acute events. Reports fatigue, shortness of breath however with some improvement after 1 unit of RBC. Objective:     Vitals:   Temp (24hrs), Av.4 °F (36.9 °C), Min:98.1 °F (36.7 °C), Max:98.7 °F (37.1 °C)    Temp:  [98.1 °F (36.7 °C)-98.7 °F (37.1 °C)] 98.3 °F (36.8 °C)  HR:  [76-92] 84  Resp:  [18-21] 18  BP: (111-161)/(58-72) 142/64  SpO2:  [94 %-98 %] 95 %  Body mass index is 31.4 kg/m². Input and Output Summary (last 24 hours): Intake/Output Summary (Last 24 hours) at 7/15/2023 1625  Last data filed at 7/15/2023 1401  Gross per 24 hour   Intake 2992.5 ml   Output 2050 ml   Net 942.5 ml       Physical Exam:   Physical Exam  Vitals and nursing note reviewed. Constitutional:       General: He is not in acute distress. Appearance: He is well-developed. He is not ill-appearing. HENT:      Head: Normocephalic and atraumatic. Eyes:      Conjunctiva/sclera: Conjunctivae normal.   Cardiovascular:      Rate and Rhythm: Normal rate and regular rhythm. Heart sounds: Normal heart sounds. No murmur heard. Pulmonary:      Effort: Pulmonary effort is normal. No respiratory distress. Breath sounds: Normal breath sounds. Abdominal:      Palpations: Abdomen is soft. Tenderness: There is no abdominal tenderness. Musculoskeletal:         General: No swelling. Cervical back: Neck supple. Skin:     General: Skin is warm and dry. Capillary Refill: Capillary refill takes less than 2 seconds. Coloration: Skin is pale. Neurological:      General: No focal deficit present. Mental Status: He is alert and oriented to person, place, and time. Mental status is at baseline. Psychiatric:         Mood and Affect: Mood normal.          Additional Data:     Labs:  Results from last 7 days   Lab Units 07/15/23  1422 07/15/23  0618   WBC Thousand/uL  --  10.30*   HEMOGLOBIN g/dL 8.0* 7.3*  7.4*   HEMATOCRIT % 27.3* 24.6*  24.5*   PLATELETS Thousands/uL  --  232   NEUTROS PCT %  --  79*   LYMPHS PCT %  --  11*   MONOS PCT %  --  8   EOS PCT %  --  1     Results from last 7 days   Lab Units 07/15/23  0618 07/14/23  1254   SODIUM mmol/L 136 137   POTASSIUM mmol/L 4.3 4.1   CHLORIDE mmol/L 107 105   CO2 mmol/L 23 21   BUN mg/dL 11 17   CREATININE mg/dL 0.85 0.99   ANION GAP mmol/L 6 11   CALCIUM mg/dL 7.9* 8.4   ALBUMIN g/dL  --  3.6   TOTAL BILIRUBIN mg/dL  --  0.53   ALK PHOS U/L  --  48   ALT U/L  --  9   AST U/L  --  13   GLUCOSE RANDOM mg/dL 177* 280*     Results from last 7 days   Lab Units 07/14/23  1254   INR  2.04*             Results from last 7 days   Lab Units 07/15/23  0618 07/14/23  2247 07/14/23  1929 07/14/23  1757 07/14/23  1502 07/14/23  1254   LACTIC ACID mmol/L  --  1.9 2.7* 2.8* 3.2* 2.8*   PROCALCITONIN ng/ml 0.31*  --   --   --   --  0.38*       Lines/Drains:  Invasive Devices     Peripheral Intravenous Line  Duration           Peripheral IV 07/14/23 Left;Proximal;Ventral (anterior) Forearm 1 day    Peripheral IV 07/14/23 Proximal;Right;Ventral (anterior) Forearm 1 day                      Imaging: Reviewed radiology reports from this admission including: abdominal/pelvic CT    Recent Cultures (last 7 days):   Results from last 7 days   Lab Units 07/14/23  1254   BLOOD CULTURE  No Growth at 24 hrs. No Growth at 24 hrs.        Last 24 Hours Medication List:   Current Facility-Administered Medications   Medication Dose Route Frequency Provider Last Rate   • acetaminophen  650 mg Oral Q6H PRSTEVEN Bolivar MD     • albuterol  2 puff Inhalation Q6H PRN Yaz Bolivar MD     • albuterol  2.5 mg Nebulization Q6H PRN Yaz Bolivar MD     • vitamin C  1,000 mg Oral Daily Yaz Bolivar MD     • cefTRIAXone  2,000 mg Intravenous Q24H Yaz Bolivar MD Stopped (07/14/23 6607)   • finasteride  5 mg Oral Daily Yaz Bolivar MD     • fluticasone-vilanterol  1 puff Inhalation Daily Yaz Bolivar MD     • lisinopril  20 mg Oral Daily Yaz Bolivar MD     • metoprolol succinate  25 mg Oral QAM Yaz Bolivar MD     • montelukast  10 mg Oral HS Yaz Bolivar MD     • multi-electrolyte  150 mL/hr Intravenous Continuous Yaz Bolivar  mL/hr (07/15/23 1201)   • multivitamin stress formula  1 tablet Oral Daily Yaz Bolivar MD     • multivitamin-minerals  1 tablet Oral Daily Yaz Bolivar MD     • polyethylene glycol  17 g Oral Daily PRN Yaz Bolivar MD     • rivaroxaban  20 mg Oral Daily Yaz Bolivar MD     • sertraline  100 mg Oral Daily Yaz Bolivar MD     • tamsulosin  0.4 mg Oral HS Yaz Bolivar MD     • trimethobenzamide  200 mg Intramuscular Q6H PRN Yaz Bolivar MD          Today, Patient Was Seen By: Alie Hyde MD    **Please Note: This note may have been constructed using a voice recognition system. **

## 2023-07-15 NOTE — PLAN OF CARE
Problem: Potential for Falls  Goal: Patient will remain free of falls  Description: INTERVENTIONS:  - Educate patient/family on patient safety including physical limitations  - Instruct patient to call for assistance with activity   - Consult OT/PT to assist with strengthening/mobility   - Keep Call bell within reach  - Keep bed low and locked with side rails adjusted as appropriate  - Keep care items and personal belongings within reach  - Initiate and maintain comfort rounds  - Make Fall Risk Sign visible to staff  - Offer Toileting every  Hours, in advance of need  - Initiate/Maintain alarm  - Obtain necessary fall risk management equipment:   - Apply yellow socks and bracelet for high fall risk patients  - Consider moving patient to room near nurses station  Outcome: Progressing     Problem: PAIN - ADULT  Goal: Verbalizes/displays adequate comfort level or baseline comfort level  Description: Interventions:  - Encourage patient to monitor pain and request assistance  - Assess pain using appropriate pain scale  - Administer analgesics based on type and severity of pain and evaluate response  - Implement non-pharmacological measures as appropriate and evaluate response  - Consider cultural and social influences on pain and pain management  - Notify physician/advanced practitioner if interventions unsuccessful or patient reports new pain  Outcome: Progressing     Problem: INFECTION - ADULT  Goal: Absence or prevention of progression during hospitalization  Description: INTERVENTIONS:  - Assess and monitor for signs and symptoms of infection  - Monitor lab/diagnostic results  - Monitor all insertion sites, i.e. indwelling lines, tubes, and drains  - Monitor endotracheal if appropriate and nasal secretions for changes in amount and color  - Dexter appropriate cooling/warming therapies per order  - Administer medications as ordered  - Instruct and encourage patient and family to use good hand hygiene technique  - Identify and instruct in appropriate isolation precautions for identified infection/condition  Outcome: Progressing     Problem: SAFETY ADULT  Goal: Patient will remain free of falls  Description: INTERVENTIONS:  - Educate patient/family on patient safety including physical limitations  - Instruct patient to call for assistance with activity   - Consult OT/PT to assist with strengthening/mobility   - Keep Call bell within reach  - Keep bed low and locked with side rails adjusted as appropriate  - Keep care items and personal belongings within reach  - Initiate and maintain comfort rounds  - Make Fall Risk Sign visible to staff  - Offer Toileting every  Hours, in advance of need  - Initiate/Maintain alarm  - Obtain necessary fall risk management equipment:   - Apply yellow socks and bracelet for high fall risk patients  - Consider moving patient to room near nurses station  Outcome: Progressing  Goal: Maintain or return to baseline ADL function  Description: INTERVENTIONS:  -  Assess patient's ability to carry out ADLs; assess patient's baseline for ADL function and identify physical deficits which impact ability to perform ADLs (bathing, care of mouth/teeth, toileting, grooming, dressing, etc.)  - Assess/evaluate cause of self-care deficits   - Assess range of motion  - Assess patient's mobility; develop plan if impaired  - Assess patient's need for assistive devices and provide as appropriate  - Encourage maximum independence but intervene and supervise when necessary  - Involve family in performance of ADLs  - Assess for home care needs following discharge   - Consider OT consult to assist with ADL evaluation and planning for discharge  - Provide patient education as appropriate  Outcome: Progressing  Goal: Maintains/Returns to pre admission functional level  Description: INTERVENTIONS:  - Perform BMAT or MOVE assessment daily.   - Set and communicate daily mobility goal to care team and patient/family/caregiver. - Collaborate with rehabilitation services on mobility goals if consulted  - Perform Range of Motion  times a day. - Reposition patient every  hours. - Dangle patient  times a day  - Stand patient  times a day  - Ambulate patient  times a day  - Out of bed to chair  times a day   - Out of bed for meals  times a day  - Out of bed for toileting  - Record patient progress and toleration of activity level   Outcome: Progressing     Problem: DISCHARGE PLANNING  Goal: Discharge to home or other facility with appropriate resources  Description: INTERVENTIONS:  - Identify barriers to discharge w/patient and caregiver  - Arrange for needed discharge resources and transportation as appropriate  - Identify discharge learning needs (meds, wound care, etc.)  - Arrange for interpretive services to assist at discharge as needed  - Refer to Case Management Department for coordinating discharge planning if the patient needs post-hospital services based on physician/advanced practitioner order or complex needs related to functional status, cognitive ability, or social support system  Outcome: Progressing     Problem: Knowledge Deficit  Goal: Patient/family/caregiver demonstrates understanding of disease process, treatment plan, medications, and discharge instructions  Description: Complete learning assessment and assess knowledge base.   Interventions:  - Provide teaching at level of understanding  - Provide teaching via preferred learning methods  Outcome: Progressing     Problem: RESPIRATORY - ADULT  Goal: Achieves optimal ventilation and oxygenation  Description: INTERVENTIONS:  - Assess for changes in respiratory status  - Assess for changes in mentation and behavior  - Position to facilitate oxygenation and minimize respiratory effort  - Oxygen administered by appropriate delivery if ordered  - Initiate smoking cessation education as indicated  - Encourage broncho-pulmonary hygiene including cough, deep breathe, Incentive Spirometry  - Assess the need for suctioning and aspirate as needed  - Assess and instruct to report SOB or any respiratory difficulty  - Respiratory Therapy support as indicated  Outcome: Progressing

## 2023-07-15 NOTE — ASSESSMENT & PLAN NOTE
Assessment:  · Patient presented tachycardic, tachypneic, and elevated lactate. History of recent bronchitis. · 7/14 CXR: No acute cardiopulmonary process. UA negative for bacteria. Pro-Norberto 0.38. WBC 12.7  · Given IV fluids, vanc, and cefepime in ED  · Procalcitonin slight elevated 0.3  · CT abdomen and pelvis showing Probable bilateral lung base pneumonia which may be on the basis of aspiration given the distribution.     Plan:  · We will continue ceftriaxone and will stop metronidazole in the setting of no abdominal acute infection  · Monitor a.m. labs

## 2023-07-15 NOTE — CONSULTS
Consultation - [facility]) [facility]  Shashank Stewart 76 y.o. male MRN: [id number]  Unit/Bed#: S -01 Encounter: [id number]        Consults    ASSESSMENT/PLAN:     66-year-old gentleman with a history of atrial fibrillation, on anticoagulation, diabetes presented to the emergency room shortness of breath dyspnea on exertion, suspected pneumonia but found to have significant anemia. No overt GI bleeding. 1.  Anemia: On a patient on anticoagulation, hemoglobin has been stable, responded appropriately to transfusion  -Twice daily PPI therapy  -Patient would benefit from EGD and a colonoscopy, if hemoglobin stable can be deferred to outpatient evaluation if we are able to obtain relatively expedited procedure  -Otherwise we will plan for inpatient endoscopic evaluation          ______________________________________________________________________    Reason for Consult / Principal Problem: Anemia    HPI: Shashank Stewart is a 76y.o. year old male who presents with Sepsis ([address]) anemia. This is a 66-year-old gentleman, admitted with weakness, found to have significant anemia, question of possible pneumonia as he presented with shortness of breath and dyspnea on exertion. Patient is on anticoagulation for history of atrial fibrillation. He denies any significant GI complaints in fact he denies any nausea, vomiting, heartburn, dysphagia, diarrhea, constipation, melena, rectal bleeding. He lost some weight many years ago intentionally with dietary modification, last colonoscopy about 5 years ago. No significant surgical history. Denies any tobacco, quit many years ago denies any significant alcohol drinks occasionally. No significant family history of GI or associated malignancies. Review of Systems: The remainder of the review of systems was negative except for the pertinent positives noted in HPI.      Historical Information   Past Medical History:   Diagnosis Date   • Anxiety    • Arthritis     fingers , knee   • BPH (benign prostatic hypertrophy)    • Cataract     currently left eye- to have surgery on 4/10/17   • Cough variant asthma 2017   • Diabetes mellitus ([address])     type 2, last assessed 2017   • Hernia, umbilical     currently has   • HL (hearing loss)     b/l hearing aids   • Labyrinthitis    • Moderate obstructive sleep apnea 2017   • New onset a-fib ([address]) 1/10/2020   • Obesity with body mass index 30 or greater    • Umbilical hernia      Past Surgical History:   Procedure Laterality Date   • CATARACT EXTRACTION Right 2017   • COLONOSCOPY      yrs ago   • EYE SURGERY Bilateral     cataracts with IOL     Social History   Social History     Substance and Sexual Activity   Alcohol Use Yes   • Alcohol/week: 3.0 standard drinks of alcohol   • Types: 3 Standard drinks or equivalent per week    Comment: socially     Social History     Substance and Sexual Activity   Drug Use Yes   • Types: Marijuana    Comment: most everyday      Social History     Tobacco Use   Smoking Status Former   • Packs/day: 0.50   • Years: 15.00   • Total pack years: 7.50   • Types: Cigarettes   • Quit date: 26   • Years since quittin.5   Smokeless Tobacco Never     Family History   Problem Relation Age of Onset   • Cancer Mother         ovarian   • Diabetes Father    • Cancer Sister         stomach to brain   • Substance Abuse Family    • Substance Abuse Son    • Mental illness Neg Hx        Meds/Allergies     Medications Prior to Admission   Medication   • albuterol (2.5 mg/3 mL) 0.083 % nebulizer solution   • albuterol (Ventolin HFA) 90 mcg/act inhaler   • Ascorbic Acid (vitamin C) 1000 MG tablet   • b complex vitamins capsule   • MYRIAM ZINC PO   • finasteride (PROSCAR) 5 mg tablet   • Fluticasone-Salmeterol (Advair Diskus) 250-50 mcg/dose inhaler   • glipiZIDE (GLUCOTROL XL) 10 mg 24 hr tablet   • Glucosamine-Chondroitin (GLUCOSAMINE CHONDR COMPLEX PO)   • metFORMIN (GLUCOPHAGE) 500 mg tablet   • metoprolol succinate (TOPROL-XL) 25 mg 24 hr tablet   • montelukast (SINGULAIR) 10 mg tablet   • Multiple Vitamins-Minerals (MEGAVITE FRUITS & VEGGIES PO)   • ramipril (ALTACE) 5 mg capsule   • rivaroxaban (XARELTO) 20 mg tablet   • sertraline (ZOLOFT) 100 mg tablet   • tamsulosin (FLOMAX) 0.4 mg     Current Facility-Administered Medications   Medication Dose Route Frequency   • acetaminophen (TYLENOL) tablet 650 mg  650 mg Oral Q6H PRN   • albuterol (PROVENTIL HFA,VENTOLIN HFA) inhaler 2 puff  2 puff Inhalation Q6H PRN   • albuterol inhalation solution 2.5 mg  2.5 mg Nebulization Q6H PRN   • ascorbic acid (VITAMIN C) tablet 1,000 mg  1,000 mg Oral Daily   • cefTRIAXone (ROCEPHIN) 2,000 mg in dextrose 5 % 50 mL IVPB  2,000 mg Intravenous Q24H   • finasteride (PROSCAR) tablet 5 mg  5 mg Oral Daily   • fluticasone-vilanterol 200-25 mcg/actuation 1 puff  1 puff Inhalation Daily   • lisinopril (ZESTRIL) tablet 20 mg  20 mg Oral Daily   • metoprolol succinate (TOPROL-XL) 24 hr tablet 25 mg  25 mg Oral QAM   • montelukast (SINGULAIR) tablet 10 mg  10 mg Oral HS   • multi-electrolyte (PLASMALYTE-A/ISOLYTE-S PH 7.4) IV solution  150 mL/hr Intravenous Continuous   • multivitamin stress formula tablet 1 tablet  1 tablet Oral Daily   • multivitamin-minerals (CENTRUM) tablet 1 tablet  1 tablet Oral Daily   • polyethylene glycol (MIRALAX) packet 17 g  17 g Oral Daily PRN   • rivaroxaban (XARELTO) tablet 20 mg  20 mg Oral Daily   • sertraline (ZOLOFT) tablet 100 mg  100 mg Oral Daily   • tamsulosin (FLOMAX) capsule 0.4 mg  0.4 mg Oral HS   • trimethobenzamide (TIGAN) IM injection 200 mg  200 mg Intramuscular Q6H PRN       Allergies   Allergen Reactions   • Ceftin [Cefuroxime] GI Intolerance and Vomiting       Objective     Blood pressure 142/64, pulse 84, temperature 98.3 °F (36.8 °C), temperature source Oral, resp.  rate 18, height 6' 1" (1.854 m), weight 108 kg (238 lb), SpO2 95 %.      Intake/Output Summary (Last 24 hours) at 7/15/2023 1746  Last data filed at 7/15/2023 1401  Gross per 24 hour   Intake 1492.5 ml   Output 2050 ml   Net -557.5 ml       PHYSICAL EXAM     GEN: well nourished, well developed, no acute distress  HEENT: anicteric, MMM, no cervical or supraclavicular lymphadenopathy  CV: RRR, no m/r/g  CHEST: Coarse breath sounds bilaterally  ABD: +BS, soft, NT/ND, no hepatosplenomegaly  EXT: no c/c/e  SKIN: no rashes,  NEURO: aaox3    Lab Results:   Admission on 07/14/2023   Component Date Value   • Ventricular Rate 07/14/2023 103    • Atrial Rate 07/14/2023 103    • KS Interval 07/14/2023 160    • QRSD Interval 07/14/2023 140    • QT Interval 07/14/2023 388    • QTC Interval 07/14/2023 508    • P Axis 07/14/2023 -28    • QRS Axis 07/14/2023 55    • T Wave Axis 07/14/2023 35    • WBC 07/14/2023 12.70 (H)    • RBC 07/14/2023 2.89 (L)    • Hemoglobin 07/14/2023 7.0 (L)    • Hematocrit 07/14/2023 24.1 (L)    • MCV 07/14/2023 83    • MCH 07/14/2023 24.2 (L)    • MCHC 07/14/2023 29.0 (L)    • RDW 07/14/2023 16.8 (H)    • MPV 07/14/2023 9.7    • Platelets 68/30/9599 257    • nRBC 07/14/2023 0    • Neutrophils Relative 07/14/2023 77 (H)    • Immat GRANS % 07/14/2023 1    • Lymphocytes Relative 07/14/2023 13 (L)    • Monocytes Relative 07/14/2023 8    • Eosinophils Relative 07/14/2023 0    • Basophils Relative 07/14/2023 1    • Neutrophils Absolute 07/14/2023 9.88 (H)    • Immature Grans Absolute 07/14/2023 0.09    • Lymphocytes Absolute 07/14/2023 1.68    • Monocytes Absolute 07/14/2023 0.95    • Eosinophils Absolute 07/14/2023 0.04    • Basophils Absolute 07/14/2023 0.06    • Sodium 07/14/2023 137    • Potassium 07/14/2023 4.1    • Chloride 07/14/2023 105    • CO2 07/14/2023 21    • ANION GAP 07/14/2023 11    • BUN 07/14/2023 17    • Creatinine 07/14/2023 0.99    • Glucose 07/14/2023 280 (H)    • Calcium 07/14/2023 8.4    • AST 07/14/2023 13    • ALT 07/14/2023 9    • Alkaline Phosphatase 07/14/2023 48    • Total Protein 07/14/2023 6.5    • Albumin 07/14/2023 3.6    • Total Bilirubin 07/14/2023 0.53    • eGFR 07/14/2023 72    • hs TnI 0hr 07/14/2023 6    • BNP 07/14/2023 123 (H)    • PTT 07/14/2023 48 (H)    • Protime 07/14/2023 23.3 (H)    • INR 07/14/2023 2.04 (H)    • Blood Culture 07/14/2023 No Growth at 24 hrs. • Blood Culture 07/14/2023 No Growth at 24 hrs.     • LACTIC ACID 07/14/2023 2.8 (HH)    • Procalcitonin 07/14/2023 0.38 (H)    • Color, UA 07/14/2023 Light Yellow    • Clarity, UA 07/14/2023 Clear    • Specific Gravity, UA 07/14/2023 1.019    • pH, UA 07/14/2023 5.0    • Leukocytes, UA 07/14/2023 Negative    • Nitrite, UA 07/14/2023 Negative    • Protein, UA 07/14/2023 Trace (A)    • Glucose, UA 07/14/2023 500 (1/2%) (A)    • Ketones, UA 07/14/2023 Negative    • Urobilinogen, UA 07/14/2023 <2.0    • Bilirubin, UA 07/14/2023 Negative    • Occult Blood, UA 07/14/2023 Negative    • hs TnI 2hr 07/14/2023 5    • Delta 2hr hsTnI 07/14/2023 -1    • LACTIC ACID 07/14/2023 3.2 (HH)    • SARS-CoV-2 07/14/2023 Negative    • INFLUENZA A PCR 07/14/2023 Negative    • INFLUENZA B PCR 07/14/2023 Negative    • RSV PCR 07/14/2023 Negative    • RBC, UA 07/14/2023 None Seen    • WBC, UA 07/14/2023 1-2    • Epithelial Cells 07/14/2023 None Seen    • Bacteria, UA 07/14/2023 None Seen    • LACTIC ACID 07/14/2023 2.8 (HH)    • Hemoglobin 07/14/2023 6.6 (LL)    • Hematocrit 07/14/2023 22.3 (L)    • Iron Saturation 07/14/2023 4 (L)    • TIBC 07/14/2023 400    • Iron 07/14/2023 15 (L)    • Ferritin 07/14/2023 18 (L)    • LACTIC ACID 07/14/2023 2.7 (HH)    • ABO Grouping 07/14/2023 A    • Rh Factor 07/14/2023 Positive    • Antibody Screen 07/14/2023 Negative    • Specimen Expiration Date 07/14/2023 [id number]    • Unit Product Code 07/15/2023 O3443E04    • Unit Number 07/15/2023 [id number]    • Unit ABO 07/15/2023 A    • Unit [facility] 07/15/2023 POS    • Crossmatch 07/15/2023 Compatible    • Unit Dispense Status 07/15/2023 Presumed Trans    • Unit Product Volume 07/15/2023 350    • ABO Grouping 07/14/2023 A    • Rh Factor 07/14/2023 Positive    • LACTIC ACID 07/14/2023 1.9    • Procalcitonin 07/15/2023 0.31 (H)    • Sodium 07/15/2023 136    • Potassium 07/15/2023 4.3    • Chloride 07/15/2023 107    • CO2 07/15/2023 23    • ANION GAP 07/15/2023 6    • BUN 07/15/2023 11    • Creatinine 07/15/2023 0.85    • Glucose 07/15/2023 177 (H)    • Calcium 07/15/2023 7.9 (L)    • eGFR 07/15/2023 82    • WBC 07/15/2023 10.30 (H)    • RBC 07/15/2023 2.92 (L)    • Hemoglobin 07/15/2023 7.4 (L)    • Hematocrit 07/15/2023 24.5 (L)    • MCV 07/15/2023 84    • MCH 07/15/2023 25.3 (L)    • MCHC 07/15/2023 30.2 (L)    • RDW 07/15/2023 16.9 (H)    • MPV 07/15/2023 9.9    • Platelets 83/80/9416 232    • nRBC 07/15/2023 0    • Neutrophils Relative 07/15/2023 79 (H)    • Immat GRANS % 07/15/2023 1    • Lymphocytes Relative 07/15/2023 11 (L)    • Monocytes Relative 07/15/2023 8    • Eosinophils Relative 07/15/2023 1    • Basophils Relative 07/15/2023 0    • Neutrophils Absolute 07/15/2023 8.16 (H)    • Immature Grans Absolute 07/15/2023 0.08    • Lymphocytes Absolute 07/15/2023 1.08    • Monocytes Absolute 07/15/2023 0.80    • Eosinophils Absolute 07/15/2023 0.14    • Basophils Absolute 07/15/2023 0.04    • Hemoglobin 07/15/2023 7.3 (L)    • Hematocrit 07/15/2023 24.6 (L)    • Hemoglobin 07/15/2023 8.0 (L)    • Hematocrit 07/15/2023 27.3 (L)      Imaging Studies: I have personally reviewed pertinent reports.

## 2023-07-16 LAB
ANION GAP SERPL CALCULATED.3IONS-SCNC: 6 MMOL/L
ATRIAL RATE: 103 BPM
BUN SERPL-MCNC: 6 MG/DL (ref 5–25)
CALCIUM SERPL-MCNC: 8 MG/DL (ref 8.4–10.2)
CHLORIDE SERPL-SCNC: 106 MMOL/L (ref 96–108)
CO2 SERPL-SCNC: 24 MMOL/L (ref 21–32)
CREAT SERPL-MCNC: 0.85 MG/DL (ref 0.6–1.3)
GFR SERPL CREATININE-BSD FRML MDRD: 82 ML/MIN/1.73SQ M
GLUCOSE SERPL-MCNC: 159 MG/DL (ref 65–140)
GLUCOSE SERPL-MCNC: 169 MG/DL (ref 65–140)
GLUCOSE SERPL-MCNC: 177 MG/DL (ref 65–140)
GLUCOSE SERPL-MCNC: 222 MG/DL (ref 65–140)
HCT VFR BLD AUTO: 25.7 % (ref 36.5–49.3)
HGB BLD-MCNC: 7.6 G/DL (ref 12–17)
P AXIS: -28 DEGREES
POTASSIUM SERPL-SCNC: 3.8 MMOL/L (ref 3.5–5.3)
PR INTERVAL: 160 MS
QRS AXIS: 55 DEGREES
QRSD INTERVAL: 140 MS
QT INTERVAL: 388 MS
QTC INTERVAL: 508 MS
SODIUM SERPL-SCNC: 136 MMOL/L (ref 135–147)
T WAVE AXIS: 35 DEGREES
VENTRICULAR RATE: 103 BPM

## 2023-07-16 PROCEDURE — 99232 SBSQ HOSP IP/OBS MODERATE 35: CPT | Performed by: INTERNAL MEDICINE

## 2023-07-16 PROCEDURE — 85014 HEMATOCRIT: CPT

## 2023-07-16 PROCEDURE — 80048 BASIC METABOLIC PNL TOTAL CA: CPT

## 2023-07-16 PROCEDURE — 82948 REAGENT STRIP/BLOOD GLUCOSE: CPT

## 2023-07-16 PROCEDURE — 85018 HEMOGLOBIN: CPT

## 2023-07-16 PROCEDURE — 93010 ELECTROCARDIOGRAM REPORT: CPT | Performed by: INTERNAL MEDICINE

## 2023-07-16 RX ORDER — BISACODYL 5 MG/1
10 TABLET, DELAYED RELEASE ORAL ONCE
Status: COMPLETED | OUTPATIENT
Start: 2023-07-16 | End: 2023-07-16

## 2023-07-16 RX ORDER — INSULIN LISPRO 100 [IU]/ML
1-6 INJECTION, SOLUTION INTRAVENOUS; SUBCUTANEOUS
Status: DISCONTINUED | OUTPATIENT
Start: 2023-07-16 | End: 2023-07-18 | Stop reason: HOSPADM

## 2023-07-16 RX ADMIN — OXYCODONE HYDROCHLORIDE AND ACETAMINOPHEN 1000 MG: 500 TABLET ORAL at 09:25

## 2023-07-16 RX ADMIN — FINASTERIDE 5 MG: 5 TABLET, FILM COATED ORAL at 09:26

## 2023-07-16 RX ADMIN — METOPROLOL SUCCINATE 25 MG: 25 TABLET, EXTENDED RELEASE ORAL at 09:26

## 2023-07-16 RX ADMIN — FLUTICASONE FUROATE AND VILANTEROL TRIFENATATE 1 PUFF: 200; 25 POWDER RESPIRATORY (INHALATION) at 09:26

## 2023-07-16 RX ADMIN — POLYETHYLENE GLYCOL 3350, SODIUM SULFATE ANHYDROUS, SODIUM BICARBONATE, SODIUM CHLORIDE, POTASSIUM CHLORIDE 4000 ML: 236; 22.74; 6.74; 5.86; 2.97 POWDER, FOR SOLUTION ORAL at 15:13

## 2023-07-16 RX ADMIN — BISACODYL 10 MG: 5 TABLET, COATED ORAL at 15:17

## 2023-07-16 RX ADMIN — INSULIN LISPRO 1 UNITS: 100 INJECTION, SOLUTION INTRAVENOUS; SUBCUTANEOUS at 21:45

## 2023-07-16 RX ADMIN — PANTOPRAZOLE SODIUM 40 MG: 40 TABLET, DELAYED RELEASE ORAL at 15:17

## 2023-07-16 RX ADMIN — MONTELUKAST 10 MG: 10 TABLET, FILM COATED ORAL at 21:44

## 2023-07-16 RX ADMIN — B-COMPLEX W/ C & FOLIC ACID TAB 1 TABLET: TAB at 09:26

## 2023-07-16 RX ADMIN — CEFTRIAXONE 2000 MG: 2 INJECTION, POWDER, FOR SOLUTION INTRAMUSCULAR; INTRAVENOUS at 21:44

## 2023-07-16 RX ADMIN — INSULIN LISPRO 2 UNITS: 100 INJECTION, SOLUTION INTRAVENOUS; SUBCUTANEOUS at 12:04

## 2023-07-16 RX ADMIN — TAMSULOSIN HYDROCHLORIDE 0.4 MG: 0.4 CAPSULE ORAL at 21:44

## 2023-07-16 RX ADMIN — LISINOPRIL 20 MG: 20 TABLET ORAL at 09:26

## 2023-07-16 RX ADMIN — SERTRALINE 100 MG: 100 TABLET, FILM COATED ORAL at 09:26

## 2023-07-16 RX ADMIN — PANTOPRAZOLE SODIUM 40 MG: 40 TABLET, DELAYED RELEASE ORAL at 05:35

## 2023-07-16 NOTE — ASSESSMENT & PLAN NOTE
Lab Results   Component Value Date    HGBA1C 7.3 (A) 06/22/2022       Recent Labs     07/16/23  1114   POCGLU 222*       Blood Sugar Average: Last 72 hrs:  · Was on Glipizide and Metformin at home  Plan  Sliding scale insulin

## 2023-07-16 NOTE — PLAN OF CARE
Problem: Potential for Falls  Goal: Patient will remain free of falls  Description: INTERVENTIONS:  - Educate patient/family on patient safety including physical limitations  - Instruct patient to call for assistance with activity   - Consult OT/PT to assist with strengthening/mobility   - Keep Call bell within reach  - Keep bed low and locked with side rails adjusted as appropriate  - Keep care items and personal belongings within reach  - Initiate and maintain comfort rounds  - Make Fall Risk Sign visible to staff  - Apply yellow socks and bracelet for high fall risk patients  - Consider moving patient to room near nurses station  Outcome: Progressing     Problem: PAIN - ADULT  Goal: Verbalizes/displays adequate comfort level or baseline comfort level  Description: Interventions:  - Encourage patient to monitor pain and request assistance  - Assess pain using appropriate pain scale  - Administer analgesics based on type and severity of pain and evaluate response  - Implement non-pharmacological measures as appropriate and evaluate response  - Consider cultural and social influences on pain and pain management  - Notify physician/advanced practitioner if interventions unsuccessful or patient reports new pain  Outcome: Progressing     Problem: INFECTION - ADULT  Goal: Absence or prevention of progression during hospitalization  Description: INTERVENTIONS:  - Assess and monitor for signs and symptoms of infection  - Monitor lab/diagnostic results  - Monitor all insertion sites, i.e. indwelling lines, tubes, and drains  - Monitor endotracheal if appropriate and nasal secretions for changes in amount and color  - Macy appropriate cooling/warming therapies per order  - Administer medications as ordered  - Instruct and encourage patient and family to use good hand hygiene technique  - Identify and instruct in appropriate isolation precautions for identified infection/condition  Outcome: Progressing     Problem: SAFETY ADULT  Goal: Patient will remain free of falls  Description: INTERVENTIONS:  - Educate patient/family on patient safety including physical limitations  - Instruct patient to call for assistance with activity   - Consult OT/PT to assist with strengthening/mobility   - Keep Call bell within reach  - Keep bed low and locked with side rails adjusted as appropriate  - Keep care items and personal belongings within reach  - Initiate and maintain comfort rounds  - Make Fall Risk Sign visible to staff  - Apply yellow socks and bracelet for high fall risk patients  - Consider moving patient to room near nurses station  Outcome: Progressing  Goal: Maintain or return to baseline ADL function  Description: INTERVENTIONS:  -  Assess patient's ability to carry out ADLs; assess patient's baseline for ADL function and identify physical deficits which impact ability to perform ADLs (bathing, care of mouth/teeth, toileting, grooming, dressing, etc.)  - Assess/evaluate cause of self-care deficits   - Assess range of motion  - Assess patient's mobility; develop plan if impaired  - Assess patient's need for assistive devices and provide as appropriate  - Encourage maximum independence but intervene and supervise when necessary  - Involve family in performance of ADLs  - Assess for home care needs following discharge   - Consider OT consult to assist with ADL evaluation and planning for discharge  - Provide patient education as appropriate  Outcome: Progressing  Goal: Maintains/Returns to pre admission functional level  Description: INTERVENTIONS:  - Perform BMAT or MOVE assessment daily.   - Set and communicate daily mobility goal to care team and patient/family/caregiver.    - Collaborate with rehabilitation services on mobility goals if consulted  - Out of bed for toileting  - Record patient progress and toleration of activity level   Outcome: Progressing     Problem: DISCHARGE PLANNING  Goal: Discharge to home or other facility with appropriate resources  Description: INTERVENTIONS:  - Identify barriers to discharge w/patient and caregiver  - Arrange for needed discharge resources and transportation as appropriate  - Identify discharge learning needs (meds, wound care, etc.)  - Arrange for interpretive services to assist at discharge as needed  - Refer to Case Management Department for coordinating discharge planning if the patient needs post-hospital services based on physician/advanced practitioner order or complex needs related to functional status, cognitive ability, or social support system  Outcome: Progressing     Problem: Knowledge Deficit  Goal: Patient/family/caregiver demonstrates understanding of disease process, treatment plan, medications, and discharge instructions  Description: Complete learning assessment and assess knowledge base.   Interventions:  - Provide teaching at level of understanding  - Provide teaching via preferred learning methods  Outcome: Progressing     Problem: RESPIRATORY - ADULT  Goal: Achieves optimal ventilation and oxygenation  Description: INTERVENTIONS:  - Assess for changes in respiratory status  - Assess for changes in mentation and behavior  - Position to facilitate oxygenation and minimize respiratory effort  - Oxygen administered by appropriate delivery if ordered  - Initiate smoking cessation education as indicated  - Encourage broncho-pulmonary hygiene including cough, deep breathe, Incentive Spirometry  - Assess the need for suctioning and aspirate as needed  - Assess and instruct to report SOB or any respiratory difficulty  - Respiratory Therapy support as indicated  Outcome: Progressing

## 2023-07-16 NOTE — INCIDENTAL FINDINGS
The following findings require follow up:  Radiographic finding   Finding: Numerous similar-appearing small low-density renal lesions which are statistically most likely cysts.  They are somewhat difficult to confidently characterize   Follow up required: Consider surveillance imaging    Follow up should be done within 6-12 month(s)    Please notify the following clinician to assist with the follow up:   Dr. Enoc Otero MD

## 2023-07-16 NOTE — PROGRESS NOTES
[address] [facility]  Progress Note  Name: Villa Singh  MRN: [id number]  Unit/Bed#: S -01 I Date of Admission: 7/14/2023   Date of Service: 7/16/2023 I Hospital Day: 2    Assessment/Plan   Anemia  Assessment & Plan  Assessment:  · Patient presented with hemoglobin 7.0 on admission with last recorded hemoglobin 10.3 in 9/2022  · Stool guaiac "mildly positive" in ED  · GI consulted appreciated input currently on clear liquids. No endoscopy for over the weekend. · Discopathy and colonoscopy scheduled for 4 PM tomorrow evening  · Patient s/p 1 unit PRBC. Denies actively bleeding  · Iron panel showing low saturation, low iron and normal TIBC 400  · today's hemoglobin is 7.6    Plan:  · Transfuse if hemoglobin less than 7.    · CBC every 24 hours  Follow-up GI recommendations    Type 2 diabetes mellitus with hyperglycemia, without long-term current use of insulin ([facility])  Assessment & Plan  Lab Results   Component Value Date    HGBA1C 7.3 (A) 06/22/2022       Recent Labs     07/16/23  1114   POCGLU 222*       Blood Sugar Average: Last 72 hrs:  · Was on Glipizide and Metformin at home  Plan  Sliding scale insulin    Paroxysmal atrial fibrillation ([address])  Assessment & Plan  Currently on Xarelto 20 mg daily  Following up with cardiology. Stable, no changes today. * Sepsis [facility])  Assessment & Plan  Assessment:  · Patient presented tachycardic, tachypneic, and elevated lactate. History of recent bronchitis. · 7/14 CXR: No acute cardiopulmonary process. UA negative for bacteria. Pro-Norberto 0.38on admission. WBC 12.7  · Given IV fluids, vanc, and cefepime in ED  · Procalcitonin slight elevated 0.3  · CT abdomen and pelvis showing Probable bilateral lung base pneumonia which may be on the basis of aspiration given the distribution.     Plan:  · Monitor a.m. labs  · Continue IV ceftriaxone for pneumonia           VTE Pharmacologic Prophylaxis: VTE Score: 7 High Risk (Score >/= 5) - Pharmacological DVT Prophylaxis Ordered: rivaroxaban (Xarelto). Sequential Compression Devices Ordered. Patient Centered Rounds: I performed bedside rounds with nursing staff today. Discussions with Specialists or Other Care Team Provider: Gastrienterology    Education and Discussions with Family / Patient: Updated  (wife) at bedside. Current Length of Stay: 2 day(s)  Current Patient Status: Inpatient   Discharge Plan: Anticipate discharge in 24-48 hrs to home. Code Status: Level 1 - Full Code    Subjective:   Patient complains of shortness of breath on exertion. Patient was upset about the endoscopy and colonoscopy being tomorrow at 4 PM.  He said he he feels hungry and weak because of the liquid diet. No chest pain, no dizziness, nausea, vomiting or diarrhea. No hematochezia or hematemesis. Objective:     Vitals:   Temp (24hrs), Av.3 °F (36.8 °C), Min:98.1 °F (36.7 °C), Max:98.4 °F (36.9 °C)    Temp:  [98.1 °F (36.7 °C)-98.4 °F (36.9 °C)] 98.1 °F (36.7 °C)  HR:  [84-92] 92  Resp:  [18-19] 19  BP: (142-159)/(64-67) 148/65  SpO2:  [93 %-96 %] 93 %  Body mass index is 31.4 kg/m². Input and Output Summary (last 24 hours): Intake/Output Summary (Last 24 hours) at 2023 1313  Last data filed at 2023 0845  Gross per 24 hour   Intake 120 ml   Output 600 ml   Net -480 ml       Physical Exam:   Physical Exam  Vitals and nursing note reviewed. Constitutional:       General: He is not in acute distress. Appearance: He is well-developed. He is not ill-appearing. HENT:      Head: Normocephalic and atraumatic. Eyes:      Conjunctiva/sclera: Conjunctivae normal.   Cardiovascular:      Rate and Rhythm: Normal rate and regular rhythm. Heart sounds: Normal heart sounds. No murmur heard. Pulmonary:      Effort: Pulmonary effort is normal. No respiratory distress. Breath sounds: Normal breath sounds. Abdominal:      Palpations: Abdomen is soft.       Tenderness: There is no abdominal tenderness. Musculoskeletal:         General: No swelling. Cervical back: Neck supple. Skin:     General: Skin is warm and dry. Capillary Refill: Capillary refill takes less than 2 seconds. Coloration: Skin is pale. Neurological:      General: No focal deficit present. Mental Status: He is alert and oriented to person, place, and time. Mental status is at baseline. Psychiatric:         Mood and Affect: Mood normal.        Additional Data:     Labs:  Results from last 7 days   Lab Units 07/16/23  0653 07/15/23  1422 07/15/23  0618   WBC Thousand/uL  --   --  10.30*   HEMOGLOBIN g/dL 7.6*   < > 7.3*  7.4*   HEMATOCRIT % 25.7*   < > 24.6*  24.5*   PLATELETS Thousands/uL  --   --  232   NEUTROS PCT %  --   --  79*   LYMPHS PCT %  --   --  11*   MONOS PCT %  --   --  8   EOS PCT %  --   --  1    < > = values in this interval not displayed. Results from last 7 days   Lab Units 07/16/23  0653 07/15/23  0618 07/14/23  1254   SODIUM mmol/L 136   < > 137   POTASSIUM mmol/L 3.8   < > 4.1   CHLORIDE mmol/L 106   < > 105   CO2 mmol/L 24   < > 21   BUN mg/dL 6   < > 17   CREATININE mg/dL 0.85   < > 0.99   ANION GAP mmol/L 6   < > 11   CALCIUM mg/dL 8.0*   < > 8.4   ALBUMIN g/dL  --   --  3.6   TOTAL BILIRUBIN mg/dL  --   --  0.53   ALK PHOS U/L  --   --  48   ALT U/L  --   --  9   AST U/L  --   --  13   GLUCOSE RANDOM mg/dL 177*   < > 280*    < > = values in this interval not displayed.      Results from last 7 days   Lab Units 07/14/23  1254   INR  2.04*     Results from last 7 days   Lab Units 07/16/23  1114   POC GLUCOSE mg/dl 222*         Results from last 7 days   Lab Units 07/15/23  0618 07/14/23  2247 07/14/23  1929 07/14/23  1757 07/14/23  1502 07/14/23  1254   LACTIC ACID mmol/L  --  1.9 2.7* 2.8* 3.2* 2.8*   PROCALCITONIN ng/ml 0.31*  --   --   --   --  0.38*       Lines/Drains:  Invasive Devices     Peripheral Intravenous Line  Duration           Peripheral IV 07/14/23 Left;Proximal;Ventral (anterior) Forearm 2 days    Peripheral IV 07/14/23 Proximal;Right;Ventral (anterior) Forearm 1 day                      Imaging: Reviewed radiology reports from this admission including: abdominal/pelvic CT    Recent Cultures (last 7 days):   Results from last 7 days   Lab Units 07/14/23  1254   BLOOD CULTURE  No Growth at 24 hrs. No Growth at 24 hrs.        Last 24 Hours Medication List:   Current Facility-Administered Medications   Medication Dose Route Frequency Provider Last Rate   • acetaminophen  650 mg Oral Q6H PRN Papi Lemos MD     • albuterol  2 puff Inhalation Q6H PRN Papi Lemos MD     • albuterol  2.5 mg Nebulization Q6H PRN Papi Lemos MD     • vitamin C  1,000 mg Oral Daily Papi Lemos MD     • bisacodyl  10 mg Oral Once LATANYA Gautam     • cefTRIAXone  2,000 mg Intravenous Q24H Papi Lemos MD 2,000 mg (07/15/23 2050)   • finasteride  5 mg Oral Daily Papi Lemos MD     • fluticasone-vilanterol  1 puff Inhalation Daily Papi Lemos MD     • insulin lispro  1-6 Units Subcutaneous 4x Daily (AC & HS) Chadd Lockett MD     • lisinopril  20 mg Oral Daily Papi Lemos MD     • metoprolol succinate  25 mg Oral QAM Papi Lemos MD     • montelukast  10 mg Oral HS Papi Lemos MD     • multivitamin stress formula  1 tablet Oral Daily Papi Lemos MD     • multivitamin-minerals  1 tablet Oral Daily Papi Lemos MD     • pantoprazole  40 mg Oral BID AC Bin Lay MD     • polyethylene glycol  4,000 mL Oral Once LATANYA Gautam     • polyethylene glycol  17 g Oral Daily PRN Papi Lemos MD     • rivaroxaban  20 mg Oral Daily Papi Lemos MD     • sertraline  100 mg Oral Daily Papi Lemos MD     • tamsulosin  0.4 mg Oral HS Papi Lemos MD     • trimethobenzamide  200 mg Intramuscular Q6H PRN Papi Lemos MD          Today, Patient Was Seen By: Chadd Lockett MD    **Please Note: This note may have been constructed using a voice recognition system. **

## 2023-07-16 NOTE — PROGRESS NOTES
Progress Note- Delmy Rubio 76 y.o. male MRN: [id number]    Unit/Bed#: S -01 Encounter: [id number]      Assessment and Plan:    75-year-old male with history of A-fib on Xarelto, diabetes who presented to the ER due to shortness of breath on exertion and suspected pneumonia on imaging, found to have significant anemia. No overt GI bleeding,.      1.  Anemia in the setting of underlying anticoagulatio and history of colonic AVMs, no overt bleeding: Hemoglobin  remains stable after transfusion at 7.6. Patient denies any shortness of breath, does have a slight cough. He remains on room air. Has not had any bowel movement since admission which he attributes to not eating. Last colonoscopy in 2018 with right-sided AVMs, diverticulosis. -Continue to hold Xarelto    -Continue PPI twice daily     -monitor CBC, transfuse as needed    -Continue clear liquids, n.p.o. past midnight    -We will schedule EGD/colonoscopy tomorrow for further evaluation due to need for anticoagulation. Prep and procedure explained          ______________________________________________________________________    Subjective:     Patient reports feeling better since admission after blood transfusion. He has not had any bowel movement since admission which she attributes to not eating. He is getting sick of Jell-O and clear liquids.   Would like endoscopic evaluation to an inpatient    Medication Administration - last 24 hours from 07/15/2023 1258 to 07/16/2023 1258       Date/Time Order Dose Route Action Action by     07/15/2023 2348 EDT multi-electrolyte (PLASMALYTE-A/ISOLYTE-S PH 7.4) IV solution -- Intravenous Canceled Entry Adán Paulino RN     07/16/2023 1925 EDT ascorbic acid (VITAMIN C) tablet 1,000 mg 1,000 mg Oral Given Jeff Tanner RN     07/16/2023 0926 EDT finasteride (PROSCAR) tablet 5 mg 5 mg Oral Given Jeff Tanner RN     07/16/2023 0926 EDT fluticasone-vilanterol 200-25 mcg/actuation 1 puff 1 puff Inhalation Given Marlon Arriaga, RN     07/16/2023 5620 EDT metoprolol succinate (TOPROL-XL) 24 hr tablet 25 mg 25 mg Oral Given Marlon rAriaga, RN     07/15/2023 2050 EDT montelukast (SINGULAIR) tablet 10 mg 10 mg Oral Given Gladys Tony, RN     07/16/2023 0926 EDT multivitamin-minerals (CENTRUM) tablet 1 tablet 1 tablet Oral Not Given Marlon Arriaga, RN     07/16/2023 9807 EDT lisinopril (ZESTRIL) tablet 20 mg 20 mg Oral Given Marlon Arriaga, RN     07/16/2023 0857 EDT rivaroxaban (XARELTO) tablet 20 mg 20 mg Oral Not Given Marlon Arriaga, RN     07/16/2023 8027 EDT sertraline (ZOLOFT) tablet 100 mg 100 mg Oral Given Marlon Arriaga, RN     07/15/2023 2050 EDT tamsulosin (FLOMAX) capsule 0.4 mg 0.4 mg Oral Given Gladys Tony, RN     07/16/2023 6587 EDT multivitamin stress formula tablet 1 tablet 1 tablet Oral Given Christel Velasquez, RN     07/15/2023 2050 EDT cefTRIAXone (ROCEPHIN) 2,000 mg in dextrose 5 % 50 mL IVPB 2,000 mg Intravenous New Bag Gladys Tony, RN     07/16/2023 0535 EDT pantoprazole (PROTONIX) EC tablet 40 mg 40 mg Oral Given Gladys Tony, RN     07/15/2023 1841 EDT pantoprazole (PROTONIX) EC tablet 40 mg 40 mg Oral Given Christel Velasquez, RN     07/16/2023 1204 EDT insulin lispro (HumaLOG) 100 units/mL subcutaneous injection 1-6 Units 2 Units Subcutaneous Given Christel Velasquez, SHERRIE          Objective:     Vitals: Blood pressure 148/65, pulse 92, temperature 98.1 °F (36.7 °C), temperature source Oral, resp. rate 19, height 6' 1" (1.854 m), weight 108 kg (238 lb), SpO2 93 %. ,Body mass index is 31.4 kg/m².       Intake/Output Summary (Last 24 hours) at 7/16/2023 1258  Last data filed at 7/16/2023 0845  Gross per 24 hour   Intake 120 ml   Output 600 ml   Net -480 ml       Physical Exam:   General Appearance: Awake and alert, in no acute distress  Chest: clear to auscultation, no rales or rhonchi  Cardiac: S1 & S2 normal, no murmur or gallop  Abdomen: Soft, non-tender, non-distended; bowel sounds normal; no masses or no organomegaly    Invasive Devices     Peripheral Intravenous Line  Duration           Peripheral IV 07/14/23 Left;Proximal;Ventral (anterior) Forearm 2 days    Peripheral IV 07/14/23 Proximal;Right;Ventral (anterior) Forearm 1 day                Lab Results:  Admission on 07/14/2023   Component Date Value   • Ventricular Rate 07/14/2023 103    • Atrial Rate 07/14/2023 103    • IN Interval 07/14/2023 160    • QRSD Interval 07/14/2023 140    • QT Interval 07/14/2023 388    • QTC Interval 07/14/2023 508    • P Axis 07/14/2023 -28    • QRS Axis 07/14/2023 55    • T Wave Axis 07/14/2023 35    • WBC 07/14/2023 12.70 (H)    • RBC 07/14/2023 2.89 (L)    • Hemoglobin 07/14/2023 7.0 (L)    • Hematocrit 07/14/2023 24.1 (L)    • MCV 07/14/2023 83    • MCH 07/14/2023 24.2 (L)    • MCHC 07/14/2023 29.0 (L)    • RDW 07/14/2023 16.8 (H)    • MPV 07/14/2023 9.7    • Platelets 39/47/9587 257    • nRBC 07/14/2023 0    • Neutrophils Relative 07/14/2023 77 (H)    • Immat GRANS % 07/14/2023 1    • Lymphocytes Relative 07/14/2023 13 (L)    • Monocytes Relative 07/14/2023 8    • Eosinophils Relative 07/14/2023 0    • Basophils Relative 07/14/2023 1    • Neutrophils Absolute 07/14/2023 9.88 (H)    • Immature Grans Absolute 07/14/2023 0.09    • Lymphocytes Absolute 07/14/2023 1.68    • Monocytes Absolute 07/14/2023 0.95    • Eosinophils Absolute 07/14/2023 0.04    • Basophils Absolute 07/14/2023 0.06    • Sodium 07/14/2023 137    • Potassium 07/14/2023 4.1    • Chloride 07/14/2023 105    • CO2 07/14/2023 21    • ANION GAP 07/14/2023 11    • BUN 07/14/2023 17    • Creatinine 07/14/2023 0.99    • Glucose 07/14/2023 280 (H)    • Calcium 07/14/2023 8.4    • AST 07/14/2023 13    • ALT 07/14/2023 9    • Alkaline Phosphatase 07/14/2023 48    • Total Protein 07/14/2023 6.5    • Albumin 07/14/2023 3.6    • Total Bilirubin 07/14/2023 0.53    • eGFR 07/14/2023 72    • hs TnI 0hr 07/14/2023 6    • BNP 07/14/2023 123 (H)    • PTT 07/14/2023 48 (H)    • Protime 07/14/2023 23.3 (H)    • INR 07/14/2023 2.04 (H)    • Blood Culture 07/14/2023 No Growth at 24 hrs. • Blood Culture 07/14/2023 No Growth at 24 hrs.     • LACTIC ACID 07/14/2023 2.8 (HH)    • Procalcitonin 07/14/2023 0.38 (H)    • Color, UA 07/14/2023 Light Yellow    • Clarity, UA 07/14/2023 Clear    • Specific Gravity, UA 07/14/2023 1.019    • pH, UA 07/14/2023 5.0    • Leukocytes, UA 07/14/2023 Negative    • Nitrite, UA 07/14/2023 Negative    • Protein, UA 07/14/2023 Trace (A)    • Glucose, UA 07/14/2023 500 (1/2%) (A)    • Ketones, UA 07/14/2023 Negative    • Urobilinogen, UA 07/14/2023 <2.0    • Bilirubin, UA 07/14/2023 Negative    • Occult Blood, UA 07/14/2023 Negative    • hs TnI 2hr 07/14/2023 5    • Delta 2hr hsTnI 07/14/2023 -1    • LACTIC ACID 07/14/2023 3.2 (HH)    • SARS-CoV-2 07/14/2023 Negative    • INFLUENZA A PCR 07/14/2023 Negative    • INFLUENZA B PCR 07/14/2023 Negative    • RSV PCR 07/14/2023 Negative    • RBC, UA 07/14/2023 None Seen    • WBC, UA 07/14/2023 1-2    • Epithelial Cells 07/14/2023 None Seen    • Bacteria, UA 07/14/2023 None Seen    • LACTIC ACID 07/14/2023 2.8 (HH)    • Hemoglobin 07/14/2023 6.6 (LL)    • Hematocrit 07/14/2023 22.3 (L)    • Iron Saturation 07/14/2023 4 (L)    • TIBC 07/14/2023 400    • Iron 07/14/2023 15 (L)    • Ferritin 07/14/2023 18 (L)    • LACTIC ACID 07/14/2023 2.7 (HH)    • ABO Grouping 07/14/2023 A    • Rh Factor 07/14/2023 Positive    • Antibody Screen 07/14/2023 Negative    • Specimen Expiration Date 07/14/2023 [id number]    • Unit Product Code 07/15/2023 [id number]    • Unit Number 07/15/2023 [id number]    • Unit ABO 07/15/2023 A    • Unit [facility] 07/15/2023 POS    • Crossmatch 07/15/2023 Compatible    • Unit Dispense Status 07/15/2023 Presumed Trans    • Unit Product Volume 07/15/2023 350    • ABO Grouping 07/14/2023 A    • Rh Factor 07/14/2023 Positive    • LACTIC ACID 07/14/2023 1.9 • Procalcitonin 07/15/2023 0.31 (H)    • Sodium 07/15/2023 136    • Potassium 07/15/2023 4.3    • Chloride 07/15/2023 107    • CO2 07/15/2023 23    • ANION GAP 07/15/2023 6    • BUN 07/15/2023 11    • Creatinine 07/15/2023 0.85    • Glucose 07/15/2023 177 (H)    • Calcium 07/15/2023 7.9 (L)    • eGFR 07/15/2023 82    • WBC 07/15/2023 10.30 (H)    • RBC 07/15/2023 2.92 (L)    • Hemoglobin 07/15/2023 7.4 (L)    • Hematocrit 07/15/2023 24.5 (L)    • MCV 07/15/2023 84    • MCH 07/15/2023 25.3 (L)    • MCHC 07/15/2023 30.2 (L)    • RDW 07/15/2023 16.9 (H)    • MPV 07/15/2023 9.9    • Platelets 34/21/1260 232    • nRBC 07/15/2023 0    • Neutrophils Relative 07/15/2023 79 (H)    • Immat GRANS % 07/15/2023 1    • Lymphocytes Relative 07/15/2023 11 (L)    • Monocytes Relative 07/15/2023 8    • Eosinophils Relative 07/15/2023 1    • Basophils Relative 07/15/2023 0    • Neutrophils Absolute 07/15/2023 8.16 (H)    • Immature Grans Absolute 07/15/2023 0.08    • Lymphocytes Absolute 07/15/2023 1.08    • Monocytes Absolute 07/15/2023 0.80    • Eosinophils Absolute 07/15/2023 0.14    • Basophils Absolute 07/15/2023 0.04    • Hemoglobin 07/15/2023 7.3 (L)    • Hematocrit 07/15/2023 24.6 (L)    • Hemoglobin 07/15/2023 8.0 (L)    • Hematocrit 07/15/2023 27.3 (L)    • Hemoglobin 07/15/2023 7.5 (L)    • Hematocrit 07/15/2023 25.5 (L)    • Sodium 07/16/2023 136    • Potassium 07/16/2023 3.8    • Chloride 07/16/2023 106    • CO2 07/16/2023 24    • ANION GAP 07/16/2023 6    • BUN 07/16/2023 6    • Creatinine 07/16/2023 0.85    • Glucose 07/16/2023 177 (H)    • Calcium 07/16/2023 8.0 (L)    • eGFR 07/16/2023 82    • Hemoglobin 07/16/2023 7.6 (L)    • Hematocrit 07/16/2023 25.7 (L)    • POC Glucose 07/16/2023 222 (H)        Imaging Studies: I have personally reviewed pertinent imaging studies.

## 2023-07-16 NOTE — ASSESSMENT & PLAN NOTE
Assessment:  · Patient presented with hemoglobin 7.0 on admission with last recorded hemoglobin 10.3 in 9/2022  · Stool guaiac "mildly positive" in ED  · GI consulted appreciated input currently on clear liquids. No endoscopy for over the weekend. · Discopathy and colonoscopy scheduled for 4 PM tomorrow evening  · Patient s/p 1 unit PRBC.   Denies actively bleeding  · Iron panel showing low saturation, low iron and normal TIBC 400  · today's hemoglobin is 7.6    Plan:  · Transfuse if hemoglobin less than 7.    · CBC every 24 hours  Follow-up GI recommendations

## 2023-07-16 NOTE — ASSESSMENT & PLAN NOTE
Assessment:  · Patient presented tachycardic, tachypneic, and elevated lactate. History of recent bronchitis. · 7/14 CXR: No acute cardiopulmonary process. UA negative for bacteria. Pro-Norberto 0.38on admission. WBC 12.7  · Given IV fluids, vanc, and cefepime in ED  · Procalcitonin slight elevated 0.3  · CT abdomen and pelvis showing Probable bilateral lung base pneumonia which may be on the basis of aspiration given the distribution.     Plan:  · Monitor a.m. labs  · Continue IV ceftriaxone for pneumonia

## 2023-07-17 ENCOUNTER — APPOINTMENT (INPATIENT)
Dept: GASTROENTEROLOGY | Facility: HOSPITAL | Age: 78
DRG: 871 | End: 2023-07-17
Payer: MEDICARE

## 2023-07-17 ENCOUNTER — ANESTHESIA (INPATIENT)
Dept: GASTROENTEROLOGY | Facility: HOSPITAL | Age: 78
DRG: 871 | End: 2023-07-17
Payer: MEDICARE

## 2023-07-17 ENCOUNTER — ANESTHESIA EVENT (INPATIENT)
Dept: GASTROENTEROLOGY | Facility: HOSPITAL | Age: 78
DRG: 871 | End: 2023-07-17
Payer: MEDICARE

## 2023-07-17 LAB
ANION GAP SERPL CALCULATED.3IONS-SCNC: 9 MMOL/L
BUN SERPL-MCNC: 6 MG/DL (ref 5–25)
CALCIUM SERPL-MCNC: 8.2 MG/DL (ref 8.4–10.2)
CHLORIDE SERPL-SCNC: 107 MMOL/L (ref 96–108)
CO2 SERPL-SCNC: 24 MMOL/L (ref 21–32)
CREAT SERPL-MCNC: 0.86 MG/DL (ref 0.6–1.3)
ERYTHROCYTE [DISTWIDTH] IN BLOOD BY AUTOMATED COUNT: 17.2 % (ref 11.6–15.1)
GFR SERPL CREATININE-BSD FRML MDRD: 82 ML/MIN/1.73SQ M
GLUCOSE SERPL-MCNC: 143 MG/DL (ref 65–140)
GLUCOSE SERPL-MCNC: 158 MG/DL (ref 65–140)
GLUCOSE SERPL-MCNC: 170 MG/DL (ref 65–140)
GLUCOSE SERPL-MCNC: 183 MG/DL (ref 65–140)
GLUCOSE SERPL-MCNC: 210 MG/DL (ref 65–140)
HCT VFR BLD AUTO: 24.6 % (ref 36.5–49.3)
HGB BLD-MCNC: 7.2 G/DL (ref 12–17)
MCH RBC QN AUTO: 24.7 PG (ref 26.8–34.3)
MCHC RBC AUTO-ENTMCNC: 29.3 G/DL (ref 31.4–37.4)
MCV RBC AUTO: 84 FL (ref 82–98)
PLATELET # BLD AUTO: 247 THOUSANDS/UL (ref 149–390)
PMV BLD AUTO: 10 FL (ref 8.9–12.7)
POTASSIUM SERPL-SCNC: 3.5 MMOL/L (ref 3.5–5.3)
RBC # BLD AUTO: 2.92 MILLION/UL (ref 3.88–5.62)
SODIUM SERPL-SCNC: 140 MMOL/L (ref 135–147)
WBC # BLD AUTO: 9.06 THOUSAND/UL (ref 4.31–10.16)

## 2023-07-17 PROCEDURE — 80048 BASIC METABOLIC PNL TOTAL CA: CPT

## 2023-07-17 PROCEDURE — 82948 REAGENT STRIP/BLOOD GLUCOSE: CPT

## 2023-07-17 PROCEDURE — 85027 COMPLETE CBC AUTOMATED: CPT

## 2023-07-17 PROCEDURE — 99232 SBSQ HOSP IP/OBS MODERATE 35: CPT | Performed by: INTERNAL MEDICINE

## 2023-07-17 PROCEDURE — 0DB78ZX EXCISION OF STOMACH, PYLORUS, VIA NATURAL OR ARTIFICIAL OPENING ENDOSCOPIC, DIAGNOSTIC: ICD-10-PCS | Performed by: INTERNAL MEDICINE

## 2023-07-17 PROCEDURE — 88342 IMHCHEM/IMCYTCHM 1ST ANTB: CPT | Performed by: PATHOLOGY

## 2023-07-17 PROCEDURE — 88305 TISSUE EXAM BY PATHOLOGIST: CPT | Performed by: PATHOLOGY

## 2023-07-17 PROCEDURE — 0DBP8ZZ EXCISION OF RECTUM, VIA NATURAL OR ARTIFICIAL OPENING ENDOSCOPIC: ICD-10-PCS | Performed by: INTERNAL MEDICINE

## 2023-07-17 PROCEDURE — 0DB68ZX EXCISION OF STOMACH, VIA NATURAL OR ARTIFICIAL OPENING ENDOSCOPIC, DIAGNOSTIC: ICD-10-PCS | Performed by: INTERNAL MEDICINE

## 2023-07-17 RX ORDER — LIDOCAINE HYDROCHLORIDE 20 MG/ML
INJECTION, SOLUTION EPIDURAL; INFILTRATION; INTRACAUDAL; PERINEURAL AS NEEDED
Status: DISCONTINUED | OUTPATIENT
Start: 2023-07-17 | End: 2023-07-17

## 2023-07-17 RX ORDER — SODIUM CHLORIDE, SODIUM LACTATE, POTASSIUM CHLORIDE, CALCIUM CHLORIDE 600; 310; 30; 20 MG/100ML; MG/100ML; MG/100ML; MG/100ML
INJECTION, SOLUTION INTRAVENOUS CONTINUOUS PRN
Status: DISCONTINUED | OUTPATIENT
Start: 2023-07-17 | End: 2023-07-17

## 2023-07-17 RX ORDER — PROPOFOL 10 MG/ML
INJECTION, EMULSION INTRAVENOUS AS NEEDED
Status: DISCONTINUED | OUTPATIENT
Start: 2023-07-17 | End: 2023-07-17

## 2023-07-17 RX ADMIN — MULTIPLE VITAMINS W/ MINERALS TAB 1 TABLET: TAB ORAL at 08:58

## 2023-07-17 RX ADMIN — PROPOFOL 20 MG: 10 INJECTION, EMULSION INTRAVENOUS at 16:56

## 2023-07-17 RX ADMIN — FLUTICASONE FUROATE AND VILANTEROL TRIFENATATE 1 PUFF: 200; 25 POWDER RESPIRATORY (INHALATION) at 09:00

## 2023-07-17 RX ADMIN — PROPOFOL 40 MG: 10 INJECTION, EMULSION INTRAVENOUS at 17:09

## 2023-07-17 RX ADMIN — INSULIN LISPRO 2 UNITS: 100 INJECTION, SOLUTION INTRAVENOUS; SUBCUTANEOUS at 22:00

## 2023-07-17 RX ADMIN — CEFTRIAXONE 2000 MG: 2 INJECTION, POWDER, FOR SOLUTION INTRAMUSCULAR; INTRAVENOUS at 22:07

## 2023-07-17 RX ADMIN — PANTOPRAZOLE SODIUM 40 MG: 40 TABLET, DELAYED RELEASE ORAL at 06:21

## 2023-07-17 RX ADMIN — PROPOFOL 30 MG: 10 INJECTION, EMULSION INTRAVENOUS at 17:15

## 2023-07-17 RX ADMIN — PROPOFOL 30 MG: 10 INJECTION, EMULSION INTRAVENOUS at 16:57

## 2023-07-17 RX ADMIN — METOPROLOL SUCCINATE 25 MG: 25 TABLET, EXTENDED RELEASE ORAL at 08:59

## 2023-07-17 RX ADMIN — PROPOFOL 20 MG: 10 INJECTION, EMULSION INTRAVENOUS at 17:02

## 2023-07-17 RX ADMIN — PROPOFOL 40 MG: 10 INJECTION, EMULSION INTRAVENOUS at 16:58

## 2023-07-17 RX ADMIN — PROPOFOL 30 MG: 10 INJECTION, EMULSION INTRAVENOUS at 17:05

## 2023-07-17 RX ADMIN — LIDOCAINE HYDROCHLORIDE 100 MG: 20 INJECTION, SOLUTION EPIDURAL; INFILTRATION; INTRACAUDAL at 16:52

## 2023-07-17 RX ADMIN — LISINOPRIL 20 MG: 20 TABLET ORAL at 08:58

## 2023-07-17 RX ADMIN — SERTRALINE 100 MG: 100 TABLET, FILM COATED ORAL at 08:58

## 2023-07-17 RX ADMIN — PROPOFOL 110 MG: 10 INJECTION, EMULSION INTRAVENOUS at 16:54

## 2023-07-17 RX ADMIN — OXYCODONE HYDROCHLORIDE AND ACETAMINOPHEN 1000 MG: 500 TABLET ORAL at 08:58

## 2023-07-17 RX ADMIN — PROPOFOL 30 MG: 10 INJECTION, EMULSION INTRAVENOUS at 17:01

## 2023-07-17 RX ADMIN — MONTELUKAST 10 MG: 10 TABLET, FILM COATED ORAL at 22:00

## 2023-07-17 RX ADMIN — PROPOFOL 40 MG: 10 INJECTION, EMULSION INTRAVENOUS at 17:11

## 2023-07-17 RX ADMIN — B-COMPLEX W/ C & FOLIC ACID TAB 1 TABLET: TAB at 08:58

## 2023-07-17 RX ADMIN — PROPOFOL 40 MG: 10 INJECTION, EMULSION INTRAVENOUS at 17:07

## 2023-07-17 RX ADMIN — PROPOFOL 20 MG: 10 INJECTION, EMULSION INTRAVENOUS at 17:04

## 2023-07-17 RX ADMIN — PROPOFOL 30 MG: 10 INJECTION, EMULSION INTRAVENOUS at 17:17

## 2023-07-17 RX ADMIN — FINASTERIDE 5 MG: 5 TABLET, FILM COATED ORAL at 08:58

## 2023-07-17 RX ADMIN — INSULIN LISPRO 1 UNITS: 100 INJECTION, SOLUTION INTRAVENOUS; SUBCUTANEOUS at 09:00

## 2023-07-17 RX ADMIN — TAMSULOSIN HYDROCHLORIDE 0.4 MG: 0.4 CAPSULE ORAL at 22:00

## 2023-07-17 RX ADMIN — PROPOFOL 20 MG: 10 INJECTION, EMULSION INTRAVENOUS at 17:21

## 2023-07-17 RX ADMIN — SODIUM CHLORIDE, SODIUM LACTATE, POTASSIUM CHLORIDE, AND CALCIUM CHLORIDE: .6; .31; .03; .02 INJECTION, SOLUTION INTRAVENOUS at 16:51

## 2023-07-17 RX ADMIN — INSULIN LISPRO 1 UNITS: 100 INJECTION, SOLUTION INTRAVENOUS; SUBCUTANEOUS at 12:14

## 2023-07-17 NOTE — ANESTHESIA PREPROCEDURE EVALUATION
Procedure:  COLONOSCOPY  EGD    Relevant Problems   CARDIO   (+) Hypertension goal BP (blood pressure) < 140/90   (+) Paroxysmal atrial fibrillation (HCC)      ENDO   (+) Type 2 diabetes mellitus with hyperglycemia, without long-term current use of insulin (HCC)      HEMATOLOGY   (+) Anemia      MUSCULOSKELETAL   (+) Left knee DJD   (+) Sciatica      PULMONARY   (+) Cough variant asthma   (+) Dyspnea   (+) Moderate obstructive sleep apnea   (+) Moderate persistent asthma without complication        Physical Exam    Airway    Mallampati score: III  TM Distance: >3 FB  Neck ROM: full     Dental   No notable dental hx     Cardiovascular  Cardiovascular exam normal    Pulmonary  Pulmonary exam normal     Other Findings        Anesthesia Plan  ASA Score- 3     Anesthesia Type- IV sedation with anesthesia with ASA Monitors. Additional Monitors:   Airway Plan:           Plan Factors-Exercise tolerance (METS): >4 METS. Chart reviewed. EKG reviewed. Existing labs reviewed. Patient summary reviewed. Patient is not a current smoker. Induction- intravenous. Postoperative Plan- Plan for postoperative opioid use. Informed Consent- Anesthetic plan and risks discussed with patient. I personally reviewed this patient with the CRNA. Discussed and agreed on the Anesthesia Plan with the CRNA. Yany Nayak

## 2023-07-17 NOTE — PLAN OF CARE
Problem: Potential for Falls  Goal: Patient will remain free of falls  Description: INTERVENTIONS:  - Educate patient/family on patient safety including physical limitations  - Instruct patient to call for assistance with activity   - Consult OT/PT to assist with strengthening/mobility   - Keep Call bell within reach  - Keep bed low and locked with side rails adjusted as appropriate  - Keep care items and personal belongings within reach  - Initiate and maintain comfort rounds  - Make Fall Risk Sign visible to staff  - Offer Toileting every  Hours, in advance of need  - Initiate/Maintain alarm  - Obtain necessary fall risk management equipment:   - Apply yellow socks and bracelet for high fall risk patients  - Consider moving patient to room near nurses station  Outcome: Progressing     Problem: PAIN - ADULT  Goal: Verbalizes/displays adequate comfort level or baseline comfort level  Description: Interventions:  - Encourage patient to monitor pain and request assistance  - Assess pain using appropriate pain scale  - Administer analgesics based on type and severity of pain and evaluate response  - Implement non-pharmacological measures as appropriate and evaluate response  - Consider cultural and social influences on pain and pain management  - Notify physician/advanced practitioner if interventions unsuccessful or patient reports new pain  Outcome: Progressing     Problem: INFECTION - ADULT  Goal: Absence or prevention of progression during hospitalization  Description: INTERVENTIONS:  - Assess and monitor for signs and symptoms of infection  - Monitor lab/diagnostic results  - Monitor all insertion sites, i.e. indwelling lines, tubes, and drains  - Monitor endotracheal if appropriate and nasal secretions for changes in amount and color  - [place] appropriate cooling/warming therapies per order  - Administer medications as ordered  - Instruct and encourage patient and family to use good hand hygiene technique  - Identify and instruct in appropriate isolation precautions for identified infection/condition  Outcome: Progressing     Problem: SAFETY ADULT  Goal: Patient will remain free of falls  Description: INTERVENTIONS:  - Educate patient/family on patient safety including physical limitations  - Instruct patient to call for assistance with activity   - Consult OT/PT to assist with strengthening/mobility   - Keep Call bell within reach  - Keep bed low and locked with side rails adjusted as appropriate  - Keep care items and personal belongings within reach  - Initiate and maintain comfort rounds  - Make Fall Risk Sign visible to staff  - Offer Toileting every  Hours, in advance of need  - Initiate/Maintain alarm  - Obtain necessary fall risk management equipment:   - Apply yellow socks and bracelet for high fall risk patients  - Consider moving patient to room near nurses station  Outcome: Progressing  Goal: Maintain or return to baseline ADL function  Description: INTERVENTIONS:  -  Assess patient's ability to carry out ADLs; assess patient's baseline for ADL function and identify physical deficits which impact ability to perform ADLs (bathing, care of mouth/teeth, toileting, grooming, dressing, etc.)  - Assess/evaluate cause of self-care deficits   - Assess range of motion  - Assess patient's mobility; develop plan if impaired  - Assess patient's need for assistive devices and provide as appropriate  - Encourage maximum independence but intervene and supervise when necessary  - Involve family in performance of ADLs  - Assess for home care needs following discharge   - Consider OT consult to assist with ADL evaluation and planning for discharge  - Provide patient education as appropriate  Outcome: Progressing  Goal: Maintains/Returns to pre admission functional level  Description: INTERVENTIONS:  - Perform BMAT or MOVE assessment daily.   - Set and communicate daily mobility goal to care team and patient/family/caregiver. - Collaborate with rehabilitation services on mobility goals if consulted  - Perform Range of Motion  times a day. - Reposition patient every hours.   - Dangle patient  times a day  - Stand patient times a day  - Ambulate patient  times a day  - Out of bed to chair times a day   - Out of bed for meals times a day  - Out of bed for toileting  - Record patient progress and toleration of activity level   Outcome: Progressing

## 2023-07-17 NOTE — ANESTHESIA POSTPROCEDURE EVALUATION
Post-Op Assessment Note    CV Status:  Stable    Pain management: adequate     Mental Status:  Sleepy   Hydration Status:  Euvolemic   PONV Controlled:  Controlled   Airway Patency:  Patent      Post Op Vitals Reviewed: Yes      Staff: Anesthesiologist, CRNA         No notable events documented.     BP      Temp     Pulse     Resp      SpO2

## 2023-07-18 VITALS
HEART RATE: 68 BPM | TEMPERATURE: 97.9 F | WEIGHT: 238 LBS | DIASTOLIC BLOOD PRESSURE: 71 MMHG | BODY MASS INDEX: 31.54 KG/M2 | HEIGHT: 73 IN | OXYGEN SATURATION: 92 % | SYSTOLIC BLOOD PRESSURE: 136 MMHG | RESPIRATION RATE: 18 BRPM

## 2023-07-18 PROBLEM — J18.9 PNEUMONIA: Status: ACTIVE | Noted: 2023-07-18

## 2023-07-18 LAB
ERYTHROCYTE [DISTWIDTH] IN BLOOD BY AUTOMATED COUNT: 17.3 % (ref 11.6–15.1)
GLUCOSE SERPL-MCNC: 220 MG/DL (ref 65–140)
GLUCOSE SERPL-MCNC: 253 MG/DL (ref 65–140)
HCT VFR BLD AUTO: 25.3 % (ref 36.5–49.3)
HGB BLD-MCNC: 7.4 G/DL (ref 12–17)
MCH RBC QN AUTO: 24.9 PG (ref 26.8–34.3)
MCHC RBC AUTO-ENTMCNC: 29.2 G/DL (ref 31.4–37.4)
MCV RBC AUTO: 85 FL (ref 82–98)
PLATELET # BLD AUTO: 239 THOUSANDS/UL (ref 149–390)
PMV BLD AUTO: 9.3 FL (ref 8.9–12.7)
RBC # BLD AUTO: 2.97 MILLION/UL (ref 3.88–5.62)
WBC # BLD AUTO: 8.97 THOUSAND/UL (ref 4.31–10.16)

## 2023-07-18 PROCEDURE — 82948 REAGENT STRIP/BLOOD GLUCOSE: CPT

## 2023-07-18 PROCEDURE — 85027 COMPLETE CBC AUTOMATED: CPT | Performed by: PHYSICIAN ASSISTANT

## 2023-07-18 PROCEDURE — 99232 SBSQ HOSP IP/OBS MODERATE 35: CPT | Performed by: PHYSICIAN ASSISTANT

## 2023-07-18 PROCEDURE — 99239 HOSP IP/OBS DSCHRG MGMT >30: CPT | Performed by: INTERNAL MEDICINE

## 2023-07-18 RX ORDER — LANOLIN ALCOHOL/MO/W.PET/CERES
6 CREAM (GRAM) TOPICAL
Status: DISCONTINUED | OUTPATIENT
Start: 2023-07-18 | End: 2023-07-18

## 2023-07-18 RX ORDER — LANOLIN ALCOHOL/MO/W.PET/CERES
6 CREAM (GRAM) TOPICAL
Status: DISCONTINUED | OUTPATIENT
Start: 2023-07-18 | End: 2023-07-18 | Stop reason: HOSPADM

## 2023-07-18 RX ORDER — ACETAMINOPHEN 325 MG/1
650 TABLET ORAL EVERY 6 HOURS PRN
Qty: 30 TABLET | Refills: 0 | Status: CANCELLED | OUTPATIENT
Start: 2023-07-18

## 2023-07-18 RX ORDER — CEFDINIR 300 MG/1
300 CAPSULE ORAL EVERY 12 HOURS SCHEDULED
Qty: 6 CAPSULE | Refills: 0 | Status: SHIPPED | OUTPATIENT
Start: 2023-07-18 | End: 2023-07-21

## 2023-07-18 RX ORDER — DOCUSATE SODIUM 100 MG/1
100 CAPSULE, LIQUID FILLED ORAL 2 TIMES DAILY PRN
Qty: 60 CAPSULE | Refills: 0 | Status: SHIPPED | OUTPATIENT
Start: 2023-07-18

## 2023-07-18 RX ORDER — FERROUS SULFATE TAB EC 324 MG (65 MG FE EQUIVALENT) 324 (65 FE) MG
324 TABLET DELAYED RESPONSE ORAL
Qty: 60 TABLET | Refills: 0 | Status: SHIPPED | OUTPATIENT
Start: 2023-07-18

## 2023-07-18 RX ADMIN — Medication 6 MG: at 03:29

## 2023-07-18 RX ADMIN — FLUTICASONE FUROATE AND VILANTEROL TRIFENATATE 1 PUFF: 200; 25 POWDER RESPIRATORY (INHALATION) at 08:32

## 2023-07-18 RX ADMIN — PANTOPRAZOLE SODIUM 40 MG: 40 TABLET, DELAYED RELEASE ORAL at 06:50

## 2023-07-18 RX ADMIN — B-COMPLEX W/ C & FOLIC ACID TAB 1 TABLET: TAB at 08:33

## 2023-07-18 RX ADMIN — SERTRALINE 100 MG: 100 TABLET, FILM COATED ORAL at 08:33

## 2023-07-18 RX ADMIN — INSULIN LISPRO 3 UNITS: 100 INJECTION, SOLUTION INTRAVENOUS; SUBCUTANEOUS at 12:23

## 2023-07-18 RX ADMIN — IRON SUCROSE 100 MG: 20 INJECTION, SOLUTION INTRAVENOUS at 12:23

## 2023-07-18 RX ADMIN — RIVAROXABAN 20 MG: 20 TABLET, FILM COATED ORAL at 12:54

## 2023-07-18 RX ADMIN — OXYCODONE HYDROCHLORIDE AND ACETAMINOPHEN 1000 MG: 500 TABLET ORAL at 08:32

## 2023-07-18 RX ADMIN — LISINOPRIL 20 MG: 20 TABLET ORAL at 08:33

## 2023-07-18 RX ADMIN — MULTIPLE VITAMINS W/ MINERALS TAB 1 TABLET: TAB ORAL at 08:32

## 2023-07-18 RX ADMIN — INSULIN LISPRO 2 UNITS: 100 INJECTION, SOLUTION INTRAVENOUS; SUBCUTANEOUS at 08:32

## 2023-07-18 RX ADMIN — METOPROLOL SUCCINATE 25 MG: 25 TABLET, EXTENDED RELEASE ORAL at 08:32

## 2023-07-18 RX ADMIN — FINASTERIDE 5 MG: 5 TABLET, FILM COATED ORAL at 08:33

## 2023-07-18 NOTE — DISCHARGE SUMMARY
[address] [facility]  Discharge- Haylee Price 1945, 76 y.o. male MRN: [id number]  Unit/Bed#: S -01 Encounter: [id number]  Primary Care Provider: Nicolle Bess MD   Date and time admitted to hospital: 7/14/2023 12:33 PM    Pneumonia  Assessment & Plan  Assessment:  · Patient presented tachycardic, tachypneic, and elevated lactate. History of recent bronchitis. · 7/14 CXR: No acute cardiopulmonary process. UA negative for bacteria. Pro-Norberto 0.38on admission. WBC 12.7  · Given IV fluids, vanc, and cefepime in ED  · Patient was treated with IV ceftriaxone for 4 days  · CT abdomen and pelvis showing Probable bilateral lung base pneumonia which may be on the basis of aspiration given the distribution. · CBC today in the normal level. · Patient symptomatically better    Plan:  · Continue oral  Cefdinir next 3 days for pneumonia  · Follow up with PCP in one week      Anemia  Assessment & Plan  Assessment:  · Patient presented with hemoglobin 7.0 on admission with last recorded hemoglobin 10.3 in 9/2022  · Stool guaiac "mildly positive" in ED  · GI consulted appreciated input currently on clear liquids. · Patient s/p 1 unit PRBC. Denies actively bleeding  · Iron panel showing low saturation, low iron and normal TIBC 400  · today's hemoglobin is 7.4  · EGD grossly unremarkable  • Colonoscopy revealed 2 small angioectasias in the ascending colon; no bleeding was identified; induced coagulation and hemostasis achieved with 3 applications of with argon plasma coagulation  • Small diverticula of mild severity in the transverse colon, descending colon, sigmoid colon and rectosigmoid  • Internal small hemorrhoids. Anal tags noted on retroflexion.   · One flat, adenomatous-appearing polyp measuring smaller than 5 mm in the rectum; completely removed en bloc by cold snare and retrieved specimen    Plan:  · CBC on 21 July 2023  · Out patient follow up with PCP  · Outpatient follow-up with GI      Type 2 diabetes mellitus with hyperglycemia, without long-term current use of insulin [facility])  Assessment & Plan  Lab Results   Component Value Date    HGBA1C 7.3 (A) 06/22/2022       Recent Labs     07/17/23  1551 07/17/23  2111 07/18/23  0721 07/18/23  1118   POCGLU 170* 210* 220* 253*       Blood Sugar Average: Last 72 hrs:  Continue Glipizide and Metformin      Paroxysmal atrial fibrillation (HCC)  Assessment & Plan  Continue Xarelto 20 mg daily  Follow up with cardiology. Medical Problems     Resolved Problems  Date Reviewed: 7/14/2023   None       Discharging Resident: Artie Baig MD  Discharging Attending: No att. providers found  PCP: Ramona Pina MD  Admission Date:   Admission Orders (From admission, onward)     Ordered        07/14/23 1553  INPATIENT ADMISSION  Once                      Discharge Date: 07/18/23    Consultations During Hospital Stay:  · Gastroenterology    Procedures Performed:   · Endoscopy  · Colonoscopy    Significant Findings / Test Results:   · Colonoscopy-  • 2 small angioectasias in the ascending colon; induced coagulation and hemostasis achieved with argon plasma coagulation  • Diverticulosis of mild severity in the transverse colon, descending colon, sigmoid colon and rectosigmoid  • Small hemorrhoids  • Subcentimeter flat, adenomatous-appearing polyp in the rectum was removed with cold snare  Endoscopy-    Regular Z-line 44 cm from the incisors  • The body of the stomach and antrum appeared normal. Performed random biopsy using biopsy forceps to rule out H. pylori. Retroflexed view was unremarkable, pylorus was patent. The duodenal bulb, 1st part of the duodenum and 2nd part of the duodenum appeared normal  Incidental findings:    Finding: Numerous similar-appearing small low-density renal lesions which are statistically most likely cysts.  They are somewhat difficult to confidently characterize              Follow up required: Consider surveillance imaging   Follow up with in 6-12 months    Test Results Pending at Discharge (will require follow up): · None     Outpatient Tests Requested:  · CBC    Complications:  None    Reason for Admission: Shortness of breath and fever    Hospital Course:   Abe Mendenhall is a 76 y.o. male with a PMH of A-fib on anticoagulation, hypertension, diabetes, BPH, sleep apnea, asthma, who presents with 1 week history of shortness of breath. He also reported he had a fever of 101 F, 2 days back with shaking chills. Patient also had diarrhea initially during admission. In the  ED patient was noted to be tachycardic .,tachypneic and  had elevated lactate, findings suspicious of sepsis. Also procalcitonin was noted to be elevated. CT abdomen pelvis showed probable bilateral lung base pneumonia which may be on the basis of aspiration given the distribution. Patient was given IV fluids and was started on  on IV ceftriaxone and continued for the next 4 days until 18 July 2023. On discharge, the IV of antibiotics was changed to oral cefdinir for the next 3 days. .  Patient also was noted to have low hemoglobin levels at 7 on admission which weight later declined to 6.8 after which 1 unit of blood was transfused. Since then patient's hemoglobin levels have been in the 7.4-7.6 range. Patient was noted to have iron deficiency based on the lab results. Colonoscopy and endoscopy was done to rule out any GI pathology is leading to anemia. Patient was given IV iron once and discharged with oral iron supplementation. Patient  stable at discharge. Please see above list of diagnoses and related plan for additional information. Condition at Discharge: stable    Discharge Day Visit / Exam:   Subjective: Patient reported that he feels fine today. No shortness of breath, chest pain, nausea, vomiting.      Vitals: Blood Pressure: 136/71 (07/18/23 0722)  Pulse: 68 (07/18/23 0722)  Temperature: 97.9 °F (36.6 °C) (07/18/23 9124)  Temp Source: Oral (07/18/23 0722)  Respirations: 18 (07/18/23 0722)  Height: 6' 1" (185.4 cm) (07/14/23 1743)  Weight - Scale: 108 kg (238 lb) (07/14/23 1743)  SpO2: 92 % (07/18/23 0722)  Exam:   Physical Exam  Vitals and nursing note reviewed. Constitutional:       General: He is not in acute distress. Appearance: Normal appearance. He is well-developed. HENT:      Head: Normocephalic and atraumatic. Nose: No congestion or rhinorrhea. Eyes:      General:         Right eye: No discharge. Conjunctiva/sclera: Conjunctivae normal.      Pupils: Pupils are equal, round, and reactive to light. Cardiovascular:      Rate and Rhythm: Normal rate and regular rhythm. Pulses: Normal pulses. Heart sounds: No murmur heard. Pulmonary:      Effort: Pulmonary effort is normal. No respiratory distress. Breath sounds: Normal breath sounds. Abdominal:      Palpations: Abdomen is soft. Tenderness: There is no abdominal tenderness. Hernia: No hernia is present. Musculoskeletal:         General: No swelling or tenderness. Cervical back: Neck supple. Right lower leg: No edema. Left lower leg: No edema. Skin:     General: Skin is warm and dry. Capillary Refill: Capillary refill takes less than 2 seconds. Neurological:      General: No focal deficit present. Mental Status: He is alert and oriented to person, place, and time. Motor: No weakness. Psychiatric:         Mood and Affect: Mood normal.          Discussion with Family: Updated  (wife) at bedside. Discharge instructions/Information to patient and family:   See after visit summary for information provided to patient and family. Provisions for Follow-Up Care:  See after visit summary for information related to follow-up care and any pertinent home health orders.        Disposition:   Home    Planned Readmission: No    Discharge Medications:  See after visit summary for reconciled discharge medications provided to patient and/or family.       **Please Note: This note may have been constructed using a voice recognition system**

## 2023-07-18 NOTE — ASSESSMENT & PLAN NOTE
Lab Results   Component Value Date    HGBA1C 7.3 (A) 06/22/2022       Recent Labs     07/17/23  1551 07/17/23  2111 07/18/23  0721 07/18/23  1118   POCGLU 170* 210* 220* 253*       Blood Sugar Average: Last 72 hrs:  Continue Glipizide and Metformin

## 2023-07-18 NOTE — PROGRESS NOTES
[address] [facility]  Progress Note  Name: Marily Meier  MRN: [id number]  Unit/Bed#: S -01 I Date of Admission: 7/14/2023   Date of Service: 7/17/2023 I Hospital Day: 3    Assessment/Plan   Anemia  Assessment & Plan  Assessment:  · Patient presented with hemoglobin 7.0 on admission with last recorded hemoglobin 10.3 in 9/2022  · Stool guaiac "mildly positive" in ED  · GI consulted appreciated input currently on clear liquids. · Patient s/p 1 unit PRBC. Denies actively bleeding  · Iron panel showing low saturation, low iron and normal TIBC 400  · today's hemoglobin is 7.2  · EGD grossly unremarkable  • Colonoscopy revealed 2 small angioectasias in the ascending colon; no bleeding was identified; induced coagulation and hemostasis achieved with 3 applications of with argon plasma coagulation  • Small diverticula of mild severity in the transverse colon, descending colon, sigmoid colon and rectosigmoid  • Internal small hemorrhoids. Anal tags noted on retroflexion. · One flat, adenomatous-appearing polyp measuring smaller than 5 mm in the rectum; completely removed en bloc by cold snare and retrieved specimen    Plan:  · Transfuse if hemoglobin less than 7.    · CBC every 24 hours  · Observe the patient, follow-up on hemoglobin levels. · Outpatient follow-up with GI      Type 2 diabetes mellitus with hyperglycemia, without long-term current use of insulin [facility])  Assessment & Plan  Lab Results   Component Value Date    HGBA1C 7.3 (A) 06/22/2022       Recent Labs     07/16/23  2023 07/17/23  0741 07/17/23  1117 07/17/23  1551   POCGLU 169* 158* 183* 170*       Blood Sugar Average: Last 72 hrs:  · Was on Glipizide and Metformin at home  Plan  Sliding scale insulin    Paroxysmal atrial fibrillation (HCC)  Assessment & Plan  Currently on Xarelto 20 mg daily  Following up with cardiology. Stable, no changes today.      * Sepsis [facility])  Assessment & Plan  Assessment:  · Patient presented tachycardic, tachypneic, and elevated lactate. History of recent bronchitis. ·  CXR: No acute cardiopulmonary process. UA negative for bacteria. Pro-Norberto 0.38on admission. WBC 12.7  · Given IV fluids, vanc, and cefepime in ED  · Procalcitonin slight elevated 0.3  · CT abdomen and pelvis showing Probable bilateral lung base pneumonia which may be on the basis of aspiration given the distribution. · CBC today revealed BC count of 9000. · Patient symptomatically better    Plan:  · Monitor a.m. labs  · Continue IV ceftriaxone for pneumonia             VTE Pharmacologic Prophylaxis: VTE Score: 7 High Risk (Score >/= 5) - Pharmacological DVT Prophylaxis Contraindicated. Sequential Compression Devices Ordered. Patient Centered Rounds: I performed bedside rounds with nursing staff today. Discussions with Specialists or Other Care Team Provider: Nursing, GI, case management    Education and Discussions with Family / Patient: Updated  (wife) at bedside. Current Length of Stay: 3 day(s)  Current Patient Status: Inpatient   Discharge Plan: Anticipate discharge in 24-48 hrs to home. Code Status: Level 1 - Full Code    Subjective:   Patient reports feeling well. Denies any headaches, chest pain, shortness of breath, weakness, bladder or bowel changes. Objective:     Vitals:   Temp (24hrs), Av.2 °F (36.2 °C), Min:96.7 °F (35.9 °C), Max:98.7 °F (37.1 °C)    Temp:  [96.7 °F (35.9 °C)-98.7 °F (37.1 °C)] 96.7 °F (35.9 °C)  HR:  [57-90] 64  Resp:  [16-25] 25  BP: (113-159)/(55-72) 134/63  SpO2:  [93 %-100 %] 95 %  Body mass index is 31.4 kg/m². Input and Output Summary (last 24 hours): Intake/Output Summary (Last 24 hours) at 2023  Last data filed at 2023 1724  Gross per 24 hour   Intake 650 ml   Output --   Net 650 ml       Physical Exam:   Physical Exam  Vitals and nursing note reviewed. Constitutional:       General: He is not in acute distress.      Appearance: Normal appearance. He is well-developed. HENT:      Head: Normocephalic and atraumatic. Nose: No congestion or rhinorrhea. Eyes:      General:         Right eye: No discharge. Conjunctiva/sclera: Conjunctivae normal.      Pupils: Pupils are equal, round, and reactive to light. Cardiovascular:      Rate and Rhythm: Normal rate and regular rhythm. Pulses: Normal pulses. Heart sounds: No murmur heard. Pulmonary:      Effort: Pulmonary effort is normal. No respiratory distress. Breath sounds: Normal breath sounds. Abdominal:      Palpations: Abdomen is soft. Tenderness: There is no abdominal tenderness. Hernia: No hernia is present. Musculoskeletal:         General: No swelling or tenderness. Cervical back: Neck supple. Right lower leg: No edema. Left lower leg: No edema. Skin:     General: Skin is warm and dry. Capillary Refill: Capillary refill takes less than 2 seconds. Neurological:      General: No focal deficit present. Mental Status: He is alert and oriented to person, place, and time. Motor: No weakness. Psychiatric:         Mood and Affect: Mood normal.          Additional Data:     Labs:  Results from last 7 days   Lab Units 07/17/23  0521 07/15/23  1422 07/15/23  0618   WBC Thousand/uL 9.06  --  10.30*   HEMOGLOBIN g/dL 7.2*   < > 7.3*  7.4*   HEMATOCRIT % 24.6*   < > 24.6*  24.5*   PLATELETS Thousands/uL 247  --  232   NEUTROS PCT %  --   --  79*   LYMPHS PCT %  --   --  11*   MONOS PCT %  --   --  8   EOS PCT %  --   --  1    < > = values in this interval not displayed.      Results from last 7 days   Lab Units 07/17/23  0521 07/15/23  0618 07/14/23  1254   SODIUM mmol/L 140   < > 137   POTASSIUM mmol/L 3.5   < > 4.1   CHLORIDE mmol/L 107   < > 105   CO2 mmol/L 24   < > 21   BUN mg/dL 6   < > 17   CREATININE mg/dL 0.86   < > 0.99   ANION GAP mmol/L 9   < > 11   CALCIUM mg/dL 8.2*   < > 8.4   ALBUMIN g/dL  --   --  3.6   TOTAL BILIRUBIN mg/dL  --   --  0.53   ALK PHOS U/L  --   --  48   ALT U/L  --   --  9   AST U/L  --   --  13   GLUCOSE RANDOM mg/dL 143*   < > 280*    < > = values in this interval not displayed. Results from last 7 days   Lab Units 07/14/23  1254   INR  2.04*     Results from last 7 days   Lab Units 07/17/23  1551 07/17/23  1117 07/17/23  0741 07/16/23  2023 07/16/23  1605 07/16/23  1114   POC GLUCOSE mg/dl 170* 183* 158* 169* 159* 222*         Results from last 7 days   Lab Units 07/15/23  0618 07/14/23  2247 07/14/23  1929 07/14/23  1757 07/14/23  1502 07/14/23  1254   LACTIC ACID mmol/L  --  1.9 2.7* 2.8* 3.2* 2.8*   PROCALCITONIN ng/ml 0.31*  --   --   --   --  0.38*       Lines/Drains:  Invasive Devices     Peripheral Intravenous Line  Duration           Peripheral IV 07/14/23 Left;Proximal;Ventral (anterior) Forearm 3 days    Peripheral IV 07/14/23 Proximal;Right;Ventral (anterior) Forearm 3 days                      Imaging: No pertinent imaging reviewed. Recent Cultures (last 7 days):   Results from last 7 days   Lab Units 07/14/23  1254   BLOOD CULTURE  No Growth at 72 hrs. No Growth at 72 hrs.        Last 24 Hours Medication List:   Current Facility-Administered Medications   Medication Dose Route Frequency Provider Last Rate   • acetaminophen  650 mg Oral Q6H PRN Kallie Montoya MD     • albuterol  2 puff Inhalation Q6H PRN Kallie Montoya MD     • albuterol  2.5 mg Nebulization Q6H PRN Kallie Montoya MD     • vitamin C  1,000 mg Oral Daily Kallie Montoya MD     • cefTRIAXone  2,000 mg Intravenous Q24H Kallie Montoya MD Stopped (07/16/23 2221)   • finasteride  5 mg Oral Daily Kallie Montoya MD     • fluticasone-vilanterol  1 puff Inhalation Daily Kallie Montoya MD     • insulin lispro  1-6 Units Subcutaneous 4x Daily (AC & HS) Coty Raman MD     • lisinopril  20 mg Oral Daily Kallie Montoya MD     • metoprolol succinate  25 mg Oral QAM Kallie Montoya MD     • montelukast  10 mg Oral HS Carmen Zelaya Romaine Pak MD     • multivitamin stress formula  1 tablet Oral Daily Marlo Will MD     • multivitamin-minerals  1 tablet Oral Daily Marlo Will MD     • pantoprazole  40 mg Oral BID AC Stephanie Gutierrez MD     • polyethylene glycol  17 g Oral Daily PRN Marlo Will MD     • sertraline  100 mg Oral Daily Marlo Will MD     • tamsulosin  0.4 mg Oral HS Marlo Will MD     • trimethobenzamide  200 mg Intramuscular Q6H PRN Marlo Will MD          Today, Patient Was Seen By: Augie Hammond MD    **Please Note: This note may have been constructed using a voice recognition system. **

## 2023-07-18 NOTE — ASSESSMENT & PLAN NOTE
Assessment:  · Patient presented tachycardic, tachypneic, and elevated lactate. History of recent bronchitis. · 7/14 CXR: No acute cardiopulmonary process. UA negative for bacteria. Pro-Norberto 0.38on admission. WBC 12.7  · Given IV fluids, vanc, and cefepime in ED  · Procalcitonin slight elevated 0.3  · CT abdomen and pelvis showing Probable bilateral lung base pneumonia which may be on the basis of aspiration given the distribution. · CBC today revealed BC count of 9000.   · Patient symptomatically better    Plan:  · Monitor a.m. labs  · Continue IV ceftriaxone for pneumonia

## 2023-07-18 NOTE — ASSESSMENT & PLAN NOTE
Assessment:  · Patient presented tachycardic, tachypneic, and elevated lactate. History of recent bronchitis. · 7/14 CXR: No acute cardiopulmonary process. UA negative for bacteria. Pro-Norberto 0.38on admission. WBC 12.7  · Given IV fluids, vanc, and cefepime in ED  · Patient was treated with IV ceftriaxone for 4 days  · CT abdomen and pelvis showing Probable bilateral lung base pneumonia which may be on the basis of aspiration given the distribution. · CBC today in the normal level.   · Patient symptomatically better    Plan:  · Continue oral  Cefdinir next 3 days for pneumonia  · Follow up with PCP in one week

## 2023-07-18 NOTE — PROGRESS NOTES
Progress Note - Jun Santo 76 y.o. male MRN: [id number]    Unit/Bed#: S -01 Encounter: [id number]    Assessment and Plan:   Principal Problem:    Sepsis ([address])  Active Problems:    Paroxysmal atrial fibrillation ([address])    Type 2 diabetes mellitus with hyperglycemia, without long-term current use of insulin (HCC)    Anemia    #1. Microcytic anemia: presumed to be from slow GI blood loss from AVMs, s/p EGD/colon yesterday with a few AVMs nonbleeding cauterized in GI tract. Hgb stable  -can resume Xarelto  -consider outpatient capsule study  -hgb as outpatient on Friday  -diet as tolerated  -follow up as outpatient  -discussed with SLIM, can be d/c from GI standpoint     ----------------------------------------------------------------------------------------------------------------    Subjective:     No pain, didn't sleep well. Objective:     Vitals: Blood pressure 136/71, pulse 68, temperature 97.9 °F (36.6 °C), temperature source Oral, resp. rate 18, height 6' 1" (1.854 m), weight 108 kg (238 lb), SpO2 92 %. ,Body mass index is 31.4 kg/m².       Intake/Output Summary (Last 24 hours) at 7/18/2023 0842  Last data filed at 7/17/2023 2112  Gross per 24 hour   Intake 720 ml   Output --   Net 720 ml       Physical Exam:     General Appearance: Alert, appears stated age and cooperative  Lungs:  Rhonchi in the RLL, no rales, no labored breathing/accessory muscle use  Heart: Regular rate and rhythm, S1, S2 normal, no murmur, click, rub or gallop  Abdomen: Soft, non-tender, obese abdomen, non-distended; bowel sounds normal; no masses or no organomegaly  Extremities: No cyanosis, clubbing, or edema    Invasive Devices     Peripheral Intravenous Line  Duration           Peripheral IV 07/14/23 Left;Proximal;Ventral (anterior) Forearm 3 days    Peripheral IV 07/14/23 Proximal;Right;Ventral (anterior) Forearm 3 days                Lab Results:  Results from last 7 days   Lab Units 07/18/23  0849 07/15/23  1427 07/15/23  0618   WBC Thousand/uL 8.97   < > 10.30*   HEMOGLOBIN g/dL 7.4*   < > 7.3*  7.4*   HEMATOCRIT % 25.3*   < > 24.6*  24.5*   PLATELETS Thousands/uL 239   < > 232   NEUTROS PCT %  --   --  79*   LYMPHS PCT %  --   --  11*   MONOS PCT %  --   --  8   EOS PCT %  --   --  1    < > = values in this interval not displayed. Results from last 7 days   Lab Units 07/17/23  0521 07/15/23  0618 07/14/23  1254   POTASSIUM mmol/L 3.5   < > 4.1   CHLORIDE mmol/L 107   < > 105   CO2 mmol/L 24   < > 21   BUN mg/dL 6   < > 17   CREATININE mg/dL 0.86   < > 0.99   CALCIUM mg/dL 8.2*   < > 8.4   ALK PHOS U/L  --   --  48   ALT U/L  --   --  9   AST U/L  --   --  13    < > = values in this interval not displayed. Invalid input(s): "BILI"  Results from last 7 days   Lab Units 07/14/23  1254   INR  2.04*           Imaging Studies: I have personally reviewed pertinent imaging studies. Colonoscopy    Result Date: 7/17/2023  Impression: 2 small angioectasias in the ascending colon; induced coagulation and hemostasis achieved with argon plasma coagulation Diverticulosis of mild severity in the transverse colon, descending colon, sigmoid colon and rectosigmoid Small hemorrhoids Subcentimeter flat, adenomatous-appearing polyp in the rectum was removed with cold snare RECOMMENDATION:  No further screening colonoscopies necessary  Age greater than 65  Follow biopsy results in 2 to 3 weeks. Monitor H&H, if hemoglobin continues to drop, consider outpatient VCE. May resume anticoagulation tomorrow if hemoglobin remains stable. May start on regular diet. Justyna Hampton MD     EGD    Addendum Date: 7/17/2023    [address] [place] [address] Endoscopy [address] [place] [address] [phone] DATE OF SERVICE: 7/17/23 PHYSICIAN(S): Attending: Justyna Hampton MD Fellow: No Staff Documented INDICATION: Iron deficiency anemia, unspecified iron deficiency anemia type POST-OP DIAGNOSIS: See the impression below. PREPROCEDURE: Informed consent was obtained for the procedure, including sedation. Risks of perforation, hemorrhage, adverse drug reaction and aspiration were discussed. The patient was placed in the left lateral decubitus position. Patient was explained about the risks and benefits of the procedure. Risks including but not limited to bleeding, infection, and perforation were explained in detail. Also explained about less than 100% sensitivity with the exam and other alternatives. PROCEDURE: EGD DETAILS OF PROCEDURE: Patient was taken to the procedure room where a time out was performed to confirm correct patient and correct procedure. The patient underwent monitored anesthesia care, which was administered by an anesthesia professional. The patient's blood pressure, heart rate, level of consciousness, respirations and oxygen were monitored throughout the procedure. The scope was advanced to the second part of the duodenum. Retroflexion was performed in the fundus. The patient experienced no blood loss. The procedure was not difficult. The patient tolerated the procedure well. There were no apparent adverse events. ANESTHESIA INFORMATION: ASA: III Anesthesia Type: Anesthesia type not filed in the log. MEDICATIONS: Totals unavailable because the procedure time range is not set FINDINGS: Regular Z-line 44 cm from the incisors The body of the stomach and antrum appeared normal. Performed random biopsy using biopsy forceps to rule out H. pylori. Retroflexed view was unremarkable, pylorus was patent. The duodenal bulb, 1st part of the duodenum and 2nd part of the duodenum appeared normal. SPECIMENS: ID Type Source Tests Collected by Time Destination 1 : r/o h.pylori Tissue Stomach TISSUE EXAM Emely Kraus MD 7/17/2023  4:57 PM  IMPRESSION: The body of the stomach and antrum appeared normal. Performed random biopsy to rule out H. pylori.  The duodenal bulb, 1st part of the duodenum and 2nd part of the duodenum appeared normal. RECOMMENDATION:  There is no recommended follow-up for this procedure. Follow biopsy results in 2 to 3 weeks. Avoid NSAIDs. We will proceed with colonoscopy to assess for etiology of anemia. Kelsey Bundy MD     Result Date: 7/17/2023  Impression: Irregular Z-line; performed cold forceps biopsy Moderate, patchy erythematous mucosa with erosion in the duodenal bulb and 1st part of the duodenum; performed cold forceps biopsy to rule out celiac disease Mild, generalized nodular mucosa in the body of the stomach; performed cold forceps biopsy RECOMMENDATION:  There is no recommended follow-up for this procedure. Follow biopsy results in 2 to 3 weeks. Avoid NSAIDs. Recommend daily PPI. We will proceed with colonoscopy to assess for etiology of anemia. No Staff Documented     CT abdomen pelvis w contrast    Result Date: 7/15/2023  Impression: Probable bilateral lung base pneumonia which may be on the basis of aspiration given the distribution. Correlate with clinical scenario and lab work and symptomatology. No malignant lesions are seen. Mild splenomegaly. Mild colonic diverticulosis without diverticulitis. Numerous similar-appearing small low-density renal lesions which are statistically most likely cysts. They are somewhat difficult to confidently characterize. Consider surveillance imaging in 6 to 12 months. The study was marked in [facility] for immediate notification. Workstation performed: [id number]     XR chest 1 view portable    Result Date: 7/14/2023  Impression: No acute cardiopulmonary disease.  Workstation performed: [id number]

## 2023-07-18 NOTE — PLAN OF CARE
Problem: Potential for Falls  Goal: Patient will remain free of falls  Description: INTERVENTIONS:  - Educate patient/family on patient safety including physical limitations  - Instruct patient to call for assistance with activity   - Consult OT/PT to assist with strengthening/mobility   - Keep Call bell within reach  - Keep bed low and locked with side rails adjusted as appropriate  - Keep care items and personal belongings within reach  - Initiate and maintain comfort rounds  - Make Fall Risk Sign visible to staff  - Offer Toileting every 2 Hours, in advance of need  - Initiate/Maintain bed/ chair alarm  - Obtain necessary fall risk management equipment  - Apply yellow socks and bracelet for high fall risk patients  - Consider moving patient to room near nurses station  Outcome: Progressing     Problem: PAIN - ADULT  Goal: Verbalizes/displays adequate comfort level or baseline comfort level  Description: Interventions:  - Encourage patient to monitor pain and request assistance  - Assess pain using appropriate pain scale  - Administer analgesics based on type and severity of pain and evaluate response  - Implement non-pharmacological measures as appropriate and evaluate response  - Consider cultural and social influences on pain and pain management  - Notify physician/advanced practitioner if interventions unsuccessful or patient reports new pain  Outcome: Progressing     Problem: INFECTION - ADULT  Goal: Absence or prevention of progression during hospitalization  Description: INTERVENTIONS:  - Assess and monitor for signs and symptoms of infection  - Monitor lab/diagnostic results  - Monitor all insertion sites, i.e. indwelling lines, tubes, and drains  - Monitor endotracheal if appropriate and nasal secretions for changes in amount and color  - [place] appropriate cooling/warming therapies per order  - Administer medications as ordered  - Instruct and encourage patient and family to use good hand hygiene technique  - Identify and instruct in appropriate isolation precautions for identified infection/condition  Outcome: Progressing     Problem: SAFETY ADULT  Goal: Patient will remain free of falls  Description: INTERVENTIONS:  - Educate patient/family on patient safety including physical limitations  - Instruct patient to call for assistance with activity   - Consult OT/PT to assist with strengthening/mobility   - Keep Call bell within reach  - Keep bed low and locked with side rails adjusted as appropriate  - Keep care items and personal belongings within reach  - Initiate and maintain comfort rounds  - Make Fall Risk Sign visible to staff  - Offer Toileting every 2 Hours, in advance of need  - Initiate/Maintain bed/chair alarm  - Obtain necessary fall risk management equipment  - Apply yellow socks and bracelet for high fall risk patients  - Consider moving patient to room near nurses station  Outcome: Progressing  Goal: Maintain or return to baseline ADL function  Description: INTERVENTIONS:  -  Assess patient's ability to carry out ADLs; assess patient's baseline for ADL function and identify physical deficits which impact ability to perform ADLs (bathing, care of mouth/teeth, toileting, grooming, dressing, etc.)  - Assess/evaluate cause of self-care deficits   - Assess range of motion  - Assess patient's mobility; develop plan if impaired  - Assess patient's need for assistive devices and provide as appropriate  - Encourage maximum independence but intervene and supervise when necessary  - Involve family in performance of ADLs  - Assess for home care needs following discharge   - Consider OT consult to assist with ADL evaluation and planning for discharge  - Provide patient education as appropriate  Outcome: Progressing  Goal: Maintains/Returns to pre admission functional level  Description: INTERVENTIONS:  - Perform BMAT or MOVE assessment daily.   - Set and communicate daily mobility goal to care team and patient/family/caregiver. - Collaborate with rehabilitation services on mobility goals if consulted  - Perform Range of Motion  - Reposition patient   - Dangle patient  - Stand patient  - Ambulate patient   - Out of bed to chair    - Out of bed for meals  - Out of bed for toileting  - Record patient progress and toleration of activity level   Outcome: Progressing     Problem: DISCHARGE PLANNING  Goal: Discharge to home or other facility with appropriate resources  Description: INTERVENTIONS:  - Identify barriers to discharge w/patient and caregiver  - Arrange for needed discharge resources and transportation as appropriate  - Identify discharge learning needs (meds, wound care, etc.)  - Arrange for interpretive services to assist at discharge as needed  - Refer to Case Management Department for coordinating discharge planning if the patient needs post-hospital services based on physician/advanced practitioner order or complex needs related to functional status, cognitive ability, or social support system  Outcome: Progressing     Problem: Knowledge Deficit  Goal: Patient/family/caregiver demonstrates understanding of disease process, treatment plan, medications, and discharge instructions  Description: Complete learning assessment and assess knowledge base.   Interventions:  - Provide teaching at level of understanding  - Provide teaching via preferred learning methods  Outcome: Progressing     Problem: RESPIRATORY - ADULT  Goal: Achieves optimal ventilation and oxygenation  Description: INTERVENTIONS:  - Assess for changes in respiratory status  - Assess for changes in mentation and behavior  - Position to facilitate oxygenation and minimize respiratory effort  - Oxygen administered by appropriate delivery if ordered  - Initiate smoking cessation education as indicated  - Encourage broncho-pulmonary hygiene including cough, deep breathe, Incentive Spirometry  - Assess the need for suctioning and aspirate as needed  - Assess and instruct to report SOB or any respiratory difficulty  - Respiratory Therapy support as indicated  Outcome: Progressing

## 2023-07-18 NOTE — DISCHARGE INSTR - AVS FIRST PAGE
Dear Jen Pulido,     It was our pleasure to care for you here at [facility]. It is our hope that we were always able to exceed the expected standards for your care during your stay. You were hospitalized due to shortness of breath. You were cared for on the 3rd floor by Vani Haskins MD under the service of Jo Tolbert MD with the [facility] who covers for your primary care physician (PCP), Aubrey Heller MD, while you were hospitalized. If you have any questions or concerns related to this hospitalization, you may contact us at [phone]. For follow up as well as any medication refills, we recommend that you follow up with your primary care physician. A registered nurse will reach out to you by phone within a few days after your discharge to answer any additional questions that you may have after going home. However, at this time we provide for you here, the most important instructions / recommendations at discharge:     Notable Medication Adjustments -  Start taking Ferrous sulfate 324 mg  one tablet twice a day before meals  Start taking Colace 1 capsule twice a day as needed for constipation  Start taking Cefdinir 300 mg twice a day fro 3 days (from 19 July 2023 to 21 July 2023)  Testing Required after Discharge -   CBC on Friday 21 July 2023 - results to be followed up with gastroenterology  Important follow up information -   Primary care provider-please set up an appointment with your primary care provider within one week of discharge  Gastroenterology  Other Instructions -   CBC on 21 July 2023   Please review this entire after visit summary as additional general instructions including medication list, appointments, activity, diet, any pertinent wound care, and other additional recommendations from your care team that may be provided for you.       Sincerely,     Vani Haskins MD

## 2023-07-18 NOTE — ASSESSMENT & PLAN NOTE
Assessment:  · Patient presented with hemoglobin 7.0 on admission with last recorded hemoglobin 10.3 in 9/2022  · Stool guaiac "mildly positive" in ED  · GI consulted appreciated input currently on clear liquids. · Patient s/p 1 unit PRBC. Denies actively bleeding  · Iron panel showing low saturation, low iron and normal TIBC 400  · today's hemoglobin is 7.4  · EGD grossly unremarkable  • Colonoscopy revealed 2 small angioectasias in the ascending colon; no bleeding was identified; induced coagulation and hemostasis achieved with 3 applications of with argon plasma coagulation  • Small diverticula of mild severity in the transverse colon, descending colon, sigmoid colon and rectosigmoid  • Internal small hemorrhoids. Anal tags noted on retroflexion.   · One flat, adenomatous-appearing polyp measuring smaller than 5 mm in the rectum; completely removed en bloc by cold snare and retrieved specimen    Plan:  · CBC on 21 July 2023  · Out patient follow up with PCP  · Outpatient follow-up with GI

## 2023-07-18 NOTE — ASSESSMENT & PLAN NOTE
Lab Results   Component Value Date    HGBA1C 7.3 (A) 06/22/2022       Recent Labs     07/16/23 2023 07/17/23  0741 07/17/23  1117 07/17/23  1551   POCGLU 169* 158* 183* 170*       Blood Sugar Average: Last 72 hrs:  · Was on Glipizide and Metformin at home  Plan  Sliding scale insulin

## 2023-07-19 ENCOUNTER — TELEPHONE (OUTPATIENT)
Dept: FAMILY MEDICINE CLINIC | Facility: CLINIC | Age: 78
End: 2023-07-19

## 2023-07-19 ENCOUNTER — TRANSITIONAL CARE MANAGEMENT (OUTPATIENT)
Dept: FAMILY MEDICINE CLINIC | Facility: CLINIC | Age: 78
End: 2023-07-19

## 2023-07-19 ENCOUNTER — TELEPHONE (OUTPATIENT)
Age: 78
End: 2023-07-19

## 2023-07-19 DIAGNOSIS — R60.0 BILATERAL LOWER EXTREMITY EDEMA: Primary | ICD-10-CM

## 2023-07-19 LAB
BACTERIA BLD CULT: NORMAL
BACTERIA BLD CULT: NORMAL

## 2023-07-19 NOTE — TELEPHONE ENCOUNTER
Marielena Madrid was in [facility] on 7/14 discharged 7/18    He is scheduled for [facility] on 7/24 with AG.

## 2023-07-19 NOTE — TELEPHONE ENCOUNTER
Patients GI provider:  Dr. Ji Dunaway    Number to return call: (  [phone]    Reason for call: Pt was told to schedule a follow up post his procedure within a week.  I have placed on waitlist but pt's wife would like a call with something sooner    Scheduled procedure/appointment date if applicable:  N/A

## 2023-07-20 ENCOUNTER — TELEPHONE (OUTPATIENT)
Dept: FAMILY MEDICINE CLINIC | Facility: CLINIC | Age: 78
End: 2023-07-20

## 2023-07-20 RX ORDER — FUROSEMIDE 20 MG/1
20 TABLET ORAL DAILY
Qty: 30 TABLET | Refills: 0 | Status: SHIPPED | OUTPATIENT
Start: 2023-07-20

## 2023-07-20 NOTE — PROGRESS NOTES
No concerns for iron and antibiotic use at the same. Try to avoid in the future. I can call in a diuretic and have them call tomorrow morning with update or they can try to come into the office for further evaluation of new onset LE swelling. If he is having any shortness of breath with new symptoms of swelling, this needs to be evaluated in the office.

## 2023-07-20 NOTE — TELEPHONE ENCOUNTER
Spoke to RENETTA and she stated that she was returning [employer]. I spoke to Dalton. Charanjit Heena needs to see a Gastroenterologist. .  They have an appt for September. Pratik Levine stated he needs to be seen now. Dalton spoke to Fabian Uribe and she reached out to [place] and they are suppose to be calling [employer] that they will probably call naveed's number so make sure she keeps his phone by her.

## 2023-07-20 NOTE — PROGRESS NOTES
Chico Harry is aware and understands. Please send the diuretic to Reggie in [place]. She will give it to him if the edma continues or worsens and will call with an update tomorrow.

## 2023-07-21 ENCOUNTER — APPOINTMENT (OUTPATIENT)
Dept: LAB | Facility: CLINIC | Age: 78
End: 2023-07-21
Payer: MEDICARE

## 2023-07-21 ENCOUNTER — TELEPHONE (OUTPATIENT)
Dept: GASTROENTEROLOGY | Facility: CLINIC | Age: 78
End: 2023-07-21

## 2023-07-21 DIAGNOSIS — J45.31 MILD PERSISTENT ASTHMA WITH ACUTE EXACERBATION: ICD-10-CM

## 2023-07-21 DIAGNOSIS — D50.9 IRON DEFICIENCY ANEMIA, UNSPECIFIED IRON DEFICIENCY ANEMIA TYPE: ICD-10-CM

## 2023-07-21 DIAGNOSIS — K92.2 GI BLEED: ICD-10-CM

## 2023-07-21 LAB
ERYTHROCYTE [DISTWIDTH] IN BLOOD BY AUTOMATED COUNT: 17.9 % (ref 11.6–15.1)
HCT VFR BLD AUTO: 26.8 % (ref 36.5–49.3)
HGB BLD-MCNC: 7.8 G/DL (ref 12–17)
MCH RBC QN AUTO: 24.8 PG (ref 26.8–34.3)
MCHC RBC AUTO-ENTMCNC: 29.1 G/DL (ref 31.4–37.4)
MCV RBC AUTO: 85 FL (ref 82–98)
PLATELET # BLD AUTO: 333 THOUSANDS/UL (ref 149–390)
PMV BLD AUTO: 10.4 FL (ref 8.9–12.7)
RBC # BLD AUTO: 3.14 MILLION/UL (ref 3.88–5.62)
WBC # BLD AUTO: 8.52 THOUSAND/UL (ref 4.31–10.16)

## 2023-07-21 PROCEDURE — 86003 ALLG SPEC IGE CRUDE XTRC EA: CPT

## 2023-07-21 PROCEDURE — 36415 COLL VENOUS BLD VENIPUNCTURE: CPT

## 2023-07-21 PROCEDURE — 82785 ASSAY OF IGE: CPT

## 2023-07-21 PROCEDURE — 85027 COMPLETE CBC AUTOMATED: CPT

## 2023-07-21 NOTE — TELEPHONE ENCOUNTER
Lmm for pt to call back and schedule appt with Dr Juan Antonio Savage on 7/26/23 or 7/27/23  in [address] [place] [address].  Thanks Sb

## 2023-07-24 ENCOUNTER — TELEPHONE (OUTPATIENT)
Dept: GASTROENTEROLOGY | Facility: CLINIC | Age: 78
End: 2023-07-24

## 2023-07-24 PROCEDURE — 88342 IMHCHEM/IMCYTCHM 1ST ANTB: CPT | Performed by: PATHOLOGY

## 2023-07-24 PROCEDURE — 88305 TISSUE EXAM BY PATHOLOGIST: CPT | Performed by: PATHOLOGY

## 2023-07-24 NOTE — TELEPHONE ENCOUNTER
Pt's partner Samreenrandy Callejas called back stating pt would like to take the appt on 7/26/23 at 11:40am. Pt's partner would like a call back to confirm date

## 2023-07-25 ENCOUNTER — OFFICE VISIT (OUTPATIENT)
Dept: FAMILY MEDICINE CLINIC | Facility: CLINIC | Age: 78
End: 2023-07-25
Payer: MEDICARE

## 2023-07-25 VITALS
SYSTOLIC BLOOD PRESSURE: 130 MMHG | HEART RATE: 79 BPM | HEIGHT: 73 IN | RESPIRATION RATE: 14 BRPM | BODY MASS INDEX: 31.81 KG/M2 | TEMPERATURE: 96 F | OXYGEN SATURATION: 97 % | WEIGHT: 240 LBS | DIASTOLIC BLOOD PRESSURE: 82 MMHG

## 2023-07-25 DIAGNOSIS — J18.9 PNEUMONIA OF BOTH LOWER LOBES DUE TO INFECTIOUS ORGANISM: ICD-10-CM

## 2023-07-25 DIAGNOSIS — R06.09 DYSPNEA ON EXERTION: ICD-10-CM

## 2023-07-25 DIAGNOSIS — E11.65 TYPE 2 DIABETES MELLITUS WITH HYPERGLYCEMIA, WITHOUT LONG-TERM CURRENT USE OF INSULIN (HCC): ICD-10-CM

## 2023-07-25 DIAGNOSIS — Z76.89 ENCOUNTER FOR SUPPORT AND COORDINATION OF TRANSITION OF CARE: Primary | ICD-10-CM

## 2023-07-25 DIAGNOSIS — D50.0 IRON DEFICIENCY ANEMIA DUE TO CHRONIC BLOOD LOSS: ICD-10-CM

## 2023-07-25 DIAGNOSIS — R60.0 LOWER EXTREMITY EDEMA: ICD-10-CM

## 2023-07-25 LAB

## 2023-07-25 PROCEDURE — 99496 TRANSJ CARE MGMT HIGH F2F 7D: CPT | Performed by: NURSE PRACTITIONER

## 2023-07-25 NOTE — ASSESSMENT & PLAN NOTE
Recheck a1c  Monitor BG at home - pt reports elevated BG. Diabetic diet.      Lab Results   Component Value Date    HGBA1C 7.3 (A) 06/22/2022

## 2023-07-25 NOTE — ASSESSMENT & PLAN NOTE
Pt reports symptoms improved overall. Continues to SOB with exertion. Advise pt to complete follow up CXR.

## 2023-07-25 NOTE — PROGRESS NOTES
Assessment/Plan:    1. Encounter for support and coordination of transition of care  Comments:  Medications and discharge instructions reviewed with pt. Discussed necessary follow ups. 2. Pneumonia of both lower lobes due to infectious organism  Assessment & Plan:  Pt reports symptoms improved overall. Continues to SOB with exertion. Advise pt to complete follow up CXR. Orders:  -     XR chest pa & lateral; Future; Expected date: 07/25/2023    3. Iron deficiency anemia due to chronic blood loss  -     CBC and differential    4. Lower extremity edema  -     Echo complete w/ contrast if indicated; Future; Expected date: 07/25/2023    5. Dyspnea on exertion  -     Echo complete w/ contrast if indicated; Future; Expected date: 07/25/2023    6. Type 2 diabetes mellitus with hyperglycemia, without long-term current use of insulin (HCC)  Assessment & Plan:  Recheck a1c  Monitor BG at home - pt reports elevated BG. Diabetic diet. Lab Results   Component Value Date    HGBA1C 7.3 (A) 06/22/2022       Orders:  -     Hemoglobin A1C          Return if symptoms worsen or fail to improve. Subjective:      Patient ID: Pilar Lozano is a 76 y.o. male. Chief Complaint   Patient presents with   • Transition of Care Management       Laura Burnett is a 76year old male with diabetes, asthma, afib, hypertension and anxiety who presents to the office for a transition of care. Pt was admitted for pneumonia. Additionally, found to have low hemoglobin and received PRBCs during his stay. Pt reports that he has been feeling improved since discharge. States that he continues to have SOB on exertion and he also has BL LE edema. Reports that he has been taking lasix x's 2 days without significant improvement. Denies chest pain or dark stools.        The following portions of the patient's history were reviewed and updated as appropriate: allergies, current medications, past family history, past medical history, past social history, past surgical history and problem list.    Review of Systems   Constitutional: Negative for chills, fatigue and fever. Respiratory: Positive for shortness of breath (on exertion). Negative for cough and chest tightness. Cardiovascular: Positive for leg swelling. Negative for chest pain. Gastrointestinal: Negative for abdominal pain, anal bleeding and blood in stool. Hematological: Does not bruise/bleed easily. Current Outpatient Medications   Medication Sig Dispense Refill   • albuterol (2.5 mg/3 mL) 0.083 % nebulizer solution Take 3 mL (2.5 mg total) by nebulization every 6 (six) hours as needed for wheezing or shortness of breath 60 mL 6   • albuterol (Ventolin HFA) 90 mcg/act inhaler Inhale 2 puffs every 6 (six) hours as needed for wheezing 18 g 3   • docusate sodium (COLACE) 100 mg capsule Take 1 capsule (100 mg total) by mouth 2 (two) times a day as needed for constipation 60 capsule 0   • ferrous sulfate 324 (65 Fe) mg Take 1 tablet (324 mg total) by mouth 2 (two) times a day before meals 60 tablet 0   • finasteride (PROSCAR) 5 mg tablet TAKE ONE TABLET BY MOUTH EVERY DAY 30 tablet 5   • Fluticasone-Salmeterol (Advair Diskus) 250-50 mcg/dose inhaler Inhale 1 puff 2 (two) times a day Rinse mouth after use.  60 blister 3   • furosemide (LASIX) 20 mg tablet Take 1 tablet (20 mg total) by mouth daily 30 tablet 0   • glipiZIDE (GLUCOTROL XL) 10 mg 24 hr tablet TAKE 1 TABLET BY MOUTH EVERY DAY WITH BREAKFAST 90 tablet 3   • metFORMIN (GLUCOPHAGE) 500 mg tablet TAKE 2 TABLETS BY MOUTH EVERY MORNING AND TAKE 1 TABLET BY MOUTH IN THE EVENING (GENERIC FOR GLUCOPHAGE) 270 tablet 3   • metoprolol succinate (TOPROL-XL) 25 mg 24 hr tablet Take 1 tablet (25 mg total) by mouth every morning 90 tablet 3   • montelukast (SINGULAIR) 10 mg tablet Take 1 tablet (10 mg total) by mouth daily at bedtime 90 tablet 3   • ramipril (ALTACE) 5 mg capsule Take 1 capsule (5 mg total) by mouth every morning 90 capsule 3   • rivaroxaban (XARELTO) 20 mg tablet in the morning     • sertraline (ZOLOFT) 100 mg tablet TAKE ONE TABLET BY MOUTH EVERY DAY 90 tablet 1   • tamsulosin (FLOMAX) 0.4 mg TAKE 1 CAPSULE BY MOUTH EVERYDAY AT BEDTIME 90 capsule 3   • Glucosamine-Chondroitin (GLUCOSAMINE CHONDR COMPLEX PO) Take by mouth 2 (two) times a day (Patient not taking: Reported on 2023)     • Multiple Vitamins-Minerals (MEGAVITE FRUITS & VEGGIES PO) Take by mouth in the morning (Patient not taking: Reported on 2023)       No current facility-administered medications for this visit. Objective:    /82 (BP Location: Left arm, Patient Position: Sitting, Cuff Size: Large)   Pulse 79   Temp (!) 96 °F (35.6 °C) (Temporal)   Resp 14   Ht 6' 1" (1.854 m)   Wt 109 kg (240 lb)   SpO2 97%   BMI 31.66 kg/m²        Physical Exam  Vitals reviewed. Constitutional:       Appearance: Normal appearance. HENT:      Head: Normocephalic and atraumatic. Cardiovascular:      Rate and Rhythm: Normal rate and regular rhythm. Pulses: Normal pulses. Heart sounds: Normal heart sounds. Pulmonary:      Breath sounds: Examination of the left-upper field reveals wheezing. Wheezing present. Musculoskeletal:      Right lower le+ Pitting Edema present. Left lower le+ Pitting Edema present. Neurological:      Mental Status: He is alert and oriented to person, place, and time. Psychiatric:         Mood and Affect: Mood normal.                April Russell, [id number]  27 Call     Date and time call was made  2023 10:57 AM    Hospital care reviewed  Records reviewed    Patient was hospitialized at  [facility]    Date of Admission  23    Date of discharge  23    Diagnosis  Pneumonia primary, sepsis, GI bleed    Disposition  Home    Were the patients medications reviewed and updated  Yes    Current Symptoms  Fatigue;  Swelling    Weakness severity  Moderate    Fatigue severity  Mild      TCM Call Clinical progress swelling  Unchanged    Post hospital issues  Reduced activity    Should patient be enrolled in anticoag monitoring? No    Scheduled for follow up?   Yes    Patients specialists  Other (comment)    Cardiologist name  Chuck Barth    Cardiologist contact #  [phone]    Other specialists names  GI    Did you obtain your prescribed medications  Yes    Do you need help managing your prescriptions or medications  No    Is transportation to your appointment needed  No    I have advised the patient to call PCP with any new or worsening symptoms  Lulu Velasquez [facility]  or [place] other    Support System  Family    The type of support provided  Emotional    Do you have social support  Yes, as much as I need    Are you recieving any outpatient services  No    What type of services  cardiology    Are you recieving home care services  No    Are you using any community resources  No    Current waiver services  No    Have you fallen in the last 12 months  No    Interperter language line needed  No    Counseling  Family    Counseling topics  Activities of daily living    Comments  Patient has appointment with Liana Grayson on 01/17/2020 (TCM appointment)

## 2023-07-26 ENCOUNTER — OFFICE VISIT (OUTPATIENT)
Dept: GASTROENTEROLOGY | Facility: CLINIC | Age: 78
End: 2023-07-26
Payer: MEDICARE

## 2023-07-26 VITALS
SYSTOLIC BLOOD PRESSURE: 163 MMHG | HEIGHT: 73 IN | DIASTOLIC BLOOD PRESSURE: 62 MMHG | BODY MASS INDEX: 31.68 KG/M2 | HEART RATE: 66 BPM | WEIGHT: 239 LBS

## 2023-07-26 DIAGNOSIS — D50.9 IRON DEFICIENCY ANEMIA, UNSPECIFIED IRON DEFICIENCY ANEMIA TYPE: ICD-10-CM

## 2023-07-26 DIAGNOSIS — Z86.010 PERSONAL HISTORY OF COLONIC POLYPS: ICD-10-CM

## 2023-07-26 DIAGNOSIS — K55.20 CECAL ANGIODYSPLASIA: Primary | ICD-10-CM

## 2023-07-26 DIAGNOSIS — K57.90 DIVERTICULAR DISEASE: ICD-10-CM

## 2023-07-26 LAB
BASOPHILS # BLD AUTO: 0.1 X10E3/UL (ref 0–0.2)
BASOPHILS NFR BLD AUTO: 1 %
EOSINOPHIL # BLD AUTO: 0.2 X10E3/UL (ref 0–0.4)
EOSINOPHIL NFR BLD AUTO: 2 %
ERYTHROCYTE [DISTWIDTH] IN BLOOD BY AUTOMATED COUNT: 17 % (ref 11.6–15.4)
EST. AVERAGE GLUCOSE BLD GHB EST-MCNC: 171 MG/DL
HBA1C MFR BLD: 7.6 % (ref 4.8–5.6)
HCT VFR BLD AUTO: 27.4 % (ref 37.5–51)
HGB BLD-MCNC: 8.1 G/DL (ref 13–17.7)
IMM GRANULOCYTES # BLD: 0 X10E3/UL (ref 0–0.1)
IMM GRANULOCYTES NFR BLD: 0 %
LYMPHOCYTES # BLD AUTO: 1.9 X10E3/UL (ref 0.7–3.1)
LYMPHOCYTES NFR BLD AUTO: 25 %
MCH RBC QN AUTO: 24.3 PG (ref 26.6–33)
MCHC RBC AUTO-ENTMCNC: 29.6 G/DL (ref 31.5–35.7)
MCV RBC AUTO: 82 FL (ref 79–97)
MONOCYTES # BLD AUTO: 0.5 X10E3/UL (ref 0.1–0.9)
MONOCYTES NFR BLD AUTO: 6 %
NEUTROPHILS # BLD AUTO: 5.1 X10E3/UL (ref 1.4–7)
NEUTROPHILS NFR BLD AUTO: 66 %
PLATELET # BLD AUTO: 323 X10E3/UL (ref 150–450)
RBC # BLD AUTO: 3.34 X10E6/UL (ref 4.14–5.8)
WBC # BLD AUTO: 7.7 X10E3/UL (ref 3.4–10.8)

## 2023-07-26 PROCEDURE — 99214 OFFICE O/P EST MOD 30 MIN: CPT | Performed by: INTERNAL MEDICINE

## 2023-07-26 RX ORDER — QUINIDINE GLUCONATE 324 MG
240 TABLET, EXTENDED RELEASE ORAL DAILY
Qty: 30 TABLET | Refills: 11 | Status: SHIPPED | OUTPATIENT
Start: 2023-07-26

## 2023-07-26 NOTE — PROGRESS NOTES
[facility] - Outpatient Follow-up Note  Tawanda Chu 76 y.o. male MRN: [id number]  Encounter: [id number]          ASSESSMENT AND PLAN:      1. Cecal angiodysplasia    2. Diverticular disease    3. Personal history of colonic polyps    4. Iron deficiency anemia, unspecified iron deficiency anemia type  -     ferrous gluconate (FERGON) 240 (27 FE) MG tablet; Take 1 tablet (240 mg total) by mouth in the morning  -     CBC and differential; Future  -     CBC and differential; Future      Patient with history of symptomatic anemia with iron deficiency indices consistent with iron deficiency weakly heme positive stool on admission, EGD colonoscopy cecal angiodysplasias cauterized. EGD done few years ago it also revealed some cecal angiodysplasia. Patient's hemoglobin somewhat improved clinically feeling better. Symptomatic iron deficiency anemia most likely from chronic GI blood loss especially since he is on Xarelto. Since his hemoglobin is somewhat improved, continue oral iron, repeat CBC every 2 weeks till hemoglobin above 10. Last year his baseline was around 12. If any worsening of symptoms or hemoglobin then may need small bowel evaluation. This plan was discussed with the patient and his wife, they will follow-up as needed. ______________________________________________________________________    SUBJECTIVE:     Patient came in for follow-up of his recent hospitalization for anemia symptomatic with fatigue shortness of breath, hemoglobin around 7, weakly heme positive stool, patient on Xarelto, he had a blood transfusion iron infusion EGD colonoscopy done cecal angiodysplasias cauterized. Repeat hemoglobin up to 8, he is marginally better. Denies any chest pains, has chronic edema of the feet, denies any history of CVA stents pacemakers. Appetite is fair weight stable bowels regular, no overt GI blood loss melena hematochezia. Eats balanced diet.       REVIEW OF SYSTEMS IS OTHERWISE NEGATIVE.       Historical Information   Past Medical History:   Diagnosis Date   • Anxiety    • Arthritis     fingers , knee   • BPH (benign prostatic hypertrophy)    • Cataract     currently left eye- to have surgery on 4/10/17   • Cough variant asthma 2017   • Diabetes mellitus ([address])     type 2, last assessed 2017   • Hernia, umbilical     currently has   • HL (hearing loss)     b/l hearing aids   • Labyrinthitis    • Moderate obstructive sleep apnea 2017   • New onset a-fib ([address]) 1/10/2020   • Obesity with body mass index 30 or greater    • Umbilical hernia      Past Surgical History:   Procedure Laterality Date   • CATARACT EXTRACTION Right 2017   • COLONOSCOPY      yrs ago   • EYE SURGERY Bilateral     cataracts with IOL     Social History   Social History     Substance and Sexual Activity   Alcohol Use Yes   • Alcohol/week: 3.0 standard drinks of alcohol   • Types: 3 Standard drinks or equivalent per week    Comment: socially     Social History     Substance and Sexual Activity   Drug Use Yes   • Types: Marijuana    Comment: most everyday      Social History     Tobacco Use   Smoking Status Former   • Packs/day: 0.50   • Years: 15.00   • Total pack years: 7.50   • Types: Cigarettes   • Quit date: 26   • Years since quittin.5   Smokeless Tobacco Never     Family History   Problem Relation Age of Onset   • Cancer Mother         ovarian   • Diabetes Father    • Cancer Sister         stomach to brain   • Substance Abuse Family    • Substance Abuse Son    • Mental illness Neg Hx        Meds/Allergies       Current Outpatient Medications:   •  albuterol (2.5 mg/3 mL) 0.083 % nebulizer solution  •  albuterol (Ventolin HFA) 90 mcg/act inhaler  •  docusate sodium (COLACE) 100 mg capsule  •  ferrous gluconate (FERGON) 240 (27 FE) MG tablet  •  ferrous sulfate 324 (65 Fe) mg  •  finasteride (PROSCAR) 5 mg tablet  •  Fluticasone-Salmeterol (Advair Diskus) 250-50 mcg/dose inhaler  •  furosemide (LASIX) 20 mg tablet  •  glipiZIDE (GLUCOTROL XL) 10 mg 24 hr tablet  •  metFORMIN (GLUCOPHAGE) 500 mg tablet  •  metoprolol succinate (TOPROL-XL) 25 mg 24 hr tablet  •  montelukast (SINGULAIR) 10 mg tablet  •  ramipril (ALTACE) 5 mg capsule  •  rivaroxaban (XARELTO) 20 mg tablet  •  sertraline (ZOLOFT) 100 mg tablet  •  tamsulosin (FLOMAX) 0.4 mg  •  Glucosamine-Chondroitin (GLUCOSAMINE CHONDR COMPLEX PO)  •  Multiple Vitamins-Minerals (MEGAVITE FRUITS & VEGGIES PO)    Allergies   Allergen Reactions   • Ceftin [Cefuroxime] GI Intolerance and Vomiting           Objective     Blood pressure 163/62, pulse 66, height 6' 1" (1.854 m), weight 108 kg (239 lb). Body mass index is 31.53 kg/m². PHYSICAL EXAM:      General Appearance:   Alert, cooperative, no distress   HEENT:   Normocephalic, atraumatic, anicteric. Neck:  Supple, symmetrical, trachea midline   Lungs:   Clear to auscultation bilaterally; no rales, rhonchi or wheezing; respirations unlabored    Heart[de-identified]   Regular rate and rhythm; no murmur. Abdomen:   Soft, non-tender, non-distended; normal bowel sounds; no masses, no organomegaly    Genitalia:   Deferred    Rectal:   Deferred    Extremities:  No cyanosis, clubbing or edema    Skin:  No jaundice, rashes, or lesions    Lymph nodes:  No palpable cervical lymphadenopathy        Lab Results:   No visits with results within 1 Day(s) from this visit.    Latest known visit with results is:   Office Visit on 07/25/2023   Component Date Value   • White Blood Cell Count 07/25/2023 7.7    • Red Blood Cell Count 07/25/2023 3.34 (L)    • Hemoglobin 07/25/2023 8.1 (L)    • HCT 07/25/2023 27.4 (L)    • MCV 07/25/2023 82    • MCH 07/25/2023 24.3 (L)    • MCHC 07/25/2023 29.6 (L)    • RDW 07/25/2023 17.0 (H)    • Platelet Count 95/88/2148 323    • Neutrophils 07/25/2023 66    • Lymphocytes 07/25/2023 25    • Monocytes 07/25/2023 6    • Eosinophils 07/25/2023 2    • Basophils PCT 07/25/2023 1    • Neutrophils (Absolute) 07/25/2023 5.1    • Lymphocytes (Absolute) 07/25/2023 1.9    • Monocytes (Absolute) 07/25/2023 0.5    • Eosinophils (Absolute) 07/25/2023 0.2    • Basophils ABS 07/25/2023 0.1    • Immature Granulocytes 07/25/2023 0    • Immature Granulocytes (A* 07/25/2023 0.0    • Hemoglobin A1C 07/25/2023 7.6 (H)    • Estimated Average Glucose 07/25/2023 171          Radiology Results:   Colonoscopy    Result Date: 7/17/2023  Narrative: Table formatting from the original result was not included. [address] [place] [id number] [phone] DATE OF SERVICE: 7/17/23 PHYSICIAN(S): Attending: Cruz Montiel MD Fellow: No Staff Documented INDICATION: Iron deficiency anemia, unspecified iron deficiency anemia type POST-OP DIAGNOSIS: See the impression below. HISTORY: Prior colonoscopy: 7 years ago. BOWEL PREPARATION: Golytely/Colyte/Trilyte PREPROCEDURE: Informed consent was obtained for the procedure, including sedation. Risks including but not limited to bleeding, infection, perforation, adverse drug reaction and aspiration were explained in detail. Also explained about less than 100% sensitivity with the exam and other alternatives. The patient was placed in the left lateral decubitus position. Procedure: Colonoscopy DETAILS OF PROCEDURE: Patient was taken to the procedure room where a time out was performed to confirm correct patient and correct procedure. The patient underwent monitored anesthesia care, which was administered by an anesthesia professional. The patient's blood pressure, heart rate, level of consciousness, oxygen, respirations and ECG were monitored throughout the procedure. A digital rectal exam was performed. The scope was introduced through the anus and advanced to the terminal ileum. Retroflexion was performed in the rectum.  The quality of bowel preparation was evaluated using the Groot-Bijgaarden Bowel Preparation Scale with scores of: right colon = 2, transverse colon = 2, left colon = 2. The total BBPS score was 6. Bowel prep was adequate. The patient experienced no blood loss. The procedure was not difficult. The patient tolerated the procedure well. There were no apparent adverse events. ANESTHESIA INFORMATION: ASA: III Anesthesia Type: IV Sedation with Anesthesia MEDICATIONS: No administrations occurring from 1648 to 1723 on 07/17/23 FINDINGS: 2 small angioectasias in the ascending colon; no bleeding was identified; induced coagulation and hemostasis achieved with 3 applications of with argon plasma coagulation Small diverticula of mild severity in the transverse colon, descending colon, sigmoid colon and rectosigmoid Internal small hemorrhoids. Anal tags noted on retroflexion. One flat, adenomatous-appearing polyp measuring smaller than 5 mm in the rectum; completely removed en bloc by cold snare and retrieved specimen EVENTS: Procedure Events Event Event Time ENDO CECUM REACHED 7/17/2023  5:07 PM ENDO SCOPE OUT TIME 7/17/2023  5:23 PM SPECIMENS: ID Type Source Tests Collected by Time Destination 1 : r/o h.pylori Tissue Stomach TISSUE EXAM Jayden Witt MD 7/17/2023  4:57 PM  2 : rectal polyp-cold snare Tissue Polyp, Colorectal TISSUE EXAM Jayden Witt MD 7/17/2023  5:19 PM  EQUIPMENT: Colonoscope -[id number]     Impression: 2 small angioectasias in the ascending colon; induced coagulation and hemostasis achieved with argon plasma coagulation Diverticulosis of mild severity in the transverse colon, descending colon, sigmoid colon and rectosigmoid Small hemorrhoids Subcentimeter flat, adenomatous-appearing polyp in the rectum was removed with cold snare RECOMMENDATION:  No further screening colonoscopies necessary  Age greater than 65  Follow biopsy results in 2 to 3 weeks. Monitor H&H, if hemoglobin continues to drop, consider outpatient VCE. May resume anticoagulation tomorrow if hemoglobin remains stable.  May start on regular diet. Melvin Guerin MD     EGD    Addendum Date: 7/17/2023 Addendum:   [facility] [id number] [address] [phone] DATE OF SERVICE: 7/17/23 PHYSICIAN(S): Attending: Melvin Guerin MD Fellow: No Staff Documented INDICATION: Iron deficiency anemia, unspecified iron deficiency anemia type POST-OP DIAGNOSIS: See the impression below. PREPROCEDURE: Informed consent was obtained for the procedure, including sedation. Risks of perforation, hemorrhage, adverse drug reaction and aspiration were discussed. The patient was placed in the left lateral decubitus position. Patient was explained about the risks and benefits of the procedure. Risks including but not limited to bleeding, infection, and perforation were explained in detail. Also explained about less than 100% sensitivity with the exam and other alternatives. PROCEDURE: EGD DETAILS OF PROCEDURE: Patient was taken to the procedure room where a time out was performed to confirm correct patient and correct procedure. The patient underwent monitored anesthesia care, which was administered by an anesthesia professional. The patient's blood pressure, heart rate, level of consciousness, respirations and oxygen were monitored throughout the procedure. The scope was advanced to the second part of the duodenum. Retroflexion was performed in the fundus. The patient experienced no blood loss. The procedure was not difficult. The patient tolerated the procedure well. There were no apparent adverse events. ANESTHESIA INFORMATION: ASA: III Anesthesia Type: Anesthesia type not filed in the log. MEDICATIONS: Totals unavailable because the procedure time range is not set FINDINGS: Regular Z-line 44 cm from the incisors The body of the stomach and antrum appeared normal. Performed random biopsy using biopsy forceps to rule out H. pylori. Retroflexed view was unremarkable, pylorus was patent.  The duodenal bulb, 1st part of the duodenum and 2nd part of the duodenum appeared normal. SPECIMENS: ID Type Source Tests Collected by Time Destination 1 : r/o h.pylori Tissue Stomach TISSUE EXAM Sierra Landry MD 7/17/2023  4:57 PM  IMPRESSION: The body of the stomach and antrum appeared normal. Performed random biopsy to rule out H. pylori. The duodenal bulb, 1st part of the duodenum and 2nd part of the duodenum appeared normal. RECOMMENDATION:  There is no recommended follow-up for this procedure. Follow biopsy results in 2 to 3 weeks. Avoid NSAIDs. We will proceed with colonoscopy to assess for etiology of anemia. Sierra Landry MD     Result Date: 7/17/2023  Narrative: Table formatting from the original result was not included. [address] [place] [id number] [phone] DATE OF SERVICE: 7/17/23 PHYSICIAN(S): Attending: No Staff Documented Fellow: No Staff Documented INDICATION: Iron deficiency anemia, unspecified iron deficiency anemia type POST-OP DIAGNOSIS: See the impression below. PREPROCEDURE: Informed consent was obtained for the procedure, including sedation. Risks of perforation, hemorrhage, adverse drug reaction and aspiration were discussed. The patient was placed in the left lateral decubitus position. Patient was explained about the risks and benefits of the procedure. Risks including but not limited to bleeding, infection, and perforation were explained in detail. Also explained about less than 100% sensitivity with the exam and other alternatives. PROCEDURE: EGD DETAILS OF PROCEDURE: Patient was taken to the procedure room where a time out was performed to confirm correct patient and correct procedure. The patient underwent monitored anesthesia care, which was administered by an anesthesia professional. The patient's blood pressure, heart rate, level of consciousness, respirations and oxygen were monitored throughout the procedure.  The scope was advanced to the second part of the duodenum. Retroflexion was performed in the fundus. The patient experienced no blood loss. The procedure was not difficult. The patient tolerated the procedure well. There were no apparent adverse events. ANESTHESIA INFORMATION: ASA: ASA status not filed in the log. Anesthesia Type: Anesthesia type not filed in the log. MEDICATIONS: Totals unavailable because the procedure time range is not set FINDINGS: Irregular Z-line 42 cm from the incisors; performed cold forceps biopsy Moderate, patchy erythematous mucosa with erosion in the duodenal bulb and 1st part of the duodenum; performed cold forceps biopsy to rule out celiac disease Mild, generalized nodular mucosa in the body of the stomach; performed cold forceps biopsy to rule out H. pylori. Retroflexed view was unremarkable, pylorus was patent. SPECIMENS: * No specimens in log *     Impression: Irregular Z-line; performed cold forceps biopsy Moderate, patchy erythematous mucosa with erosion in the duodenal bulb and 1st part of the duodenum; performed cold forceps biopsy to rule out celiac disease Mild, generalized nodular mucosa in the body of the stomach; performed cold forceps biopsy RECOMMENDATION:  There is no recommended follow-up for this procedure. Follow biopsy results in 2 to 3 weeks. Avoid NSAIDs. Recommend daily PPI. We will proceed with colonoscopy to assess for etiology of anemia. No Staff Documented     CT abdomen pelvis w contrast    Result Date: 7/15/2023  Narrative: CT ABDOMEN AND PELVIS WITH IV CONTRAST INDICATION:   Abdominal abscess/infection suspected Occult malignancy r/o intra-abdominal infection or occult malignancy. COMPARISON:  None. TECHNIQUE:  CT examination of the abdomen and pelvis was performed. Multiplanar 2D reformatted images were created from the source data.  This examination, like all CT scans performed in the [facility], was performed utilizing techniques to minimize radiation dose exposure, including the use of iterative reconstruction and automated exposure control. Radiation dose length product (DLP) for this visit:  1894 mGy-cm IV Contrast:  100 mL of iohexol (OMNIPAQUE) Enteric Contrast:  Enteric contrast was not administered. FINDINGS: ABDOMEN LOWER CHEST: Moderately dense localized infiltrates are present in both lung bases, left more so than right. The appearance is most suggestive of pneumonia which could be aspiration based on distribution. Correlate with symptoms and patient history. Included portion of the heart appears within normal limits. No pleural effusion or pericardial effusion. LIVER/BILIARY TREE: Coarse benign type calcification of the hepatic dome. Otherwise, unremarkable GALLBLADDER:  No calcified gallstones. No pericholecystic inflammatory change. SPLEEN: Splenomegaly measuring 14.3 cm craniocaudal length. PANCREAS:  Unremarkable. ADRENAL GLANDS:  Unremarkable. KIDNEYS/URETERS: There are several bilateral small circumscribed round low-density renal lesions which are most likely benign cysts although difficult to completely characterize on this exam. Laterally at the mid to upper pole of the right kidney there is an 11 mm lesion. Laterally at the midpole of the right kidney there is a 5 mm lesion. Anteriorly at the midpole of the right kidney there is a 14 mm lesion. Medially at the midpole of the right kidney there is a 7 mm lesion. At the lower pole of the right kidney there are adjacent 12 and 6 mm lesions. On the left side there is an exophytic lesion at the upper pole which is 17 mm. Laterally at the midpole there is a 12 mm lesion. Laterally and anteriorly at the midpole there are 7 and 8 mm lesions. No hydronephrosis or kidney stones. STOMACH AND BOWEL: There is mild colonic diverticulosis without diverticulitis. The stomach is unremarkable. The small bowel appears within normal limits. APPENDIX:  No findings to suggest appendicitis. ABDOMINOPELVIC CAVITY:  No ascites.   No pneumoperitoneum. No lymphadenopathy. VESSELS:  Unremarkable for patient's age. PELVIS REPRODUCTIVE ORGANS:  Unremarkable for patient's age. URINARY BLADDER:  Unremarkable. ABDOMINAL WALL/INGUINAL REGIONS: Uncomplicated fat-containing anterior abdominal wall hernia at or near the umbilicus measures 2.5 cm diameter at the neck. There is a fat-containing right inguinal hernia without complications. It is small. OSSEOUS STRUCTURES: No acute fracture or destructive osseous lesion. Spinal degenerative changes are noted. Impression: Probable bilateral lung base pneumonia which may be on the basis of aspiration given the distribution. Correlate with clinical scenario and lab work and symptomatology. No malignant lesions are seen. Mild splenomegaly. Mild colonic diverticulosis without diverticulitis. Numerous similar-appearing small low-density renal lesions which are statistically most likely cysts. They are somewhat difficult to confidently characterize. Consider surveillance imaging in 6 to 12 months. The study was marked in [facility] for immediate notification. Workstation performed: [id number]     XR chest 1 view portable    Result Date: 7/14/2023  Narrative: CHEST INDICATION:   SOB. COMPARISON: 6/5/2023 EXAM PERFORMED/VIEWS:  XR CHEST PORTABLE FINDINGS: Cardiomediastinal silhouette appears unremarkable. The lungs are clear. No pneumothorax or pleural effusion. Osseous structures appear within normal limits for patient age. Impression: No acute cardiopulmonary disease.  Workstation performed: [id number]

## 2023-08-09 ENCOUNTER — CONSULT (OUTPATIENT)
Dept: PULMONOLOGY | Facility: MEDICAL CENTER | Age: 78
End: 2023-08-09
Payer: MEDICARE

## 2023-08-09 VITALS
OXYGEN SATURATION: 95 % | WEIGHT: 231 LBS | TEMPERATURE: 97.8 F | BODY MASS INDEX: 30.62 KG/M2 | SYSTOLIC BLOOD PRESSURE: 138 MMHG | HEIGHT: 73 IN | DIASTOLIC BLOOD PRESSURE: 70 MMHG | HEART RATE: 58 BPM | RESPIRATION RATE: 16 BRPM

## 2023-08-09 DIAGNOSIS — J45.998 ASTHMA, PERSISTENT CONTROLLED: ICD-10-CM

## 2023-08-09 DIAGNOSIS — T17.908A ASPIRATION INTO AIRWAY, INITIAL ENCOUNTER: Primary | ICD-10-CM

## 2023-08-09 DIAGNOSIS — J45.30 MILD PERSISTENT ASTHMA WITHOUT COMPLICATION: ICD-10-CM

## 2023-08-09 DIAGNOSIS — T17.908A ASPIRATION OF FOREIGN BODY IN RESPIRATORY TRACT, INITIAL ENCOUNTER: ICD-10-CM

## 2023-08-09 PROCEDURE — 99204 OFFICE O/P NEW MOD 45 MIN: CPT | Performed by: HOSPITALIST

## 2023-08-09 RX ORDER — BUDESONIDE AND FORMOTEROL FUMARATE DIHYDRATE 160; 4.5 UG/1; UG/1
2 AEROSOL RESPIRATORY (INHALATION) 2 TIMES DAILY
Qty: 10.2 G | Refills: 3 | Status: SHIPPED | OUTPATIENT
Start: 2023-08-09

## 2023-08-09 NOTE — PROGRESS NOTES
Pulmonary Consult Note     Assessment:   Mild persistent asthma   Chronic cough and congestion   Aspiration      Plan:  - refer to ambulatory SLP for dysphagia evaluation. Suspect there might be a component of chronic aspiration triggering his symptoms. - trial of removing dairy products from diet. - start symbicort 160-4.5 2 puffs BID with spacer   - return to clinic in 6 weeks for re-evaluation. Referring Provider: Rick He MD     Chief Complaint: Cough and Congestion     HPI:   77 yo M presenting for cough and congestion evaluation. No hx of asthma, but reports allergic rhinitis. Symptoms have been ongoing for many years, unable to quantify. Does not correlate with seasonal changes, no specific environmental triggers. Reports worse symptoms when consuming dairy products. No obvious reflux symptoms. He uses albuterol nebulizer which relieves his symptoms (but not completely). He reports improvement in congestion w mucinex. He uses flonase, but it does not appear to help. He also uses advair, but does not find significant relief. He is a former smoker quit in [address] [place] [address]. He was recently hospitalized in 2023 for shortness of breath w fever and chills. He was treated for sepsis due to bibasilar pneumonia. During this admission he also underwent upper and lower endoscopy for anemia. On upper endoscopy with normal upper GI tract findings. Social History:   Social History     Tobacco Use   • Smoking status: Former     Packs/day: 0.50     Years: 15.00     Total pack years: 7.50     Types: Cigarettes     Quit date:      Years since quittin.6   • Smokeless tobacco: Never   Vaping Use   • Vaping Use: Never used   Substance Use Topics   • Alcohol use:  Yes     Alcohol/week: 3.0 standard drinks of alcohol     Types: 3 Standard drinks or equivalent per week     Comment: socially   • Drug use: Yes     Types: Marijuana     Comment: most everyday        Pmhx:   Past Medical History: Diagnosis Date   • Anxiety    • Arthritis     fingers , knee   • BPH (benign prostatic hypertrophy)    • Cataract     currently left eye- to have surgery on 4/10/17   • Cough variant asthma 4/12/2017   • Diabetes mellitus ([address])     type 2, last assessed 4/12/2017   • Hernia, umbilical     currently has   • HL (hearing loss)     b/l hearing aids   • Labyrinthitis    • Moderate obstructive sleep apnea 4/11/2017   • New onset a-fib ([address]) 1/10/2020   • Obesity with body mass index 30 or greater 51/6/7346   • Umbilical hernia         Surgical history:   Past Surgical History:   Procedure Laterality Date   • CATARACT EXTRACTION Right 04/04/2017   • COLONOSCOPY      yrs ago   • EYE SURGERY Bilateral     cataracts with IOL        Family History:   Family History   Problem Relation Age of Onset   • Cancer Mother         ovarian   • Diabetes Father    • Cancer Sister         stomach to brain   • Substance Abuse Family    • Substance Abuse Son    • Mental illness Neg Hx         Allergies:    Allergies   Allergen Reactions   • Ceftin [Cefuroxime] GI Intolerance and Vomiting       Current Meds:   Current Outpatient Medications on File Prior to Visit   Medication Sig Dispense Refill   • albuterol (2.5 mg/3 mL) 0.083 % nebulizer solution Take 3 mL (2.5 mg total) by nebulization every 6 (six) hours as needed for wheezing or shortness of breath 60 mL 6   • albuterol (Ventolin HFA) 90 mcg/act inhaler Inhale 2 puffs every 6 (six) hours as needed for wheezing 18 g 3   • docusate sodium (COLACE) 100 mg capsule Take 1 capsule (100 mg total) by mouth 2 (two) times a day as needed for constipation 60 capsule 0   • ferrous gluconate (FERGON) 240 (27 FE) MG tablet Take 1 tablet (240 mg total) by mouth in the morning 30 tablet 11   • ferrous sulfate 324 (65 Fe) mg Take 1 tablet (324 mg total) by mouth 2 (two) times a day before meals 60 tablet 0   • finasteride (PROSCAR) 5 mg tablet TAKE ONE TABLET BY MOUTH EVERY DAY 30 tablet 5   • furosemide (LASIX) 20 mg tablet Take 1 tablet (20 mg total) by mouth daily 30 tablet 0   • glipiZIDE (GLUCOTROL XL) 10 mg 24 hr tablet TAKE 1 TABLET BY MOUTH EVERY DAY WITH BREAKFAST 90 tablet 3   • metFORMIN (GLUCOPHAGE) 500 mg tablet TAKE 2 TABLETS BY MOUTH EVERY MORNING AND TAKE 1 TABLET BY MOUTH IN THE EVENING (GENERIC FOR GLUCOPHAGE) 270 tablet 3   • metoprolol succinate (TOPROL-XL) 25 mg 24 hr tablet Take 1 tablet (25 mg total) by mouth every morning 90 tablet 3   • montelukast (SINGULAIR) 10 mg tablet Take 1 tablet (10 mg total) by mouth daily at bedtime 90 tablet 3   • ramipril (ALTACE) 5 mg capsule Take 1 capsule (5 mg total) by mouth every morning 90 capsule 3   • rivaroxaban (XARELTO) 20 mg tablet in the morning     • sertraline (ZOLOFT) 100 mg tablet TAKE ONE TABLET BY MOUTH EVERY DAY 90 tablet 1   • tamsulosin (FLOMAX) 0.4 mg TAKE 1 CAPSULE BY MOUTH EVERYDAY AT BEDTIME 90 capsule 3   • [DISCONTINUED] Fluticasone-Salmeterol (Advair Diskus) 250-50 mcg/dose inhaler Inhale 1 puff 2 (two) times a day Rinse mouth after use. 60 blister 3   • Glucosamine-Chondroitin (GLUCOSAMINE CHONDR COMPLEX PO) Take by mouth 2 (two) times a day (Patient not taking: Reported on 7/20/2023)     • Multiple Vitamins-Minerals (MEGAVITE FRUITS & VEGGIES PO) Take by mouth in the morning (Patient not taking: Reported on 7/20/2023)       No current facility-administered medications on file prior to visit. Review of Systems: all review of system negative except as stated in HPI     Vital Signs:   /70 (BP Location: Left arm, Patient Position: Sitting, Cuff Size: Standard)   Pulse 58   Temp 97.8 °F (36.6 °C) (Temporal)   Resp 16   Ht 6' 1" (1.854 m)   Wt 105 kg (231 lb)   SpO2 95%   BMI 30.48 kg/m²     Physical Exam:   General: NAD, well appearing   Eyes: anicteric   Oral: no thrush, no post-nasal drip. Nose: nasal mucosa without erythema.    Cardiac: s1s2 rrr, no m/r/g   Lungs: inspiratory wheezing, active cough with inspiration    MSK: no peripheral edema    Neuro: independent mobility / ambulates without assistance. Pertinent labs and imaging reviewed:   Previous CT chest reviewed. CT abdomen  / pelvis from July hospitalization with bibasilar infiltrates. NE allergy panel unremarkable from July 2023    Most recent CBC with appx 200 eosinophils/uL. July 2023 hospital course reviewed. Endoscopy reports reviewed.

## 2023-08-15 ENCOUNTER — LAB (OUTPATIENT)
Dept: LAB | Facility: CLINIC | Age: 78
End: 2023-08-15
Payer: MEDICARE

## 2023-08-15 ENCOUNTER — APPOINTMENT (OUTPATIENT)
Dept: RADIOLOGY | Facility: CLINIC | Age: 78
End: 2023-08-15
Payer: MEDICARE

## 2023-08-15 DIAGNOSIS — J18.9 PNEUMONIA OF BOTH LOWER LOBES DUE TO INFECTIOUS ORGANISM: ICD-10-CM

## 2023-08-15 DIAGNOSIS — D50.9 IRON DEFICIENCY ANEMIA, UNSPECIFIED IRON DEFICIENCY ANEMIA TYPE: ICD-10-CM

## 2023-08-15 LAB
BASOPHILS # BLD AUTO: 0.09 THOUSANDS/ÂΜL (ref 0–0.1)
BASOPHILS NFR BLD AUTO: 1 % (ref 0–1)
EOSINOPHIL # BLD AUTO: 0.21 THOUSAND/ÂΜL (ref 0–0.61)
EOSINOPHIL NFR BLD AUTO: 2 % (ref 0–6)
ERYTHROCYTE [DISTWIDTH] IN BLOOD BY AUTOMATED COUNT: 19.3 % (ref 11.6–15.1)
HCT VFR BLD AUTO: 31.8 % (ref 36.5–49.3)
HGB BLD-MCNC: 9 G/DL (ref 12–17)
IMM GRANULOCYTES # BLD AUTO: 0.02 THOUSAND/UL (ref 0–0.2)
IMM GRANULOCYTES NFR BLD AUTO: 0 % (ref 0–2)
LYMPHOCYTES # BLD AUTO: 2.42 THOUSANDS/ÂΜL (ref 0.6–4.47)
LYMPHOCYTES NFR BLD AUTO: 27 % (ref 14–44)
MCH RBC QN AUTO: 24.5 PG (ref 26.8–34.3)
MCHC RBC AUTO-ENTMCNC: 28.3 G/DL (ref 31.4–37.4)
MCV RBC AUTO: 86 FL (ref 82–98)
MONOCYTES # BLD AUTO: 0.54 THOUSAND/ÂΜL (ref 0.17–1.22)
MONOCYTES NFR BLD AUTO: 6 % (ref 4–12)
NEUTROPHILS # BLD AUTO: 5.68 THOUSANDS/ÂΜL (ref 1.85–7.62)
NEUTS SEG NFR BLD AUTO: 64 % (ref 43–75)
NRBC BLD AUTO-RTO: 0 /100 WBCS
PLATELET # BLD AUTO: 312 THOUSANDS/UL (ref 149–390)
PMV BLD AUTO: 10.6 FL (ref 8.9–12.7)
RBC # BLD AUTO: 3.68 MILLION/UL (ref 3.88–5.62)
WBC # BLD AUTO: 8.96 THOUSAND/UL (ref 4.31–10.16)

## 2023-08-15 PROCEDURE — 71046 X-RAY EXAM CHEST 2 VIEWS: CPT

## 2023-08-15 PROCEDURE — 36415 COLL VENOUS BLD VENIPUNCTURE: CPT

## 2023-08-15 PROCEDURE — 85025 COMPLETE CBC W/AUTO DIFF WBC: CPT

## 2023-08-16 NOTE — RESULT ENCOUNTER NOTE
Please call the patient regarding his abnormal result. He is still anemic though hemoglobin is slowly improving. Is 9 now. Continue same.   Follow up in my office as needed

## 2023-08-17 DIAGNOSIS — N40.0 BENIGN PROSTATIC HYPERPLASIA, UNSPECIFIED WHETHER LOWER URINARY TRACT SYMPTOMS PRESENT: ICD-10-CM

## 2023-08-17 RX ORDER — FINASTERIDE 5 MG/1
TABLET, FILM COATED ORAL
Qty: 30 TABLET | Refills: 5 | Status: SHIPPED | OUTPATIENT
Start: 2023-08-17

## 2023-09-05 ENCOUNTER — LAB (OUTPATIENT)
Dept: LAB | Facility: CLINIC | Age: 78
End: 2023-09-05
Payer: MEDICARE

## 2023-09-05 DIAGNOSIS — D50.9 IRON DEFICIENCY ANEMIA, UNSPECIFIED IRON DEFICIENCY ANEMIA TYPE: ICD-10-CM

## 2023-09-05 LAB
BASOPHILS # BLD AUTO: 0.07 THOUSANDS/ÂΜL (ref 0–0.1)
BASOPHILS NFR BLD AUTO: 1 % (ref 0–1)
EOSINOPHIL # BLD AUTO: 0.27 THOUSAND/ÂΜL (ref 0–0.61)
EOSINOPHIL NFR BLD AUTO: 4 % (ref 0–6)
ERYTHROCYTE [DISTWIDTH] IN BLOOD BY AUTOMATED COUNT: 20.2 % (ref 11.6–15.1)
HCT VFR BLD AUTO: 33.2 % (ref 36.5–49.3)
HGB BLD-MCNC: 9.5 G/DL (ref 12–17)
IMM GRANULOCYTES # BLD AUTO: 0.02 THOUSAND/UL (ref 0–0.2)
IMM GRANULOCYTES NFR BLD AUTO: 0 % (ref 0–2)
LYMPHOCYTES # BLD AUTO: 1.87 THOUSANDS/ÂΜL (ref 0.6–4.47)
LYMPHOCYTES NFR BLD AUTO: 27 % (ref 14–44)
MCH RBC QN AUTO: 26.2 PG (ref 26.8–34.3)
MCHC RBC AUTO-ENTMCNC: 28.6 G/DL (ref 31.4–37.4)
MCV RBC AUTO: 92 FL (ref 82–98)
MONOCYTES # BLD AUTO: 0.55 THOUSAND/ÂΜL (ref 0.17–1.22)
MONOCYTES NFR BLD AUTO: 8 % (ref 4–12)
NEUTROPHILS # BLD AUTO: 4.2 THOUSANDS/ÂΜL (ref 1.85–7.62)
NEUTS SEG NFR BLD AUTO: 60 % (ref 43–75)
NRBC BLD AUTO-RTO: 0 /100 WBCS
PLATELET # BLD AUTO: 264 THOUSANDS/UL (ref 149–390)
PMV BLD AUTO: 11 FL (ref 8.9–12.7)
RBC # BLD AUTO: 3.62 MILLION/UL (ref 3.88–5.62)
WBC # BLD AUTO: 6.98 THOUSAND/UL (ref 4.31–10.16)

## 2023-09-05 PROCEDURE — 85025 COMPLETE CBC W/AUTO DIFF WBC: CPT

## 2023-09-05 PROCEDURE — 36415 COLL VENOUS BLD VENIPUNCTURE: CPT

## 2023-09-06 NOTE — RESULT ENCOUNTER NOTE
Please call the patient regarding his abnormal result. Hemoglobin is improved to 9.5, continue same.   Follow up in my office as needed

## 2023-09-08 ENCOUNTER — OFFICE VISIT (OUTPATIENT)
Dept: ENDOCRINOLOGY | Facility: CLINIC | Age: 78
End: 2023-09-08
Payer: MEDICARE

## 2023-09-08 VITALS
SYSTOLIC BLOOD PRESSURE: 142 MMHG | OXYGEN SATURATION: 96 % | DIASTOLIC BLOOD PRESSURE: 80 MMHG | WEIGHT: 237.4 LBS | HEART RATE: 58 BPM | HEIGHT: 73 IN | BODY MASS INDEX: 31.46 KG/M2

## 2023-09-08 DIAGNOSIS — R53.83 OTHER FATIGUE: ICD-10-CM

## 2023-09-08 DIAGNOSIS — E66.9 OBESITY (BMI 30-39.9): ICD-10-CM

## 2023-09-08 DIAGNOSIS — E11.65 TYPE 2 DIABETES MELLITUS WITH HYPERGLYCEMIA, WITHOUT LONG-TERM CURRENT USE OF INSULIN (HCC): Primary | ICD-10-CM

## 2023-09-08 DIAGNOSIS — E11.9 TYPE 2 DIABETES MELLITUS WITHOUT COMPLICATION, WITHOUT LONG-TERM CURRENT USE OF INSULIN (HCC): ICD-10-CM

## 2023-09-08 DIAGNOSIS — E55.9 VITAMIN D DEFICIENCY: ICD-10-CM

## 2023-09-08 DIAGNOSIS — I10 PRIMARY HYPERTENSION: ICD-10-CM

## 2023-09-08 PROCEDURE — 99214 OFFICE O/P EST MOD 30 MIN: CPT | Performed by: INTERNAL MEDICINE

## 2023-09-08 NOTE — PROGRESS NOTES
Erum Prather 76 y.o. male MRN: [id number]    Encounter: [id number]      Assessment/Plan     1. Type 2 diabetes mellitus not on ling term insulin therapy with hyperglycemia   2. Obesity   Poorly controlled with last A1c 7.6%, worsening     Discussed different medications that can be used for glycemic management-oral hypoglycemic agents as well as injectable insulin and non-insulin medications (GLP 1 agonist), along with risks, benefits, side effect profile. Has a history of pancreatitis and so GLP-1 agonists are contraindicated  Would ideally like to avoid sulfonylureas in this elderly patient. Patient would like to continue at this time. May consider DPP4 inhibitor/ SGLT-2 inhibitor in follow up     Recommend the following at this time  increase metformin to 1000 mg orally twice a day  Patient will confirm if he is taking glipizide or not ( last prescription appears to be from 2020)   Check blood sugars at least once a day at different times before meals and at bedtime  Follow-up in 3 months with repeat labs    3. Hypertension  Blood pressure at goal   - continue current medications including ACE-I/ ARB    4. Fatigue  5. H/o vitamin D deficiency    Check thyroid function tests, Vitamin d levels    CC: Diabetes    History of Present Illness     HPI:  Erum Prather is a 76 y.o. male presents for a follow-up visit regarding diabetes management.    Also has hypertension, hyperlipidemia, heart failure, atrial fibrillation, TRISTIN, history of pancreatitis    Last seen in 2/2022    Current regimen:   Glipizide XL 10 mg orally daily   Metformin 1000, 500 mg daily     Admitted to the hospital in 7/2023 for anemia   Appetite is better since his anemia has improved   no recent changes in weight   C/o fatigue but improved; walking some   No CP/SOB   Pedal edema improving     Statin:  --   ACE-I/ARB:  Ramipril     Home glucose monitoring: does not check often; once a month  Symptoms of hypoglycemia :  None that the patient is aware of     All other systems were reviewed and are negative.     Review of Systems      Historical Information   Past Medical History:   Diagnosis Date   • Anxiety    • Arthritis     fingers , knee   • BPH (benign prostatic hypertrophy)    • Cataract     currently left eye- to have surgery on 4/10/17   • Cough variant asthma 2017   • Diabetes mellitus ([address])     type 2, last assessed 2017   • Hernia, umbilical     currently has   • HL (hearing loss)     b/l hearing aids   • Labyrinthitis    • Moderate obstructive sleep apnea 2017   • New onset a-fib ([address]) 1/10/2020   • Obesity with body mass index 30 or greater    • Umbilical hernia      Past Surgical History:   Procedure Laterality Date   • CATARACT EXTRACTION Right 2017   • COLONOSCOPY      yrs ago   • EYE SURGERY Bilateral     cataracts with IOL     Social History   Social History     Substance and Sexual Activity   Alcohol Use Yes   • Alcohol/week: 3.0 standard drinks of alcohol   • Types: 3 Standard drinks or equivalent per week    Comment: socially     Social History     Substance and Sexual Activity   Drug Use Yes   • Types: Marijuana    Comment: most everyday      Social History     Tobacco Use   Smoking Status Former   • Packs/day: 0.50   • Years: 15.00   • Total pack years: 7.50   • Types: Cigarettes   • Quit date: 1983   • Years since quittin.7   Smokeless Tobacco Never     Family History:   Family History   Problem Relation Age of Onset   • Cancer Mother         ovarian   • Diabetes Father    • Cancer Sister         stomach to brain   • Substance Abuse Family    • Substance Abuse Son    • Alcohol abuse Son    • Mental illness Neg Hx        Meds/Allergies   Current Outpatient Medications   Medication Sig Dispense Refill   • albuterol (2.5 mg/3 mL) 0.083 % nebulizer solution Take 3 mL (2.5 mg total) by nebulization every 6 (six) hours as needed for wheezing or shortness of breath 60 mL 6   • albuterol (Ventolin HFA) 90 mcg/act inhaler Inhale 2 puffs every 6 (six) hours as needed for wheezing 18 g 3   • docusate sodium (COLACE) 100 mg capsule Take 1 capsule (100 mg total) by mouth 2 (two) times a day as needed for constipation 60 capsule 0   • ferrous sulfate 324 (65 Fe) mg Take 1 tablet (324 mg total) by mouth 2 (two) times a day before meals 60 tablet 0   • finasteride (PROSCAR) 5 mg tablet TAKE ONE TABLET BY MOUTH EVERY DAY 30 tablet 5   • glipiZIDE (GLUCOTROL XL) 10 mg 24 hr tablet TAKE 1 TABLET BY MOUTH EVERY DAY WITH BREAKFAST 90 tablet 3   • Glucosamine-Chondroitin (GLUCOSAMINE CHONDR COMPLEX PO) Take by mouth 2 (two) times a day     • metFORMIN (GLUCOPHAGE) 500 mg tablet TAKE 2 TABLETS BY MOUTH EVERY MORNING AND TAKE 1 TABLET BY MOUTH IN THE EVENING (GENERIC FOR GLUCOPHAGE) 270 tablet 3   • metoprolol succinate (TOPROL-XL) 25 mg 24 hr tablet Take 1 tablet (25 mg total) by mouth every morning 90 tablet 3   • montelukast (SINGULAIR) 10 mg tablet Take 1 tablet (10 mg total) by mouth daily at bedtime 90 tablet 3   • ramipril (ALTACE) 5 mg capsule Take 1 capsule (5 mg total) by mouth every morning 90 capsule 3   • rivaroxaban (XARELTO) 20 mg tablet in the morning     • sertraline (ZOLOFT) 100 mg tablet TAKE ONE TABLET BY MOUTH EVERY DAY 90 tablet 1   • tamsulosin (FLOMAX) 0.4 mg TAKE 1 CAPSULE BY MOUTH EVERYDAY AT BEDTIME 90 capsule 3   • budesonide-formoterol (Symbicort) 160-4.5 mcg/act inhaler Inhale 2 puffs 2 (two) times a day Rinse mouth after use.  (Patient not taking: Reported on 9/8/2023) 10.2 g 3   • ferrous gluconate (FERGON) 240 (27 FE) MG tablet Take 1 tablet (240 mg total) by mouth in the morning 30 tablet 11   • furosemide (LASIX) 20 mg tablet Take 1 tablet (20 mg total) by mouth daily (Patient not taking: Reported on 9/8/2023) 30 tablet 0   • Multiple Vitamins-Minerals (MEGAVITE FRUITS & VEGGIES PO) Take by mouth in the morning (Patient not taking: Reported on 9/8/2023)       No current facility-administered medications for this visit. Allergies   Allergen Reactions   • Ceftin [Cefuroxime] GI Intolerance and Vomiting       Objective   Vitals: Blood pressure 142/80, pulse 58, height 6' 1" (1.854 m), weight 108 kg (237 lb 6.4 oz), SpO2 96 %. Physical Exam  Constitutional:       General: He is not in acute distress. Appearance: He is well-developed. He is not diaphoretic. HENT:      Head: Normocephalic and atraumatic. Eyes:      Conjunctiva/sclera: Conjunctivae normal.      Pupils: Pupils are equal, round, and reactive to light. Cardiovascular:      Rate and Rhythm: Normal rate and regular rhythm. Heart sounds: Normal heart sounds. No murmur heard. Pulmonary:      Effort: Pulmonary effort is normal. No respiratory distress. Breath sounds: Normal breath sounds. No wheezing. Abdominal:      General: There is no distension. Palpations: Abdomen is soft. Tenderness: There is no abdominal tenderness. There is no guarding. Musculoskeletal:      Cervical back: Normal range of motion and neck supple. Right lower leg: Edema present. Left lower leg: Edema present. Skin:     General: Skin is warm and dry. Findings: No erythema or rash. Neurological:      Mental Status: He is alert and oriented to person, place, and time. Psychiatric:         Behavior: Behavior normal.         Thought Content: Thought content normal.         The history was obtained from the review of the chart, patient and family.     Lab Results:   Lab Results   Component Value Date/Time    Hemoglobin A1C 7.6 (H) 07/25/2023 02:32 PM    WBC 6.98 09/05/2023 02:34 PM    WBC 8.96 08/15/2023 01:31 PM    WBC 8.52 07/21/2023 11:35 AM    White Blood Cell Count 7.7 07/25/2023 02:32 PM    Hemoglobin 9.5 (L) 09/05/2023 02:34 PM    Hemoglobin 9.0 (L) 08/15/2023 01:31 PM    Hemoglobin 8.1 (L) 07/25/2023 02:32 PM    Hemoglobin 7.8 (L) 07/21/2023 11:35 AM    HCT 27.4 (L) 07/25/2023 02:32 PM Hematocrit 33.2 (L) 09/05/2023 02:34 PM    Hematocrit 31.8 (L) 08/15/2023 01:31 PM    Hematocrit 26.8 (L) 07/21/2023 11:35 AM    MCV 92 09/05/2023 02:34 PM    MCV 86 08/15/2023 01:31 PM    MCV 82 07/25/2023 02:32 PM    MCV 85 07/21/2023 11:35 AM    Platelet Count 219 93/53/0730 02:32 PM    Platelets 431 48/98/2264 02:34 PM    Platelets 268 22/45/8479 01:31 PM    Platelets 284 48/27/3853 11:35 AM    BUN 6 07/17/2023 05:21 AM    BUN 6 07/16/2023 06:53 AM    BUN 11 07/15/2023 06:18 AM    Potassium 3.5 07/17/2023 05:21 AM    Potassium 3.8 07/16/2023 06:53 AM    Potassium 4.3 07/15/2023 06:18 AM    Chloride 107 07/17/2023 05:21 AM    Chloride 106 07/16/2023 06:53 AM    Chloride 107 07/15/2023 06:18 AM    CO2 24 07/17/2023 05:21 AM    CO2 24 07/16/2023 06:53 AM    CO2 23 07/15/2023 06:18 AM    Creatinine 0.86 07/17/2023 05:21 AM    Creatinine 0.85 07/16/2023 06:53 AM    Creatinine 0.85 07/15/2023 06:18 AM    AST 13 07/14/2023 12:54 PM    ALT 9 07/14/2023 12:54 PM    Total Protein 6.5 07/14/2023 12:54 PM    Albumin 3.6 07/14/2023 12:54 PM         Imaging Studies: I have personally reviewed pertinent reports. Portions of the record may have been created with voice recognition software. Occasional wrong word or "sound a like" substitutions may have occurred due to the inherent limitations of voice recognition software. Read the chart carefully and recognize, using context, where substitutions have occurred.

## 2023-09-08 NOTE — PATIENT INSTRUCTIONS
Please increase metformin to 1000 mg orally twice a day  Please confirm if you are taking glipizide or not, if you are not taking, please do not start  Check blood sugars at least once a day at different times before meals and at bedtime  Follow-up in 3 months with repeat labs

## 2023-09-13 ENCOUNTER — HOSPITAL ENCOUNTER (OUTPATIENT)
Dept: NON INVASIVE DIAGNOSTICS | Facility: HOSPITAL | Age: 78
Discharge: HOME | End: 2023-09-13
Payer: MEDICARE

## 2023-09-13 VITALS
DIASTOLIC BLOOD PRESSURE: 72 MMHG | HEART RATE: 81 BPM | BODY MASS INDEX: 31.56 KG/M2 | WEIGHT: 238.1 LBS | SYSTOLIC BLOOD PRESSURE: 170 MMHG | HEIGHT: 73 IN

## 2023-09-13 DIAGNOSIS — R06.09 DYSPNEA ON EXERTION: ICD-10-CM

## 2023-09-13 DIAGNOSIS — R60.0 LOWER EXTREMITY EDEMA: ICD-10-CM

## 2023-09-13 PROCEDURE — 93306 TTE W/DOPPLER COMPLETE: CPT

## 2023-09-14 LAB
AORTIC ROOT: 3.4 CM
APICAL FOUR CHAMBER EJECTION FRACTION: 72 %
E WAVE DECELERATION TIME: 245 MS
FRACTIONAL SHORTENING: 29 (ref 28–44)
INTERVENTRICULAR SEPTUM IN DIASTOLE (PARASTERNAL SHORT AXIS VIEW): 1.1 CM
INTERVENTRICULAR SEPTUM: 1.1 CM (ref 0.6–1.1)
LAAS-AP2: 19.9 CM2
LAAS-AP4: 20.6 CM2
LEFT ATRIUM SIZE: 3.4 CM
LEFT ATRIUM VOLUME (MOD BIPLANE): 59 ML
LEFT INTERNAL DIMENSION IN SYSTOLE: 3.6 CM (ref 2.1–4)
LEFT VENTRICULAR INTERNAL DIMENSION IN DIASTOLE: 5.1 CM (ref 3.5–6)
LEFT VENTRICULAR POSTERIOR WALL IN END DIASTOLE: 1.2 CM
LEFT VENTRICULAR STROKE VOLUME: 69 ML
LVSV (TEICH): 69 ML
MV E'TISSUE VEL-LAT: 12 CM/S
MV E'TISSUE VEL-SEP: 10 CM/S
MV PEAK A VEL: 0.87 M/S
MV PEAK E VEL: 92 CM/S
MV STENOSIS PRESSURE HALF TIME: 71 MS
MV VALVE AREA P 1/2 METHOD: 3.1
RA PRESSURE ESTIMATED: 8 MMHG
RIGHT ATRIUM AREA SYSTOLE A4C: 15.4 CM2
RIGHT VENTRICLE ID DIMENSION: 4.4 CM
RV PSP: 24 MMHG
SL CV LEFT ATRIUM LENGTH A2C: 5.4 CM
SL CV LV EF: 55
SL CV PED ECHO LEFT VENTRICLE DIASTOLIC VOLUME (MOD BIPLANE) 2D: 124 ML
SL CV PED ECHO LEFT VENTRICLE SYSTOLIC VOLUME (MOD BIPLANE) 2D: 55 ML
TR MAX PG: 16 MMHG
TR PEAK VELOCITY: 2 M/S
TRICUSPID ANNULAR PLANE SYSTOLIC EXCURSION: 2.2 CM
TRICUSPID VALVE PEAK REGURGITATION VELOCITY: 2.02 M/S

## 2023-09-14 PROCEDURE — 93306 TTE W/DOPPLER COMPLETE: CPT | Performed by: INTERNAL MEDICINE

## 2023-09-14 NOTE — PROGRESS NOTES
Progress Note - Cardiology Office  [facility]    Edin Damon 76 y.o. male MRN: [id number]  : 1945  Encounter: [id number]      Assessment:     1. Paroxysmal atrial fibrillation. 2. Cardiomyopathy- now resolved. 3. Essential hypertension. 4. RBBB. 5. Type II diabetes. 6. Obstructive sleep apnea. Discussion Summary and Plan:    1. Paroxysmal atrial fibrillation.    - Currently in sinus rhythm during today's office visit. - Underwent ANGELA and successful cardioversion on 20.   - Continue Toprol-XL 25 mg daily. - [id number] stroke risk score: 4 points, moderate-high risk. - Currently on Xarelto 20 mg daily. - Continue current medication regimen at this time. - He reports he is not able to tolerate CPAP for his obstructive sleep apnea. 2. Cardiomyopathy -now resolved. - Noted on 2020 TTE, LVEF 45 to 50%. Thought to be likely secondary to A-fib with RVR.   - 23 TTE: LVEF 55% by visual estimation. Systolic function is normal. Wall motion is normal. Diastolic function is normal for age. Left atrial filling pressure is normal.  Mitral valve with mild annular calcification. Tricuspid valve with mild regurgitation. Right ventricle systolic pressure normal, 24 mmHg. - 20 TTE: LVEF 45-50%, mild diffuse hypokinesis. Wall thickness was mildly increased. - Currently on Toprol XL 25 mg daily and ramipril 5 mg daily. 3. Essential hypertension.    - BP during today's visit, 146/60.   - Currently on Toprol XL 25 mg daily and ramipril 5 mg daily. - Continue current medication regimen at this time. 4.  RBBB. - 9/15/23 EKG: Sinus bradycardia with occasional PVC complexes, 58 bpm.  Right bundle branch block. - Noted on 2023 EKG. 5. Type II diabetes. -  HgbA1c: 7.6.   - Follows with [facility]'s Endocrinology outpatient, last seen on 23.     6. Obstructive sleep apnea. - Moderate obstructive sleep apnea.   - Follows outpatient with [facility].  - Last Sleep Medicine note from 6/27/23: "not using CPAP due to claustrophobia associated with nasal mask. Feels like it is very suffocating. He is willing to try full face mask. "  - Reports he is not able to tolerate CPAP has tried both nasal and face mask. Patient / Rony Raines was advised and educated to call our office  immediately if  patient has any new symptoms of chest pain/shortness of breath, near-syncope, syncope, light headedness sustained palpitations  or any other cardiovascular symptoms before their scheduled follow-up appointment. Office number was provided #[phone]. Please call [phone] if any questions. Counseling :  A description of the counseling. Goals and Barriers. Patient's ability to self care: Yes  Medication side effect reviewed with patient in detail and all their questions answered to their satisfaction. HPI :     Edin Damon is a 76y.o. year old male who came for follow up. Patient was last seen by outpatient cardiology in December 2021. Since last office visit patient was hospitalized in July 2023 for pneumonia. Patient reports he has felt well since his hospitalization in July 2023 for pneumonia. He denies experiencing any chest pain, palpitations, shortness of breath, lightheadedness, dizziness, headache, nausea, or vomiting. He states he is able to do his daily activities without any symptoms. Review of Systems   All other systems reviewed and are negative.       Historical Information   Past Medical History:   Diagnosis Date   • Anxiety    • Arthritis     fingers , knee   • BPH (benign prostatic hypertrophy)    • Cataract     currently left eye- to have surgery on 4/10/17   • Cough variant asthma 4/12/2017   • Diabetes mellitus ([facility])     type 2, last assessed 4/12/2017   • Hernia, umbilical     currently has   • HL (hearing loss)     b/l hearing aids   • Labyrinthitis    • Moderate obstructive sleep apnea 2017   • New onset a-fib ([address]) 1/10/2020   • Obesity with body mass index 30 or greater 769   • Umbilical hernia      Past Surgical History:   Procedure Laterality Date   • CATARACT EXTRACTION Right 2017   • COLONOSCOPY      yrs ago   • EYE SURGERY Bilateral     cataracts with IOL     Social History     Substance and Sexual Activity   Alcohol Use Yes   • Alcohol/week: 3.0 standard drinks of alcohol   • Types: 3 Standard drinks or equivalent per week    Comment: socially     Social History     Substance and Sexual Activity   Drug Use Yes   • Types: Marijuana    Comment: most everyday      Social History     Tobacco Use   Smoking Status Former   • Packs/day: 0.50   • Years: 15.00   • Total pack years: 7.50   • Types: Cigarettes   • Quit date: 1983   • Years since quittin.7   Smokeless Tobacco Never     Family History:   Family History   Problem Relation Age of Onset   • Cancer Mother         ovarian   • Diabetes Father    • Cancer Sister         stomach to brain   • Substance Abuse Family    • Substance Abuse Son    • Alcohol abuse Son    • Mental illness Neg Hx        Meds/Allergies     Allergies   Allergen Reactions   • Ceftin [Cefuroxime] GI Intolerance and Vomiting       Current Outpatient Medications:   •  albuterol (2.5 mg/3 mL) 0.083 % nebulizer solution, Take 3 mL (2.5 mg total) by nebulization every 6 (six) hours as needed for wheezing or shortness of breath, Disp: 60 mL, Rfl: 6  •  albuterol (Ventolin HFA) 90 mcg/act inhaler, Inhale 2 puffs every 6 (six) hours as needed for wheezing, Disp: 18 g, Rfl: 3  •  budesonide-formoterol (Symbicort) 160-4.5 mcg/act inhaler, Inhale 2 puffs 2 (two) times a day Rinse mouth after use.  (Patient not taking: Reported on 2023), Disp: 10.2 g, Rfl: 3  •  docusate sodium (COLACE) 100 mg capsule, Take 1 capsule (100 mg total) by mouth 2 (two) times a day as needed for constipation, Disp: 60 capsule, Rfl: 0  •  ferrous gluconate (FERGON) 240 (27 FE) MG tablet, Take 1 tablet (240 mg total) by mouth in the morning, Disp: 30 tablet, Rfl: 11  •  ferrous sulfate 324 (65 Fe) mg, Take 1 tablet (324 mg total) by mouth 2 (two) times a day before meals, Disp: 60 tablet, Rfl: 0  •  finasteride (PROSCAR) 5 mg tablet, TAKE ONE TABLET BY MOUTH EVERY DAY, Disp: 30 tablet, Rfl: 5  •  furosemide (LASIX) 20 mg tablet, Take 1 tablet (20 mg total) by mouth daily (Patient not taking: Reported on 9/8/2023), Disp: 30 tablet, Rfl: 0  •  glipiZIDE (GLUCOTROL XL) 10 mg 24 hr tablet, TAKE 1 TABLET BY MOUTH EVERY DAY WITH BREAKFAST, Disp: 90 tablet, Rfl: 3  •  Glucosamine-Chondroitin (GLUCOSAMINE CHONDR COMPLEX PO), Take by mouth 2 (two) times a day, Disp: , Rfl:   •  metFORMIN (GLUCOPHAGE) 500 mg tablet, Take 2 tablets (1,000 mg total) by mouth 2 (two) times a day with meals, Disp: 360 tablet, Rfl: 3  •  metoprolol succinate (TOPROL-XL) 25 mg 24 hr tablet, Take 1 tablet (25 mg total) by mouth every morning, Disp: 90 tablet, Rfl: 3  •  montelukast (SINGULAIR) 10 mg tablet, Take 1 tablet (10 mg total) by mouth daily at bedtime, Disp: 90 tablet, Rfl: 3  •  Multiple Vitamins-Minerals (MEGAVITE FRUITS & VEGGIES PO), Take by mouth in the morning (Patient not taking: Reported on 9/8/2023), Disp: , Rfl:   •  ramipril (ALTACE) 5 mg capsule, Take 1 capsule (5 mg total) by mouth every morning, Disp: 90 capsule, Rfl: 3  •  rivaroxaban (XARELTO) 20 mg tablet, in the morning, Disp: , Rfl:   •  sertraline (ZOLOFT) 100 mg tablet, TAKE ONE TABLET BY MOUTH EVERY DAY, Disp: 90 tablet, Rfl: 1  •  tamsulosin (FLOMAX) 0.4 mg, TAKE 1 CAPSULE BY MOUTH EVERYDAY AT BEDTIME, Disp: 90 capsule, Rfl: 3    Vitals: There were no vitals taken for this visit. There is no height or weight on file to calculate BMI.   Wt Readings from Last 3 Encounters:   09/13/23 108 kg (238 lb 1.6 oz)   09/08/23 108 kg (237 lb 6.4 oz)   08/09/23 105 kg (231 lb)     There were no vitals filed for this visit.  BP Readings from Last 3 Encounters:   23 170/72   23 142/80   23 138/70       Physical Exam:  Physical Exam  Vitals reviewed. Constitutional:       General: He is not in acute distress. Appearance: He is obese. Cardiovascular:      Rate and Rhythm: Regular rhythm. Bradycardia present. Pulses: Normal pulses. Heart sounds: No murmur heard. Pulmonary:      Effort: Pulmonary effort is normal. No respiratory distress. Breath sounds: Decreased breath sounds present. Neurological:      Mental Status: He is alert. Diagnostic Studies Review Cardio:      EK/15/23 EKG: Sinus bradycardia, 58 bpm, with occasional PVCs. RBBB. Cardiac testing:   Echo complete with contrast if indicated  Result date: 23     Left Ventricle Left ventricular cavity size is normal. Wall thickness is mildly increased. The left ventricular ejection fraction is 55% by visual estimation. . Systolic function is normal.  Wall motion is normal. Diastolic function is normal for age. Left atrial filling pressure is normal.      Right Ventricle Right ventricular cavity size is normal. Systolic function is normal. Wall thickness is normal.      Left Atrium The atrium is normal in size. Right Atrium The atrium is normal in size. Aortic Valve The aortic valve is trileaflet. The leaflets are not thickened. The leaflets are not calcified. The leaflets exhibit normal mobility. There is no evidence of regurgitation. The aortic valve has no significant stenosis. Mitral Valve There is mild calcification. The leaflets exhibit normal mobility. There is mild annular calcification. There is no evidence of regurgitation. There is no evidence of stenosis. The valve has normal function. Tricuspid Valve Tricuspid valve structure is normal. There is mild regurgitation. There is no evidence of stenosis.  The right ventricular systolic pressure is normal. The estimated right ventricular systolic pressure is 39.54 mmHg. Pulmonic Valve Pulmonic valve structure is normal. There is no evidence of regurgitation. There is no evidence of stenosis. Ascending Aorta The aortic root is normal in size. IVC/SVC The right atrial pressure is estimated at 8.0 mmHg. The inferior vena cava is normal in size. Pericardium There is no pericardial effusion. The pericardium is normal in appearance. Results for orders placed during the hospital encounter of 01/10/20    Echo complete with contrast if indicated    [address]  [address]  [phone]    Transthoracic Echocardiogram  2D, M-mode, Doppler, and Color Doppler    Study date:  2020    Patient: Guerrero Smith  MR number: [id number]  Account number: [de-identified]  : 1945  Age: 66 years  Gender: Male  Status: Inpatient  Location: Bedside  Height: 72 in  Weight: 249.5 lb  BP: 139/ 73 mmHg    Indications: Atrial Fibrillation    Diagnoses: I48.0 - Atrial fibrillation    Sonographer:  Geovani Oh RDCS  Referring Physician:  Priti Mcmullen MD  Group:  [facility]  Interpreting Physician:  Kirk Hernandez MD    SUMMARY    LEFT VENTRICLE:  Systolic function was mildly reduced. Ejection fraction was estimated in the range of 45 % to 50 %. There was mild diffuse hypokinesis. Wall thickness was mildly increased. LEFT ATRIUM:  The atrium was moderately dilated. RIGHT ATRIUM:  The atrium was moderately dilated. AORTIC VALVE:  Although there was no diagnostic evidence for vegetation, this study is not adequate to completely exclude the possibility. HISTORY: PRIOR HISTORY: Diabetes Mellitus, Obstructive Sleep Apnea, Asthma    PROCEDURE: The procedure was performed at the bedside. This was a routine study. The transthoracic approach was used. The study included complete 2D imaging, M-mode, complete spectral Doppler, and color Doppler.  The heart rate was 88 bpm,  at the start of the study. Intravenous contrast ( 1.2 ml Definity in NSS, 1 ml) was administered to opacify the left ventricle. Echocardiographic views were limited due to decreased penetration and lung interference. This was a technically  difficult study. LEFT VENTRICLE: Size was normal. Systolic function was mildly reduced. Ejection fraction was estimated in the range of 45 % to 50 %. There was mild diffuse hypokinesis. Wall thickness was mildly increased. No evidence of apical thrombus. DOPPLER: The study was not technically sufficient to allow evaluation of LV diastolic function. RIGHT VENTRICLE: The size was normal. Systolic function was normal with TAPSE-2.2cm Wall thickness was normal.    LEFT ATRIUM: The atrium was moderately dilated. RIGHT ATRIUM: The atrium was moderately dilated. MITRAL VALVE: Valve structure was normal. There was normal leaflet separation. DOPPLER: The transmitral velocity was within the normal range. There was no evidence for stenosis. There was no significant regurgitation. AORTIC VALVE: Leaflets exhibited mildly increased thickness, mild calcification, lower normal cuspal separation, and sclerosis. Although there was no diagnostic evidence for vegetation, this study is not adequate to completely exclude the  possibility. DOPPLER: Transaortic velocity was within the normal range. There was no evidence for stenosis. There was no significant regurgitation. TRICUSPID VALVE: The valve structure was normal. There was normal leaflet separation. DOPPLER: The transtricuspid velocity was within the normal range. There was no evidence for stenosis. There was no significant regurgitation. PULMONIC VALVE: Leaflets exhibited normal thickness, no calcification, and normal cuspal separation. DOPPLER: The transpulmonic velocity was within the normal range. There was no significant regurgitation. PERICARDIUM: There was no pericardial effusion.  The pericardium was normal in appearance. AORTA: The root exhibited normal size. SYSTEMIC VEINS: IVC: The inferior vena cava was normal in size. SYSTEM MEASUREMENT TABLES    2D mode  AoR Diam 2D: 3.4 cm  LA Diam (2D): 3.7 cm  LA/Ao (2D): 1.09  FS (2D Teich): 22.7 %  IVSd (2D): 1.17 cm  LVDEV: 98.8 cmï¾³  LVESV: 53.7 cmï¾³  LVIDd(2D): 4.63 cm  LVISd (2D): 3.58 cm  LVOT Area 2D: 3.14 cmï¾²  LVPWd (2D): 1.15 cm  SV (Teich): 45.1 cmï¾³    Apical four chamber  LVEF A4C: 46 %    Unspecified Scan Mode  KENTON Cont Eq (Peak Josh): 2.21 cmï¾²  LVOT Diam.: 2 cm  LVOT Vmax: 824 mm/s  LVOT Vmax; Mean: 824 mm/s  Peak Grad.; Mean: 3 mm[Hg]  MV Peak A Josh: 326 mm/s  MV Peak E Josh. Mean: 928 mm/s  MVA (PHT): 2.97 cmï¾²  PHT: 74 ms  Max P mm[Hg]  V Max: 2460 mm/s  Vmax: 2310 mm/s  RA Area: 17 cmï¾²  RA Volume: 45.1 cmï¾³  TAPSE: 2.2 cm    [facility]    Prepared and electronically signed by    Vipin Rehman MD  Signed 2020 13:46:20    Results for orders placed during the hospital encounter of 01/10/20    ANGELA    Narrative  [address] [place] [address]  [phone]    Transesophageal Echocardiogram    Study date:  2020    Patient: Erum Prather  MR number: [id number]  Account number: [de-identified]  : 1945  Age: 66 years  Gender: Male  Status: Inpatient  Location: Cath lab  Height: 72 in  Weight: 250 lb  BP: 118/ 60 mmHg    Indications: Atrial Fibrillation    Diagnoses: 427.31 - ATRIAL FIBRILLATION    Sonographer:  JAGUAR Regan  Primary Physician:  Dannie Dalton  Referring Physician:  Vipin Rehman MD  Group:  [facility]  RN:  Neda Garcia RN  Interpreting Physician:  Vipin Rehman MD    SUMMARY    LEFT VENTRICLE:  Systolic function was mildly reduced. Ejection fraction was estimated in the range of 45 % to 50 % to be 50 %. There was mild diffuse hypokinesis.     LEFT ATRIUM:  The atrium was dilated. ATRIAL SEPTUM:  No defect or patent foramen ovale was identified. Contrast injection was performed. There was no right-to-left shunt, with provocative maneuvers to increase right atrial pressure. RIGHT ATRIUM:  The atrium was dilated. MITRAL VALVE:  There was mild regurgitation. There was no echocardiographic evidence of vegetation. AORTIC VALVE:  There was no echocardiographic evidence of vegetation. HISTORY: PRIOR HISTORY: DM, TRISTIN, Asthma    PROCEDURE: The procedure was performed in the catheterization laboratory. This was a routine study. The risks and alternatives of the procedure were explained to the patient and informed consent was obtained. The transesophageal approach  was used. The heart rate was 92 bpm, at the start of the study. An adult omniplane probe was inserted by the attending cardiologist. Intubated with ease. One intubation attempt(s). There was no blood detected on the probe. Intravenous  contrast (agitated saline, 10 ml) was administered to evaluate shunting. LEFT VENTRICLE: Size was normal. Systolic function was mildly reduced. Ejection fraction was estimated in the range of 45 % to 50 % to be 50 %. There was mild diffuse hypokinesis. Wall thickness was normal. No evidence of apical thrombus. DOPPLER: Left ventricular diastolic function parameters were normal.    RIGHT VENTRICLE: The size was normal. Systolic function was normal. Wall thickness was normal.    LEFT ATRIUM: The atrium was dilated. ATRIAL SEPTUM: No defect or patent foramen ovale was identified. Contrast injection was performed. There was no right-to-left shunt, with provocative maneuvers to increase right atrial pressure. RIGHT ATRIUM: The atrium was dilated. MITRAL VALVE: Valve structure was normal. There was normal leaflet separation. There was no echocardiographic evidence of vegetation. DOPPLER: The transmitral velocity was within the normal range.  There was no evidence for stenosis. There  was mild regurgitation. AORTIC VALVE: The valve was trileaflet. Leaflets exhibited mildly increased thickness, calcification, and normal cuspal separation. There was no echocardiographic evidence of vegetation. DOPPLER: Transaortic velocity was within the normal  range. There was no evidence for stenosis. There was no significant regurgitation. TRICUSPID VALVE: The valve structure was normal. There was normal leaflet separation. DOPPLER: The transtricuspid velocity was within the normal range. There was no evidence for stenosis. There was no significant regurgitation. PULMONIC VALVE: Leaflets exhibited normal thickness, no calcification, and normal cuspal separation. DOPPLER: The transpulmonic velocity was within the normal range. There was no significant regurgitation. PERICARDIUM: There was no pericardial effusion. The pericardium was normal in appearance. AORTA: The root exhibited normal size. There was no significant atheroma noted of the transverse and descending aorta    SYSTEMIC VEINS: IVC: The inferior vena cava was normal in size. [facility] Accredited Echocardiography Laboratory    Prepared and electronically signed by    Vipin Rehman MD  Signed 14-Jan-2020 10:49:20      Imaging:  Chest X-Ray:   XR chest pa & lateral    Result Date: 8/15/2023  Impression No acute cardiopulmonary disease. XR chest pa & lateral    Result Date: 6/5/2023  Impression No acute cardiopulmonary disease. CT-scan of the chest:     No CTA results available for this patient.   Lab Review   Lab Results   Component Value Date    WBC 6.98 09/05/2023    HGB 9.5 (L) 09/05/2023    HCT 33.2 (L) 09/05/2023    MCV 92 09/05/2023    RDW 20.2 (H) 09/05/2023     09/05/2023     BMP:  Lab Results   Component Value Date    SODIUM 140 07/17/2023    K 3.5 07/17/2023     07/17/2023    CO2 24 07/17/2023    BUN 6 07/17/2023    CREATININE 0.86 07/17/2023    GLUC 143 (H) 07/17/2023    GLUF 204 (H) 10/22/2020    CALCIUM 8.2 (L) 07/17/2023    EGFR 82 07/17/2023    MG 1.6 01/13/2020     Troponins:    LFT:  Lab Results   Component Value Date    AST 13 07/14/2023    ALT 9 07/14/2023    ALKPHOS 48 07/14/2023    TP 6.5 07/14/2023    ALB 3.6 07/14/2023      No components found for: "TSH3"  Lab Results   Component Value Date    GGL1TJQTKBIN 0.831 10/22/2020     Lab Results   Component Value Date    HGBA1C 7.6 (H) 07/25/2023     Lipid Profile:   Lab Results   Component Value Date    CHOLESTEROL 184 10/22/2020    HDL 44 10/22/2020    LDLCALC 104 (H) 10/22/2020    TRIG 178 (H) 10/22/2020     Lab Results   Component Value Date    CHOLESTEROL 184 10/22/2020     Lab Results   Component Value Date    CKTOTAL 65 09/03/2022    TROPONINI <0.02 01/10/2020     Lab Results   Component Value Date    NTBNP 1,781 (H) 01/10/2020              Darian Partida PA-C

## 2023-09-15 ENCOUNTER — OFFICE VISIT (OUTPATIENT)
Dept: CARDIOLOGY CLINIC | Facility: CLINIC | Age: 78
End: 2023-09-15
Payer: MEDICARE

## 2023-09-15 VITALS
SYSTOLIC BLOOD PRESSURE: 146 MMHG | HEIGHT: 73 IN | OXYGEN SATURATION: 97 % | BODY MASS INDEX: 31.54 KG/M2 | WEIGHT: 238 LBS | HEART RATE: 58 BPM | DIASTOLIC BLOOD PRESSURE: 60 MMHG

## 2023-09-15 DIAGNOSIS — I48.0 PAROXYSMAL ATRIAL FIBRILLATION (HCC): Primary | ICD-10-CM

## 2023-09-15 DIAGNOSIS — I10 ESSENTIAL HYPERTENSION: ICD-10-CM

## 2023-09-15 PROCEDURE — 99214 OFFICE O/P EST MOD 30 MIN: CPT | Performed by: PHYSICIAN ASSISTANT

## 2023-09-15 PROCEDURE — 93000 ELECTROCARDIOGRAM COMPLETE: CPT | Performed by: PHYSICIAN ASSISTANT

## 2023-09-16 PROBLEM — J18.9 PNEUMONIA: Status: RESOLVED | Noted: 2023-07-18 | Resolved: 2023-09-16

## 2023-10-13 DIAGNOSIS — I48.91 NEW ONSET A-FIB (HCC): ICD-10-CM

## 2023-10-13 DIAGNOSIS — E11.65 TYPE 2 DIABETES MELLITUS WITH HYPERGLYCEMIA, WITHOUT LONG-TERM CURRENT USE OF INSULIN (HCC): ICD-10-CM

## 2023-10-13 DIAGNOSIS — F33.1 MODERATE EPISODE OF RECURRENT MAJOR DEPRESSIVE DISORDER (HCC): ICD-10-CM

## 2023-10-13 RX ORDER — METOPROLOL SUCCINATE 25 MG/1
25 TABLET, EXTENDED RELEASE ORAL DAILY
Qty: 90 TABLET | Refills: 1 | Status: SHIPPED | OUTPATIENT
Start: 2023-10-13

## 2023-10-13 RX ORDER — SERTRALINE HYDROCHLORIDE 100 MG/1
TABLET, FILM COATED ORAL
Qty: 90 TABLET | Refills: 1 | Status: SHIPPED | OUTPATIENT
Start: 2023-10-13

## 2023-10-24 ENCOUNTER — TELEPHONE (OUTPATIENT)
Dept: CARDIOLOGY CLINIC | Facility: CLINIC | Age: 78
End: 2023-10-24

## 2023-10-24 NOTE — TELEPHONE ENCOUNTER
Patient's wife called for a refill of Xarelto 20 mg to [facility]. I cannot find this pharmacy in the [facility] system to send it to. Patient's wife gave me a phone number of [phone] and a fax number of [phone]. Patient's wife says that they have used this pharmacy before with no issues. Would you be willing to write a prescription that I can fax over to them? I let her know that you are on vacation and will be back in the office on Thursday.

## 2023-11-08 ENCOUNTER — NURSE TRIAGE (OUTPATIENT)
Age: 78
End: 2023-11-08

## 2023-11-08 NOTE — TELEPHONE ENCOUNTER
Reason for Disposition  • Neck pain is a chronic symptom (recurrent or ongoing AND lasting > 4 weeks)    Answer Assessment - Initial Assessment Questions  1. ONSET: "When did the pain begin?"       One week  2. LOCATION: "Where does it hurt?"       Back of the neck ,   3. PATTERN "Does the pain come and go, or has it been constant since it started?"       Constant   4. SEVERITY: "How bad is the pain?"  (Scale 1-10; or mild, moderate, severe)    - NO PAIN (0): no pain or only slight stiffness     - MILD (1-3): doesn't interfere with normal activities     - MODERATE (4-7): interferes with normal activities or awakens from sleep     - SEVERE (8-10):  excruciating pain, unable to do any normal activities       mild  5. RADIATION: "Does the pain go anywhere else, shoot into your arms?"      denies  6. CORD SYMPTOMS: "Any weakness or numbness of the arms or legs?"      denies  7. CAUSE: "What do you think is causing the neck pain?"      Denies   8. NECK OVERUSE: "Any recent activities that involved turning or twisting the neck?"      Denies   9.  OTHER SYMPTOMS: "Do you have any other symptoms?" (e.g., headache, fever, chest pain, difficulty breathing, neck swelling)      Denies    Protocols used: Neck Pain or Stiffness-ADULT-[place]

## 2023-11-08 NOTE — TELEPHONE ENCOUNTER
Regarding: Severe neck pain, clicking  ----- Message from Nataly Omer sent at 11/8/2023  4:13 PM EST -----  Pt's life partner called on and stated the patient is having really bad neck pain. He saw a chiropractor a few days ago and an adjustment was made but it has not helped at all. She stated he can barely sleep and it's not getting any better.  Please advise

## 2023-11-13 ENCOUNTER — OFFICE VISIT (OUTPATIENT)
Dept: FAMILY MEDICINE CLINIC | Facility: CLINIC | Age: 78
End: 2023-11-13
Payer: MEDICARE

## 2023-11-13 ENCOUNTER — APPOINTMENT (OUTPATIENT)
Dept: LAB | Facility: CLINIC | Age: 78
End: 2023-11-13
Payer: MEDICARE

## 2023-11-13 ENCOUNTER — APPOINTMENT (OUTPATIENT)
Dept: RADIOLOGY | Facility: CLINIC | Age: 78
End: 2023-11-13
Payer: MEDICARE

## 2023-11-13 VITALS
BODY MASS INDEX: 31.01 KG/M2 | RESPIRATION RATE: 20 BRPM | WEIGHT: 234 LBS | HEIGHT: 73 IN | OXYGEN SATURATION: 97 % | SYSTOLIC BLOOD PRESSURE: 120 MMHG | HEART RATE: 63 BPM | DIASTOLIC BLOOD PRESSURE: 70 MMHG | TEMPERATURE: 96.5 F

## 2023-11-13 DIAGNOSIS — D64.9 ANEMIA, UNSPECIFIED TYPE: ICD-10-CM

## 2023-11-13 DIAGNOSIS — M54.2 NECK PAIN: Primary | ICD-10-CM

## 2023-11-13 DIAGNOSIS — E55.9 VITAMIN D DEFICIENCY: ICD-10-CM

## 2023-11-13 DIAGNOSIS — E11.9 TYPE 2 DIABETES MELLITUS WITHOUT COMPLICATION, WITHOUT LONG-TERM CURRENT USE OF INSULIN (HCC): ICD-10-CM

## 2023-11-13 DIAGNOSIS — M54.2 NECK PAIN: ICD-10-CM

## 2023-11-13 DIAGNOSIS — Z23 ENCOUNTER FOR IMMUNIZATION: ICD-10-CM

## 2023-11-13 DIAGNOSIS — I10 PRIMARY HYPERTENSION: ICD-10-CM

## 2023-11-13 DIAGNOSIS — E11.65 TYPE 2 DIABETES MELLITUS WITH HYPERGLYCEMIA, WITHOUT LONG-TERM CURRENT USE OF INSULIN (HCC): ICD-10-CM

## 2023-11-13 DIAGNOSIS — E66.9 OBESITY (BMI 30-39.9): ICD-10-CM

## 2023-11-13 DIAGNOSIS — R53.83 OTHER FATIGUE: ICD-10-CM

## 2023-11-13 LAB
25(OH)D3 SERPL-MCNC: 20.9 NG/ML (ref 30–100)
ALBUMIN SERPL BCP-MCNC: 4 G/DL (ref 3.5–5)
ALP SERPL-CCNC: 53 U/L (ref 34–104)
ALT SERPL W P-5'-P-CCNC: 12 U/L (ref 7–52)
ANION GAP SERPL CALCULATED.3IONS-SCNC: 10 MMOL/L
AST SERPL W P-5'-P-CCNC: 16 U/L (ref 13–39)
BILIRUB SERPL-MCNC: 0.28 MG/DL (ref 0.2–1)
BUN SERPL-MCNC: 25 MG/DL (ref 5–25)
CALCIUM SERPL-MCNC: 9.9 MG/DL (ref 8.4–10.2)
CHLORIDE SERPL-SCNC: 101 MMOL/L (ref 96–108)
CO2 SERPL-SCNC: 29 MMOL/L (ref 21–32)
CREAT SERPL-MCNC: 1.06 MG/DL (ref 0.6–1.3)
CREAT UR-MCNC: 152.8 MG/DL
ERYTHROCYTE [DISTWIDTH] IN BLOOD BY AUTOMATED COUNT: 15.4 % (ref 11.6–15.1)
GFR SERPL CREATININE-BSD FRML MDRD: 65 ML/MIN/1.73SQ M
GLUCOSE SERPL-MCNC: 206 MG/DL (ref 65–140)
HCT VFR BLD AUTO: 38.7 % (ref 36.5–49.3)
HGB BLD-MCNC: 12.2 G/DL (ref 12–17)
MCH RBC QN AUTO: 28.3 PG (ref 26.8–34.3)
MCHC RBC AUTO-ENTMCNC: 31.5 G/DL (ref 31.4–37.4)
MCV RBC AUTO: 90 FL (ref 82–98)
MICROALBUMIN UR-MCNC: 218.3 MG/L
MICROALBUMIN/CREAT 24H UR: 143 MG/G CREATININE (ref 0–30)
PLATELET # BLD AUTO: 295 THOUSANDS/UL (ref 149–390)
PMV BLD AUTO: 11 FL (ref 8.9–12.7)
POTASSIUM SERPL-SCNC: 4.8 MMOL/L (ref 3.5–5.3)
PROT SERPL-MCNC: 7.6 G/DL (ref 6.4–8.4)
RBC # BLD AUTO: 4.31 MILLION/UL (ref 3.88–5.62)
SL AMB POCT HEMOGLOBIN AIC: 6.9 (ref ?–6.5)
SODIUM SERPL-SCNC: 140 MMOL/L (ref 135–147)
T4 FREE SERPL-MCNC: 0.83 NG/DL (ref 0.61–1.12)
TSH SERPL DL<=0.05 MIU/L-ACNC: 0.59 UIU/ML (ref 0.45–4.5)
WBC # BLD AUTO: 8.08 THOUSAND/UL (ref 4.31–10.16)

## 2023-11-13 PROCEDURE — 83036 HEMOGLOBIN GLYCOSYLATED A1C: CPT | Performed by: STUDENT IN AN ORGANIZED HEALTH CARE EDUCATION/TRAINING PROGRAM

## 2023-11-13 PROCEDURE — 82306 VITAMIN D 25 HYDROXY: CPT

## 2023-11-13 PROCEDURE — 99214 OFFICE O/P EST MOD 30 MIN: CPT | Performed by: STUDENT IN AN ORGANIZED HEALTH CARE EDUCATION/TRAINING PROGRAM

## 2023-11-13 PROCEDURE — 85027 COMPLETE CBC AUTOMATED: CPT

## 2023-11-13 PROCEDURE — 80053 COMPREHEN METABOLIC PANEL: CPT

## 2023-11-13 PROCEDURE — 84443 ASSAY THYROID STIM HORMONE: CPT

## 2023-11-13 PROCEDURE — G0008 ADMIN INFLUENZA VIRUS VAC: HCPCS

## 2023-11-13 PROCEDURE — 72050 X-RAY EXAM NECK SPINE 4/5VWS: CPT

## 2023-11-13 PROCEDURE — 84439 ASSAY OF FREE THYROXINE: CPT

## 2023-11-13 PROCEDURE — 36415 COLL VENOUS BLD VENIPUNCTURE: CPT

## 2023-11-13 PROCEDURE — 90662 IIV NO PRSV INCREASED AG IM: CPT

## 2023-11-13 NOTE — PROGRESS NOTES
Name: Christie Au      : 1945      MRN: [id number]  Encounter Provider: Rosemary Heart MD  Encounter Date: 2023   Encounter department: [facility]     1. Neck pain  -     Ambulatory Referral to Physical Therapy; Future  -     XR spine cervical complete 4 or 5 vw non injury; Future; Expected date: 2023    2. Type 2 diabetes mellitus with hyperglycemia, without long-term current use of insulin ([facility])  Assessment & Plan:    Lab Results   Component Value Date    HGBA1C 6.9 (A) 2023     -Stable on current medical regimen    Orders:  -     POCT hemoglobin A1c    3. Anemia, unspecified type  -     CBC and Platelet; Future    4. Encounter for immunization  -     influenza vaccine, high-dose, PF 0.7 mL (FLUZONE HIGH-DOSE)         Subjective      HPI    Patient presents to discuss neck pain. He notes he hears a clicking sound in his neck upon movement. His wife notes he tends to have poor posture while sitting and looking at his phone. He has not taken any oral medications for his neck pain. Denies numbness and tingling and pain going down his shoulders. Denies trauma to the neck. Patient has history of anemia. Review of Systems   Constitutional:  Positive for activity change. Negative for appetite change, chills, fatigue and fever. HENT:  Negative for congestion, postnasal drip, rhinorrhea, sinus pressure and sinus pain. Respiratory:  Negative for cough, shortness of breath and wheezing. Cardiovascular:  Negative for chest pain, palpitations and leg swelling. Gastrointestinal:  Negative for abdominal pain, constipation, diarrhea, nausea and vomiting. Musculoskeletal:  Positive for neck pain. Skin:  Negative for rash. Neurological:  Negative for weakness, light-headedness and headaches. Psychiatric/Behavioral:  The patient is not nervous/anxious.         Current Outpatient Medications on File Prior to Visit   Medication Sig • albuterol (2.5 mg/3 mL) 0.083 % nebulizer solution Take 3 mL (2.5 mg total) by nebulization every 6 (six) hours as needed for wheezing or shortness of breath   • albuterol (Ventolin HFA) 90 mcg/act inhaler Inhale 2 puffs every 6 (six) hours as needed for wheezing   • ferrous gluconate (FERGON) 240 (27 FE) MG tablet Take 1 tablet (240 mg total) by mouth in the morning   • ferrous sulfate 324 (65 Fe) mg Take 1 tablet (324 mg total) by mouth 2 (two) times a day before meals   • finasteride (PROSCAR) 5 mg tablet TAKE ONE TABLET BY MOUTH EVERY DAY   • glipiZIDE (GLUCOTROL XL) 10 mg 24 hr tablet TAKE 1 TABLET BY MOUTH EVERY DAY WITH BREAKFAST   • Glucosamine-Chondroitin (GLUCOSAMINE CHONDR COMPLEX PO) Take by mouth 2 (two) times a day   • metFORMIN (GLUCOPHAGE) 500 mg tablet Take 2 tablets (1,000 mg total) by mouth 2 (two) times a day with meals   • metoprolol succinate (TOPROL-XL) 25 mg 24 hr tablet TAKE ONE TABLET BY MOUTH EVERY MORNING   • montelukast (SINGULAIR) 10 mg tablet Take 1 tablet (10 mg total) by mouth daily at bedtime   • ramipril (ALTACE) 5 mg capsule Take 1 capsule (5 mg total) by mouth every morning   • rivaroxaban (XARELTO) 20 mg tablet in the morning   • tamsulosin (FLOMAX) 0.4 mg TAKE 1 CAPSULE BY MOUTH EVERYDAY AT BEDTIME   • docusate sodium (COLACE) 100 mg capsule Take 1 capsule (100 mg total) by mouth 2 (two) times a day as needed for constipation (Patient not taking: Reported on 11/13/2023)   • sertraline (ZOLOFT) 100 mg tablet TAKE ONE TABLET BY MOUTH EVERY DAY       Objective     /70 (BP Location: Right arm, Patient Position: Sitting, Cuff Size: Large)   Pulse 63   Temp (!) 96.5 °F (35.8 °C) (Temporal)   Resp 20   Ht 6' 1" (1.854 m)   Wt 106 kg (234 lb)   SpO2 97%   BMI 30.87 kg/m²     Physical Exam  Constitutional:       Appearance: Normal appearance. HENT:      Head: Normocephalic and atraumatic.    Neck:     Cardiovascular:      Rate and Rhythm: Normal rate and regular rhythm. Pulses: Normal pulses. Heart sounds: Normal heart sounds. Pulmonary:      Effort: Pulmonary effort is normal.      Breath sounds: Normal breath sounds. Musculoskeletal:      Cervical back: No edema, erythema, signs of trauma, rigidity or crepitus. Pain with movement and muscular tenderness present. No spinous process tenderness. Normal range of motion. Neurological:      General: No focal deficit present. Mental Status: He is alert and oriented to person, place, and time. Psychiatric:         Mood and Affect: Mood normal.         Behavior: Behavior normal.         Thought Content:  Thought content normal.         Judgment: Judgment normal.       Casey Carrion MD

## 2023-11-13 NOTE — ASSESSMENT & PLAN NOTE
Lab Results   Component Value Date    HGBA1C 6.9 (A) 11/13/2023     -Stable on current medical regimen

## 2023-11-14 ENCOUNTER — TELEPHONE (OUTPATIENT)
Dept: FAMILY MEDICINE CLINIC | Facility: CLINIC | Age: 78
End: 2023-11-14

## 2023-11-14 NOTE — TELEPHONE ENCOUNTER
----- Message from Brenda Quintero MD sent at 11/14/2023  8:08 AM EST -----  Please call patient and let him know that hemoglobin level is normal. No further action at this time.

## 2023-11-15 ENCOUNTER — TELEPHONE (OUTPATIENT)
Dept: FAMILY MEDICINE CLINIC | Facility: CLINIC | Age: 78
End: 2023-11-15

## 2023-11-15 ENCOUNTER — EVALUATION (OUTPATIENT)
Dept: PHYSICAL THERAPY | Facility: CLINIC | Age: 78
End: 2023-11-15
Payer: MEDICARE

## 2023-11-15 DIAGNOSIS — Z91.81 HISTORY OF FALL: ICD-10-CM

## 2023-11-15 DIAGNOSIS — R26.89 BALANCE PROBLEM: Primary | ICD-10-CM

## 2023-11-15 DIAGNOSIS — M54.2 NECK PAIN: ICD-10-CM

## 2023-11-15 PROCEDURE — 97161 PT EVAL LOW COMPLEX 20 MIN: CPT | Performed by: PHYSICAL THERAPIST

## 2023-11-15 NOTE — PROGRESS NOTES
PT Evaluation           Insurance:  AMA/CMS Mini/ Daysi Alfaro #/ Referral # Total units  Start date  Expiration date Extension  Visit limitation? PT only or  PT+OT? Co-Insurance   CMS  none  na na  no na Yes. $0                 POC Start Date POC Expiration Date Signed POC?   11/15/2023 2/7/24 pending              Today's date: 11/15/2023  Patient name: Margorie Bosworth  : 1945  MRN: [id number]  Referring provider: Marino Perez MD  Dx:   Encounter Diagnosis     ICD-10-CM    1. Balance problem  R26.89       2. Neck pain  M54.2 Ambulatory Referral to Physical Therapy      3. History of fall  Z91.81           Time in: 1410  Time out: 1452    Assessment  Assessment details: Margorie Bosworth is a 78 y.o. Male who presents to skilled outpatient PT with impairments in decreased endurance, decreased strength, impaired static and dynamic standing balance, limited AROM of cervical spine and (+) pain with movement. Currently, client presents with decreased functional strength evident by score of 34 sec (with need for CG) during 5XSTS. He presents with decreased endurance evident by gait speed of 1.0 m/s without AD which reflects decreased community mobility and does not allow him to cross the street safely as per research guidelines. He is at an increased risk for falls due to scores of >13.0 sec during TUG balance assessment, and presents with decreased protective balance reactions with mCTSIB. Please refer to chart below for details. Plan to complete DGI, 6MWT and comprehensive cervical ROM next session. All questions addressed at this time. Client was fairly active baseline and is limited as per activities listed below. Client will benefit from skilled OPPT services to maximize their function and return to age appropriate roles of home and community safely and independently.      Functional limitations: decreased ability and confidence with item retrieval to/from floor, decreased confidence walking on unlevel surfaces, difficulties changing a lightbulb, (+) fall history, difficulties with floor to/from stand transfers (+) pain with head turning. Impairments: Abnormal coordination, Abnormal gait, Abnormal or restricted ROM, Activity intolerance, Impaired balance, Impaired physical strength, Lacks appropriate HEP, Poor posture, Pain with function, and Safety issue  Understanding of Dx/Px/POC: Excellent  Prognosis: Good    He verbalized understanding of POC. Please contact me if you have any questions or recommendations. Thank you for the referral and the opportunity to share in [facility].         Plan  Plan details: 6MWT, DGI/FGA, static/dynamic standing balance, elevation training, generalized strengthening, cervical ROM/assessment, standing tolerance    Client would benefit from: PT Eval and Skilled PT  Planned modality interventions: Cryotherapy  Planned therapy interventions: ADL training, Balance, Body mechanics training, Coordination, Functional ROM exercises, Gait training, HEP, Neuromuscular re-education, Patient education, Postural training, Strengthening, Stretching, Therapeutic activities, and Therapeutic exercises  Frequency: 2x/wk  Duration in weeks: 12  Plan of Care beginning date: 11/15/23   Plan of Care expiration date: 16 weeks - 2/7/2024  Treatment plan discussed with: Patient       Goals  Short Term Goals (4 weeks):    - Client will improve time on TUG by 2.9 seconds from 15.82 seconds to 12.92 seconds to facilitate improved safety in all ambulation  - Client will be independent in basic HEP 2-3 weeks  - Client will improve 5xSTS score by 2.3 seconds from 34.0 seconds to 31.7 seconds to promote improved LE functional strength needed for ADLs  - Client will complete DGI and 6MWT walk balance assessment to progress safety with dynamic tasks    Long Term Goals (12 weeks):  - Client will be independent in a comprehensive home exercise program  - Client will improve scoring on DGI by 2.6 points  to progress safety  - Client will improve gait speed by 0.18 m/s from 1.0 m/s to 1.18 m/s to improve safety with community ambulation  - Client will improve BARRAGAN by 6 points  in order to improve static balance and reduce risk for falls  - Client will improve scoring on FGA by 4 points to progress safety with dynamic tasks  - Client will be able to demonstrate HT in gait without veering  - Client will improve 6 Minute Walk Test score by 190 feet  to promote improved cardiovascular endurance  - Client will be able to ascend/descend stairs reciprocally with 1 UE assist to promote independence and safety with ADLs        Cut off score    All date taken from [facility] Neuro Section or Rehab Measures      Barragan/58  MDC: 6 pts  Age Norms:  57-79: M - 54   F - 55  70-79: M - 47   F - 53  80-89: M - 48   F - 50 5xSTS: Perla et al 2010  MDC: 2.3 sec  Age Norms:  60-69: 11.1 sec  70-79: 12.6 sec  80-89: 14.8 sec   TUG  MDC: 4.14 sec  Cut off score:  >13.5 sec community dwelling adults  >32.2 frail elderly  <20 I for basic transfers  >30 dependent on transfers 10 Meter Walk Test: Evelyn Manzano al 2011  MDC: 0.18 m/s  20-29: M - 1.35 m   F - 1.34 m  30-39: M - 1.43 m   F - 1.34 m  40-49: M - 1.43 m   F - 1.39 m  50-59: M - 1.43 m   F - 1.31 m  60-69: M - 1.34 m   F - 1.24 m  70-79: M - 1.26 m   F - 1.13 m  80-89: M - 0.97 m   F - 0.94 m    Household Ambulator < 0.4 m/s  Limited Community Ambulator 0.4 - 0.8 m/s  [employer] 0.8 - 1.2 m/s  Safely cross the street > 1.2 m/s   FGA  MCID: 4 pts  Geriatrics/community < 22/30 fall risk  Geriatrics/community < 20/30 unexplained falls    DGI  MDC: vestibular - 4 pts  MDC: geriatric/community - 3 pts  Falls risk <19/24 mCTSIB  Norm: 20-60 yrs  Eyes open firm: norm sway 0.21-0.48  Eyes closed firm: norm sway 0.48-0.99  Eyes open foam: norm sway 0.38-0.71  Eyes closed foam: norm sway 0. 70-2.22   6 Minute Walk Test  MDC: 190.98 ft  MCID: 164 ft    Age Norms  57-79: M - 1876 ft (571.80 m)  F - 1765 ft (537.98 m)  70-79: M - 1729 ft (527.00 m)  F - 1545 ft (470.92 m)  80-89: M - 1368 ft (416.97 m)  F - 1286 ft (391.97 m) ABC: Grisel Seals, 2003  <67% increased risk for falls         Subjective    History of Present Illness  - Mechanism of injury: Client arrives with reports of an approx 6 month history of neck pain, restricting his ability to look up, left and right. He has X-rays yesterday which was WNL. He also reports approx 1-1.5 year history of worsening balance, most recently over the past month, requiring him to use handrails more in the community, rely on wife assisting him if he is changing a light bulb etc. Hx: Type II DM with stable labs. Of note he had a fall last year resulting in a broken L humerus.  - Primary AD: n/a  - Assist level at home: as needed by family  - Decreased fine motor tasks: Yes      Pain  - Current pain ratin/10  - At best pain ratin/10  - At worst pain ratin/10  - Location: B suboccipitals and posterior neck  - Aggravating factors: cervical rotation, looking down, looking up    Cervical AROM:  Upon skilled general assessment, grossly limited in all planes with (+) reproduction of pain with looking up and rotating bilaterally and looking down. Plan to complete formal cervical assessment with use of inclinometer next session. 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.    - Hand dominance: right     Treatments  - Previous treatment: n/a  - Current treatment: OPPT for balance and neck pain  - Diagnostic Testing: x-rays of cervical spine WNL      Objective       LE MMT  - R Hip Flexion: 4/5  L Hip Flexion: 4/5  - R Knee Extension: 4/5  L Knee Extension: 4/5  - R Knee Flexion: 4/5  L Knee Flexion: 4+/5  - R Ankle DF: 4+/5   L Ankle DF: 4+/5      Sensation  - Light touch: intact to light touch BLE    Coordination  - Alternate Toe Taps: delayed  - Finger to Nose: slight dysmetria      Reflexes/Clonus  WNL      Postural Screen  - Observation: mild forward head and rounded shoulders, (+) mild kyphotic posturing  - Improvement in symptoms with correction of posture: Yes    Gait  - Abnormalities: slowed brett, limited pushoff bilaterally           Outcome Measures Initial Eval  11/15/23        5xSTS 34.00 sec, CG        TUG 15.82 sec        10 meter 10.00 sec  1.0 m/s        BARRAGAN /56        FGA /30        DGI /24        mCTSIB  - FTEO (firm)  - FTEC (firm)  - FTEO (foam)  - FTEC (foam)   30 sec  9.50 sec  30 sec  1.19 sec        6MWT  ft        ABC 74.38%                            Precautions: falls  Past Medical History:   Diagnosis Date    Anxiety     Arthritis     fingers , knee    BPH (benign prostatic hypertrophy)     Cataract     currently left eye- to have surgery on 4/10/17    Cough variant asthma 4/12/2017    Diabetes mellitus ([address])     type 2, last assessed 5/07/1335    Hernia, umbilical     currently has    HL (hearing loss)     b/l hearing aids    Labyrinthitis     Moderate obstructive sleep apnea 4/11/2017    New onset a-fib ([address]) 1/10/2020    Obesity with body mass index 30 or greater 03/8/7440    Umbilical hernia

## 2023-11-15 NOTE — TELEPHONE ENCOUNTER
Called patient to discuss recent Xray results but went to . Will call back at a more convenient time.

## 2023-11-15 NOTE — TELEPHONE ENCOUNTER
----- Message from Mary Vidal MD sent at 11/14/2023  8:08 AM EST -----  Please call patient and let him know that hemoglobin level is normal. No further action at this time.

## 2023-11-16 ENCOUNTER — OFFICE VISIT (OUTPATIENT)
Dept: PHYSICAL THERAPY | Facility: CLINIC | Age: 78
End: 2023-11-16
Payer: MEDICARE

## 2023-11-16 DIAGNOSIS — M54.2 NECK PAIN: ICD-10-CM

## 2023-11-16 DIAGNOSIS — R26.89 BALANCE PROBLEM: Primary | ICD-10-CM

## 2023-11-16 DIAGNOSIS — Z91.81 HISTORY OF FALL: ICD-10-CM

## 2023-11-16 PROCEDURE — 97110 THERAPEUTIC EXERCISES: CPT | Performed by: PHYSICAL THERAPIST

## 2023-11-16 NOTE — PROGRESS NOTES
Daily Note     Today's date: 2023  Patient name: Erum Prather  : 1945  MRN: [id number]  Referring provider: Oral Huitron MD  Dx:   Encounter Diagnosis     ICD-10-CM    1. Balance problem  R26.89       2. Neck pain  M54.2       3. History of fall  Z91.81           Start Time: 1109  Stop Time: 1150  Total time in clinic (min): 41 minutes    Subjective: Client arrives late to session and he got stuck in traffic. He reports having some neck pain last night making it difficult to sleep. Objective: See treatment diary below    TE:    - Cervical AROM  Flexion: 50 degrees  Extension: 43 degrees slight pain on endrange  R rotation: 48 degrees "slight pain" on L  L rotation: 45 degrees "uncomfortable" on L side  R side-bendin degrees "stretching" on L  L side-bending: 15 degrees (+) pain on L    - 6MWT: refer to chart below  - STS x 10, 0 UE support  - standing heelraises 30x   - standing hip abduction 2x10 BLE  - standing hip extension 2x10 BLE    Assessment: Client with good participation throughout session. Session focused on strengthening exercises, cervical AROM, and endurance testing. He currently presents with decreased active cervical ROM with (+) symptom provocation on L, consistent with subjective reports from IE. He also presents with decreased functional endurance with 6MWT. Provided client with HEP for 10x STS 3x a week and goal to walk for 6 minutes 3x a week to further address his goals and improve his function. Plan: Continue per plan of care. Insurance:  AMA/CMS Eval/ Abril Du #/ Referral # Total units  Start date  Expiration date Extension  Visit limitation? PT only or  PT+OT?  Co-Insurance   CMS  none  na na  no na Yes. $0                 POC Start Date POC Expiration Date Signed POC?   11/15/2023 2/7/24 yes       Outcome Measures Initial Eval  11/15/23 + 23        5xSTS 34.00 sec, CG        TUG 15.82 sec        10 meter 10.00 sec  1.0 m/s        LAUREL /56        FGA /30        DGI /24        mCTSIB  - FTEO (firm)  - FTEC (firm)  - FTEO (foam)  - FTEC (foam)   30 sec  9.50 sec  30 sec  1.19 sec        6MWT  1000 ft                                     Precautions: falls

## 2023-11-20 ENCOUNTER — OFFICE VISIT (OUTPATIENT)
Dept: PHYSICAL THERAPY | Facility: CLINIC | Age: 78
End: 2023-11-20
Payer: MEDICARE

## 2023-11-20 DIAGNOSIS — M54.2 NECK PAIN: ICD-10-CM

## 2023-11-20 DIAGNOSIS — R26.89 BALANCE PROBLEM: Primary | ICD-10-CM

## 2023-11-20 DIAGNOSIS — Z91.81 HISTORY OF FALL: ICD-10-CM

## 2023-11-20 PROCEDURE — 97110 THERAPEUTIC EXERCISES: CPT | Performed by: PHYSICAL THERAPIST

## 2023-11-20 PROCEDURE — 97112 NEUROMUSCULAR REEDUCATION: CPT | Performed by: PHYSICAL THERAPIST

## 2023-11-20 NOTE — PROGRESS NOTES
Daily Note     Today's date: 2023  Patient name: Erum Prather  : 1945  MRN: [id number]  Referring provider: Oral Huitron MD  Dx:   Encounter Diagnosis     ICD-10-CM    1. Balance problem  R26.89       2. Neck pain  M54.2       3. History of fall  Z91.81           Start Time: 1236  Stop Time: 1319  Total time in clinic (min): 43 minutes    Subjective: Client arrived late to session as he drove to the wrong clinic again. He was agreeable to wait until 1230 to be seen today. Objective: See treatment diary below    TE:  - STS 2x10, 0 UE support  - standing heelraises 30x, 1 UE   - standing hip abduction 2x10 BLE  - upper trap stretch 30 sec x 5 bilaterally  - levator scap 30 sec x 5 bilaterally      NMR:   - side stepping along airex beam 10 feet x 10, 0-1 UE support  - tandem walking along airex beam  10 feet x 10, 1 UE support    Assessment: Client with good participation throughout session. Session focused on standing balance, postural control and neck stretching. All activities completed to tolerance. Plan: Continue per plan of care. Insurance:  AMA/CMS Eval/ Velora Du #/ Referral # Total units  Start date  Expiration date Extension  Visit limitation? PT only or  PT+OT?  Co-Insurance   CMS  none  na na  no na Yes. $0                 POC Start Date POC Expiration Date Signed POC?   11/15/2023 2/7/24 yes       Outcome Measures Initial Eval  11/15/23 + 23        5xSTS 34.00 sec, CG        TUG 15.82 sec        10 meter 10.00 sec  1.0 m/s        BARRAGAN /56        FGA /30        DG /24        mCTSIB  - FTEO (firm)  - FTEC (firm)  - FTEO (foam)  - FTEC (foam)   30 sec  9.50 sec  30 sec  1.19 sec        6MWT  1000 ft                                     Precautions: falls

## 2023-11-21 ENCOUNTER — RA CDI HCC (OUTPATIENT)
Dept: OTHER | Facility: HOSPITAL | Age: 78
End: 2023-11-21

## 2023-11-22 ENCOUNTER — OFFICE VISIT (OUTPATIENT)
Dept: PHYSICAL THERAPY | Facility: CLINIC | Age: 78
End: 2023-11-22
Payer: MEDICARE

## 2023-11-22 DIAGNOSIS — R26.89 BALANCE PROBLEM: Primary | ICD-10-CM

## 2023-11-22 DIAGNOSIS — Z91.81 HISTORY OF FALL: ICD-10-CM

## 2023-11-22 DIAGNOSIS — M54.2 NECK PAIN: ICD-10-CM

## 2023-11-22 PROCEDURE — 97112 NEUROMUSCULAR REEDUCATION: CPT | Performed by: PHYSICAL THERAPIST

## 2023-11-22 PROCEDURE — 97110 THERAPEUTIC EXERCISES: CPT | Performed by: PHYSICAL THERAPIST

## 2023-11-22 NOTE — PROGRESS NOTES
Daily Note     Today's date: 2023  Patient name: Belen Banks  : 1945  MRN: [id number]  Referring provider: Mini Alfaro MD  Dx:   Encounter Diagnosis     ICD-10-CM    1. Balance problem  R26.89       2. Neck pain  M54.2       3. History of fall  Z91.81           Start Time: 1406  Stop Time: 1449  Total time in clinic (min): 43 minutes    Subjective: Client arrived without reports of falls. He states he feels the exercises are helping him feel really good. Objective: See treatment diary below    TE:  - standing heelraises 30x, 1 UE   - standing hip abduction 2x10 BLE  - STS 2x10, airex pad on chair,  0UE support    NMR:   - side stepping along airex beam 10 feet x 10, 0-1 UE support  - tandem walking along airex beam  10 feet x 10, 1 UE support  - wobble board 30x AP/ML,1 UE support  - fwd/bwd stepping over blanca 1UE support 30x    Not today:  - upper trap stretch 30 sec x 5 bilaterally  - levator scap 30 sec x 5 bilaterally    Assessment: Client with good participation throughout session. Session focused on standing balance, postural control and strengthening. Initiated and trained with anterior/posterior balance reactions and exercises today. All activities completed to tolerance. Plan: Continue per plan of care. Insurance:  AMA/CMS Eval/ [employer] #/ Referral # Total units  Start date  Expiration date Extension  Visit limitation? PT only or  PT+OT?  Co-Insurance   CMS  none  na na  no na Yes. $0                 POC Start Date POC Expiration Date Signed POC?   11/15/2023 2/7/24 yes       Outcome Measures Initial Eval  11/15/23 + 23        5xSTS 34.00 sec, CG        TUG 15.82 sec        10 meter 10.00 sec  1.0 m/s        BARRAGAN /56        FGA /30        DG /24        mCTSIB  - FTEO (firm)  - FTEC (firm)  - FTEO (foam)  - FTEC (foam)   30 sec  9.50 sec  30 sec  1.19 sec        6MWT  1000 ft                                     Precautions: falls

## 2023-11-27 ENCOUNTER — OFFICE VISIT (OUTPATIENT)
Dept: FAMILY MEDICINE CLINIC | Facility: CLINIC | Age: 78
End: 2023-11-27
Payer: MEDICARE

## 2023-11-27 ENCOUNTER — OFFICE VISIT (OUTPATIENT)
Dept: PHYSICAL THERAPY | Facility: CLINIC | Age: 78
End: 2023-11-27
Payer: MEDICARE

## 2023-11-27 VITALS
WEIGHT: 236 LBS | SYSTOLIC BLOOD PRESSURE: 158 MMHG | OXYGEN SATURATION: 96 % | HEART RATE: 60 BPM | TEMPERATURE: 94.9 F | HEIGHT: 73 IN | RESPIRATION RATE: 14 BRPM | DIASTOLIC BLOOD PRESSURE: 78 MMHG | BODY MASS INDEX: 31.28 KG/M2

## 2023-11-27 DIAGNOSIS — R26.89 BALANCE PROBLEM: Primary | ICD-10-CM

## 2023-11-27 DIAGNOSIS — M54.2 NECK PAIN: ICD-10-CM

## 2023-11-27 DIAGNOSIS — Z12.11 COLON CANCER SCREENING: ICD-10-CM

## 2023-11-27 DIAGNOSIS — I48.0 PAROXYSMAL ATRIAL FIBRILLATION (HCC): ICD-10-CM

## 2023-11-27 DIAGNOSIS — Z00.00 MEDICARE ANNUAL WELLNESS VISIT, SUBSEQUENT: ICD-10-CM

## 2023-11-27 DIAGNOSIS — Z12.5 ENCOUNTER FOR PROSTATE CANCER SCREENING: ICD-10-CM

## 2023-11-27 DIAGNOSIS — Z91.81 HISTORY OF FALL: ICD-10-CM

## 2023-11-27 DIAGNOSIS — J30.9 CHRONIC ALLERGIC RHINITIS: ICD-10-CM

## 2023-11-27 DIAGNOSIS — I48.0 PAROXYSMAL ATRIAL FIBRILLATION (HCC): Primary | ICD-10-CM

## 2023-11-27 DIAGNOSIS — I10 HYPERTENSION GOAL BP (BLOOD PRESSURE) < 140/90: ICD-10-CM

## 2023-11-27 DIAGNOSIS — E11.65 TYPE 2 DIABETES MELLITUS WITH HYPERGLYCEMIA, WITHOUT LONG-TERM CURRENT USE OF INSULIN (HCC): Primary | ICD-10-CM

## 2023-11-27 DIAGNOSIS — J45.40 MODERATE PERSISTENT ASTHMA WITHOUT COMPLICATION: ICD-10-CM

## 2023-11-27 DIAGNOSIS — G47.33 MODERATE OBSTRUCTIVE SLEEP APNEA: ICD-10-CM

## 2023-11-27 PROBLEM — J45.991 COUGH VARIANT ASTHMA: Status: RESOLVED | Noted: 2017-04-12 | Resolved: 2023-11-27

## 2023-11-27 PROBLEM — R06.00 DYSPNEA: Status: RESOLVED | Noted: 2018-02-05 | Resolved: 2023-11-27

## 2023-11-27 PROBLEM — J40 BRONCHITIS: Status: RESOLVED | Noted: 2023-05-22 | Resolved: 2023-11-27

## 2023-11-27 PROBLEM — R22.40 MASS OF KNEE: Status: RESOLVED | Noted: 2017-03-30 | Resolved: 2023-11-27

## 2023-11-27 PROBLEM — Z86.19 HISTORY OF INFECTION DUE TO STREPTOCOCCUS PNEUMONIAE: Status: RESOLVED | Noted: 2020-01-17 | Resolved: 2023-11-27

## 2023-11-27 PROBLEM — M25.569 KNEE PAIN: Status: RESOLVED | Noted: 2017-03-21 | Resolved: 2023-11-27

## 2023-11-27 PROBLEM — J45.30 MILD PERSISTENT ASTHMA WITHOUT COMPLICATION: Status: RESOLVED | Noted: 2023-08-09 | Resolved: 2023-11-27

## 2023-11-27 PROCEDURE — 99215 OFFICE O/P EST HI 40 MIN: CPT | Performed by: FAMILY MEDICINE

## 2023-11-27 PROCEDURE — 93000 ELECTROCARDIOGRAM COMPLETE: CPT | Performed by: FAMILY MEDICINE

## 2023-11-27 PROCEDURE — 97110 THERAPEUTIC EXERCISES: CPT | Performed by: PHYSICAL THERAPIST

## 2023-11-27 PROCEDURE — G0438 PPPS, INITIAL VISIT: HCPCS | Performed by: FAMILY MEDICINE

## 2023-11-27 NOTE — ASSESSMENT & PLAN NOTE
Lab Results   Component Value Date    HGBA1C 6.9 (A) 11/13/2023       COMPLIANT WITH MEDICATION  DENIES ANY NUMBNESS, TINGLING IN FEET    - DISCUSSED DIETARY MODIFICATION OF CARBOHYDRATES  - HGB A1C AND URINE STUDIES DUE TODAY  - FOOT CARE  - RV 3 MONTHS

## 2023-11-27 NOTE — LETTER
FRANKLIN BRIDGES      Current Outpatient Medications:     albuterol (2.5 mg/3 mL) 0.083 % nebulizer solution, Take 3 mL (2.5 mg total) by nebulization every 6 (six) hours as needed for wheezing or shortness of breath, Disp: 60 mL, Rfl: 6    albuterol (Ventolin HFA) 90 mcg/act inhaler, Inhale 2 puffs every 6 (six) hours as needed for wheezing, Disp: 18 g, Rfl: 3    docusate sodium (COLACE) 100 mg capsule, Take 1 capsule (100 mg total) by mouth 2 (two) times a day as needed for constipation (Patient not taking: Reported on 11/13/2023), Disp: 60 capsule, Rfl: 0    ferrous gluconate (FERGON) 240 (27 FE) MG tablet, Take 1 tablet (240 mg total) by mouth in the morning, Disp: 30 tablet, Rfl: 11    ferrous sulfate 324 (65 Fe) mg, Take 1 tablet (324 mg total) by mouth 2 (two) times a day before meals, Disp: 60 tablet, Rfl: 0    finasteride (PROSCAR) 5 mg tablet, TAKE ONE TABLET BY MOUTH EVERY DAY, Disp: 30 tablet, Rfl: 5    glipiZIDE (GLUCOTROL XL) 10 mg 24 hr tablet, TAKE 1 TABLET BY MOUTH EVERY DAY WITH BREAKFAST, Disp: 90 tablet, Rfl: 3    Glucosamine-Chondroitin (GLUCOSAMINE CHONDR COMPLEX PO), Take by mouth 2 (two) times a day, Disp: , Rfl:     metFORMIN (GLUCOPHAGE) 500 mg tablet, Take 2 tablets (1,000 mg total) by mouth 2 (two) times a day with meals, Disp: 360 tablet, Rfl: 3    metoprolol succinate (TOPROL-XL) 25 mg 24 hr tablet, TAKE ONE TABLET BY MOUTH EVERY MORNING, Disp: 90 tablet, Rfl: 1    montelukast (SINGULAIR) 10 mg tablet, Take 1 tablet (10 mg total) by mouth daily at bedtime, Disp: 90 tablet, Rfl: 3    ramipril (ALTACE) 5 mg capsule, Take 1 capsule (5 mg total) by mouth every morning, Disp: 90 capsule, Rfl: 3    rivaroxaban (XARELTO) 20 mg tablet, in the morning, Disp: , Rfl:     rivaroxaban (Xarelto) 20 mg tablet, Take 1 tablet (20 mg total) by mouth daily with breakfast for 14 days, Disp: 14 tablet, Rfl: 0    sertraline (ZOLOFT) 100 mg tablet, TAKE ONE TABLET BY MOUTH EVERY DAY, Disp: 90 tablet, Rfl: 1    tamsulosin (FLOMAX) 0.4 mg, TAKE 1 CAPSULE BY MOUTH EVERYDAY AT BEDTIME, Disp: 90 capsule, Rfl: 3      Recent Results (from the past 672 hour(s))   POCT hemoglobin A1c    Collection Time: 11/13/23 10:27 AM   Result Value Ref Range    Hemoglobin A1C 6.9 (A) 6.5   Albumin / creatinine urine ratio    Collection Time: 11/13/23  1:23 PM   Result Value Ref Range    Creatinine, Ur 152.8 Reference range not established. mg/dL    Albumin,U,Random 218.3 (H) <20.0 mg/L    Albumin Creat Ratio 143 (H) 0 - 30 mg/g creatinine   Comprehensive metabolic panel    Collection Time: 11/13/23  1:23 PM   Result Value Ref Range    Sodium 140 135 - 147 mmol/L    Potassium 4.8 3.5 - 5.3 mmol/L    Chloride 101 96 - 108 mmol/L    CO2 29 21 - 32 mmol/L    ANION GAP 10 mmol/L    BUN 25 5 - 25 mg/dL    Creatinine 1.06 0.60 - 1.30 mg/dL    Glucose 206 (H) 65 - 140 mg/dL    Calcium 9.9 8.4 - 10.2 mg/dL    AST 16 13 - 39 U/L    ALT 12 7 - 52 U/L    Alkaline Phosphatase 53 34 - 104 U/L    Total Protein 7.6 6.4 - 8.4 g/dL    Albumin 4.0 3.5 - 5.0 g/dL    Total Bilirubin 0.28 0.20 - 1.00 mg/dL    eGFR 65 ml/min/1.73sq m   Vitamin D 25 hydroxy    Collection Time: 11/13/23  1:23 PM   Result Value Ref Range    Vit D, 25-Hydroxy 20.9 (L) 30.0 - 100.0 ng/mL   T4, free    Collection Time: 11/13/23  1:23 PM   Result Value Ref Range    Free T4 0.83 0.61 - 1.12 ng/dL   TSH, 3rd generation    Collection Time: 11/13/23  1:23 PM   Result Value Ref Range    TSH 3RD GENERATON 0.588 0.450 - 4.500 uIU/mL   CBC and Platelet    Collection Time: 11/13/23  1:23 PM   Result Value Ref Range    WBC 8.08 4.31 - 10.16 Thousand/uL    RBC 4.31 3.88 - 5.62 Million/uL    Hemoglobin 12.2 12.0 - 17.0 g/dL    Hematocrit 38.7 36.5 - 49.3 %    MCV 90 82 - 98 fL    MCH 28.3 26.8 - 34.3 pg    MCHC 31.5 31.4 - 37.4 g/dL    RDW 15.4 (H) 11.6 - 15.1 %    Platelets 862 252 - 642 Thousands/uL    MPV 11.0 8.9 - 12.7 fL

## 2023-11-27 NOTE — PROGRESS NOTES
Assessment and Plan:     Problem List Items Addressed This Visit        Endocrine    Type 2 diabetes mellitus with hyperglycemia, without long-term current use of insulin ([address]) - Primary       Lab Results   Component Value Date    HGBA1C 6.9 (A) 11/13/2023       COMPLIANT WITH MEDICATION  DENIES ANY NUMBNESS, TINGLING IN FEET    - DISCUSSED DIETARY MODIFICATION OF CARBOHYDRATES  - HGB A1C AND URINE STUDIES DUE TODAY  - FOOT CARE  - RV 3 MONTHS           Relevant Orders    POCT ECG (Completed)       Respiratory    Chronic allergic rhinitis    Moderate persistent asthma without complication     STABLE           Moderate obstructive sleep apnea       Cardiovascular and Mediastinum    Paroxysmal atrial fibrillation (HCC)     STABLE  FOLLOWED BY CARDIOLOGY         Relevant Orders    POCT ECG (Completed)    Hypertension goal BP (blood pressure) < 140/90     STABLE  DENIES ANY CP, SOB, PALPITATIONS, OR HEADACHE  NOTES NO WATER RETENTION  COMPLIANT WITH MEDICATION  NO CONCERNS    - CONTINUE CURRENT TREATMENT PLAN  - MONITOR DIETARY SODIUM INTAKE  - ENCOURAGE PHYSICAL ACTIVITY  - RV 3 MONTHS           Relevant Orders    POCT ECG (Completed)   Other Visit Diagnoses     Encounter for prostate cancer screening        Colon cancer screening        Neck pain        Medicare annual wellness visit, subsequent               Preventive health issues were discussed with patient, and age appropriate screening tests were ordered as noted in patient's After Visit Summary. Personalized health advice and appropriate referrals for health education or preventive services given if needed, as noted in patient's After Visit Summary.      History of Present Illness:     Patient presents for a Medicare Wellness Visit    PATIENT [facility] AND ANNUAL EVALUATION OF PATIENT'S MEDICAL ISSUES    INDIVIDUAL MEDICAL ISSUES WITH THEIR CURRENT STATUS, ASSESSMENT AND PLANS ARE LISTED ABOVE             Patient Care Team:  Nathalie Boston There are no Wet Read(s) to document. Danny Rodrigues MD as PCP - General (Family Medicine)     Review of Systems:     Review of Systems   Constitutional:  Negative for chills, fatigue and fever. HENT:  Negative for congestion, ear discharge, ear pain, mouth sores, postnasal drip, sore throat and trouble swallowing. Eyes:  Negative for pain, discharge and visual disturbance. Respiratory:  Negative for cough, shortness of breath and wheezing. Cardiovascular:  Negative for chest pain, palpitations and leg swelling. Gastrointestinal:  Negative for abdominal distention, abdominal pain, blood in stool, diarrhea and nausea. Endocrine: Negative for polydipsia, polyphagia and polyuria. Genitourinary:  Negative for dysuria, frequency, hematuria and urgency. Musculoskeletal:  Negative for arthralgias, gait problem and joint swelling. Skin:  Negative for pallor and rash. Neurological:  Negative for dizziness, syncope, speech difficulty, weakness, light-headedness, numbness and headaches. Hematological:  Negative for adenopathy. Psychiatric/Behavioral:  Negative for behavioral problems, confusion and sleep disturbance. The patient is not nervous/anxious.          Problem List:     Patient Active Problem List   Diagnosis   • Adjustment disorder with anxious mood   • Cataract of both eyes   • Chronic allergic rhinitis   • Enlarged prostate   • Moderate persistent asthma without complication   • Moderate obstructive sleep apnea   • Obesity with body mass index 30 or greater   • Pes anserinus tendinitis or bursitis   • Restrictive lung disease   • Tear of medial meniscus of knee   • Tinnitus   • Paroxysmal atrial fibrillation (HCC)   • Type 2 diabetes mellitus with hyperglycemia, without long-term current use of insulin (HCC)   • Arthralgia   • Hypertension goal BP (blood pressure) < 140/90   • Anemia   • Encounter for immunization      Past Medical and Surgical History:     Past Medical History:   Diagnosis Date   • Anxiety    • Arthritis fingers , knee   • BPH (benign prostatic hypertrophy)    • Cataract     currently left eye- to have surgery on 4/10/17   • Cough variant asthma 2017   • Diabetes mellitus ([address])     type 2, last assessed 2017   • Hernia, umbilical     currently has   • HL (hearing loss)     b/l hearing aids   • Labyrinthitis    • Moderate obstructive sleep apnea 2017   • New onset a-fib ([address]) 1/10/2020   • Obesity with body mass index 30 or greater    • Umbilical hernia      Past Surgical History:   Procedure Laterality Date   • CATARACT EXTRACTION Right 2017   • COLONOSCOPY      yrs ago   • EYE SURGERY Bilateral     cataracts with IOL      Family History:     Family History   Problem Relation Age of Onset   • Cancer Mother         ovarian   • Diabetes Father    • Cancer Sister         stomach to brain   • Substance Abuse Family    • Substance Abuse Son    • Alcohol abuse Son    • Mental illness Neg Hx       Social History:     Social History     Socioeconomic History   • Marital status: /Civil Union     Spouse name: None   • Number of children: None   • Years of education: None   • Highest education level: None   Occupational History   • None   Tobacco Use   • Smoking status: Former     Packs/day: 0.50     Years: 15.00     Total pack years: 7.50     Types: Cigarettes     Quit date: 1983     Years since quittin.9   • Smokeless tobacco: Never   Vaping Use   • Vaping Use: Never used   Substance and Sexual Activity   • Alcohol use:  Yes     Alcohol/week: 3.0 standard drinks of alcohol     Types: 3 Standard drinks or equivalent per week     Comment: socially   • Drug use: Yes     Types: Marijuana     Comment: most everyday    • Sexual activity: Not Currently     Partners: Female   Other Topics Concern   • None   Social History Narrative    Active advance directive    Caffeine use    Dental care, regularly    Drinks coffee     Social Determinants of Health     Financial Resource Strain: Low Risk  (11/27/2023)    Overall Financial Resource Strain (CARDIA)    • Difficulty of Paying Living Expenses: Not hard at all   Food Insecurity: Not on file   Transportation Needs: No Transportation Needs (11/27/2023)    PRAPARE - Transportation    • Lack of Transportation (Medical): No    • Lack of Transportation (Non-Medical):  No   Physical Activity: Not on file   Stress: Not on file   Social Connections: Not on file   Intimate Partner Violence: Not on file   Housing Stability: Not on file      Medications and Allergies:     Current Outpatient Medications   Medication Sig Dispense Refill   • albuterol (2.5 mg/3 mL) 0.083 % nebulizer solution Take 3 mL (2.5 mg total) by nebulization every 6 (six) hours as needed for wheezing or shortness of breath 60 mL 6   • albuterol (Ventolin HFA) 90 mcg/act inhaler Inhale 2 puffs every 6 (six) hours as needed for wheezing 18 g 3   • finasteride (PROSCAR) 5 mg tablet TAKE ONE TABLET BY MOUTH EVERY DAY 30 tablet 5   • glipiZIDE (GLUCOTROL XL) 10 mg 24 hr tablet TAKE 1 TABLET BY MOUTH EVERY DAY WITH BREAKFAST 90 tablet 3   • Glucosamine-Chondroitin (GLUCOSAMINE CHONDR COMPLEX PO) Take by mouth 2 (two) times a day     • metFORMIN (GLUCOPHAGE) 500 mg tablet Take 2 tablets (1,000 mg total) by mouth 2 (two) times a day with meals 360 tablet 3   • metoprolol succinate (TOPROL-XL) 25 mg 24 hr tablet TAKE ONE TABLET BY MOUTH EVERY MORNING 90 tablet 1   • montelukast (SINGULAIR) 10 mg tablet Take 1 tablet (10 mg total) by mouth daily at bedtime 90 tablet 3   • ramipril (ALTACE) 5 mg capsule Take 1 capsule (5 mg total) by mouth every morning 90 capsule 3   • rivaroxaban (XARELTO) 20 mg tablet in the morning     • sertraline (ZOLOFT) 100 mg tablet TAKE ONE TABLET BY MOUTH EVERY DAY 90 tablet 1   • tamsulosin (FLOMAX) 0.4 mg TAKE 1 CAPSULE BY MOUTH EVERYDAY AT BEDTIME 90 capsule 3   • ferrous gluconate (FERGON) 240 (27 FE) MG tablet Take 1 tablet (240 mg total) by mouth in the morning 30 tablet 11   • ferrous sulfate 324 (65 Fe) mg Take 1 tablet (324 mg total) by mouth 2 (two) times a day before meals 60 tablet 0     No current facility-administered medications for this visit. Allergies   Allergen Reactions   • Ceftin [Cefuroxime] GI Intolerance and Vomiting      Immunizations:     Immunization History   Administered Date(s) Administered   • COVID-19 PFIZER VACCINE 0.3 ML IM 02/23/2021, 03/16/2021   • H1N1, All Formulations 12/03/2009   • INFLUENZA 01/05/2018, 09/02/2019   • Influenza Split High Dose Preservative Free IM 09/07/2016, 01/10/2018   • Influenza, high dose seasonal 0.7 mL 12/09/2022, 11/13/2023   • Influenza, seasonal, injectable 11/03/2010, 10/06/2015   • Meningococcal C/Y-HIB PRP 12/03/2009   • Pneumococcal Conjugate 13-Valent 09/07/2016   • Pneumococcal Polysaccharide PPV23 11/03/2010   • Tdap 11/03/2010      Health Maintenance: There are no preventive care reminders to display for this patient. There are no preventive care reminders to display for this patient. Medicare Screening Tests and Risk Assessments:         Health Risk Assessment:   Patient rates overall health as good. Patient feels that their physical health rating is slightly better. Patient is dissatisfied with their life. Eyesight was rated as same. Hearing was rated as slightly worse. Patient feels that their emotional and mental health rating is same. Patients states they are sometimes angry. Patient states they are often unusually tired/fatigued. Pain experienced in the last 7 days has been some. Patient's pain rating has been 4/10. Patient states that he has experienced no weight loss or gain in last 6 months. Fall Risk Screening: In the past year, patient has experienced: history of falling in past year      Home Safety:  Patient does not have trouble with stairs inside or outside of their home. Patient has working smoke alarms and has working carbon monoxide detector.  Home safety hazards include: none.     Nutrition:   Current diet is Regular. Medications:   Patient is currently taking over-the-counter supplements. OTC medications include: see medication list. Patient is able to manage medications. Activities of Daily Living (ADLs)/Instrumental Activities of Daily Living (IADLs):   Walk and transfer into and out of bed and chair?: Yes  Dress and groom yourself?: Yes    Bathe or shower yourself?: Yes    Feed yourself? Yes  Do your laundry/housekeeping?: Yes  Manage your money, pay your bills and track your expenses?: Yes  Make your own meals?: Yes    Do your own shopping?: Yes    Durable Medical Equipment Suppliers  none    Previous Hospitalizations:   Any hospitalizations or ED visits within the last 12 months?: Yes    How many hospitalizations have you had in the last year?: 1-2    Advance Care Planning:   Living will: No    Durable POA for healthcare: No    Advanced directive: No    End of Life Decisions reviewed with patient: No      Cognitive Screening:   Provider or family/friend/caregiver concerned regarding cognition?: No    PREVENTIVE SCREENINGS      Cardiovascular Screening:    General: Screening Current      Diabetes Screening:     General: Screening Not Indicated and History Diabetes      Colorectal Cancer Screening:     General: Screening Not Indicated      Prostate Cancer Screening:    General: Screening Not Indicated      Abdominal Aortic Aneurysm (AAA) Screening:    Risk factors include: tobacco use        General: Screening Not Indicated      Lung Cancer Screening:     General: Screening Not Indicated      Hepatitis C Screening:    General: Screening Not Indicated    Screening, Brief Intervention, and Referral to Treatment (SBIRT)    Screening  Typical number of drinks in a day: 0  Typical number of drinks in a week: 4  Interpretation: Low risk drinking behavior.     AUDIT-C Screenin) How often did you have a drink containing alcohol in the past year? 2 to 4 times a month  2) How many drinks did you have on a typical day when you were drinking in the past year? 3 to 4  3) How often did you have 6 or more drinks on one occasion in the past year? never    AUDIT-C Score: 2  Interpretation: Score 0-3 (male): Negative screen for alcohol misuse    Single Item Drug Screening:  How often have you used an illegal drug (including marijuana) or a prescription medication for non-medical reasons in the past year? weekly    Single Item Drug Screen Score: 3  Interpretation: POSITIVE screen for possible drug use disorder    Drug Abuse Screening Test (DAST-10):  1) Have you used drugs other than those required for medical reasons? No  2) Do you abuse more than one drug at a time? No  3) Are you always able to stop using drugs when you want to? Yes  4) Have you had "blackouts" or "flashbacks" as a result of drug use? No  5) Do you ever feel bad or guilty about your drug use? No  6) Does your spouse (or parents) ever complain about your involvement with drugs? No  7) Have you neglected your family because of your use of drugs? No  8) Have you engaged in illegal activities in order to obtain drugs? No  9) Have you ever experienced withdrawal symptoms (felt sick) when you stopped taking drugs? No  10) Have you had medical problems as a result of your drug use (e.g., memory loss, hepatitis, convulsions, bleeding, etc.)? No    DAST-10 Score: 0  Interpretation: No problems reported    No results found. Physical Exam:     /78 (BP Location: Left arm, Patient Position: Sitting, Cuff Size: Large)   Pulse 60   Temp (!) 94.9 °F (34.9 °C) (Temporal)   Resp 14   Ht 6' 1" (1.854 m)   Wt 107 kg (236 lb)   SpO2 96%   BMI 31.14 kg/m²     Physical Exam  Constitutional:       General: He is not in acute distress. Appearance: Normal appearance. He is well-developed. He is not ill-appearing or diaphoretic. HENT:      Head: Normocephalic and atraumatic.       Right Ear: Tympanic membrane and external ear normal.      Left Ear: Tympanic membrane and external ear normal.      Nose: Nose normal.      Mouth/Throat:      Mouth: Mucous membranes are moist.      Pharynx: No oropharyngeal exudate. Eyes:      General: No scleral icterus. Right eye: No discharge. Left eye: No discharge. Conjunctiva/sclera: Conjunctivae normal.      Pupils: Pupils are equal, round, and reactive to light. Neck:      Thyroid: No thyromegaly. Vascular: No JVD. Trachea: No tracheal deviation. Cardiovascular:      Rate and Rhythm: Normal rate and regular rhythm. Heart sounds: Normal heart sounds. No murmur heard. No friction rub. No gallop. Pulmonary:      Effort: Pulmonary effort is normal. No respiratory distress. Breath sounds: Normal breath sounds. No stridor. No wheezing or rales. Chest:      Chest wall: No tenderness. Abdominal:      General: Bowel sounds are normal. There is no distension. Palpations: Abdomen is soft. There is no mass. Tenderness: There is no abdominal tenderness. There is no guarding or rebound. Hernia: No hernia is present. Genitourinary:     Penis: No tenderness. Musculoskeletal:         General: No tenderness or deformity. Cervical back: Normal range of motion and neck supple. Comments: MODERATE DJD CHANGES     Lymphadenopathy:      Cervical: No cervical adenopathy. Skin:     General: Skin is warm and dry. Coloration: Skin is not pale. Findings: No erythema or rash. Neurological:      General: No focal deficit present. Mental Status: He is alert and oriented to person, place, and time. Cranial Nerves: No cranial nerve deficit. Sensory: No sensory deficit. Motor: No weakness or abnormal muscle tone.       Coordination: Coordination normal.      Gait: Gait normal.      Deep Tendon Reflexes: Reflexes normal.   Psychiatric:         Mood and Affect: Mood normal.         Behavior: Behavior normal. Thought Content:  Thought content normal.         Judgment: Judgment normal.          Justine Pugh MD

## 2023-11-27 NOTE — PATIENT INSTRUCTIONS
DISCUSSED HEALTH MAINTENENCE ISSUES  BW WILL BE REVIEWED  ENCOURAGED HEALTHY DIET AND EXERCISE    RV FOR ANNUAL HEALTH EXAM IN 1 YEAR  RV SOONER IF THERE ARE ANY CONCERNS    RV 3 M      Recent Results (from the past 672 hour(s))   POCT hemoglobin A1c    Collection Time: 11/13/23 10:27 AM   Result Value Ref Range    Hemoglobin A1C 6.9 (A) 6.5   Albumin / creatinine urine ratio    Collection Time: 11/13/23  1:23 PM   Result Value Ref Range    Creatinine, Ur 152.8 Reference range not established. mg/dL    Albumin,U,Random 218.3 (H) <20.0 mg/L    Albumin Creat Ratio 143 (H) 0 - 30 mg/g creatinine   Comprehensive metabolic panel    Collection Time: 11/13/23  1:23 PM   Result Value Ref Range    Sodium 140 135 - 147 mmol/L    Potassium 4.8 3.5 - 5.3 mmol/L    Chloride 101 96 - 108 mmol/L    CO2 29 21 - 32 mmol/L    ANION GAP 10 mmol/L    BUN 25 5 - 25 mg/dL    Creatinine 1.06 0.60 - 1.30 mg/dL    Glucose 206 (H) 65 - 140 mg/dL    Calcium 9.9 8.4 - 10.2 mg/dL    AST 16 13 - 39 U/L    ALT 12 7 - 52 U/L    Alkaline Phosphatase 53 34 - 104 U/L    Total Protein 7.6 6.4 - 8.4 g/dL    Albumin 4.0 3.5 - 5.0 g/dL    Total Bilirubin 0.28 0.20 - 1.00 mg/dL    eGFR 65 ml/min/1.73sq m   Vitamin D 25 hydroxy    Collection Time: 11/13/23  1:23 PM   Result Value Ref Range    Vit D, 25-Hydroxy 20.9 (L) 30.0 - 100.0 ng/mL   T4, free    Collection Time: 11/13/23  1:23 PM   Result Value Ref Range    Free T4 0.83 0.61 - 1.12 ng/dL   TSH, 3rd generation    Collection Time: 11/13/23  1:23 PM   Result Value Ref Range    TSH 3RD GENERATON 0.588 0.450 - 4.500 uIU/mL   CBC and Platelet    Collection Time: 11/13/23  1:23 PM   Result Value Ref Range    WBC 8.08 4.31 - 10.16 Thousand/uL    RBC 4.31 3.88 - 5.62 Million/uL    Hemoglobin 12.2 12.0 - 17.0 g/dL    Hematocrit 38.7 36.5 - 49.3 %    MCV 90 82 - 98 fL    MCH 28.3 26.8 - 34.3 pg    MCHC 31.5 31.4 - 37.4 g/dL    RDW 15.4 (H) 11.6 - 15.1 %    Platelets 419 279 - 624 Thousands/uL    MPV 11.0 8.9 - 12.7 fL       Medicare Preventive Visit Patient Instructions  Thank you for completing your Welcome to Medicare Visit or Medicare Annual Wellness Visit today. Your next wellness visit will be due in one year (11/27/2024). The screening/preventive services that you may require over the next 5-10 years are detailed below. Some tests may not apply to you based off risk factors and/or age. Screening tests ordered at today's visit but not completed yet may show as past due. Also, please note that scanned in results may not display below. Preventive Screenings:  Service Recommendations Previous Testing/Comments   Colorectal Cancer Screening  Colonoscopy    Fecal Occult Blood Test (FOBT)/Fecal Immunochemical Test (FIT)  Fecal DNA/Cologuard Test  Flexible Sigmoidoscopy Age: 43-73 years old   Colonoscopy: every 10 years (May be performed more frequently if at higher risk)  OR  FOBT/FIT: every 1 year  OR  Cologuard: every 3 years  OR  Sigmoidoscopy: every 5 years  Screening may be recommended earlier than age 39 if at higher risk for colorectal cancer. Also, an individualized decision between you and your healthcare provider will decide whether screening between the ages of 77-80 would be appropriate.  Colonoscopy: 07/17/2023  FOBT/FIT: Not on file  Cologuard: Not on file  Sigmoidoscopy: Not on file          Prostate Cancer Screening Individualized decision between patient and health care provider in men between ages of 53-66   Medicare will cover every 12 months beginning on the day after your 50th birthday PSA: 2.1 ng/mL     Screening Not Indicated     Hepatitis C Screening Once for adults born between 1945 and 1965  More frequently in patients at high risk for Hepatitis C Hep C Antibody: Not on file        Diabetes Screening 1-2 times per year if you're at risk for diabetes or have pre-diabetes Fasting glucose: 204 mg/dL (10/22/2020)  A1C: 6.9 (11/13/2023)  Screening Not Indicated  History Diabetes   Cholesterol Screening Once every 5 years if you don't have a lipid disorder. May order more often based on risk factors. Lipid panel: 10/22/2020  Screening Current      Other Preventive Screenings Covered by Medicare:  Abdominal Aortic Aneurysm (AAA) Screening: covered once if your at risk. You're considered to be at risk if you have a family history of AAA or a male between the age of 70-76 who smoking at least 100 cigarettes in your lifetime. Lung Cancer Screening: covers low dose CT scan once per year if you meet all of the following conditions: (1) Age 48-67; (2) No signs or symptoms of lung cancer; (3) Current smoker or have quit smoking within the last 15 years; (4) You have a tobacco smoking history of at least 20 pack years (packs per day x number of years you smoked); (5) You get a written order from a healthcare provider. Glaucoma Screening: covered annually if you're considered high risk: (1) You have diabetes OR (2) Family history of glaucoma OR (3)  aged 48 and older OR (3)  American aged 72 and older  Osteoporosis Screening: covered every 2 years if you meet one of the following conditions: (1) Have a vertebral abnormality; (2) On glucocorticoid therapy for more than 3 months; (3) Have primary hyperparathyroidism; (4) On osteoporosis medications and need to assess response to drug therapy. HIV Screening: covered annually if you're between the age of 14-79. Also covered annually if you are younger than 13 and older than 72 with risk factors for HIV infection. For pregnant patients, it is covered up to 3 times per pregnancy.     Immunizations:  Immunization Recommendations   Influenza Vaccine Annual influenza vaccination during flu season is recommended for all persons aged >= 6 months who do not have contraindications   Pneumococcal Vaccine   * Pneumococcal conjugate vaccine = PCV13 (Prevnar 13), PCV15 (Vaxneuvance), PCV20 (Prevnar 20)  * Pneumococcal polysaccharide vaccine = PPSV23 (Pneumovax) Adults 97-14 yo with certain risk factors or if 69+ yo  If never received any pneumonia vaccine: recommend Prevnar 20 (PCV20)  Give PCV20 if previously received 1 dose of PCV13 or PPSV23   Hepatitis B Vaccine 3 dose series if at intermediate or high risk (ex: diabetes, end stage renal disease, liver disease)   Respiratory syncytial virus (RSV) Vaccine - COVERED BY MEDICARE PART D  * RSVPreF3 (Arexvy) CDC recommends that adults 61years of age and older may receive a single dose of RSV vaccine using shared clinical decision-making (SCDM)   Tetanus (Td) Vaccine - COST NOT COVERED BY MEDICARE PART B Following completion of primary series, a booster dose should be given every 10 years to maintain immunity against tetanus. Td may also be given as tetanus wound prophylaxis. Tdap Vaccine - COST NOT COVERED BY MEDICARE PART B Recommended at least once for all adults. For pregnant patients, recommended with each pregnancy. Shingles Vaccine (Shingrix) - COST NOT COVERED BY MEDICARE PART B  2 shot series recommended in those 19 years and older who have or will have weakened immune systems or those 50 years and older     Health Maintenance Due:  There are no preventive care reminders to display for this patient. Immunizations Due:  There are no preventive care reminders to display for this patient. Advance Directives   What are advance directives? Advance directives are legal documents that state your wishes and plans for medical care. These plans are made ahead of time in case you lose your ability to make decisions for yourself. Advance directives can apply to any medical decision, such as the treatments you want, and if you want to donate organs. What are the types of advance directives? There are many types of advance directives, and each state has rules about how to use them. You may choose a combination of any of the following:  Living will: This is a written record of the treatment you want.  You can also choose which treatments you do not want, which to limit, and which to stop at a certain time. This includes surgery, medicine, IV fluid, and tube feedings. Durable power of  for [facility]): This is a written record that states who you want to make healthcare choices for you when you are unable to make them for yourself. This person, called a proxy, is usually a family member or a friend. You may choose more than 1 proxy. Do not resuscitate (DNR) order:  A DNR order is used in case your heart stops beating or you stop breathing. It is a request not to have certain forms of treatment, such as CPR. A DNR order may be included in other types of advance directives. Medical directive: This covers the care that you want if you are in a coma, near death, or unable to make decisions for yourself. You can list the treatments you want for each condition. Treatment may include pain medicine, surgery, blood transfusions, dialysis, IV or tube feedings, and a ventilator (breathing machine). Values history: This document has questions about your views, beliefs, and how you feel and think about life. This information can help others choose the care that you would choose. Why are advance directives important? An advance directive helps you control your care. Although spoken wishes may be used, it is better to have your wishes written down. Spoken wishes can be misunderstood, or not followed. Treatments may be given even if you do not want them. An advance directive may make it easier for your family to make difficult choices about your care. Fall Prevention    Fall prevention  includes ways to make your home and other areas safer. It also includes ways you can move more carefully to prevent a fall. Health conditions that cause changes in your blood pressure, vision, or muscle strength and coordination may increase your risk for falls.  Medicines may also increase your risk for falls if they make you dizzy, weak, or sleepy. Fall prevention tips:   Stand or sit up slowly. Use assistive devices as directed. Wear shoes that fit well and have soles that . Wear a personal alarm. Stay active. Manage your medical conditions. Home Safety Tips:  Add items to prevent falls in the bathroom. Keep paths clear. Install bright lights in your home. Keep items you use often on shelves within reach. Paint or place reflective tape on the edges of your stairs. Weight Management   Why it is important to manage your weight:  Being overweight increases your risk of health conditions such as heart disease, high blood pressure, type 2 diabetes, and certain types of cancer. It can also increase your risk for osteoarthritis, sleep apnea, and other respiratory problems. Aim for a slow, steady weight loss. Even a small amount of weight loss can lower your risk of health problems. How to lose weight safely:  A safe and healthy way to lose weight is to eat fewer calories and get regular exercise. You can lose up about 1 pound a week by decreasing the number of calories you eat by 500 calories each day. Healthy meal plan for weight management:  A healthy meal plan includes a variety of foods, contains fewer calories, and helps you stay healthy. A healthy meal plan includes the following:  Eat whole-grain foods more often. A healthy meal plan should contain fiber. Fiber is the part of grains, fruits, and vegetables that is not broken down by your body. Whole-grain foods are healthy and provide extra fiber in your diet. Some examples of whole-grain foods are whole-wheat breads and pastas, oatmeal, brown rice, and bulgur. Eat a variety of vegetables every day. Include dark, leafy greens such as spinach, kale, maia greens, and mustard greens. Eat yellow and orange vegetables such as carrots, sweet potatoes, and winter squash. Eat a variety of fruits every day.   Choose fresh or canned fruit (canned in its own juice or light syrup) instead of juice. Fruit juice has very little or no fiber. Eat low-fat dairy foods. Drink fat-free (skim) milk or 1% milk. Eat fat-free yogurt and low-fat cottage cheese. Try low-fat cheeses such as mozzarella and other reduced-fat cheeses. Choose meat and other protein foods that are low in fat. Choose beans or other legumes such as split peas or lentils. Choose fish, skinless poultry (chicken or turkey), or lean cuts of red meat (beef or pork). Before you cook meat or poultry, cut off any visible fat. Use less fat and oil. Try baking foods instead of frying them. Add less fat, such as margarine, sour cream, regular salad dressing and mayonnaise to foods. Eat fewer high-fat foods. Some examples of high-fat foods include french fries, doughnuts, ice cream, and cakes. Eat fewer sweets. Limit foods and drinks that are high in sugar. This includes candy, cookies, regular soda, and sweetened drinks. Exercise:  Exercise at least 30 minutes per day on most days of the week. Some examples of exercise include walking, biking, dancing, and swimming. You can also fit in more physical activity by taking the stairs instead of the elevator or parking farther away from stores. Ask your healthcare provider about the best exercise plan for you. © Copyright [address] 2018 Information is for End User's use only and may not be sold, redistributed or otherwise used for commercial purposes.  All illustrations and images included in CareNotes® are the copyrighted property of A.D.A.M., Inc. or  [employer]

## 2023-11-27 NOTE — TELEPHONE ENCOUNTER
Pt.'s friend-Xiomy,called to report pt.has been without his Xarelto from the [facility] x 1 week. Asked for samples. Dispensed 2 bottles.

## 2023-11-29 ENCOUNTER — OFFICE VISIT (OUTPATIENT)
Dept: PHYSICAL THERAPY | Facility: CLINIC | Age: 78
End: 2023-11-29
Payer: MEDICARE

## 2023-11-29 DIAGNOSIS — M54.2 NECK PAIN: ICD-10-CM

## 2023-11-29 DIAGNOSIS — R26.89 BALANCE PROBLEM: Primary | ICD-10-CM

## 2023-11-29 DIAGNOSIS — Z91.81 HISTORY OF FALL: ICD-10-CM

## 2023-11-29 PROCEDURE — 97112 NEUROMUSCULAR REEDUCATION: CPT | Performed by: PHYSICAL THERAPIST

## 2023-11-29 PROCEDURE — 97110 THERAPEUTIC EXERCISES: CPT | Performed by: PHYSICAL THERAPIST

## 2023-11-29 NOTE — PROGRESS NOTES
Daily Note     Today's date: 2023  Patient name: Nitin Barragan  : 1945  MRN: [id number]  Referring provider: Stanley Castro MD  Dx:   Encounter Diagnosis     ICD-10-CM    1. Balance problem  R26.89       2. Neck pain  M54.2       3. History of fall  Z91.81           Start Time: 1317  Stop Time: 1400  Total time in clinic (min): 43 minutes    Subjective: Client arrives on time to session. He reports his neck is still feeling a little tight. He also states he is starting to walk for 6 minutes each day on his driveway and is trying to use his stairs more. Objective: See treatment diary below    TE:  - standing heelraises 30x, 1 UE   - upper trap stretch 30 sec x 5 bilaterally  - levator scap 30 sec x 5 bilaterally  - SCM stretch 30 sec x 5 bilaterally  - STS 2x10, airex pad on chair,  0UE support (+) education and cues for anterior weight shift      NMR:  - side stepping along airex beam 10 feet x 10, 0-1 UE support  - tandem walking along airex beam  10 feet x 10, 1 UE support  - wobble board 30x AP/ML,1 UE support    Not today:  - standing hip abduction 2x10 BLE  - fwd/bwd stepping over blanca 1UE support 30x    Assessment: Client with good participation throughout session. Session focused on neck stretching and standing balance exercises. Min cues to increase anterior weight shift with STS. He also reports having reduction in neck pain by end of session. Plan: Continue per plan of care. Insurance:  AMA/CMS Eval/ Mellissa Trujillo #/ Referral # Total units  Start date  Expiration date Extension  Visit limitation? PT only or  PT+OT?  Co-Insurance   CMS  none  na na  no na Yes. $0                 POC Start Date POC Expiration Date Signed POC?   11/15/2023 2/7/24 yes       Outcome Measures Initial Eval  11/15/23 + 23        5xSTS 34.00 sec, CG        TUG 15.82 sec        10 meter 10.00 sec  1.0 m/s        BARRAGAN /Kane        FG /        DG        mCTSIB  - FTEO (firm)  - FTEC (firm)  - FTEO (foam)  - FTEC (foam)   30 sec  9.50 sec  30 sec  1.19 sec        6MWT  1000 ft                                     Precautions: falls

## 2023-12-07 ENCOUNTER — OFFICE VISIT (OUTPATIENT)
Dept: PHYSICAL THERAPY | Facility: CLINIC | Age: 78
End: 2023-12-07
Payer: MEDICARE

## 2023-12-07 DIAGNOSIS — Z91.81 HISTORY OF FALL: ICD-10-CM

## 2023-12-07 DIAGNOSIS — R26.89 BALANCE PROBLEM: Primary | ICD-10-CM

## 2023-12-07 DIAGNOSIS — M54.2 NECK PAIN: ICD-10-CM

## 2023-12-07 PROCEDURE — 97112 NEUROMUSCULAR REEDUCATION: CPT | Performed by: PHYSICAL THERAPIST

## 2023-12-07 NOTE — PROGRESS NOTES
Daily Note     Today's date: 2023  Patient name: John Levine  : 1945  MRN: [id number]  Referring provider: Pam Dickey MD  Dx:   Encounter Diagnosis     ICD-10-CM    1. Balance problem  R26.89       2. Neck pain  M54.2       3. History of fall  Z91.81           Start Time: 1105  Stop Time: 1145  Total time in clinic (min): 40 minutes       Subjective: Client arrives on time to session. He reports his neck is still feeling a little tight. He is continuing to work on his walking. He was able to walk out to his greenhouse earlier this morning without difficulties. Objective: See treatment diary below     TE:  - standing heelraises 30x, 1 UE   - upper trap stretch 30 sec x 5 bilaterally  - levator scap 30 sec x 5 bilaterally  - SCM stretch 30 sec x 5 bilaterally  - STS 2x10, airex pad on chair,  0UE support (+) education and cues for anterior weight shift        NMR:  - side stepping along airex beam 10 feet x 10, 0-1 UE support  - fwd/bwd tandem walking along airex beam 10 feet x 10, 1 UE support  - wobble board 30x AP/ML,1 UE support     Not today:  - standing hip abduction 2x10 BLE  - fwd/bwd stepping over blanca 1UE support 30x     Assessment: Client with good participation throughout session. Sessions continued to focus on neck stretching and standing balance exercises. Progressed standing balance to include fwd and bwd tandem walking with appropriate challenge. Plan: Continue per plan of care. Insurance:  AMA/CMS Eval/ Vikki Fragmariaelena #/ Referral # Total units  Start date  Expiration date Extension  Visit limitation? PT only or  PT+OT?  Co-Insurance   CMS  none  na na  no na Yes. $0                 POC Start Date POC Expiration Date Signed POC?   11/15/2023 2/7/24 yes       Outcome Measures Initial Eval  11/15/23 + 23        5xSTS 34.00 sec, CG        TUG 15.82 sec        10 meter 10.00 sec  1.0 m/s        BARRAGAN /Kaen        FGA /30        DG        mCTSIB  - FTEO (firm)  - FTEC (firm)  - FTEO (foam)  - FTEC (foam)   30 sec  9.50 sec  30 sec  1.19 sec        6MWT  1000 ft                                     Precautions: falls

## 2023-12-11 ENCOUNTER — OFFICE VISIT (OUTPATIENT)
Dept: PHYSICAL THERAPY | Facility: CLINIC | Age: 78
End: 2023-12-11
Payer: MEDICARE

## 2023-12-11 DIAGNOSIS — M54.2 NECK PAIN: ICD-10-CM

## 2023-12-11 DIAGNOSIS — Z91.81 HISTORY OF FALL: ICD-10-CM

## 2023-12-11 DIAGNOSIS — R26.89 BALANCE PROBLEM: Primary | ICD-10-CM

## 2023-12-11 PROCEDURE — 97110 THERAPEUTIC EXERCISES: CPT | Performed by: PHYSICAL THERAPIST

## 2023-12-11 PROCEDURE — 97112 NEUROMUSCULAR REEDUCATION: CPT | Performed by: PHYSICAL THERAPIST

## 2023-12-11 NOTE — PROGRESS NOTES
Daily Note     Today's date: 2023  Patient name: Roderick Pierre  : 1945  MRN: [id number]  Referring provider: Donald Kirkland MD  Dx:   Encounter Diagnosis     ICD-10-CM    1. Balance problem  R26.89       2. Neck pain  M54.2       3. History of fall  Z91.81           Start Time: 1152  Stop Time: 1237  Total time in clinic (min): 45 minutes       Subjective: Client arrives on time to session. He reports his neck and mobility is feeling really good. He reports being able to play with his granddaughter over the weekend and they walked for almost 1/2 a mile        Objective: See treatment diary below     TE:  - standing heelraises 30x, 1 UE   - upper trap stretch 30 sec x 5 bilaterally  - levator scap 30 sec x 5 bilaterally  - SCM stretch 30 sec x 5 bilaterally  - STS 2x10, airex pad on chair,  0UE support (+) education and cues for anterior weight shift  - discussion on progression of PT POC to increase HEP and plan to trial 1x a week starting in 2024   - tband rows, blue 3x10  - tband ext, blue 3x10      NMR:  - side stepping along airex beam 10 feet x 10, 0-1 UE support  - side stepping without beam, 10 feet x 10  - fwd/bwd tandem walking along airex beam 10 feet x 10, 1 UE support       Not today:  - wobble board 30x AP/ML,1 UE support  - standing hip abduction 2x10 BLE  - fwd/bwd stepping over blanca 1UE support 30x     Assessment: Client with good participation throughout session. Sessions continued to focus on neck stretching and standing balance exercises. Initiated and trained on tband rows/ext with blue tband for postural control. Plan: Continue per plan of care. Insurance:  AMA/CMS Mini/ Milton Crews #/ Referral # Total units  Start date  Expiration date Extension  Visit limitation? PT only or  PT+OT?  Co-Insurance   CMS  none  na na  no na Yes. $0                 POC Start Date POC Expiration Date Signed POC?   11/15/2023 2/7/24 yes       Outcome Measures Initial Eval  11/15/23 + 11/16/23        5xSTS 34.00 sec, CG        TUG 15.82 sec        10 meter 10.00 sec  1.0 m/s        BARRAGAN /Kane        FGA /30        DGI /24        mCTSIB  - FTEO (firm)  - FTEC (firm)  - FTEO (foam)  - FTEC (foam)   30 sec  9.50 sec  30 sec  1.19 sec        6MWT  1000 ft                                  Precautions: falls

## 2023-12-14 ENCOUNTER — OFFICE VISIT (OUTPATIENT)
Dept: PHYSICAL THERAPY | Facility: CLINIC | Age: 78
End: 2023-12-14
Payer: MEDICARE

## 2023-12-14 DIAGNOSIS — R26.89 BALANCE PROBLEM: Primary | ICD-10-CM

## 2023-12-14 DIAGNOSIS — M54.2 NECK PAIN: ICD-10-CM

## 2023-12-14 DIAGNOSIS — Z91.81 HISTORY OF FALL: ICD-10-CM

## 2023-12-14 PROCEDURE — 97112 NEUROMUSCULAR REEDUCATION: CPT | Performed by: PHYSICAL THERAPIST

## 2023-12-14 PROCEDURE — 97110 THERAPEUTIC EXERCISES: CPT | Performed by: PHYSICAL THERAPIST

## 2023-12-14 NOTE — PROGRESS NOTES
Daily Note     Today's date: 2023  Patient name: Sylvain Borrero  : 1945  MRN: [id number]  Referring provider: Nara Haley MD  Dx:   Encounter Diagnosis     ICD-10-CM    1. Balance problem  R26.89       2. Neck pain  M54.2       3. History of fall  Z91.81           Start Time: 1146  Stop Time: 1233  Total time in clinic (min): 47 minutes       Subjective: Client arrives on time to session. He reports his neck is feeling a little tight today. Objective: See treatment diary below     TE:  - standing heelraises 30x, 1 UE   - upper trap stretch 30 sec x 5 bilaterally  - levator scap 30 sec x 5 bilaterally  - SCM stretch 30 sec x 5 bilaterally  - STS 2x10, airex pad on chair,  0UE support (+) education and cues for anterior weight shift  - tband rows, blue 2x15  - tband ext, blue 2x15      NMR:  - side stepping along airex beam 10 feet x 10, 0-1 UE support  - fwd/bwd tandem walking along airex beam 10 feet x 10, 1 UE support  - chin tucks 20x 3 sec hold  - wobble board 30x AP/ML,1 UE support     Not today:  - standing hip abduction 2x10 BLE  - fwd/bwd stepping over blanca 1UE support 30x     Assessment: Client with good participation throughout session. Sessions continued to focus on neck stretching and standing balance exercises. Initiated and trained on tband rows/ext with blue tband for postural control. Plan: Continue per plan of care. Insurance:  AMA/CMS [place]/ Josué [place] #/ Referral # Total units  Start date  Expiration date Extension  Visit limitation? PT only or  PT+OT?  Co-Insurance   CMS  none  na na  no na Yes. $0                 POC Start Date POC Expiration Date Signed POC?   11/15/2023 2/7/24 yes       Outcome Measures Initial Eval  11/15/23 + 23        5xSTS 34.00 sec, CG        TUG 15.82 sec        10 meter 10.00 sec  1.0 m/s        BARRAGAN /56        FG /        DG /24        mCTSIB  - FTEO (firm)  - FTEC (firm)  - FTEO (foam)  - FTEC (foam)   30 sec  9.50 sec  30 sec  1.19 sec        6MWT  1000 ft                                  Precautions: falls

## 2023-12-18 ENCOUNTER — EVALUATION (OUTPATIENT)
Dept: PHYSICAL THERAPY | Facility: CLINIC | Age: 78
End: 2023-12-18
Payer: MEDICARE

## 2023-12-18 DIAGNOSIS — R26.89 BALANCE PROBLEM: Primary | ICD-10-CM

## 2023-12-18 DIAGNOSIS — M54.2 NECK PAIN: ICD-10-CM

## 2023-12-18 DIAGNOSIS — Z91.81 HISTORY OF FALL: ICD-10-CM

## 2023-12-18 PROCEDURE — 97112 NEUROMUSCULAR REEDUCATION: CPT | Performed by: PHYSICAL THERAPIST

## 2023-12-18 PROCEDURE — 97110 THERAPEUTIC EXERCISES: CPT | Performed by: PHYSICAL THERAPIST

## 2023-12-18 NOTE — PROGRESS NOTES
PT PROGRESS NOTE          Insurance:  AMA/CMS Eval/ Re-eval Auth #/ Referral # Total units  Start date  Expiration date Extension  Visit limitation?  PT only or  PT+OT? Co-Insurance   CMS  none  na na  no na Yes. $0                 POC Start Date POC Expiration Date Signed POC?   11/15/2023 2/7/24 signed              Today's date: 2023  Patient name: Kaiser Price  : 1945  MRN: [id number]  Referring provider: Mary Jo Marroquin MD  Dx:   Encounter Diagnosis     ICD-10-CM    1. Balance problem  R26.89       2. Neck pain  M54.2       3. History of fall  Z91.81             Time in: 1151  Time out: 1240    Assessment  Assessment details: Kaiser Price is a 78 y.o. Male who presents to skilled outpatient PT with impairments in decreased endurance, decreased strength, impaired static and dynamic standing balance, limited AROM of cervical spine and (+) pain with movement. Upon re-assessment today, client presents with improvements in all functional outcome measurements and improvements in cervical AROM. He is improving in his standing balance and functional strengthening. Client was fairly active baseline and is limited as per activities listed below. Client is making good progress and will continue to benefit from skilled OPPT services to maximize his function and return to age appropriate roles of home and community safely and independently. He is in agreement to drop frequency from 2x to 1x a week starting 2024 to further promote safe transition to discharge.     Functional improvements: able to complete STS to/from surfaces of varied heights, able to play with granddaughter at playground, able to take longer walks with his family and granddaughter, getting compliments on his posture when walking, increased head turning, able to turn head without pain.  Functional limitations: decreased ability and confidence with item retrieval  to/from floor, decreased confidence walking on unlevel surfaces, difficulties changing a lightbulb, (+) fall history, difficulties with floor to/from stand transfers        Impairments: Abnormal coordination, Abnormal gait, Abnormal or restricted ROM, Activity intolerance, Impaired balance, Impaired physical strength, Lacks appropriate HEP, Poor posture, Pain with function, and Safety issue  Understanding of Dx/Px/POC: Excellent  Prognosis: Good    He verbalized understanding of POC.         Please contact me if you have any questions or recommendations. Thank you for the referral and the opportunity to share in Kaiser Price's care.        Plan  Plan details: 6MWT, DGI/FGA, static/dynamic standing balance, elevation training, generalized strengthening, cervical ROM/assessment, standing tolerance    Client would benefit from: PT Eval and Skilled PT  Planned modality interventions: Cryotherapy  Planned therapy interventions: ADL training, Balance, Body mechanics training, Coordination, Functional ROM exercises, Gait training, HEP, Neuromuscular re-education, Patient education, Postural training, Strengthening, Stretching, Therapeutic activities, and Therapeutic exercises  Frequency: 2x/wk  Duration in weeks: 12  Plan of Care beginning date: 11/15/23   Plan of Care expiration date: 12 weeks - 2/7/2024  Treatment plan discussed with: Patient       Goals  Short Term Goals (4 weeks):    - Client will improve time on TUG by 2.9 seconds from 15.82 seconds to 12.92 seconds to facilitate improved safety in all ambulation. MET  - Client will be independent in basic HEP 2-3 weeks. MET  - Client will improve 5xSTS score by 2.3 seconds from 34.0 seconds to 31.7 seconds to promote improved LE functional strength needed for ADLs. MET  - Client will complete DGI and 6MWT walk balance assessment to progress safety with dynamic tasks. MET    Long Term Goals (12 weeks):  - Client will be independent in a comprehensive home  exercise program  - Client will improve scoring on DGI by 2.6 points  to progress safety  - Client will improve gait speed by 0.18 m/s from 1.0 m/s to 1.18 m/s to improve safety with community ambulation  - Client will improve BARRAGAN by 6 points  in order to improve static balance and reduce risk for falls  - Client will improve scoring on FGA by 4 points to progress safety with dynamic tasks  - Client will be able to demonstrate HT in gait without veering  - Client will improve 6 Minute Walk Test score by 190 feet  to promote improved cardiovascular endurance  - Client will be able to ascend/descend stairs reciprocally with 1 UE assist to promote independence and safety with ADLs        Cut off score    All date taken from [facility] Neuro Section or Rehab Measures      Barragan/56  MDC: 6 pts  Age Norms:  60-69: M - 55   F - 55  70-79: M - 54   F - 53  80-89: M - 53   F - 50 5xSTS: Perla et al 2010  MDC: 2.3 sec  Age Norms:  60-69: 11.1 sec  70-79: 12.6 sec  80-89: 14.8 sec   TUG  MDC: 4.14 sec  Cut off score:  >13.5 sec community dwelling adults  >32.2 frail elderly  <20 I for basic transfers  >30 dependent on transfers 10 Meter Walk Test: Alona and Ronald et al 2011  MDC: 0.18 m/s  20-29: M - 1.35 m   F - 1.34 m  30-39: M - 1.43 m   F - 1.34 m  40-49: M - 1.43 m   F - 1.39 m  50-59: M - 1.43 m   F - 1.31 m  60-69: M - 1.34 m   F - 1.24 m  70-79: M - 1.26 m   F - 1.13 m  80-89: M - 0.97 m   F - 0.94 m    Household Ambulator < 0.4 m/s  Limited Community Ambulator 0.4 - 0.8 m/s  Community Ambulator 0.8 - 1.2 m/s  Safely cross the street > 1.2 m/s   FGA  MCID: 4 pts  Geriatrics/community < 22/30 fall risk  Geriatrics/community < 20/30 unexplained falls    DGI  MDC: vestibular - 4 pts  MDC: geriatric/community - 3 pts  Falls risk <19/24 mCTSIB  Norm: 20-60 yrs  Eyes open firm: norm sway 0.21-0.48  Eyes closed firm: norm sway 0.48-0.99  Eyes open foam: norm sway 0.38-0.71  Eyes closed foam: norm sway 0.70-2.22   6 Minute  Walk Test  MDC: 190.98 ft  MCID: 164 ft    Age Norms  60-69: M - 1876 ft (571.80 m)  F - 1765 ft (537.98 m)  70-79: M - 1729 ft (527.00 m)  F - 1545 ft (470.92 m)  80-89: M - 1368 ft (416.97 m)  F - 1286 ft (391.97 m) ABC: Ric & Kristin, 2003  <67% increased risk for falls         Subjective    History of Present Illness  - Mechanism of injury: UPDATE : Client responding well to OPPT and presents with good autonomy in completing HEP and staying active. He reports receiving compliments on his posture and stabilization. He also reports feeling more confident when walking and noticing he is able to walk longer before getting tired. He also reports reduction in neck pain.    FROM IE: Client arrives with reports of an approx 6 month history of neck pain, restricting his ability to look up, left and right. He has X-rays yesterday which was WNL. He also reports approx 1-1.5 year history of worsening balance, most recently over the past month, requiring him to use handrails more in the community, rely on wife assisting him if he is changing a light bulb etc. Hx: Type II DM with stable labs. Of note he had a fall last year resulting in a broken L humerus.  - Primary AD: n/a  - Assist level at home: as needed by family  - Decreased fine motor tasks: Yes      Pain  - Current pain rating: 3/10 (improved from: 6/10)  - At best pain ratin/10 (improved from: 5/10)  - At worst pain ratin/10  - Location: B suboccipitals and posterior neck  - Aggravating factors: cervical rotation, looking down, looking up    Social Support  - Steps to enter house: yes uses banister  - Stairs in house: yes uses banister   - Lives in: private home  - Lives with: spouse, daughter and granddaughter    - Employment status: Owns [employer].   - Hand dominance: right     Treatments  - Previous treatment: n/a  - Current treatment: OPPT for balance and neck pain  - Diagnostic Testing: x-rays of cervical spine WNL      Objective     -  "Cervical AROM  Flexion: 55 degrees (improved from: 50 degrees)  Extension: 45 degrees no pain (improved from: 43 degrees slight pain on endrange)  R rotation: 55 degrees (improved from: 48 degrees \"slight pain\" on L)  L rotation: 53 degrees (improved from: 45 degrees \"uncomfortable\" on L side)  R side-bendin degrees (improved from: 25 degrees \"stretching\" on L)  L side-bendin degrees \"stretching\" denies pain (improved from: 15 degrees (+) pain on L)    LE MMT  - R Hip Flexion: 4/5  L Hip Flexion: 4/5  - R Knee Extension: 4/5  L Knee Extension: 4/5  - R Knee Flexion: 4/5  L Knee Flexion: 4+/5  - R Ankle DF: 4+/5   L Ankle DF: 4+/5      Sensation  - Light touch: intact to light touch BLE    Coordination  - Alternate Toe Taps: delayed  - Finger to Nose: slight dysmetria      Reflexes/Clonus  WNL      Postural Screen  - Observation: mild forward head and rounded shoulders, (+) mild kyphotic posturing  - Improvement in symptoms with correction of posture: Yes    Gait  - Abnormalities: slowed brett, limited pushoff bilaterally           Outcome Measures Initial Eval  11/15/23 + 23 PN: 23       5xSTS 34.00 sec, CG 25.47 sec CS       TUG 15.82 sec 11.56 sec       10 meter 10.00 sec  1.0 m/s 8.82 sec  1.13 m/s       BARRAGAN /Kane        FGA /30        DGI /24        mCTSIB  - FTEO (firm)  - FTEC (firm)  - FTEO (foam)  - FTEC (foam)   30 sec  9.50 sec  30 sec  1.19 sec   30 sec  30 sec  30 sec  4.53 sec       6MWT  1000 ft 1170 ft                                    Precautions: falls  Past Medical History:   Diagnosis Date    Anxiety     Arthritis     fingers , knee    BPH (benign prostatic hypertrophy)     Cataract     currently left eye- to have surgery on 4/10/17    Cough variant asthma 2017    Diabetes mellitus (HCC)     type 2, last assessed 2017    Hernia, umbilical     currently has    HL (hearing loss)     b/l hearing aids    Labyrinthitis     Moderate obstructive sleep apnea 2017    " New onset a-fib (HCC) 1/10/2020    Obesity with body mass index 30 or greater 11/4/2019    Umbilical hernia

## 2023-12-21 ENCOUNTER — OFFICE VISIT (OUTPATIENT)
Dept: PHYSICAL THERAPY | Facility: CLINIC | Age: 78
End: 2023-12-21
Payer: MEDICARE

## 2023-12-21 DIAGNOSIS — M54.2 NECK PAIN: ICD-10-CM

## 2023-12-21 DIAGNOSIS — R26.89 BALANCE PROBLEM: Primary | ICD-10-CM

## 2023-12-21 DIAGNOSIS — Z91.81 HISTORY OF FALL: ICD-10-CM

## 2023-12-21 PROCEDURE — 97112 NEUROMUSCULAR REEDUCATION: CPT | Performed by: PHYSICAL THERAPIST

## 2023-12-21 PROCEDURE — 97110 THERAPEUTIC EXERCISES: CPT | Performed by: PHYSICAL THERAPIST

## 2023-12-21 NOTE — PROGRESS NOTES
Daily Note     Today's date: 2023  Patient name: Kaiser Price  : 1945  MRN: [id number]  Referring provider: Mary Jo Marroquin MD  Dx:   Encounter Diagnosis     ICD-10-CM    1. Balance problem  R26.89       2. Neck pain  M54.2       3. History of fall  Z91.81           Start Time: 1150  Stop Time: 1236  Total time in clinic (min): 46 minutes       Subjective: Client reports his neck is feeling a little tight today.        Objective: See treatment diary below     TE:  - standing heelraises 30x, 1 UE   - upper trap stretch 30 sec x 5 bilaterally  - levator scap 30 sec x 5 bilaterally  - tband rows, blue 2x15  - tband ext, blue 2x15      NMR:  - side stepping along airex beam 10 feet x 10, 0-1 UE support  - fwd/bwd tandem walking along airex beam 10 feet x 10, 1 UE support  - chin tucks 20x 3 sec hold  - wobble board 30x AP/ML,1 UE support  - fwd/bwd stepping over hurdles 20 feet x 8 (1x CG)     Not today:  - SCM stretch 30 sec x 5 bilaterally  - STS 2x10, airex pad on chair,  0UE support (+) education and cues for anterior weight shift  - standing hip abduction 2x10 BLE  - fwd/bwd stepping over blanca 1UE support 30x     Assessment: Client with good participation throughout session. Sessions continued to focus on neck stretching and standing balance exercises.  Initiated and trained on fwd/bwd hurdles with 1x LOB during bwd walking.     Plan: Continue per plan of care.       Insurance:  AMA/CMS Eval/ Re-eval Auth #/ Referral # Total units  Start date  Expiration date Extension  Visit limitation?  PT only or  PT+OT? Co-Insurance   CMS  none  na na  no na Yes. $0                 POC Start Date POC Expiration Date Signed POC?   11/15/2023 2/7/24 yes       Outcome Measures Initial Eval  11/15/23 + 23 PN: 23       5xSTS 34.00 sec, CG 25.47 sec CS       TUG 15.82 sec 11.56 sec       10 meter 10.00 sec  1.0 m/s 8.82 sec  1.13 m/s       BARRAGAN /56        FGA /30        DGI /24        mCTSIB  - FTEO  (firm)  - FTEC (firm)  - FTEO (foam)  - FTEC (foam)   30 sec  9.50 sec  30 sec  1.19 sec   30 sec  30 sec  30 sec  4.53 sec       6MWT  1000 ft 1170 ft                                 Precautions: falls

## 2023-12-26 ENCOUNTER — APPOINTMENT (OUTPATIENT)
Dept: PHYSICAL THERAPY | Facility: CLINIC | Age: 78
End: 2023-12-26
Payer: MEDICARE

## 2023-12-28 ENCOUNTER — OFFICE VISIT (OUTPATIENT)
Dept: PHYSICAL THERAPY | Facility: CLINIC | Age: 78
End: 2023-12-28
Payer: MEDICARE

## 2023-12-28 DIAGNOSIS — M54.2 NECK PAIN: ICD-10-CM

## 2023-12-28 DIAGNOSIS — R26.89 BALANCE PROBLEM: Primary | ICD-10-CM

## 2023-12-28 DIAGNOSIS — Z91.81 HISTORY OF FALL: ICD-10-CM

## 2023-12-28 PROCEDURE — 97112 NEUROMUSCULAR REEDUCATION: CPT | Performed by: PHYSICAL THERAPIST

## 2023-12-28 PROCEDURE — 97110 THERAPEUTIC EXERCISES: CPT | Performed by: PHYSICAL THERAPIST

## 2023-12-28 NOTE — PROGRESS NOTES
Daily Note     Today's date: 2023  Patient name: Kaiser Price  : 1945  MRN: [id number]  Referring provider: Mary Jo Marroquin MD  Dx:   Encounter Diagnosis     ICD-10-CM    1. Balance problem  R26.89       2. Neck pain  M54.2       3. History of fall  Z91.81           Start Time: 1147  Stop Time: 1229  Total time in clinic (min): 42 minutes       Subjective: Client reports his neck is feeling better. He reports feeling a little tight when looking up        Objective: See treatment diary below     TE:  - standing heelraises 30x, 1 UE   - upper trap stretch 30 sec x 5 bilaterally  - levator scap 30 sec x 5 bilaterally  - tband rows, blue 2x15  - tband ext, blue 2x15      NMR:  - side stepping along airex beam 10 feet x 10, 0-1 UE support  - fwd/bwd tandem walking along airex beam 10 feet x 10, 1 UE support  - chin tucks 20x 3 sec hold  - wobble board 30x AP/ML,1 UE support  - fwd/bwd stepping over hurdles 20 feet x 8 (1x CG)     Not today:  - SCM stretch 30 sec x 5 bilaterally  - STS 2x10, airex pad on chair,  0UE support (+) education and cues for anterior weight shift  - standing hip abduction 2x10 BLE  - fwd/bwd stepping over blanca 1UE support 30x     Assessment: Client with good participation throughout session. Sessions continued to focus on neck stretching and standing balance exercises.  Initiated and trained on cervical SNAG extension and rotation with good tolerance and effect. Updated HEP for cervical extension with towel and SNAG rotation.     Plan: Continue per plan of care. 1x a week due to progress.       Access Code: [email or web address]  URL: [email or web address]  Date: 2023  Prepared by: Cassie Gomez    Exercises  - Cervical Extension AROM with Strap  - 1 x daily - 3 x weekly - 10 reps - 10 sec hold  - Seated Assisted Cervical Rotation with Towel  - 1 x daily - 3 x weekly - 10 reps - 10 sec hold     Insurance:  AMA/CMS Eval/ Re-eval Auth #/ Referral # Total units  Start date   Expiration date Extension  Visit limitation?  PT only or  PT+OT? Co-Insurance   CMS  none  na na  no na Yes. $0                 POC Start Date POC Expiration Date Signed POC?   11/15/2023 2/7/24 yes       Outcome Measures Initial Eval  11/15/23 + 11/16/23 PN: 12/18/23       5xSTS 34.00 sec, CG 25.47 sec CS       TUG 15.82 sec 11.56 sec       10 meter 10.00 sec  1.0 m/s 8.82 sec  1.13 m/s       BERG /56        FGA /30        DGI /24        mCTSIB  - FTEO (firm)  - FTEC (firm)  - FTEO (foam)  - FTEC (foam)   30 sec  9.50 sec  30 sec  1.19 sec   30 sec  30 sec  30 sec  4.53 sec       6MWT  1000 ft 1170 ft                                 Precautions: falls

## 2024-01-02 ENCOUNTER — APPOINTMENT (OUTPATIENT)
Dept: PHYSICAL THERAPY | Facility: CLINIC | Age: 79
End: 2024-01-02
Payer: MEDICARE

## 2024-01-03 ENCOUNTER — APPOINTMENT (OUTPATIENT)
Dept: PHYSICAL THERAPY | Facility: CLINIC | Age: 79
End: 2024-01-03
Payer: MEDICARE

## 2024-01-04 ENCOUNTER — APPOINTMENT (OUTPATIENT)
Dept: PHYSICAL THERAPY | Facility: CLINIC | Age: 79
End: 2024-01-04
Payer: MEDICARE

## 2024-01-08 ENCOUNTER — OFFICE VISIT (OUTPATIENT)
Dept: PHYSICAL THERAPY | Facility: CLINIC | Age: 79
End: 2024-01-08
Payer: MEDICARE

## 2024-01-08 DIAGNOSIS — M54.2 NECK PAIN: ICD-10-CM

## 2024-01-08 DIAGNOSIS — Z91.81 HISTORY OF FALL: ICD-10-CM

## 2024-01-08 DIAGNOSIS — R26.89 BALANCE PROBLEM: Primary | ICD-10-CM

## 2024-01-08 PROCEDURE — 97112 NEUROMUSCULAR REEDUCATION: CPT | Performed by: PHYSICAL THERAPIST

## 2024-01-08 PROCEDURE — 97110 THERAPEUTIC EXERCISES: CPT | Performed by: PHYSICAL THERAPIST

## 2024-01-08 NOTE — PROGRESS NOTES
Daily Note     Today's date: 2024  Patient name: Kaiser Price  : 1945  MRN: [id number]  Referring provider: Mary Jo Marroquin MD  Dx:   Encounter Diagnosis     ICD-10-CM    1. Balance problem  R26.89       2. Neck pain  M54.2       3. History of fall  Z91.81           Start Time: 1149  Stop Time: 1238  Total time in clinic (min): 49 minutes       Subjective: Client reports his neck is feeling better. He reports feeling a little tired throughout session today.        Objective: See treatment diary below     TE:  - standing heelraises 30x, 1 UE   - tband rows, blue 2x15  - tband ext, blue 2x15  - vitals: 132/69 mmHg 109 bpm      NMR:  - side stepping along airex beam 10 feet x 10, 0-1 UE support  - fwd/bwd tandem walking along airex beam 10 feet x 10, 1 UE support  - chin tucks 20x 3 sec hold  - wobble board 30x AP/ML,1 UE support  - fwd/bwd stepping over blanca, standing on airex pads, 30x 1UE support   - lateral stepping over blanca, standing on airex pads, 30x 1UE support     Not today:  - upper trap stretch 30 sec x 5 bilaterally  - levator scap 30 sec x 5 bilaterally  - SCM stretch 30 sec x 5 bilaterally  - STS 2x10, airex pad on chair,  0UE support (+) education and cues for anterior weight shift  - standing hip abduction 2x10 BLE       Assessment: Client with good participation throughout session. Sessions continued to focus on neck stretching/postural strengthening and progression of standing balance exercises. Progressed stepping over hurdles with use of airex pads today with notable challenge. During session client with period of pallor and diaphoresis. He confirms not eating anything yet today due to rushing around this morning (client is type II diabetic). Clinic decision to provide client with protein and carbohydrate based snack. He declined wanting to go to urgent care to get his blood sugar checked. After having his snack he confirmed feeling better and pallor and diaphoresis improved.  This PT recommended he seek higher medical care if symptoms return/persist. This PT also recommended client check his blood sugar once he goes home (client was going straight home from PT).     Plan: Continue per plan of care. 1x a week due to progress.       Access Code: [email or web address]  URL: [email or web address]  Date: 12/28/2023  Prepared by: Cassie Gomez    Exercises  - Cervical Extension AROM with Strap  - 1 x daily - 3 x weekly - 10 reps - 10 sec hold  - Seated Assisted Cervical Rotation with Towel  - 1 x daily - 3 x weekly - 10 reps - 10 sec hold     Insurance:  AMA/CMS Eval/ Re-eval Auth #/ Referral # Total units  Start date  Expiration date Extension  Visit limitation?  PT only or  PT+OT? Co-Insurance   CMS  none  na na  no na Yes. $0                 POC Start Date POC Expiration Date Signed POC?   11/15/2023 2/7/24 yes       Outcome Measures Initial Eval  11/15/23 + 11/16/23 PN: 12/18/23       5xSTS 34.00 sec, CG 25.47 sec CS       TUG 15.82 sec 11.56 sec       10 meter 10.00 sec  1.0 m/s 8.82 sec  1.13 m/s       BARRAGAN /56        FGA /30        DGI /24        mCTSIB  - FTEO (firm)  - FTEC (firm)  - FTEO (foam)  - FTEC (foam)   30 sec  9.50 sec  30 sec  1.19 sec   30 sec  30 sec  30 sec  4.53 sec       6MWT  1000 ft 1170 ft                                 Precautions: falls

## 2024-01-15 ENCOUNTER — OFFICE VISIT (OUTPATIENT)
Dept: PHYSICAL THERAPY | Facility: CLINIC | Age: 79
End: 2024-01-15
Payer: MEDICARE

## 2024-01-15 DIAGNOSIS — R26.89 BALANCE PROBLEM: Primary | ICD-10-CM

## 2024-01-15 DIAGNOSIS — Z91.81 HISTORY OF FALL: ICD-10-CM

## 2024-01-15 DIAGNOSIS — M54.2 NECK PAIN: ICD-10-CM

## 2024-01-15 PROCEDURE — 97112 NEUROMUSCULAR REEDUCATION: CPT | Performed by: PHYSICAL THERAPIST

## 2024-01-15 PROCEDURE — 97110 THERAPEUTIC EXERCISES: CPT | Performed by: PHYSICAL THERAPIST

## 2024-01-15 NOTE — PROGRESS NOTES
Daily Note     Today's date: 1/15/2024  Patient name: Kaiser Price  : 1945  MRN: [id number]  Referring provider: Mary Jo Marroquin MD  Dx:   Encounter Diagnosis     ICD-10-CM    1. Balance problem  R26.89       2. Neck pain  M54.2       3. History of fall  Z91.81           Start Time: 1412  Stop Time: 1500  Total time in clinic (min): 48 minutes       Subjective: Client reports his neck is feeling better. He had a busy day at the [facility].         Objective: See treatment diary below     TE:  - standing heelraises 30x, 1 UE   - vitals: 172/86 mmHg 64 bpm, O2 98% on room air, 171/72 mmHg 64 bpm      NMR:  - side stepping along airex beam 10 feet x 10, 0-1 UE support  - fwd/bwd tandem walking along airex beam 10 feet x 10, 1 UE support  - wobble board 30x AP/ML,1 UE support  - fwd/bwd stepping over blanca, standing on airex pads, 30x 1UE support   - lateral stepping over blanca, standing on airex pads, 30x 1UE support  - romberg stance on airex pad EC 20-30 sec x 5, CS  - romberg stance on airex pad, EO, H/V head turns 30x each way, CS-CG     Not today:  - chin tucks 20x 3 sec hold  - tband rows, blue 2x15  - tband ext, blue 2x15  - upper trap stretch 30 sec x 5 bilaterally  - levator scap 30 sec x 5 bilaterally  - SCM stretch 30 sec x 5 bilaterally  - STS 2x10, airex pad on chair,  0UE support (+) education and cues for anterior weight shift  - standing hip abduction 2x10 BLE       Assessment: Client with good participation throughout session. Sessions continued to focus on neck stretching/postural strengthening and progression of standing balance exercises. During session, client presented with periods of audible dyspnea on exertion. This PT assessed vitals and O2 and HR were WNL, however BP was elevated with SBP staying in the 170's for remainder of session. LACKEY subsided and client was asymptomatic entire session. He has a BP machine at home. This PT recommended for client to recheck his BP  throughout the rest of the day and if it continues to stay elevated and/of he starts to not feel well, to seek higher medical care. This PT also told his PCP team.     Plan: Continue per plan of care. 1x a week due to progress.       Access Code: [email or web address]  URL: [email or web address]  Date: 12/28/2023  Prepared by: Cassie Gomez    Exercises  - Cervical Extension AROM with Strap  - 1 x daily - 3 x weekly - 10 reps - 10 sec hold  - Seated Assisted Cervical Rotation with Towel  - 1 x daily - 3 x weekly - 10 reps - 10 sec hold     Insurance:  AMA/CMS Eval/ Re-eval Auth #/ Referral # Total units  Start date  Expiration date Extension  Visit limitation?  PT only or  PT+OT? Co-Insurance   CMS  none  na na  no na Yes. $0                 POC Start Date POC Expiration Date Signed POC?   11/15/2023 2/7/24 yes       Outcome Measures Initial Eval  11/15/23 + 11/16/23 PN: 12/18/23       5xSTS 34.00 sec, CG 25.47 sec CS       TUG 15.82 sec 11.56 sec       10 meter 10.00 sec  1.0 m/s 8.82 sec  1.13 m/s       BARRAGAN /56        FGA /30        DGI /24        mCTSIB  - FTEO (firm)  - FTEC (firm)  - FTEO (foam)  - FTEC (foam)   30 sec  9.50 sec  30 sec  1.19 sec   30 sec  30 sec  30 sec  4.53 sec       6MWT  1000 ft 1170 ft                                 Precautions: falls

## 2024-01-16 ENCOUNTER — OFFICE VISIT (OUTPATIENT)
Dept: FAMILY MEDICINE CLINIC | Facility: CLINIC | Age: 79
End: 2024-01-16
Payer: MEDICARE

## 2024-01-16 VITALS
OXYGEN SATURATION: 99 % | TEMPERATURE: 96.2 F | SYSTOLIC BLOOD PRESSURE: 170 MMHG | BODY MASS INDEX: 31.28 KG/M2 | DIASTOLIC BLOOD PRESSURE: 78 MMHG | RESPIRATION RATE: 20 BRPM | WEIGHT: 236 LBS | HEART RATE: 71 BPM | HEIGHT: 73 IN

## 2024-01-16 DIAGNOSIS — G47.33 MODERATE OBSTRUCTIVE SLEEP APNEA: ICD-10-CM

## 2024-01-16 DIAGNOSIS — E11.65 TYPE 2 DIABETES MELLITUS WITH HYPERGLYCEMIA, WITHOUT LONG-TERM CURRENT USE OF INSULIN (HCC): ICD-10-CM

## 2024-01-16 DIAGNOSIS — I10 ESSENTIAL HYPERTENSION: ICD-10-CM

## 2024-01-16 DIAGNOSIS — I10 HYPERTENSION GOAL BP (BLOOD PRESSURE) < 140/90: Primary | ICD-10-CM

## 2024-01-16 DIAGNOSIS — D64.9 ANEMIA, UNSPECIFIED TYPE: ICD-10-CM

## 2024-01-16 DIAGNOSIS — I48.0 PAROXYSMAL ATRIAL FIBRILLATION (HCC): ICD-10-CM

## 2024-01-16 PROCEDURE — 99214 OFFICE O/P EST MOD 30 MIN: CPT | Performed by: FAMILY MEDICINE

## 2024-01-16 RX ORDER — RAMIPRIL 5 MG/1
10 CAPSULE ORAL EVERY MORNING
Start: 2024-01-16

## 2024-01-16 NOTE — PATIENT INSTRUCTIONS
CONTINUE CURRENT TREATMENT PLAN  MONITOR DIETARY SODIUM, CHOL INTAKE  ENCOURAGE PHYSICAL ACTIVITY  INCREASE RAMIPRIL TO 2 PILLS DAILY ( 10 MG ) BOTH AT THE SAME TIME    RV 2 WEEKS

## 2024-01-16 NOTE — PROGRESS NOTES
Name: Kaiser Price      : 1945      MRN: [id number]  Encounter Provider: Scott Padron MD  Encounter Date: 2024   Encounter department: [facility]    Assessment & Plan     1. Hypertension goal BP (blood pressure) < 140/90    2. Type 2 diabetes mellitus with hyperglycemia, without long-term current use of insulin (HCC)    3. Anemia, unspecified type    4. Paroxysmal atrial fibrillation (HCC)    5. Moderate obstructive sleep apnea    6. Essential hypertension  -     ramipril (ALTACE) 5 mg capsule; Take 2 capsules (10 mg total) by mouth every morning           Subjective      FOLLOW UP    PATIENT RETURNS  WAS NOTED TO HAVE ELEVATED BP AT PT  CONTINUES TO BE ELEVATED TODAY    DENIES ANY CP, SOB, PALPITATIONS OR DIZZINESS  NOTES NO NUMBNESS, TINGLING , WEAKNESS OR HEADACHE  NO LEG SWELLING NOTED    DENIES ANY CHANGE IN DIET  HAS BEEN UNDER MORE STRESS AT HOME WITH SOME FAMILY DISCORD    DISCUSSED THERAPEUTIC PLAN AND FOLLOW UP      Review of Systems   Constitutional:  Negative for chills, fatigue and fever.   HENT:  Negative for congestion, ear discharge, ear pain, mouth sores, postnasal drip, sore throat and trouble swallowing.    Eyes:  Negative for pain, discharge and visual disturbance.   Respiratory:  Negative for cough, shortness of breath and wheezing.    Cardiovascular:  Negative for chest pain, palpitations and leg swelling.   Gastrointestinal:  Negative for abdominal distention, abdominal pain, blood in stool, diarrhea and nausea.   Endocrine: Negative for polydipsia, polyphagia and polyuria.   Genitourinary:  Negative for dysuria, frequency, hematuria and urgency.   Musculoskeletal:  Positive for arthralgias. Negative for gait problem and joint swelling.   Skin:  Negative for pallor and rash.   Neurological:  Negative for dizziness, syncope, speech difficulty, weakness, light-headedness, numbness and headaches.   Hematological:  Negative for adenopathy.  "  Psychiatric/Behavioral:  Negative for behavioral problems, confusion and sleep disturbance. The patient is not nervous/anxious.        Current Outpatient Medications on File Prior to Visit   Medication Sig   • albuterol (2.5 mg/3 mL) 0.083 % nebulizer solution Take 3 mL (2.5 mg total) by nebulization every 6 (six) hours as needed for wheezing or shortness of breath   • albuterol (Ventolin HFA) 90 mcg/act inhaler Inhale 2 puffs every 6 (six) hours as needed for wheezing   • finasteride (PROSCAR) 5 mg tablet TAKE ONE TABLET BY MOUTH EVERY DAY   • glipiZIDE (GLUCOTROL XL) 10 mg 24 hr tablet TAKE 1 TABLET BY MOUTH EVERY DAY WITH BREAKFAST   • Glucosamine-Chondroitin (GLUCOSAMINE CHONDR COMPLEX PO) Take by mouth 2 (two) times a day   • metFORMIN (GLUCOPHAGE) 500 mg tablet Take 2 tablets (1,000 mg total) by mouth 2 (two) times a day with meals   • metoprolol succinate (TOPROL-XL) 25 mg 24 hr tablet TAKE ONE TABLET BY MOUTH EVERY MORNING   • montelukast (SINGULAIR) 10 mg tablet Take 1 tablet (10 mg total) by mouth daily at bedtime   • rivaroxaban (XARELTO) 20 mg tablet in the morning   • sertraline (ZOLOFT) 100 mg tablet TAKE ONE TABLET BY MOUTH EVERY DAY   • tamsulosin (FLOMAX) 0.4 mg TAKE 1 CAPSULE BY MOUTH EVERYDAY AT BEDTIME   • [DISCONTINUED] ramipril (ALTACE) 5 mg capsule Take 1 capsule (5 mg total) by mouth every morning   • [DISCONTINUED] ferrous gluconate (FERGON) 240 (27 FE) MG tablet Take 1 tablet (240 mg total) by mouth in the morning (Patient not taking: Reported on 1/16/2024)   • [DISCONTINUED] ferrous sulfate 324 (65 Fe) mg Take 1 tablet (324 mg total) by mouth 2 (two) times a day before meals (Patient not taking: Reported on 1/16/2024)       Objective     /78 (BP Location: Right arm, Patient Position: Sitting, Cuff Size: Large)   Pulse 71   Temp (!) 96.2 °F (35.7 °C) (Temporal)   Resp 20   Ht 6' 1\" (1.854 m)   Wt 107 kg (236 lb)   SpO2 99%   BMI 31.14 kg/m²     Physical Exam  Vitals reviewed. "   Constitutional:       General: He is not in acute distress.     Appearance: Normal appearance. He is well-developed. He is not ill-appearing.   HENT:      Head: Normocephalic and atraumatic.      Nose: Nose normal.      Mouth/Throat:      Mouth: Mucous membranes are moist.   Eyes:      General:         Right eye: No discharge.         Left eye: No discharge.      Extraocular Movements: Extraocular movements intact.      Conjunctiva/sclera: Conjunctivae normal.      Pupils: Pupils are equal, round, and reactive to light.      Comments: SMALL PUPILS  ? GRADE I HTN CHANGES   Neck:      Thyroid: No thyromegaly.      Vascular: No JVD.   Cardiovascular:      Rate and Rhythm: Normal rate and regular rhythm.      Heart sounds: Normal heart sounds. No murmur heard.  Pulmonary:      Effort: Pulmonary effort is normal.      Breath sounds: Normal breath sounds. No wheezing or rales.   Abdominal:      General: Bowel sounds are normal.      Palpations: Abdomen is soft. There is no mass.      Tenderness: There is no abdominal tenderness. There is no guarding or rebound.   Musculoskeletal:         General: No tenderness or deformity.      Cervical back: Neck supple.      Comments: MODERATE DJD CHANGES    TR-1+ EDEMA BILAT   Lymphadenopathy:      Cervical: No cervical adenopathy.   Skin:     General: Skin is warm and dry.      Findings: No erythema or rash.   Neurological:      General: No focal deficit present.      Mental Status: He is alert and oriented to person, place, and time.   Psychiatric:         Mood and Affect: Mood normal.         Behavior: Behavior normal.         Thought Content: Thought content normal.         Judgment: Judgment normal.       Scott Padron MD

## 2024-01-16 NOTE — LETTER
2024     Cassie Gomez, PT    Patient: Kaiser Price   YOB: 1945   Date of Visit: 2024       Dear Dr. Gomez:    Thank you for referring Kaiser Price to me for evaluation. Below are my notes for this consultation.    If you have questions, please do not hesitate to call me. I look forward to following your patient along with you.         Sincerely,        Scott Padron MD        CC: No Recipients    Scott Padron MD  2024  3:17 PM  Sign when Signing Visit  Name: Kaiser Price      : 1945      MRN: [id number]  Encounter Provider: Scott Padron MD  Encounter Date: 2024   Encounter department: [facility]    Assessment & Plan     1. Hypertension goal BP (blood pressure) < 140/90    2. Type 2 diabetes mellitus with hyperglycemia, without long-term current use of insulin (HCC)    3. Anemia, unspecified type    4. Paroxysmal atrial fibrillation (HCC)    5. Moderate obstructive sleep apnea    6. Essential hypertension  -     ramipril (ALTACE) 5 mg capsule; Take 2 capsules (10 mg total) by mouth every morning           Subjective      FOLLOW UP    PATIENT RETURNS  WAS NOTED TO HAVE ELEVATED BP AT PT  CONTINUES TO BE ELEVATED TODAY    DENIES ANY CP, SOB, PALPITATIONS OR DIZZINESS  NOTES NO NUMBNESS, TINGLING , WEAKNESS OR HEADACHE  NO LEG SWELLING NOTED    DENIES ANY CHANGE IN DIET  HAS BEEN UNDER MORE STRESS AT HOME WITH SOME FAMILY DISCORD    DISCUSSED THERAPEUTIC PLAN AND FOLLOW UP      Review of Systems   Constitutional:  Negative for chills, fatigue and fever.   HENT:  Negative for congestion, ear discharge, ear pain, mouth sores, postnasal drip, sore throat and trouble swallowing.    Eyes:  Negative for pain, discharge and visual disturbance.   Respiratory:  Negative for cough, shortness of breath and wheezing.    Cardiovascular:  Negative for chest pain, palpitations and leg swelling.   Gastrointestinal:  Negative  for abdominal distention, abdominal pain, blood in stool, diarrhea and nausea.   Endocrine: Negative for polydipsia, polyphagia and polyuria.   Genitourinary:  Negative for dysuria, frequency, hematuria and urgency.   Musculoskeletal:  Positive for arthralgias. Negative for gait problem and joint swelling.   Skin:  Negative for pallor and rash.   Neurological:  Negative for dizziness, syncope, speech difficulty, weakness, light-headedness, numbness and headaches.   Hematological:  Negative for adenopathy.   Psychiatric/Behavioral:  Negative for behavioral problems, confusion and sleep disturbance. The patient is not nervous/anxious.        Current Outpatient Medications on File Prior to Visit   Medication Sig   • albuterol (2.5 mg/3 mL) 0.083 % nebulizer solution Take 3 mL (2.5 mg total) by nebulization every 6 (six) hours as needed for wheezing or shortness of breath   • albuterol (Ventolin HFA) 90 mcg/act inhaler Inhale 2 puffs every 6 (six) hours as needed for wheezing   • finasteride (PROSCAR) 5 mg tablet TAKE ONE TABLET BY MOUTH EVERY DAY   • glipiZIDE (GLUCOTROL XL) 10 mg 24 hr tablet TAKE 1 TABLET BY MOUTH EVERY DAY WITH BREAKFAST   • Glucosamine-Chondroitin (GLUCOSAMINE CHONDR COMPLEX PO) Take by mouth 2 (two) times a day   • metFORMIN (GLUCOPHAGE) 500 mg tablet Take 2 tablets (1,000 mg total) by mouth 2 (two) times a day with meals   • metoprolol succinate (TOPROL-XL) 25 mg 24 hr tablet TAKE ONE TABLET BY MOUTH EVERY MORNING   • montelukast (SINGULAIR) 10 mg tablet Take 1 tablet (10 mg total) by mouth daily at bedtime   • rivaroxaban (XARELTO) 20 mg tablet in the morning   • sertraline (ZOLOFT) 100 mg tablet TAKE ONE TABLET BY MOUTH EVERY DAY   • tamsulosin (FLOMAX) 0.4 mg TAKE 1 CAPSULE BY MOUTH EVERYDAY AT BEDTIME   • [DISCONTINUED] ramipril (ALTACE) 5 mg capsule Take 1 capsule (5 mg total) by mouth every morning   • [DISCONTINUED] ferrous gluconate (FERGON) 240 (27 FE) MG tablet Take 1 tablet (240 mg  "total) by mouth in the morning (Patient not taking: Reported on 1/16/2024)   • [DISCONTINUED] ferrous sulfate 324 (65 Fe) mg Take 1 tablet (324 mg total) by mouth 2 (two) times a day before meals (Patient not taking: Reported on 1/16/2024)       Objective     /78 (BP Location: Right arm, Patient Position: Sitting, Cuff Size: Large)   Pulse 71   Temp (!) 96.2 °F (35.7 °C) (Temporal)   Resp 20   Ht 6' 1\" (1.854 m)   Wt 107 kg (236 lb)   SpO2 99%   BMI 31.14 kg/m²     Physical Exam  Vitals reviewed.   Constitutional:       General: He is not in acute distress.     Appearance: Normal appearance. He is well-developed. He is not ill-appearing.   HENT:      Head: Normocephalic and atraumatic.      Nose: Nose normal.      Mouth/Throat:      Mouth: Mucous membranes are moist.   Eyes:      General:         Right eye: No discharge.         Left eye: No discharge.      Extraocular Movements: Extraocular movements intact.      Conjunctiva/sclera: Conjunctivae normal.      Pupils: Pupils are equal, round, and reactive to light.      Comments: SMALL PUPILS  ? GRADE I HTN CHANGES   Neck:      Thyroid: No thyromegaly.      Vascular: No JVD.   Cardiovascular:      Rate and Rhythm: Normal rate and regular rhythm.      Heart sounds: Normal heart sounds. No murmur heard.  Pulmonary:      Effort: Pulmonary effort is normal.      Breath sounds: Normal breath sounds. No wheezing or rales.   Abdominal:      General: Bowel sounds are normal.      Palpations: Abdomen is soft. There is no mass.      Tenderness: There is no abdominal tenderness. There is no guarding or rebound.   Musculoskeletal:         General: No tenderness or deformity.      Cervical back: Neck supple.      Comments: MODERATE DJD CHANGES    TR-1+ EDEMA BILAT   Lymphadenopathy:      Cervical: No cervical adenopathy.   Skin:     General: Skin is warm and dry.      Findings: No erythema or rash.   Neurological:      General: No focal deficit present.      Mental " Status: He is alert and oriented to person, place, and time.   Psychiatric:         Mood and Affect: Mood normal.         Behavior: Behavior normal.         Thought Content: Thought content normal.         Judgment: Judgment normal.       Scott Padron MD

## 2024-01-18 DIAGNOSIS — E11.65 TYPE 2 DIABETES MELLITUS WITH HYPERGLYCEMIA, WITHOUT LONG-TERM CURRENT USE OF INSULIN (HCC): Primary | ICD-10-CM

## 2024-01-24 ENCOUNTER — RA CDI HCC (OUTPATIENT)
Dept: OTHER | Facility: HOSPITAL | Age: 79
End: 2024-01-24

## 2024-01-29 ENCOUNTER — APPOINTMENT (OUTPATIENT)
Dept: PHYSICAL THERAPY | Facility: CLINIC | Age: 79
End: 2024-01-29
Payer: MEDICARE

## 2024-02-05 ENCOUNTER — TELEPHONE (OUTPATIENT)
Age: 79
End: 2024-02-05

## 2024-02-05 NOTE — TELEPHONE ENCOUNTER
I offered Xiomy an appt later this week with Dr Padron, but Xiomy wanted Kaiser to be seen sooner so she scheduled an appt tomorrow with Dr Marroquin.  No further action required

## 2024-02-05 NOTE — TELEPHONE ENCOUNTER
Wife called. Concerned with blood pressure readings. She has a new cuff that she got for herself since recently discharged from the hospital from having a stroke . She's checked her 's at 10 o'clock last night .   209/99, 207/91 and 203/96. Took his blood pressure medication as he usually does in the evening. Started to come down. 190/88, 186/87, 175/78, and at 1 am it was 165/76. This morning it is 150/64. Denies symptoms. No headache, no chest pain, no visual or neurological symptoms. Did report they ate a very salty dinner.  She just wanted his PCP to know and if recommends anything further, future appt is 2/27

## 2024-02-06 ENCOUNTER — OFFICE VISIT (OUTPATIENT)
Dept: FAMILY MEDICINE CLINIC | Facility: CLINIC | Age: 79
End: 2024-02-06
Payer: MEDICARE

## 2024-02-06 VITALS
WEIGHT: 236.4 LBS | SYSTOLIC BLOOD PRESSURE: 150 MMHG | OXYGEN SATURATION: 96 % | DIASTOLIC BLOOD PRESSURE: 80 MMHG | BODY MASS INDEX: 31.19 KG/M2 | HEART RATE: 61 BPM

## 2024-02-06 DIAGNOSIS — I10 HYPERTENSION GOAL BP (BLOOD PRESSURE) < 140/90: Primary | ICD-10-CM

## 2024-02-06 DIAGNOSIS — I10 ESSENTIAL HYPERTENSION: ICD-10-CM

## 2024-02-06 PROCEDURE — 99213 OFFICE O/P EST LOW 20 MIN: CPT | Performed by: STUDENT IN AN ORGANIZED HEALTH CARE EDUCATION/TRAINING PROGRAM

## 2024-02-06 RX ORDER — RAMIPRIL 10 MG/1
10 CAPSULE ORAL EVERY MORNING
Qty: 30 CAPSULE | Refills: 0 | Status: SHIPPED | OUTPATIENT
Start: 2024-02-06 | End: 2024-02-08

## 2024-02-06 NOTE — ASSESSMENT & PLAN NOTE
-BP in office 150/80  -Patient has only been taking 5 mg of ramipril advised to take 10 mg daily in addition to toprol xl 25 mg  -Continue to monitor BP at home  -Patient advised if he develops HA, SOB, dizziness, and chest pain he should proceed to ER immediately. He should also proceed to ER if BP systolic is in the 200s.   -MARTE diet discussed  -Follow up in office in 2 days

## 2024-02-06 NOTE — PROGRESS NOTES
Name: Kaiser Price      : 1945      MRN: [id number]  Encounter Provider: Mary Jo Marroquin MD  Encounter Date: 2024   Encounter department: [facility]    Assessment & Plan     1. Hypertension goal BP (blood pressure) < 140/90  Assessment & Plan:  -BP in office 150/80  -Patient has only been taking 5 mg of ramipril advised to take 10 mg daily in addition to toprol xl 25 mg  -Continue to monitor BP at home  -Patient advised if he develops HA, SOB, dizziness, and chest pain he should proceed to ER immediately. He should also proceed to ER if BP systolic is in the 200s.   -MARTE diet discussed  -Follow up in office in 2 days      2. Essential hypertension  -     ramipril (ALTACE) 10 MG capsule; Take 1 capsule (10 mg total) by mouth every morning         Subjective      HPI    Patient presents for BP check. Notes BP at home was 155/70 this morning. The other day he had ravinder crab which was salty and as per blood pressure monitor systolic was in 200s. He denies headache, SOB, dizziness and chest pain. He is with his wife who notes he has only been taking 5 mg of ramipril and not 10 mg daily. Advised him to increase to 10 mg daily.     Review of Systems   Constitutional:  Negative for activity change, appetite change, chills, fatigue and fever.   HENT:  Negative for congestion.    Respiratory:  Negative for cough, shortness of breath and wheezing.    Cardiovascular:  Negative for chest pain, palpitations and leg swelling.   Gastrointestinal:  Negative for abdominal pain, constipation, diarrhea, nausea and vomiting.   Musculoskeletal:  Positive for arthralgias and neck pain. Negative for myalgias.   Skin:  Negative for rash.   Neurological:  Negative for dizziness, weakness, light-headedness and headaches.   Psychiatric/Behavioral:  The patient is not nervous/anxious.        Current Outpatient Medications on File Prior to Visit   Medication Sig   • albuterol (2.5 mg/3 mL) 0.083 %  nebulizer solution Take 3 mL (2.5 mg total) by nebulization every 6 (six) hours as needed for wheezing or shortness of breath   • albuterol (Ventolin HFA) 90 mcg/act inhaler Inhale 2 puffs every 6 (six) hours as needed for wheezing   • finasteride (PROSCAR) 5 mg tablet TAKE ONE TABLET BY MOUTH EVERY DAY   • glipiZIDE (GLUCOTROL XL) 10 mg 24 hr tablet TAKE 1 TABLET BY MOUTH EVERY DAY WITH BREAKFAST   • Glucosamine-Chondroitin (GLUCOSAMINE CHONDR COMPLEX PO) Take by mouth 2 (two) times a day   • metFORMIN (GLUCOPHAGE) 500 mg tablet Take 2 tablets (1,000 mg total) by mouth 2 (two) times a day with meals   • metoprolol succinate (TOPROL-XL) 25 mg 24 hr tablet TAKE ONE TABLET BY MOUTH EVERY MORNING   • montelukast (SINGULAIR) 10 mg tablet Take 1 tablet (10 mg total) by mouth daily at bedtime   • rivaroxaban (XARELTO) 20 mg tablet in the morning   • sertraline (ZOLOFT) 100 mg tablet TAKE ONE TABLET BY MOUTH EVERY DAY   • tamsulosin (FLOMAX) 0.4 mg TAKE 1 CAPSULE BY MOUTH EVERYDAY AT BEDTIME   • [DISCONTINUED] ramipril (ALTACE) 5 mg capsule Take 2 capsules (10 mg total) by mouth every morning       Objective     /80 (BP Location: Right arm, Patient Position: Sitting)   Pulse 61   Wt 107 kg (236 lb 6.4 oz)   SpO2 96%   BMI 31.19 kg/m²     Physical Exam  Constitutional:       Appearance: Normal appearance.   HENT:      Head: Normocephalic and atraumatic.   Cardiovascular:      Rate and Rhythm: Normal rate and regular rhythm.      Pulses: Normal pulses.      Heart sounds: Normal heart sounds.   Pulmonary:      Effort: Pulmonary effort is normal.      Breath sounds: Normal breath sounds.   Neurological:      General: No focal deficit present.      Mental Status: He is alert and oriented to person, place, and time.   Psychiatric:         Mood and Affect: Mood normal.         Behavior: Behavior normal.         Thought Content: Thought content normal.         Judgment: Judgment normal.       Mary Jo Marroquin MD

## 2024-02-08 ENCOUNTER — OFFICE VISIT (OUTPATIENT)
Dept: FAMILY MEDICINE CLINIC | Facility: CLINIC | Age: 79
End: 2024-02-08
Payer: MEDICARE

## 2024-02-08 ENCOUNTER — TELEPHONE (OUTPATIENT)
Age: 79
End: 2024-02-08

## 2024-02-08 VITALS
DIASTOLIC BLOOD PRESSURE: 90 MMHG | BODY MASS INDEX: 31 KG/M2 | OXYGEN SATURATION: 94 % | WEIGHT: 235 LBS | HEART RATE: 69 BPM | TEMPERATURE: 97.2 F | SYSTOLIC BLOOD PRESSURE: 150 MMHG | RESPIRATION RATE: 18 BRPM

## 2024-02-08 DIAGNOSIS — L98.9 SKIN LESION: ICD-10-CM

## 2024-02-08 DIAGNOSIS — E11.65 TYPE 2 DIABETES MELLITUS WITH HYPERGLYCEMIA, WITHOUT LONG-TERM CURRENT USE OF INSULIN (HCC): ICD-10-CM

## 2024-02-08 DIAGNOSIS — I10 HYPERTENSION GOAL BP (BLOOD PRESSURE) < 140/90: Primary | ICD-10-CM

## 2024-02-08 PROCEDURE — 99214 OFFICE O/P EST MOD 30 MIN: CPT | Performed by: STUDENT IN AN ORGANIZED HEALTH CARE EDUCATION/TRAINING PROGRAM

## 2024-02-08 RX ORDER — ENALAPRIL MALEATE AND HYDROCHLOROTHIAZIDE 10; 25 MG/1; MG/1
1 TABLET ORAL DAILY
Qty: 30 TABLET | Refills: 0 | Status: SHIPPED | OUTPATIENT
Start: 2024-02-08 | End: 2024-02-09 | Stop reason: SDUPTHER

## 2024-02-08 NOTE — PROGRESS NOTES
Name: Kaiser Price      : 1945      MRN: [id number]  Encounter Provider: Mary Jo Marroquin MD  Encounter Date: 2024   Encounter department: [facility]    Assessment & Plan     1. Hypertension goal BP (blood pressure) < 140/90  Assessment & Plan:  -BP in office 150/90  -Added enalapril-HCTZ to regimen. Patient advised to stop ramipril  -Denies chest pain, SOB, and headache      2. Type 2 diabetes mellitus with hyperglycemia, without long-term current use of insulin (HCC)  Assessment & Plan:    Lab Results   Component Value Date    HGBA1C 6.9 (A) 2023     -Stable on current medical regimen      3. Skin lesion  -     Ambulatory Referral to Dermatology; Future         Subjective      HPI    Patient presents for blood pressure check. Systolic at homes has been in the 170s. He denies headache, chest pain and SOB. He has a  skin lesion on his forehead that has been present for a couple weeks.     Review of Systems   Constitutional:  Negative for activity change, chills and fever.   HENT:  Negative for ear pain and sore throat.    Eyes:  Negative for pain and visual disturbance.   Respiratory:  Negative for cough and shortness of breath.    Cardiovascular:  Negative for chest pain and palpitations.   Gastrointestinal:  Negative for abdominal pain and vomiting.   Genitourinary:  Negative for dysuria and hematuria.   Musculoskeletal:  Positive for arthralgias and neck pain. Negative for back pain.   Skin:  Negative for color change and rash.   Neurological:  Negative for syncope.   All other systems reviewed and are negative.      Current Outpatient Medications on File Prior to Visit   Medication Sig   • albuterol (2.5 mg/3 mL) 0.083 % nebulizer solution Take 3 mL (2.5 mg total) by nebulization every 6 (six) hours as needed for wheezing or shortness of breath   • albuterol (Ventolin HFA) 90 mcg/act inhaler Inhale 2 puffs every 6 (six) hours as needed for wheezing   • finasteride  (PROSCAR) 5 mg tablet TAKE ONE TABLET BY MOUTH EVERY DAY   • glipiZIDE (GLUCOTROL XL) 10 mg 24 hr tablet TAKE 1 TABLET BY MOUTH EVERY DAY WITH BREAKFAST   • Glucosamine-Chondroitin (GLUCOSAMINE CHONDR COMPLEX PO) Take by mouth 2 (two) times a day   • metFORMIN (GLUCOPHAGE) 500 mg tablet Take 2 tablets (1,000 mg total) by mouth 2 (two) times a day with meals   • metoprolol succinate (TOPROL-XL) 25 mg 24 hr tablet TAKE ONE TABLET BY MOUTH EVERY MORNING   • montelukast (SINGULAIR) 10 mg tablet Take 1 tablet (10 mg total) by mouth daily at bedtime   • rivaroxaban (XARELTO) 20 mg tablet in the morning   • sertraline (ZOLOFT) 100 mg tablet TAKE ONE TABLET BY MOUTH EVERY DAY   • tamsulosin (FLOMAX) 0.4 mg TAKE 1 CAPSULE BY MOUTH EVERYDAY AT BEDTIME       Objective     /90 (BP Location: Right arm, Patient Position: Sitting, Cuff Size: Extra-Large)   Pulse 69   Temp (!) 97.2 °F (36.2 °C) (Temporal)   Resp 18   Wt 107 kg (235 lb)   SpO2 94%   BMI 31.00 kg/m²     Physical Exam  Constitutional:       Appearance: Normal appearance.   HENT:      Head: Normocephalic and atraumatic.   Cardiovascular:      Rate and Rhythm: Normal rate and regular rhythm.      Pulses: Normal pulses.      Heart sounds: Normal heart sounds.   Skin:         Neurological:      General: No focal deficit present.      Mental Status: He is alert and oriented to person, place, and time.   Psychiatric:         Mood and Affect: Mood normal.         Behavior: Behavior normal.         Thought Content: Thought content normal.         Judgment: Judgment normal.       Mary Jo Marroquin MD

## 2024-02-08 NOTE — TELEPHONE ENCOUNTER
Pharm is calling.  The combined enalapril -HCTZ is backordered. The pharm wants to know if they can dispense them as two separate meds ?  Please call them back.

## 2024-02-08 NOTE — TELEPHONE ENCOUNTER
Received call from Xiomy post patient's OV today. Order for enalapril-hydrochlorothiazide (VASERETIC) 10-25 MG per tablet was sent to [facility]. Xiomy was told the pharmacy does not carry this medication. Please change medication or resent dot [facility] pharmacy on profile. Please follow up with Xiomy for resolution.

## 2024-02-09 DIAGNOSIS — I10 ESSENTIAL HYPERTENSION: ICD-10-CM

## 2024-02-09 RX ORDER — ENALAPRIL MALEATE AND HYDROCHLOROTHIAZIDE 10; 25 MG/1; MG/1
1 TABLET ORAL DAILY
Qty: 30 TABLET | Refills: 0 | Status: SHIPPED | OUTPATIENT
Start: 2024-02-09

## 2024-02-09 NOTE — ASSESSMENT & PLAN NOTE
-BP in office 150/90  -Added enalapril-HCTZ to regimen. Patient advised to stop ramipril  -Denies chest pain, SOB, and headache

## 2024-02-09 NOTE — TELEPHONE ENCOUNTER
Tarah from [facility] in [facility] called again since no one returned their call about the medications, advised that the scripts were canceled through [facility] and send to a different pharmacy, and confirmed with pt via separate encounter yesterday. JAYDEN

## 2024-02-12 ENCOUNTER — OFFICE VISIT (OUTPATIENT)
Dept: FAMILY MEDICINE CLINIC | Facility: CLINIC | Age: 79
End: 2024-02-12
Payer: MEDICARE

## 2024-02-12 VITALS
HEIGHT: 73 IN | WEIGHT: 231 LBS | OXYGEN SATURATION: 100 % | RESPIRATION RATE: 20 BRPM | TEMPERATURE: 96.3 F | DIASTOLIC BLOOD PRESSURE: 72 MMHG | BODY MASS INDEX: 30.62 KG/M2 | HEART RATE: 65 BPM | SYSTOLIC BLOOD PRESSURE: 136 MMHG

## 2024-02-12 DIAGNOSIS — L98.9 SKIN LESION: ICD-10-CM

## 2024-02-12 DIAGNOSIS — I10 HYPERTENSION GOAL BP (BLOOD PRESSURE) < 140/90: Primary | ICD-10-CM

## 2024-02-12 PROCEDURE — 99213 OFFICE O/P EST LOW 20 MIN: CPT | Performed by: STUDENT IN AN ORGANIZED HEALTH CARE EDUCATION/TRAINING PROGRAM

## 2024-02-12 NOTE — ASSESSMENT & PLAN NOTE
-/72 in office today  -Continue current medical regimen  -Denies headache, chest pain, SOB, dizziness, and blurry vision

## 2024-02-12 NOTE — PROGRESS NOTES
Name: Kaiser Price      : 1945      MRN: [id number]  Encounter Provider: Mary Jo Marroquin MD  Encounter Date: 2024   Encounter department: [facility]    Assessment & Plan     1. Hypertension goal BP (blood pressure) < 140/90  Assessment & Plan:  -/72 in office today  -Continue current medical regimen  -Denies headache, chest pain, SOB, dizziness, and blurry vision      2. Skin lesion     Follow up with dermatology, referral placed at last visit    Subjective      HPI    Patient presents for BP check. Notes he is feeling a lot better. BP monitor at home showed systolic in 150s and diastolic in 70s.     Review of Systems   Constitutional:  Negative for activity change, appetite change, chills, fatigue and fever.   HENT:  Negative for congestion, postnasal drip, rhinorrhea, sinus pressure, sinus pain, sneezing and sore throat.    Respiratory:  Negative for cough, shortness of breath and wheezing.    Cardiovascular:  Negative for chest pain, palpitations and leg swelling.   Gastrointestinal:  Negative for abdominal pain, constipation, diarrhea, nausea and vomiting.   Skin:  Negative for rash.   Neurological:  Negative for weakness, light-headedness and headaches.   Psychiatric/Behavioral:  The patient is not nervous/anxious.        Current Outpatient Medications on File Prior to Visit   Medication Sig   • albuterol (2.5 mg/3 mL) 0.083 % nebulizer solution Take 3 mL (2.5 mg total) by nebulization every 6 (six) hours as needed for wheezing or shortness of breath   • albuterol (Ventolin HFA) 90 mcg/act inhaler Inhale 2 puffs every 6 (six) hours as needed for wheezing   • enalapril-hydrochlorothiazide (VASERETIC) 10-25 MG per tablet Take 1 tablet by mouth daily   • finasteride (PROSCAR) 5 mg tablet TAKE ONE TABLET BY MOUTH EVERY DAY   • glipiZIDE (GLUCOTROL XL) 10 mg 24 hr tablet TAKE 1 TABLET BY MOUTH EVERY DAY WITH BREAKFAST   • Glucosamine-Chondroitin (GLUCOSAMINE CHONDR  "COMPLEX PO) Take by mouth 2 (two) times a day   • metFORMIN (GLUCOPHAGE) 500 mg tablet Take 2 tablets (1,000 mg total) by mouth 2 (two) times a day with meals   • metoprolol succinate (TOPROL-XL) 25 mg 24 hr tablet TAKE ONE TABLET BY MOUTH EVERY MORNING   • montelukast (SINGULAIR) 10 mg tablet Take 1 tablet (10 mg total) by mouth daily at bedtime   • rivaroxaban (XARELTO) 20 mg tablet in the morning   • sertraline (ZOLOFT) 100 mg tablet TAKE ONE TABLET BY MOUTH EVERY DAY   • tamsulosin (FLOMAX) 0.4 mg TAKE 1 CAPSULE BY MOUTH EVERYDAY AT BEDTIME       Objective     /72 (BP Location: Right arm, Patient Position: Sitting, Cuff Size: Standard)   Pulse 65   Temp (!) 96.3 °F (35.7 °C) (Temporal)   Resp 20   Ht 6' 1\" (1.854 m)   Wt 105 kg (231 lb)   SpO2 100%   BMI 30.48 kg/m²     Physical Exam  Constitutional:       Appearance: Normal appearance.   HENT:      Head: Normocephalic and atraumatic.   Cardiovascular:      Rate and Rhythm: Normal rate and regular rhythm.      Pulses: Normal pulses.      Heart sounds: Normal heart sounds.   Pulmonary:      Effort: Pulmonary effort is normal.      Breath sounds: Normal breath sounds.   Neurological:      General: No focal deficit present.      Mental Status: He is alert and oriented to person, place, and time.   Psychiatric:         Mood and Affect: Mood normal.         Behavior: Behavior normal.         Thought Content: Thought content normal.         Judgment: Judgment normal.       Mary Jo Marroquin MD    "

## 2024-02-21 ENCOUNTER — OFFICE VISIT (OUTPATIENT)
Dept: PLASTIC SURGERY | Facility: CLINIC | Age: 79
End: 2024-02-21

## 2024-02-21 VITALS
DIASTOLIC BLOOD PRESSURE: 84 MMHG | WEIGHT: 230.13 LBS | TEMPERATURE: 98.7 F | BODY MASS INDEX: 30.5 KG/M2 | SYSTOLIC BLOOD PRESSURE: 171 MMHG | HEART RATE: 67 BPM | HEIGHT: 73 IN

## 2024-02-21 DIAGNOSIS — L98.9 SKIN LESION: Primary | ICD-10-CM

## 2024-02-21 DIAGNOSIS — L98.9 FACIAL SKIN LESION: ICD-10-CM

## 2024-02-21 NOTE — PROGRESS NOTES
"H&P Exam - Plastic Surgery   Kaiser Price 78 y.o. male MRN: [id number]  Unit/Bed#:  Encounter: [id number]    Assessment/Plan     Assessment:  Pt has a nonhealing lesion of his upper forehead.  Plan:  Biopsy taken today.  Followup 10 days.    Biopsy    Date/Time: 2/21/2024 3:30 PM    Performed by: Randolph Waddell PA-C  Authorized by: Randolph Waddell PA-C  Universal Protocol:  Consent: Verbal consent obtained. Written consent not obtained.  Risks and benefits: risks, benefits and alternatives were discussed  Consent given by: patient  Time out: Immediately prior to procedure a \"time out\" was called to verify the correct patient, procedure, equipment, support staff and site/side marked as required.  Patient understanding: patient states understanding of the procedure being performed  Patient consent: the patient's understanding of the procedure matches consent given  Procedure consent: procedure consent matches procedure scheduled  Relevant documents: relevant documents present and verified  Test results: test results not available  Site marked: the operative site was not marked  Radiology Images displayed and confirmed. If images not available, report reviewed: imaging studies available  Required items: required blood products, implants, devices, and special equipment available  Patient identity confirmed: verbally with patient    Procedure Details - Lesion Biopsy:     Body area:  Head/neck    Head/neck location:  Forehead    Biopsy method: punch biopsy      Biopsy tissue type: skin and subcutaneous    Malignancy: malignancy unknown         History of Present Illness     HPI:  Kaiser Price is a 78 y.o. male who presents with a nonhealing lesion on his forehead. He has noticed this for a few weeks. He has no history of skin cancer. He takes Xarelto for afib.    Review of Systems   Constitutional: Negative.    HENT:  Positive for hearing loss.    Eyes:  Positive for visual disturbance.   Respiratory:  " Positive for cough.    Cardiovascular:  Positive for palpitations.   Gastrointestinal: Negative.    Endocrine: Negative.    Genitourinary: Negative.    Musculoskeletal: Negative.    Skin: Negative.    Allergic/Immunologic: Negative.    Neurological: Negative.    Hematological: Negative.    Psychiatric/Behavioral: Negative.         Historical Information   Past Medical History:   Diagnosis Date    Anxiety     Arthritis     fingers , knee    Asthma     BPH (benign prostatic hypertrophy)     Cataract     currently left eye- to have surgery on 4/10/17    Cough variant asthma 2017    Diabetes mellitus (HCC)     type 2, last assessed 2017    Hernia, umbilical     currently has    HL (hearing loss)     b/l hearing aids    Labyrinthitis     Moderate obstructive sleep apnea 2017    New onset a-fib (HCC) 01/10/2020    Obesity with body mass index 30 or greater 2019    Umbilical hernia      Past Surgical History:   Procedure Laterality Date    CATARACT EXTRACTION Right 2017    COLONOSCOPY      yrs ago    EYE SURGERY Bilateral     cataracts with IOL     Social History   Social History     Substance and Sexual Activity   Alcohol Use Yes    Alcohol/week: 3.0 standard drinks of alcohol    Types: 3 Standard drinks or equivalent per week    Comment: socially     Social History     Substance and Sexual Activity   Drug Use Yes    Types: Marijuana    Comment: most everyday      Social History     Tobacco Use   Smoking Status Former    Current packs/day: 0.00    Average packs/day: 0.5 packs/day for 15.0 years (7.5 ttl pk-yrs)    Types: Cigarettes    Start date: 1968    Quit date: 1983    Years since quittin.1    Passive exposure: Past   Smokeless Tobacco Never     E-Cigarette/Vaping    E-Cigarette Use Never User      E-Cigarette/Vaping Substances    Nicotine No     THC No     CBD No     Flavoring No     Other No     Unknown No      Family History:   Family History   Problem Relation Age of Onset     Cancer Mother         ovarian    Diabetes Father     Cancer Sister         stomach to brain    Substance Abuse Family     Substance Abuse Son     Alcohol abuse Son     Mental illness Neg Hx        Meds/Allergies     Current Outpatient Medications:     albuterol (2.5 mg/3 mL) 0.083 % nebulizer solution, Take 3 mL (2.5 mg total) by nebulization every 6 (six) hours as needed for wheezing or shortness of breath, Disp: 60 mL, Rfl: 6    albuterol (Ventolin HFA) 90 mcg/act inhaler, Inhale 2 puffs every 6 (six) hours as needed for wheezing, Disp: 18 g, Rfl: 3    enalapril-hydrochlorothiazide (VASERETIC) 10-25 MG per tablet, Take 1 tablet by mouth daily, Disp: 30 tablet, Rfl: 0    finasteride (PROSCAR) 5 mg tablet, TAKE ONE TABLET BY MOUTH EVERY DAY, Disp: 30 tablet, Rfl: 5    glipiZIDE (GLUCOTROL XL) 10 mg 24 hr tablet, TAKE 1 TABLET BY MOUTH EVERY DAY WITH BREAKFAST, Disp: 90 tablet, Rfl: 3    Glucosamine-Chondroitin (GLUCOSAMINE CHONDR COMPLEX PO), Take by mouth 2 (two) times a day, Disp: , Rfl:     metFORMIN (GLUCOPHAGE) 500 mg tablet, Take 2 tablets (1,000 mg total) by mouth 2 (two) times a day with meals, Disp: 360 tablet, Rfl: 3    metoprolol succinate (TOPROL-XL) 25 mg 24 hr tablet, TAKE ONE TABLET BY MOUTH EVERY MORNING, Disp: 90 tablet, Rfl: 1    montelukast (SINGULAIR) 10 mg tablet, Take 1 tablet (10 mg total) by mouth daily at bedtime, Disp: 90 tablet, Rfl: 3    rivaroxaban (XARELTO) 20 mg tablet, in the morning, Disp: , Rfl:     sertraline (ZOLOFT) 100 mg tablet, TAKE ONE TABLET BY MOUTH EVERY DAY, Disp: 90 tablet, Rfl: 1    tamsulosin (FLOMAX) 0.4 mg, TAKE 1 CAPSULE BY MOUTH EVERYDAY AT BEDTIME, Disp: 90 capsule, Rfl: 3   Allergies   Allergen Reactions    Ceftin [Cefuroxime] GI Intolerance and Vomiting       Objective   First Vitals:   @VSFIRST2(5,8,6,7,9,11,14,10:FIRST)@    Current Vitals:   Blood Pressure: (!) 171/84 (02/21/24 1535)  Pulse: 67 (02/21/24 3666)  Temperature: 98.7 °F (37.1 °C) (02/21/24  "1535)  Height: 6' 1\" (185.4 cm) (02/21/24 1535)  Weight - Scale: 104 kg (230 lb 2 oz) (02/21/24 1535)    [unfilled]    Invasive Devices       Peripheral Intravenous Line  Duration             Peripheral IV 07/14/23 Left;Proximal;Ventral (anterior) Forearm 222 days    Peripheral IV 07/14/23 Proximal;Right;Ventral (anterior) Forearm 222 days                    Physical Exam  Vitals and nursing note reviewed.   Constitutional:       General: He is not in acute distress.     Appearance: He is well-developed.   HENT:      Head: Normocephalic and atraumatic.     Eyes:      Conjunctiva/sclera: Conjunctivae normal.   Cardiovascular:      Rate and Rhythm: Normal rate. Rhythm irregular.      Heart sounds: No murmur heard.  Pulmonary:      Effort: Pulmonary effort is normal. No respiratory distress.      Breath sounds: Normal breath sounds.   Abdominal:      Palpations: Abdomen is soft.      Tenderness: There is no abdominal tenderness.   Musculoskeletal:         General: No swelling.      Cervical back: Neck supple.   Skin:     General: Skin is warm and dry.      Capillary Refill: Capillary refill takes less than 2 seconds.   Neurological:      Mental Status: He is alert.   Psychiatric:         Mood and Affect: Mood normal.         Lab Results: I have personally reviewed pertinent lab results.    Imaging: I have personally reviewed pertinent reports.    EKG, Pathology, and Other Studies: I have personally reviewed pertinent reports.        "

## 2024-02-26 DIAGNOSIS — I10 ESSENTIAL HYPERTENSION: ICD-10-CM

## 2024-02-26 RX ORDER — ENALAPRIL MALEATE AND HYDROCHLOROTHIAZIDE 10; 25 MG/1; MG/1
1 TABLET ORAL DAILY
Qty: 30 TABLET | Refills: 0 | Status: SHIPPED | OUTPATIENT
Start: 2024-02-26

## 2024-02-27 ENCOUNTER — OFFICE VISIT (OUTPATIENT)
Dept: FAMILY MEDICINE CLINIC | Facility: CLINIC | Age: 79
End: 2024-02-27
Payer: MEDICARE

## 2024-02-27 VITALS
HEART RATE: 70 BPM | DIASTOLIC BLOOD PRESSURE: 70 MMHG | RESPIRATION RATE: 18 BRPM | BODY MASS INDEX: 30.34 KG/M2 | TEMPERATURE: 98.2 F | SYSTOLIC BLOOD PRESSURE: 134 MMHG | OXYGEN SATURATION: 97 % | WEIGHT: 230 LBS

## 2024-02-27 DIAGNOSIS — I10 HYPERTENSION GOAL BP (BLOOD PRESSURE) < 140/90: ICD-10-CM

## 2024-02-27 DIAGNOSIS — J45.40 MODERATE PERSISTENT ASTHMA WITHOUT COMPLICATION: ICD-10-CM

## 2024-02-27 DIAGNOSIS — I48.0 PAROXYSMAL ATRIAL FIBRILLATION (HCC): ICD-10-CM

## 2024-02-27 DIAGNOSIS — D64.9 ANEMIA, UNSPECIFIED TYPE: ICD-10-CM

## 2024-02-27 DIAGNOSIS — E11.65 TYPE 2 DIABETES MELLITUS WITH HYPERGLYCEMIA, WITHOUT LONG-TERM CURRENT USE OF INSULIN (HCC): Primary | ICD-10-CM

## 2024-02-27 PROBLEM — L98.9 FACIAL SKIN LESION: Status: RESOLVED | Noted: 2024-02-21 | Resolved: 2024-02-27

## 2024-02-27 LAB — SL AMB POCT HEMOGLOBIN AIC: 7 (ref ?–6.5)

## 2024-02-27 PROCEDURE — 99214 OFFICE O/P EST MOD 30 MIN: CPT | Performed by: FAMILY MEDICINE

## 2024-02-27 PROCEDURE — 83036 HEMOGLOBIN GLYCOSYLATED A1C: CPT | Performed by: FAMILY MEDICINE

## 2024-02-27 NOTE — PROGRESS NOTES
Name: Kaiser Price      : 1945      MRN: [id number]  Encounter Provider: Scott Padron MD  Encounter Date: 2024   Encounter department: [facility]    Assessment & Plan     1. Type 2 diabetes mellitus with hyperglycemia, without long-term current use of insulin (HCC)  Assessment & Plan:    Lab Results   Component Value Date    HGBA1C 6.9 (A) 2023       COMPLIANT WITH MEDICATION  DENIES ANY NUMBNESS, TINGLING IN FEET    - DISCUSSED DIETARY MODIFICATION OF CARBOHYDRATES  - HGB A1C AND URINE STUDIES DUE TODAY  - FOOT CARE  - RV 3 MONTHS      Orders:  -     POCT hemoglobin A1c  -     Comprehensive metabolic panel; Future; Expected date: 2024  -     Albumin / creatinine urine ratio; Future; Expected date: 2024  -     Lipid Panel with Direct LDL reflex; Future; Expected date: 2024  -     Hemoglobin A1C; Future; Expected date: 2024    2. Paroxysmal atrial fibrillation (HCC)  Assessment & Plan:  STABLE  FOLLOWED BY CARDIOLOGY      3. Hypertension goal BP (blood pressure) < 140/90  Assessment & Plan:  STABLE  DENIES ANY CP, SOB, PALPITATIONS, OR HEADACHE  NOTES NO WATER RETENTION  COMPLIANT WITH MEDICATION  NO CONCERNS    - CONTINUE CURRENT TREATMENT PLAN  - MONITOR DIETARY SODIUM INTAKE  - ENCOURAGE PHYSICAL ACTIVITY  - RV 3 MONTHS      Orders:  -     Comprehensive metabolic panel; Future; Expected date: 2024    4. Moderate persistent asthma without complication  Assessment & Plan:  STABLE    Orders:  -     CBC and Platelet; Future; Expected date: 2024    5. Anemia, unspecified type  -     CBC and Platelet; Future; Expected date: 2024           Subjective      PATIENT RETURNS FOR ROUTINE EVALUATION OF PATIENT'S MEDICAL ISSUES    INDIVIDUAL MEDICAL ISSUES WITH THEIR CURRENT STATUS, ASSESSMENT AND PLANS ARE LISTED ABOVE            Review of Systems   Constitutional:  Negative for chills, fatigue and fever.   HENT:  Negative for  congestion, ear discharge, ear pain, mouth sores, postnasal drip, sore throat and trouble swallowing.    Eyes:  Negative for pain, discharge and visual disturbance.   Respiratory:  Negative for cough, shortness of breath and wheezing.    Cardiovascular:  Negative for chest pain, palpitations and leg swelling.   Gastrointestinal:  Negative for abdominal distention, abdominal pain, blood in stool, diarrhea and nausea.   Endocrine: Negative for polydipsia, polyphagia and polyuria.   Genitourinary:  Negative for dysuria, frequency, hematuria and urgency.   Musculoskeletal:  Negative for arthralgias, gait problem and joint swelling.   Skin:  Negative for pallor and rash.   Neurological:  Negative for dizziness, syncope, speech difficulty, weakness, light-headedness, numbness and headaches.   Hematological:  Negative for adenopathy.   Psychiatric/Behavioral:  Negative for behavioral problems, confusion and sleep disturbance. The patient is not nervous/anxious.        Current Outpatient Medications on File Prior to Visit   Medication Sig   • albuterol (2.5 mg/3 mL) 0.083 % nebulizer solution Take 3 mL (2.5 mg total) by nebulization every 6 (six) hours as needed for wheezing or shortness of breath   • albuterol (Ventolin HFA) 90 mcg/act inhaler Inhale 2 puffs every 6 (six) hours as needed for wheezing   • finasteride (PROSCAR) 5 mg tablet TAKE ONE TABLET BY MOUTH EVERY DAY   • glipiZIDE (GLUCOTROL XL) 10 mg 24 hr tablet TAKE 1 TABLET BY MOUTH EVERY DAY WITH BREAKFAST   • Glucosamine-Chondroitin (GLUCOSAMINE CHONDR COMPLEX PO) Take by mouth 2 (two) times a day   • metFORMIN (GLUCOPHAGE) 500 mg tablet Take 2 tablets (1,000 mg total) by mouth 2 (two) times a day with meals   • metoprolol succinate (TOPROL-XL) 25 mg 24 hr tablet TAKE ONE TABLET BY MOUTH EVERY MORNING   • montelukast (SINGULAIR) 10 mg tablet Take 1 tablet (10 mg total) by mouth daily at bedtime   • rivaroxaban (XARELTO) 20 mg tablet in the morning   • sertraline  (ZOLOFT) 100 mg tablet TAKE ONE TABLET BY MOUTH EVERY DAY   • tamsulosin (FLOMAX) 0.4 mg TAKE 1 CAPSULE BY MOUTH EVERYDAY AT BEDTIME   • enalapril-hydrochlorothiazide (VASERETIC) 10-25 MG per tablet TAKE 1 TABLET BY MOUTH EVERY DAY       Objective     /70 (BP Location: Right arm, Patient Position: Sitting, Cuff Size: Extra-Large)   Pulse 70   Temp 98.2 °F (36.8 °C) (Temporal)   Resp 18   Wt 104 kg (230 lb)   SpO2 97%   BMI 30.34 kg/m²     Physical Exam  Vitals reviewed.   Constitutional:       General: He is not in acute distress.     Appearance: Normal appearance. He is well-developed. He is not ill-appearing.   HENT:      Head: Normocephalic and atraumatic.      Nose: Nose normal.      Mouth/Throat:      Mouth: Mucous membranes are moist.   Eyes:      General:         Right eye: No discharge.         Left eye: No discharge.      Conjunctiva/sclera: Conjunctivae normal.      Pupils: Pupils are equal, round, and reactive to light.   Neck:      Thyroid: No thyromegaly.      Vascular: No JVD.   Cardiovascular:      Rate and Rhythm: Normal rate and regular rhythm.      Heart sounds: Normal heart sounds. No murmur heard.  Pulmonary:      Effort: Pulmonary effort is normal.      Breath sounds: Normal breath sounds. No wheezing or rales.   Abdominal:      General: Bowel sounds are normal.      Palpations: Abdomen is soft. There is no mass.      Tenderness: There is no abdominal tenderness. There is no guarding or rebound.   Musculoskeletal:         General: No tenderness or deformity. Normal range of motion.      Cervical back: Neck supple.   Lymphadenopathy:      Cervical: No cervical adenopathy.   Skin:     General: Skin is warm and dry.      Findings: No erythema or rash.   Neurological:      General: No focal deficit present.      Mental Status: He is alert and oriented to person, place, and time.   Psychiatric:         Mood and Affect: Mood normal.         Behavior: Behavior normal.         Thought  Content: Thought content normal.         Judgment: Judgment normal.       Scott Padron MD

## 2024-02-27 NOTE — PATIENT INSTRUCTIONS
CONTINUE CURRENT TREATMENT PLAN  MONITOR DIETARY SODIUM, CHOL, AND CARBOHYDRATE INTAKE  ENCOURAGE PHYSICAL ACTIVITY  FOOT CARE    RV 3 M, SOONER PRN    Type 2 Diabetes Management for Adults   AMBULATORY CARE:   Type 2 diabetes  is a disease that affects how your body uses glucose (sugar). Either your body cannot make enough insulin, or it cannot use the insulin correctly. It is important to keep diabetes controlled to prevent damage to your heart, blood vessels, and other organs. Management will help you feel well and enjoy your daily activities. Your diabetes care team providers can help you make a plan to fit diabetes care into your schedule. Your plan can change over time to fit your needs and your family's needs.       Have someone call your local emergency number (911 in the [place]) if:   You cannot be woken.    You have signs of diabetic ketoacidosis:     confusion, fatigue    vomiting    rapid heartbeat    fruity smelling breath    extreme thirst    dry mouth and skin    You have any of the following signs of a heart attack:      Squeezing, pressure, or pain in your chest    You may  also have any of the following:     Discomfort or pain in your back, neck, jaw, stomach, or arm    Shortness of breath    Nausea or vomiting    Lightheadedness or a sudden cold sweat    You have any of the following signs of a stroke:      Numbness or drooping on one side of your face     Weakness in an arm or leg    Confusion or difficulty speaking    Dizziness, a severe headache, or vision loss    Call your doctor or diabetes care team provider if:   You have a sore or wound that will not heal.    You have a change in the amount you urinate.    Your blood sugar levels are higher than your target goals.    You often have lower blood sugar levels than your target goals.    Your skin is red, dry, warm, or swollen.    You have trouble coping with diabetes, or you feel anxious or depressed.    You have trouble following any part of your  care plan, such as your meal plan.    You have questions or concerns about your condition or care.    What you need to know about high blood sugar levels:  High blood sugar levels may not cause any symptoms. You may feel more thirsty or urinate more often than usual. Over time, high blood sugar levels can damage your nerves, blood vessels, tissues, and organs. The following can increase your blood sugar levels:  Large meals or large amounts of carbohydrates at one time    Less physical activity    Stress    Illness    A lower dose of diabetes medicine or insulin, or a late dose    What you need to know about low blood sugar levels:  Symptoms include feeling shaky, dizzy, irritable, or confused. You can prevent symptoms by keeping your blood sugar levels from going too low.  Treat a low blood sugar level right away:      Drink 4 ounces of juice or have 1 tube of glucose gel.    Check your blood sugar level again 10 to 15 minutes later.    When the level goes back to normal, eat a meal or snack to prevent another decrease.       Keep glucose gel, raisins, or hard candy with you at all times to treat a low blood sugar level.     Your blood sugar level can get too low if you take diabetes medicine or insulin and do not eat enough food.     If you use insulin, check your blood sugar level before you exercise.      If your blood sugar level is below 100 mg/dL, eat 4 crackers or 2 ounces of raisins, or drink 4 ounces of juice.    Check your level every 30 minutes if you exercise longer than 1 hour.    You may need a snack during or after exercise.    What you can do to manage your blood sugar levels:   Check your blood sugar levels as directed and as needed.  Several items are available to use to check your levels. You may need to check by testing a drop of blood in a glucose monitor. You may instead be given a continuous glucose monitoring (CGM) device. The device is worn at all times. The CGM checks your blood sugar  level every 5 minutes. It sends results to an electronic device such as a smart phone. A CGM can be used with or without an insulin pump. You and your diabetes care team providers will decide on the best method for you. The goal for blood sugar levels before meals  is between 80 and 130 mg/dL and 2 hours after eating  is lower than 180 mg/dL.            Make healthy food choices.  Work with a dietitian to create a meal plan that works for you and your schedule. A dietitian can help you learn how to eat the right amount of carbohydrates (sugar and starchy foods) during your meals and snacks. Examples of carbohydrates are breads, cereals, rice, pasta, fruit, low-fat dairy, and sweets. Carbohydrates can raise your blood sugar level if you eat too many at one time.         Eat high-fiber foods as directed.  Fiber helps improve blood sugar levels. Fiber also lowers your risk for heart disease and other problems diabetes can cause. Examples of high-fiber foods include vegetables, whole-grain bread, and beans such as akhtar beans. Your dietitian can tell you how much fiber to have each day.         Get regular physical activity.  Physical activity can help you get to your target blood sugar level goal and manage your weight. Get at least 150 minutes of moderate to vigorous aerobic physical activity each week. Resistance training, such as lifting weights, should be done 3 times each week. Do not miss more than 2 days of physical activity in a row. Do not sit longer than 30 minutes at a time. Your healthcare provider can help you create an activity plan. The plan can include the best activities for you and can help you build your strength and endurance.            Maintain a healthy weight.  Ask your team what a healthy weight is for you. A healthy weight can help you control diabetes and prevent heart disease. Ask your team to help you create a weight-loss plan, if needed. Even a loss of 3% to 7% of your excess body weight  can help make a difference in managing diabetes. Your team will help you set a weight-loss goal, such as 10 to 15 pounds, or 5% of your extra weight. Together you and your team can set manageable weight-loss goals.    Take your diabetes medicine or insulin as directed.  You may need diabetes medicine, insulin, or both to help control your blood sugar levels. Your healthcare provider will teach you how and when to take your diabetes medicine or insulin. You will also be taught about side effects oral diabetes medicine can cause. Insulin may be injected or given through a pump or pen. You and your providers will decide on the best method for you:    An insulin pump  is an implanted device that gives your insulin 24 hours a day. An insulin pump prevents the need for multiple insulin injections in a day.         An insulin pen  is a device prefilled with the right amount of insulin.         You and your family members will be taught how to draw up and give insulin  if this is the best method for you. Your providers will also teach you how to dispose of needles and syringes.    You will learn how much insulin you need  and when to give it. You will be taught when not to give insulin. You will also be taught what to do if your blood sugar level drops too low. This may happen if you take insulin and do not eat the right amount of carbohydrates.    More ways to manage type 2 diabetes:   Wear medical alert identification.  Wear medical alert jewelry or carry a card that says you have diabetes. Ask your provider where to get these items.         Do not smoke.  Nicotine and other chemicals in cigarettes and cigars can cause lung and blood vessel damage. It also makes it more difficult to manage your diabetes. Ask your provider for information if you currently smoke and need help to quit. Do not use e-cigarettes or smokeless tobacco in place of cigarettes or to help you quit. They still contain nicotine.    Check your feet each  day for cuts, scratches, calluses, or other wounds.  Look for redness and swelling, and feel for warmth. Wear shoes that fit well. Check your shoes for rocks or other objects that can hurt your feet. Do not walk barefoot or wear shoes without socks. Wear cotton socks to help keep your feet dry.         Ask about vaccines you may need.  You have a higher risk for serious illness if you get the flu, pneumonia, COVID-19, or hepatitis. Ask your provider if you should get vaccines to prevent these or other diseases, and when to get the vaccines.    Talk to your provider if you become stressed about diabetes care.  Sometimes being able to fit diabetes care into your life can cause increased stress. The stress can cause you not to take care of yourself properly. Your provider can help by offering tips about self-care. A mental health provider can listen and offer help with self-care issues. Other types of counseling can help you make nutrition or physical activity changes.    Have your A1c checked as directed.  Your provider may check your A1c every 3 months, or 2 times each year if your diabetes is controlled. An A1c test shows the average amount of sugar in your blood over the past 2 to 3 months. Your provider will tell you what your A1c level should be.    Have screening tests as directed.  Your provider may recommend screening for complications of diabetes and other conditions that may develop. Some screenings may begin right away and some may happen within the first 5 years of diagnosis:    Examples of diabetes complications  include kidney problems, high cholesterol, high blood pressure, blood vessel problems, eye problems, and sleep apnea.    You may be screened for a low vitamin B level  if you take oral diabetes medicine for a long time.    You may be screened for polycystic ovarian syndrome (PCOS)  if you are of childbearing age.    Follow up with your doctor or diabetes care team providers as directed:  You may  need to have blood tests done before your follow-up visit. The test results will show if changes need to be made in your treatment or self-care. Talk to your provider if you cannot afford your medicine. Write down your questions so you remember to ask them during your visits.  © Copyright [employer] 2023 Information is for End User's use only and may not be sold, redistributed or otherwise used for commercial purposes.  The above information is an  only. It is not intended as medical advice for individual conditions or treatments. Talk to your doctor, nurse or pharmacist before following any medical regimen to see if it is safe and effective for you.

## 2024-03-04 ENCOUNTER — TELEPHONE (OUTPATIENT)
Age: 79
End: 2024-03-04

## 2024-03-04 NOTE — TELEPHONE ENCOUNTER
Rec'd call from [facility] this morning checking on pathology results. Are they back yet? Patient is scheduled for appointment today - should they keep appointment?    I told [facility] that I would check this afternoon and see what they should do.    Path is still pending.    Patient is traveling from [place] to [place]. Should they keep appointment or should it be rescheduled?    Please advise.    (Also sent teams message.)

## 2024-03-07 DIAGNOSIS — N40.0 BENIGN PROSTATIC HYPERPLASIA, UNSPECIFIED WHETHER LOWER URINARY TRACT SYMPTOMS PRESENT: ICD-10-CM

## 2024-03-07 RX ORDER — FINASTERIDE 5 MG/1
TABLET, FILM COATED ORAL
Qty: 30 TABLET | Refills: 5 | Status: SHIPPED | OUTPATIENT
Start: 2024-03-07

## 2024-03-11 ENCOUNTER — OFFICE VISIT (OUTPATIENT)
Dept: FAMILY MEDICINE CLINIC | Facility: CLINIC | Age: 79
End: 2024-03-11
Payer: MEDICARE

## 2024-03-11 VITALS
BODY MASS INDEX: 31.25 KG/M2 | WEIGHT: 235.8 LBS | SYSTOLIC BLOOD PRESSURE: 130 MMHG | HEART RATE: 68 BPM | TEMPERATURE: 97.8 F | DIASTOLIC BLOOD PRESSURE: 68 MMHG | OXYGEN SATURATION: 97 % | HEIGHT: 73 IN | RESPIRATION RATE: 18 BRPM

## 2024-03-11 DIAGNOSIS — I10 ESSENTIAL HYPERTENSION: ICD-10-CM

## 2024-03-11 PROCEDURE — 99213 OFFICE O/P EST LOW 20 MIN: CPT | Performed by: STUDENT IN AN ORGANIZED HEALTH CARE EDUCATION/TRAINING PROGRAM

## 2024-03-11 PROCEDURE — G2211 COMPLEX E/M VISIT ADD ON: HCPCS | Performed by: STUDENT IN AN ORGANIZED HEALTH CARE EDUCATION/TRAINING PROGRAM

## 2024-03-11 RX ORDER — ENALAPRIL MALEATE AND HYDROCHLOROTHIAZIDE 10; 25 MG/1; MG/1
1 TABLET ORAL DAILY
Qty: 30 TABLET | Refills: 2 | Status: SHIPPED | OUTPATIENT
Start: 2024-03-11 | End: 2024-03-13

## 2024-03-11 NOTE — PROGRESS NOTES
Name: Kaiser Price      : 1945      MRN: [id number]  Encounter Provider: Mary Jo Marroquin MD  Encounter Date: 3/11/2024   Encounter department: [facility]    Assessment & Plan     1. Essential hypertension  Assessment & Plan:  -/68  -Has not been taking Vaseretic for two weeks due to lack of prescription at pharmacy  -Denies headache, chest pain, SOB, and dizziness  -Restart medication and monitor BP at home    Orders:  -     enalapril-hydrochlorothiazide (VASERETIC) 10-25 MG per tablet; Take 1 tablet by mouth daily           Subjective      HPI    Patient presents for BP check. Notes he has not been taking medication due to the pharmacy not having it for two weeks. He is taking all of his other medications as prescribed. Denies headache, chest pain and SOB.     Review of Systems   Constitutional:  Negative for activity change, appetite change, chills, fatigue and fever.   HENT:  Negative for congestion.    Respiratory:  Negative for cough, shortness of breath and wheezing.    Cardiovascular:  Negative for chest pain, palpitations and leg swelling.   Gastrointestinal:  Negative for abdominal pain, constipation, diarrhea, nausea and vomiting.   Skin:  Negative for rash.   Neurological:  Negative for light-headedness and headaches.   Psychiatric/Behavioral:  The patient is not nervous/anxious.        Current Outpatient Medications on File Prior to Visit   Medication Sig   • albuterol (2.5 mg/3 mL) 0.083 % nebulizer solution Take 3 mL (2.5 mg total) by nebulization every 6 (six) hours as needed for wheezing or shortness of breath   • albuterol (Ventolin HFA) 90 mcg/act inhaler Inhale 2 puffs every 6 (six) hours as needed for wheezing   • finasteride (PROSCAR) 5 mg tablet TAKE ONE TABLET BY MOUTH EVERY DAY   • glipiZIDE (GLUCOTROL XL) 10 mg 24 hr tablet TAKE 1 TABLET BY MOUTH EVERY DAY WITH BREAKFAST   • Glucosamine-Chondroitin (GLUCOSAMINE CHONDR COMPLEX PO) Take by mouth 2  "(two) times a day   • metFORMIN (GLUCOPHAGE) 500 mg tablet Take 2 tablets (1,000 mg total) by mouth 2 (two) times a day with meals   • metoprolol succinate (TOPROL-XL) 25 mg 24 hr tablet TAKE ONE TABLET BY MOUTH EVERY MORNING   • montelukast (SINGULAIR) 10 mg tablet Take 1 tablet (10 mg total) by mouth daily at bedtime   • rivaroxaban (XARELTO) 20 mg tablet in the morning   • sertraline (ZOLOFT) 100 mg tablet TAKE ONE TABLET BY MOUTH EVERY DAY   • tamsulosin (FLOMAX) 0.4 mg TAKE 1 CAPSULE BY MOUTH EVERYDAY AT BEDTIME   • [DISCONTINUED] enalapril-hydrochlorothiazide (VASERETIC) 10-25 MG per tablet TAKE 1 TABLET BY MOUTH EVERY DAY       Objective     /68   Pulse 68   Temp 97.8 °F (36.6 °C) (Temporal)   Resp 18   Ht 6' 1\" (1.854 m)   Wt 107 kg (235 lb 12.8 oz)   SpO2 97%   BMI 31.11 kg/m²     Physical Exam  Constitutional:       Appearance: Normal appearance.   HENT:      Head: Normocephalic and atraumatic.   Cardiovascular:      Rate and Rhythm: Normal rate and regular rhythm.      Pulses: Normal pulses.      Heart sounds: Normal heart sounds.   Pulmonary:      Effort: Pulmonary effort is normal.      Breath sounds: Normal breath sounds.   Neurological:      General: No focal deficit present.      Mental Status: He is alert and oriented to person, place, and time.   Psychiatric:         Mood and Affect: Mood normal.         Behavior: Behavior normal.         Thought Content: Thought content normal.         Judgment: Judgment normal.       Mary Jo Marroquin MD    "

## 2024-03-11 NOTE — TELEPHONE ENCOUNTER
[facility] called asking if Manuel pathology report is back yet I left her know that it is still active in process, she is asking if he should come in the office to get the sutures removed or will they dissolve?

## 2024-03-11 NOTE — ASSESSMENT & PLAN NOTE
-/68  -Has not been taking Vaseretic for two weeks due to lack of prescription at pharmacy  -Denies headache, chest pain, SOB, and dizziness  -Restart medication and monitor BP at home

## 2024-03-12 NOTE — TELEPHONE ENCOUNTER
Called and left a message for [facility] to give us a call back so we could get Kaiser in sometime this week with any PA at plastics for biopsy check

## 2024-03-13 ENCOUNTER — TELEPHONE (OUTPATIENT)
Age: 79
End: 2024-03-13

## 2024-03-13 DIAGNOSIS — I10 ESSENTIAL HYPERTENSION: Primary | ICD-10-CM

## 2024-03-13 RX ORDER — LISINOPRIL AND HYDROCHLOROTHIAZIDE 12.5; 1 MG/1; MG/1
1 TABLET ORAL DAILY
Qty: 30 TABLET | Refills: 2 | Status: SHIPPED | OUTPATIENT
Start: 2024-03-13

## 2024-03-13 NOTE — TELEPHONE ENCOUNTER
Similar combination medication sent to [facility] [place]. Please inform patient to  and start medication as soon as possible.

## 2024-03-13 NOTE — TELEPHONE ENCOUNTER
Left message on machine for [facility] with Dr Marroquin's detailed response.  No further action required

## 2024-03-13 NOTE — TELEPHONE ENCOUNTER
Received call from pharmacy to inform provider the order for enalapril-hydrochlorothiazide (VASERETIC) 10-25 MG per tablet Is on back order with no anticipated delivery date. Does provider want to order each medication separately? Please follow up with pharmacy.

## 2024-03-15 ENCOUNTER — OFFICE VISIT (OUTPATIENT)
Dept: CARDIOLOGY CLINIC | Facility: CLINIC | Age: 79
End: 2024-03-15
Payer: MEDICARE

## 2024-03-15 VITALS
DIASTOLIC BLOOD PRESSURE: 80 MMHG | HEIGHT: 73 IN | WEIGHT: 232 LBS | HEART RATE: 69 BPM | BODY MASS INDEX: 30.75 KG/M2 | OXYGEN SATURATION: 97 % | SYSTOLIC BLOOD PRESSURE: 142 MMHG

## 2024-03-15 DIAGNOSIS — I48.0 PAROXYSMAL ATRIAL FIBRILLATION (HCC): Primary | ICD-10-CM

## 2024-03-15 DIAGNOSIS — I10 ESSENTIAL HYPERTENSION: ICD-10-CM

## 2024-03-15 PROCEDURE — 99214 OFFICE O/P EST MOD 30 MIN: CPT | Performed by: INTERNAL MEDICINE

## 2024-03-15 PROCEDURE — 93000 ELECTROCARDIOGRAM COMPLETE: CPT | Performed by: INTERNAL MEDICINE

## 2024-03-15 NOTE — PROGRESS NOTES
[facility]  [address] [place] [address]. [address]   [place] [address]  Cardiology Consultation  Kaiser Price  1945  [id number]  [facility]  [address] [facility] [address], [address]  [facility] [id number] [phone]    1. Paroxysmal atrial fibrillation (HCC)  POCT ECG      2. Essential hypertension  POCT ECG         Discussion/Summary:   Paroxysmal afib-xarelto 20mg with food + [id number] in [facility].  He understands his elevated chads Vasc score-4  Dm2- tight glucose control  TRISTIN- moderate sleep apnea.consider weight loss with GLP-1  Htn- lisinopril-hctz    Interval History:   73 yo gentleman with recent hospitalization for upper respiratory infection found to have paroxysmal atrial fibrillation.  He had successful cardioversion and remains in normal sinus rhythm.  His heart rates have been adequately controlled.  He denies having any significant exertional dyspnea.  Denies having significant bleeding or bruising.  Denies feeling dizziness or lightheadedness.  His heart rate and blood pressure controlled.  He is currently in sinus bradycardia 57 beats per minute.    10/27/2020:  He will follow up with a sleep specialist.  We discussed about need for continued anticoagulation with his paroxysmal atrial fibrillation.  Currently his heart rate is suppressed.  His blood pressure is mildly elevated.  He will start back his ramipril.    12/02/2021:  We reviewed through his last sleep study.  He denies having major palpitations.  We reviewed his medications.  He understands about the increased risk of stroke with atrial fibrillation.  He is willing to try CPAP.    1/17/2023: Reviewed through his last abnormal sleep study.  He is compliant with medications.    Recent Visit: Denies having chest pain or major change in breathing. Denies significant palpitations. Compliant with therapy. Reviewed labwork together. Cannot tolerate cpap. Trying  to lose weight    Past Medical History:   Diagnosis Date    Anxiety     Arthritis     fingers , knee    Asthma     BPH (benign prostatic hypertrophy)     Cataract     currently left eye- to have surgery on 4/10/17    Cough variant asthma 2017    Diabetes mellitus (HCC)     type 2, last assessed 2017    Hernia, umbilical     currently has    HL (hearing loss)     b/l hearing aids    Labyrinthitis     Moderate obstructive sleep apnea 2017    New onset a-fib (HCC) 01/10/2020    Obesity with body mass index 30 or greater 2019    Umbilical hernia      Social History     Socioeconomic History    Marital status: /Civil Union     Spouse name: Not on file    Number of children: Not on file    Years of education: Not on file    Highest education level: Not on file   Occupational History    Not on file   Tobacco Use    Smoking status: Former     Current packs/day: 0.00     Average packs/day: 0.5 packs/day for 15.0 years (7.5 ttl pk-yrs)     Types: Cigarettes     Start date: 1968     Quit date: 1983     Years since quittin.2     Passive exposure: Past    Smokeless tobacco: Never   Vaping Use    Vaping status: Never Used   Substance and Sexual Activity    Alcohol use: Yes     Alcohol/week: 3.0 standard drinks of alcohol     Types: 3 Standard drinks or equivalent per week     Comment: socially    Drug use: Yes     Types: Marijuana     Comment: most everyday     Sexual activity: Not Currently     Partners: Female   Other Topics Concern    Not on file   Social History Narrative    Active advance directive    Caffeine use    Dental care, regularly    Drinks coffee     Social Determinants of Health     Financial Resource Strain: Low Risk  (2023)    Overall Financial Resource Strain (CARDIA)     Difficulty of Paying Living Expenses: Not hard at all   Food Insecurity: Not on file   Transportation Needs: No Transportation Needs (2023)    PRAPARE - Transportation     Lack of  Transportation (Medical): No     Lack of Transportation (Non-Medical): No   Physical Activity: Not on file   Stress: Not on file   Social Connections: Not on file   Intimate Partner Violence: Not on file   Housing Stability: Not on file      Family History   Problem Relation Age of Onset    Cancer Mother         ovarian    Diabetes Father     Cancer Sister         stomach to brain    Substance Abuse Family     Substance Abuse Son     Alcohol abuse Son     Mental illness Neg Hx      Past Surgical History:   Procedure Laterality Date    CATARACT EXTRACTION Right 04/04/2017    COLONOSCOPY      yrs ago    EYE SURGERY Bilateral     cataracts with IOL       Current Outpatient Medications:     albuterol (2.5 mg/3 mL) 0.083 % nebulizer solution, Take 3 mL (2.5 mg total) by nebulization every 6 (six) hours as needed for wheezing or shortness of breath, Disp: 60 mL, Rfl: 6    albuterol (Ventolin HFA) 90 mcg/act inhaler, Inhale 2 puffs every 6 (six) hours as needed for wheezing, Disp: 18 g, Rfl: 3    finasteride (PROSCAR) 5 mg tablet, TAKE ONE TABLET BY MOUTH EVERY DAY, Disp: 30 tablet, Rfl: 5    glipiZIDE (GLUCOTROL XL) 10 mg 24 hr tablet, TAKE 1 TABLET BY MOUTH EVERY DAY WITH BREAKFAST, Disp: 90 tablet, Rfl: 3    Glucosamine-Chondroitin (GLUCOSAMINE CHONDR COMPLEX PO), Take by mouth 2 (two) times a day, Disp: , Rfl:     lisinopril-hydrochlorothiazide (PRINZIDE,ZESTORETIC) 10-12.5 MG per tablet, Take 1 tablet by mouth daily, Disp: 30 tablet, Rfl: 2    metFORMIN (GLUCOPHAGE) 500 mg tablet, Take 2 tablets (1,000 mg total) by mouth 2 (two) times a day with meals, Disp: 360 tablet, Rfl: 3    metoprolol succinate (TOPROL-XL) 25 mg 24 hr tablet, TAKE ONE TABLET BY MOUTH EVERY MORNING, Disp: 90 tablet, Rfl: 1    montelukast (SINGULAIR) 10 mg tablet, Take 1 tablet (10 mg total) by mouth daily at bedtime, Disp: 90 tablet, Rfl: 3    rivaroxaban (XARELTO) 20 mg tablet, in the morning, Disp: , Rfl:     sertraline (ZOLOFT) 100 mg tablet,  "TAKE ONE TABLET BY MOUTH EVERY DAY, Disp: 90 tablet, Rfl: 1    tamsulosin (FLOMAX) 0.4 mg, TAKE 1 CAPSULE BY MOUTH EVERYDAY AT BEDTIME, Disp: 90 capsule, Rfl: 3  Allergies   Allergen Reactions    Ceftin [Cefuroxime] GI Intolerance and Vomiting     Vitals:    03/15/24 1254   BP: 142/80   BP Location: Right arm   Patient Position: Sitting   Cuff Size: Standard   Pulse: 69   SpO2: 97%   Weight: 105 kg (232 lb)   Height: 6' 1\" (1.854 m)       Review of Systems:   Review of Systems   Constitutional: Negative.    HENT: Negative.     Eyes: Negative.    Respiratory: Negative.     Cardiovascular:  Positive for palpitations.   Gastrointestinal: Negative.    Endocrine: Negative.    Genitourinary: Negative.    Musculoskeletal: Negative.    Skin: Negative.    Allergic/Immunologic: Negative.    Neurological: Negative.    Hematological: Negative.    Psychiatric/Behavioral: Negative.         Vitals:    03/15/24 1254   BP: 142/80   BP Location: Right arm   Patient Position: Sitting   Cuff Size: Standard   Pulse: 69   SpO2: 97%   Weight: 105 kg (232 lb)   Height: 6' 1\" (1.854 m)     Physical Examination:   Physical Exam  Constitutional:       General: He is not in acute distress.     Appearance: He is well-developed. He is not diaphoretic.   HENT:      Head: Normocephalic and atraumatic.      Right Ear: External ear normal.      Left Ear: External ear normal.   Eyes:      General: No scleral icterus.        Right eye: No discharge.         Left eye: No discharge.      Conjunctiva/sclera: Conjunctivae normal.      Pupils: Pupils are equal, round, and reactive to light.   Neck:      Thyroid: No thyromegaly.      Vascular: No JVD.      Trachea: No tracheal deviation.   Cardiovascular:      Rate and Rhythm: Normal rate. Rhythm irregular.      Heart sounds: Murmur heard.      No friction rub. Gallop present.   Pulmonary:      Effort: Pulmonary effort is normal. No respiratory distress.      Breath sounds: Normal breath sounds. No stridor. " No wheezing or rales.   Chest:      Chest wall: No tenderness.   Abdominal:      General: Bowel sounds are normal. There is no distension.      Palpations: Abdomen is soft. There is no mass.      Tenderness: There is no abdominal tenderness. There is no guarding or rebound.   Musculoskeletal:         General: No tenderness or deformity. Normal range of motion.      Cervical back: Normal range of motion and neck supple.   Skin:     General: Skin is warm and dry.      Coloration: Skin is not pale.      Findings: No erythema or rash.   Neurological:      Mental Status: He is alert and oriented to person, place, and time.      Cranial Nerves: No cranial nerve deficit.      Motor: No abnormal muscle tone.      Coordination: Coordination normal.      Deep Tendon Reflexes: Reflexes are normal and symmetric. Reflexes normal.   Psychiatric:         Behavior: Behavior normal.         Thought Content: Thought content normal.         Judgment: Judgment normal.         Labs:     Lab Results   Component Value Date    WBC 8.08 11/13/2023    HGB 12.2 11/13/2023    HCT 38.7 11/13/2023    MCV 90 11/13/2023    RDW 15.4 (H) 11/13/2023     11/13/2023     BMP:  Lab Results   Component Value Date    SODIUM 140 11/13/2023    K 4.8 11/13/2023     11/13/2023    CO2 29 11/13/2023    BUN 25 11/13/2023    CREATININE 1.06 11/13/2023    GLUC 206 (H) 11/13/2023    GLUF 204 (H) 10/22/2020    CALCIUM 9.9 11/13/2023    EGFR 65 11/13/2023    MG 1.6 01/13/2020     LFT:  Lab Results   Component Value Date    AST 16 11/13/2023    ALT 12 11/13/2023    ALKPHOS 53 11/13/2023    TP 7.6 11/13/2023    ALB 4.0 11/13/2023      Lab Results   Component Value Date    MEO4RMMAATRG 0.588 11/13/2023     Lab Results   Component Value Date    HGBA1C 7.0 (A) 02/27/2024     Lipid Profile:   Lab Results   Component Value Date    CHOLESTEROL 184 10/22/2020    HDL 44 10/22/2020    LDLCALC 104 (H) 10/22/2020    TRIG 178 (H) 10/22/2020     Lab Results   Component  Value Date    CHOLESTEROL 184 10/22/2020     Lab Results   Component Value Date    CKTOTAL 65 09/03/2022    TROPONINI <0.02 01/10/2020     Lab Results   Component Value Date    NTBNP 1,781 (H) 01/10/2020      Recent Results (from the past 672 hour(s))   Tissue Exam    Collection Time: 02/21/24  4:48 PM   Result Value Ref Range    Case Report       Surgical Pathology Report                         Case: [id number]                                  Authorizing Provider:  Randolph Waddell PA-C   Collected:           02/21/2024 1648              Ordering Location:     [facility] and      Received:            02/21/2024 1649                                     Reconstructive Surgery                                                                              Lane                                                                    Pathologist:           Lalito Grullon MD                                                                           Specimen:    Lesion, forehead                                                                           Final Diagnosis       This case was sent for processing and evaluation to [facility]. The final diagnosis is issued below; their complete report is viewable in the attached link.    A. Forehead, punch biopsy:  - Actinic Keratosis, Hyperplastic.    Note: Deeper sections were also examined.      Additional Information       All reported additional testing was performed with appropriately reactive controls.  These tests were developed and their performance characteristics determined by [facility] or appropriate performing facility, though some tests may be performed on tissues which have not been validated for performance characteristics (such as staining performed on alcohol exposed cell blocks and decalcified tissues).  Results should be interpreted with  "caution and in the context of the patients’ clinical condition. These tests may not be cleared or approved by the U.S. Food and Drug Administration, though the FDA has determined that such clearance or approval is not necessary. These tests are used for clinical purposes and they should not be regarded as investigational or for research. This laboratory has been approved by [place] 88, designated as a high-complexity laboratory and is qualified to perform these tests.  .      Gross Description       A. The specimen is received in formalin, labeled with the patient's name and hospital number, and is designated \" lesion forehead\".  The specimen consists of a single skin fragment.  The case is sent out in its entirety to: [facility], [address] [place] [address] for histologic processing and examination.     -Iglesia Baez      Clinical Information suspicious lesion    POCT hemoglobin A1c    Collection Time: 24  1:42 PM   Result Value Ref Range    Hemoglobin A1C 7.0 (A) 6.5       Imaging & Testing   I have personally reviewed pertinent reports.      Cardiac Testing     Results for orders placed during the hospital encounter of 01/10/20   Echo complete with contrast if indicated    Narrative [place] [id number] [phone]    Transthoracic Echocardiogram  2D, M-mode, Doppler, and Color Doppler    Study date:  2020    Patient: FRANKLIN BRIDGES  MR number: [id number]  Account number: [id number]  : 1945  Age: 74 years  Gender: Male  Status: Inpatient  Location: Bedside  Height: 72 in  Weight: 249.5 lb  BP: 139/ 73 mmHg    Indications: Atrial Fibrillation    Diagnoses: I48.0 - Atrial fibrillation    Sonographer:  Giselle Bain RDCS  Referring Physician:  Christin Erwin MD  Group:  [facility]  Interpreting Physician:  Nicola Castillo MD    SUMMARY    LEFT " VENTRICLE:  Systolic function was mildly reduced. Ejection fraction was estimated in the range of 45 % to 50 %.  There was mild diffuse hypokinesis.  Wall thickness was mildly increased.    LEFT ATRIUM:  The atrium was moderately dilated.    RIGHT ATRIUM:  The atrium was moderately dilated.    AORTIC VALVE:  Although there was no diagnostic evidence for vegetation, this study is not adequate to completely exclude the possibility.    HISTORY: PRIOR HISTORY: Diabetes Mellitus, Obstructive Sleep Apnea, Asthma    PROCEDURE: The procedure was performed at the bedside. This was a routine study. The transthoracic approach was used. The study included complete 2D imaging, M-mode, complete spectral Doppler, and color Doppler. The heart rate was 88 bpm,  at the start of the study. Intravenous contrast ( 1.2 ml Definity in NSS, 1 ml) was administered to opacify the left ventricle. Echocardiographic views were limited due to decreased penetration and lung interference. This was a technically  difficult study.    LEFT VENTRICLE: Size was normal. Systolic function was mildly reduced. Ejection fraction was estimated in the range of 45 % to 50 %. There was mild diffuse hypokinesis. Wall thickness was mildly increased. No evidence of apical thrombus.  DOPPLER: The study was not technically sufficient to allow evaluation of LV diastolic function.    RIGHT VENTRICLE: The size was normal. Systolic function was normal with TAPSE-2.2cm Wall thickness was normal.    LEFT ATRIUM: The atrium was moderately dilated.    RIGHT ATRIUM: The atrium was moderately dilated.    MITRAL VALVE: Valve structure was normal. There was normal leaflet separation. DOPPLER: The transmitral velocity was within the normal range. There was no evidence for stenosis. There was no significant regurgitation.    AORTIC VALVE: Leaflets exhibited mildly increased thickness, mild calcification, lower normal cuspal separation, and sclerosis. Although there was no  diagnostic evidence for vegetation, this study is not adequate to completely exclude the  possibility. DOPPLER: Transaortic velocity was within the normal range. There was no evidence for stenosis. There was no significant regurgitation.    TRICUSPID VALVE: The valve structure was normal. There was normal leaflet separation. DOPPLER: The transtricuspid velocity was within the normal range. There was no evidence for stenosis. There was no significant regurgitation.    PULMONIC VALVE: Leaflets exhibited normal thickness, no calcification, and normal cuspal separation. DOPPLER: The transpulmonic velocity was within the normal range. There was no significant regurgitation.    PERICARDIUM: There was no pericardial effusion. The pericardium was normal in appearance.    AORTA: The root exhibited normal size.    SYSTEMIC VEINS: IVC: The inferior vena cava was normal in size.    SYSTEM MEASUREMENT TABLES    2D mode  AoR Diam 2D: 3.4 cm  LA Diam (2D): 3.7 cm  LA/Ao (2D): 1.09  FS (2D Teich): 22.7 %  IVSd (2D): 1.17 cm  LVDEV: 98.8 cmï¾³  LVESV: 53.7 cmï¾³  LVIDd(2D): 4.63 cm  LVISd (2D): 3.58 cm  LVOT Area 2D: 3.14 cmï¾²  LVPWd (2D): 1.15 cm  SV (Teich): 45.1 cmï¾³    Apical four chamber  LVEF A4C: 46 %    Unspecified Scan Mode  KENTON Cont Eq (Peak Josh): 2.21 cmï¾²  LVOT Diam.: 2 cm  LVOT Vmax: 824 mm/s  LVOT Vmax; Mean: 824 mm/s  Peak Grad.; Mean: 3 mm[Hg]  MV Peak A Josh: 326 mm/s  MV Peak E Josh. Mean: 928 mm/s  MVA (PHT): 2.97 cmï¾²  PHT: 74 ms  Max P mm[Hg]  V Max: 2460 mm/s  Vmax: 2310 mm/s  RA Area: 17 cmï¾²  RA Volume: 45.1 cmï¾³  TAPSE: 2.2 cm    Intersocietal Commission Accredited Echocardiography Laboratory    Prepared and electronically signed by    Nicola Castillo MD  Signed 2020 13:46:20       Results for orders placed during the hospital encounter of 01/10/20   ANGELA    Narrative [address] [place] [id number] [phone]    Transesophageal Echocardiogram    Study  date:  2020    Patient: FRANKLIN BRIDGES  MR number: [id number]  Account number: [id number]  : 1945  Age: 74 years  Gender: Male  Status: Inpatient  Location: Cath lab  Height: 72 in  Weight: 250 lb  BP: 118/ 60 mmHg    Indications: Atrial Fibrillation    Diagnoses: 427.31 - ATRIAL FIBRILLATION    Sonographer:  JAGUAR Calloway  Primary Physician:  LATANYA Motta  Referring Physician:  Nicola Castillo MD  Group:  [facility]  RN:  Kb Wynn RN  Interpreting Physician:  Nicola Castillo MD    SUMMARY    LEFT VENTRICLE:  Systolic function was mildly reduced. Ejection fraction was estimated in the range of 45 % to 50 % to be 50 %.  There was mild diffuse hypokinesis.    LEFT ATRIUM:  The atrium was dilated.    ATRIAL SEPTUM:  No defect or patent foramen ovale was identified.  Contrast injection was performed. There was no right-to-left shunt, with provocative maneuvers to increase right atrial pressure.    RIGHT ATRIUM:  The atrium was dilated.    MITRAL VALVE:  There was mild regurgitation.  There was no echocardiographic evidence of vegetation.    AORTIC VALVE:  There was no echocardiographic evidence of vegetation.    HISTORY: PRIOR HISTORY: DM, TRISTIN, Asthma    PROCEDURE: The procedure was performed in the catheterization laboratory. This was a routine study. The risks and alternatives of the procedure were explained to the patient and informed consent was obtained. The transesophageal approach  was used. The heart rate was 92 bpm, at the start of the study. An adult omniplane probe was inserted by the attending cardiologist. Intubated with ease. One intubation attempt(s). There was no blood detected on the probe. Intravenous  contrast (agitated saline, 10 ml) was administered to evaluate shunting.    LEFT VENTRICLE: Size was normal. Systolic function was mildly reduced. Ejection fraction was estimated in the range of 45 % to 50 % to be 50 %. There was mild diffuse  hypokinesis. Wall thickness was normal. No evidence of apical thrombus.  DOPPLER: Left ventricular diastolic function parameters were normal.    RIGHT VENTRICLE: The size was normal. Systolic function was normal. Wall thickness was normal.    LEFT ATRIUM: The atrium was dilated.    ATRIAL SEPTUM: No defect or patent foramen ovale was identified. Contrast injection was performed. There was no right-to-left shunt, with provocative maneuvers to increase right atrial pressure.    RIGHT ATRIUM: The atrium was dilated.    MITRAL VALVE: Valve structure was normal. There was normal leaflet separation. There was no echocardiographic evidence of vegetation. DOPPLER: The transmitral velocity was within the normal range. There was no evidence for stenosis. There  was mild regurgitation.    AORTIC VALVE: The valve was trileaflet. Leaflets exhibited mildly increased thickness, calcification, and normal cuspal separation. There was no echocardiographic evidence of vegetation. DOPPLER: Transaortic velocity was within the normal  range. There was no evidence for stenosis. There was no significant regurgitation.    TRICUSPID VALVE: The valve structure was normal. There was normal leaflet separation. DOPPLER: The transtricuspid velocity was within the normal range. There was no evidence for stenosis. There was no significant regurgitation.    PULMONIC VALVE: Leaflets exhibited normal thickness, no calcification, and normal cuspal separation. DOPPLER: The transpulmonic velocity was within the normal range. There was no significant regurgitation.    PERICARDIUM: There was no pericardial effusion. The pericardium was normal in appearance.    AORTA: The root exhibited normal size. There was no significant atheroma noted of the transverse and descending aorta    SYSTEMIC VEINS: IVC: The inferior vena cava was normal in size.    Intersocietal Commission Accredited Echocardiography Laboratory    Prepared and electronically signed by    Nicola  "MD Jonathan  Signed 14-Jan-2020 10:49:20         EKG: Personally reviewed.    Sinus bradycardia no acute st/t wave changes    AHI- 19 time an hour      Nicola Castillo MD [facility]  [phone]  Please call with any questions or suggestions    Counseling :  A description of the counseling:   Goals and Barriers:  Patient's ability to self care:  Medication side effect reviewed with patient in detail and all their questions answered.    \"Portions of the record may have been created with voice recognition software. Occasional wrong word or \"sound a like\" substitutions may have occurred due to the inherent limitations of voice recognition software. Read the chart carefully and recognize, using context, where substitutions have occurred. Please call if you have any questions. \"    "

## 2024-03-18 ENCOUNTER — TELEPHONE (OUTPATIENT)
Age: 79
End: 2024-03-18

## 2024-03-18 ENCOUNTER — OFFICE VISIT (OUTPATIENT)
Dept: PLASTIC SURGERY | Facility: CLINIC | Age: 79
End: 2024-03-18

## 2024-03-18 DIAGNOSIS — L57.0 ACTINIC KERATOSIS: Primary | ICD-10-CM

## 2024-03-18 RX ORDER — FLUOROURACIL 50 MG/G
CREAM TOPICAL 2 TIMES DAILY
Qty: 40 G | Refills: 0 | Status: SHIPPED | OUTPATIENT
Start: 2024-03-18

## 2024-03-18 NOTE — TELEPHONE ENCOUNTER
PA for Fluorouracil 5% cream     Submitted via    []CMM-KEY   [x][employer]-Case ID # [id number]   []Faxed to plan   []Other website   []Phone call Case ID #     Office notes sent, clinical questions answered. Awaiting determination    Turnaround time for your insurance to make a decision on your Prior Authorization can take 7-21 business days.

## 2024-03-18 NOTE — TELEPHONE ENCOUNTER
Xiomy called back, frustrated pathology is taking so long to result and is growing concerned with delay. Per office request, scheduled biopsy check for today. No other questions at this time.

## 2024-03-18 NOTE — PROGRESS NOTES
Assessment/Plan:     Diagnoses and all orders for this visit:    Actinic keratosis  Pt presented with a forehead lesion. Biopsy showed actinic keratosis. I will prescribe efudex for 1 mon. I also referred him to derm for follow-up.  -     fluorouracil (EFUDEX) 5 % cream; Apply topically 2 (two) times a day      Subjective:      Patient ID: [facility] Price is a 78 y.o. male.    HPI    Pt is here to discuss his pathology. He had a biopsy of a forehead lesion. Pathology was consistent with actinic keratosis, hyperplastic. Pt denies any complaints.     Patient Active Problem List   Diagnosis    Adjustment disorder with anxious mood    Cataract of both eyes    Chronic allergic rhinitis    Enlarged prostate    Moderate persistent asthma without complication    Moderate obstructive sleep apnea    Obesity with body mass index 30 or greater    Pes anserinus tendinitis or bursitis    Restrictive lung disease    Tear of medial meniscus of knee    Tinnitus    Paroxysmal atrial fibrillation (HCC)    Type 2 diabetes mellitus with hyperglycemia, without long-term current use of insulin (HCC)    Arthralgia    Essential hypertension    Anemia    Encounter for immunization     Allergies   Allergen Reactions    Ceftin [Cefuroxime] GI Intolerance and Vomiting     Current Outpatient Medications on File Prior to Visit   Medication Sig    albuterol (2.5 mg/3 mL) 0.083 % nebulizer solution Take 3 mL (2.5 mg total) by nebulization every 6 (six) hours as needed for wheezing or shortness of breath    albuterol (Ventolin HFA) 90 mcg/act inhaler Inhale 2 puffs every 6 (six) hours as needed for wheezing    finasteride (PROSCAR) 5 mg tablet TAKE ONE TABLET BY MOUTH EVERY DAY    glipiZIDE (GLUCOTROL XL) 10 mg 24 hr tablet TAKE 1 TABLET BY MOUTH EVERY DAY WITH BREAKFAST    Glucosamine-Chondroitin (GLUCOSAMINE CHONDR COMPLEX PO) Take by mouth 2 (two) times a day    lisinopril-hydrochlorothiazide (PRINZIDE,ZESTORETIC) 10-12.5 MG per tablet Take 1  tablet by mouth daily    metFORMIN (GLUCOPHAGE) 500 mg tablet Take 2 tablets (1,000 mg total) by mouth 2 (two) times a day with meals    metoprolol succinate (TOPROL-XL) 25 mg 24 hr tablet TAKE ONE TABLET BY MOUTH EVERY MORNING    montelukast (SINGULAIR) 10 mg tablet Take 1 tablet (10 mg total) by mouth daily at bedtime    rivaroxaban (XARELTO) 20 mg tablet in the morning    sertraline (ZOLOFT) 100 mg tablet TAKE ONE TABLET BY MOUTH EVERY DAY    tamsulosin (FLOMAX) 0.4 mg TAKE 1 CAPSULE BY MOUTH EVERYDAY AT BEDTIME     No current facility-administered medications on file prior to visit.     Family History   Problem Relation Age of Onset    Cancer Mother         ovarian    Diabetes Father     Cancer Sister         stomach to brain    Substance Abuse Family     Substance Abuse Son     Alcohol abuse Son     Mental illness Neg Hx      Past Medical History:   Diagnosis Date    Anxiety     Arthritis     fingers , knee    Asthma     BPH (benign prostatic hypertrophy)     Cataract     currently left eye- to have surgery on 4/10/17    Cough variant asthma 2017    Diabetes mellitus (HCC)     type 2, last assessed 2017    Hernia, umbilical     currently has    HL (hearing loss)     b/l hearing aids    Labyrinthitis     Moderate obstructive sleep apnea 2017    New onset a-fib (HCC) 01/10/2020    Obesity with body mass index 30 or greater 2019    Umbilical hernia      Social History     Socioeconomic History    Marital status: /Civil Union     Spouse name: Not on file    Number of children: Not on file    Years of education: Not on file    Highest education level: Not on file   Occupational History    Not on file   Tobacco Use    Smoking status: Former     Current packs/day: 0.00     Average packs/day: 0.5 packs/day for 15.0 years (7.5 ttl pk-yrs)     Types: Cigarettes     Start date: 1968     Quit date: 1983     Years since quittin.2     Passive exposure: Past    Smokeless tobacco:  Never   Vaping Use    Vaping status: Never Used   Substance and Sexual Activity    Alcohol use: Yes     Alcohol/week: 3.0 standard drinks of alcohol     Types: 3 Standard drinks or equivalent per week     Comment: socially    Drug use: Yes     Types: Marijuana     Comment: most everyday     Sexual activity: Not Currently     Partners: Female   Other Topics Concern    Not on file   Social History Narrative    Active advance directive    Caffeine use    Dental care, regularly    Drinks coffee     Social Determinants of Health     Financial Resource Strain: Low Risk  (11/27/2023)    Overall Financial Resource Strain (CARDIA)     Difficulty of Paying Living Expenses: Not hard at all   Food Insecurity: Not on file   Transportation Needs: No Transportation Needs (11/27/2023)    PRAPARE - Transportation     Lack of Transportation (Medical): No     Lack of Transportation (Non-Medical): No   Physical Activity: Not on file   Stress: Not on file   Social Connections: Not on file   Intimate Partner Violence: Not on file   Housing Stability: Not on file     Past Surgical History:   Procedure Laterality Date    CATARACT EXTRACTION Right 04/04/2017    COLONOSCOPY      yrs ago    EYE SURGERY Bilateral     cataracts with IOL         Review of Systems   All other systems reviewed and are negative.        Objective:      There were no vitals taken for this visit.         Physical Exam  Constitutional:       Appearance: Normal appearance. He is well-developed.   HENT:      Head: Normocephalic and atraumatic.      Comments: Healing biopsy site,actinic keratosis, suture removed.   Eyes:      Conjunctiva/sclera: Conjunctivae normal.   Pulmonary:      Effort: Pulmonary effort is normal.   Musculoskeletal:         General: Normal range of motion.      Cervical back: Normal range of motion.   Skin:     General: Skin is warm and dry.   Neurological:      Mental Status: He is alert and oriented to person, place, and time.   Psychiatric:          Mood and Affect: Mood normal.         Behavior: Behavior normal.

## 2024-03-19 NOTE — TELEPHONE ENCOUNTER
PA for Efudex 5% cream Approved   Date(s) approved 01/01/2024 - 06/16/2024      Patient advised by [x] "[employer] Message                      [x] Phone call       Pharmacy advised by [x]Fax                                     []Phone call    Approval letter scanned into Media Yes

## 2024-04-02 ENCOUNTER — TELEPHONE (OUTPATIENT)
Age: 79
End: 2024-04-02

## 2024-04-02 NOTE — TELEPHONE ENCOUNTER
Wife called with a couple questions about test results she is reviewing in her husbands chart. Question about an alb/creat ratio test. This was  ordered by Diabetic /Endo . Advised she call them. Number provided .amrito a qyestion about a tissue pathology reort and he was to get a cream. Reviewed derm note . It appears the cream was sent in to the pharmacy . Made wife aware,. She will call [facility] to see if available to  . Will call Dermatology for further questions the biopsy report as well

## 2024-04-08 ENCOUNTER — OFFICE VISIT (OUTPATIENT)
Dept: ENDOCRINOLOGY | Facility: CLINIC | Age: 79
End: 2024-04-08
Payer: MEDICARE

## 2024-04-08 VITALS
HEIGHT: 73 IN | DIASTOLIC BLOOD PRESSURE: 80 MMHG | OXYGEN SATURATION: 98 % | SYSTOLIC BLOOD PRESSURE: 130 MMHG | BODY MASS INDEX: 30.48 KG/M2 | WEIGHT: 230 LBS | HEART RATE: 82 BPM

## 2024-04-08 DIAGNOSIS — I10 ESSENTIAL HYPERTENSION: ICD-10-CM

## 2024-04-08 DIAGNOSIS — E11.65 TYPE 2 DIABETES MELLITUS WITH HYPERGLYCEMIA, WITHOUT LONG-TERM CURRENT USE OF INSULIN (HCC): Primary | ICD-10-CM

## 2024-04-08 DIAGNOSIS — E55.9 VITAMIN D DEFICIENCY: ICD-10-CM

## 2024-04-08 PROCEDURE — 99214 OFFICE O/P EST MOD 30 MIN: CPT | Performed by: NURSE PRACTITIONER

## 2024-04-08 NOTE — PROGRESS NOTES
Established Patient Progress Note    Chief Complaint:  Diabetes follow up visit    Impression & Plan:    Problem List Items Addressed This Visit          Cardiovascular and Mediastinum    Essential hypertension     BP stable on current regimen.             Endocrine    Type 2 diabetes mellitus with hyperglycemia, without long-term current use of insulin ([facility]) - Primary       Lab Results   Component Value Date    HGBA1C 7.0 (A) 02/27/2024     HGA1C close to goal.    BGL Reviewed: Recommend checking blood sugars 1-2 times per day at alternating times.    Treatment regimen: Continue metformin 1000 mg twice daily for now.  Recommend confirming he 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 taking Glipizide.     Discussed risks/complications associated with uncontrolled diabetes including organ involvement, heart attack, stroke, death.    Advised lifestyle modifications including attention to diet including the amount and types of carbohydrates consumed and regular activity.     Call for blood sugars less than 70 mg/dl or patterns over 250 mg/dl.     Discussed symptoms and treatment of hypoglycemia.  Reviewed risks associated with hypoglycemia. Always carry rapid acting carbohydrates and a glucometer (a way to check your blood sugar).    Recommendation for medical identification either bracelet, necklace.    Routine follow up for diabetic eye and foot exams.     Ordered blood work to complete prior to next visit.    Send glucose logs/CGM download in 1-2 weeks for review    Follow up in 3 months.            Relevant Orders    Comprehensive metabolic panel    Lipid panel    Albumin / creatinine urine ratio     Other Visit Diagnoses       Vitamin D deficiency        Relevant Orders    Vitamin D 25 hydroxy            History of Present Illness:   K[facility] Price is a 78 y.o. male with a history of type 2 diabetes mellitus on metformin therapy. Has a history of pancreatitis, hyperlipidemia, hypertension, heart failure, atrial fibrillation, and TRISTIN.  UTD diabetic foot exam. Does not think he is currently on Glipizide. Denies symptomatic hypoglycemia. Denies polyuria, polydipsia, or blurry vision. Denies CP or SOB.     Home blood glucose readings: Not checking regularly.      Current regimen:  Metformin 1000 mg twice daily    Has hypertension: Taking ACE   Has hyperlipidemia: None  Thyroid disorders: None    Patient Active Problem List   Diagnosis    Adjustment disorder with anxious mood    Cataract of both eyes    Chronic allergic rhinitis    Enlarged prostate    Moderate persistent asthma without complication    Moderate obstructive sleep apnea    Obesity with body mass index 30 or greater    Pes anserinus tendinitis or bursitis    Restrictive lung disease    Tear of medial meniscus of knee    Tinnitus    Paroxysmal atrial fibrillation (HCC)    Type 2 diabetes mellitus with hyperglycemia, without long-term current use of insulin (HCC)    Arthralgia    Essential hypertension    Anemia    Encounter for immunization      Past Medical History:   Diagnosis Date    Anxiety     Arthritis     fingers , knee    Asthma     BPH (benign prostatic hypertrophy)     Cataract     currently left eye- to have surgery on 4/10/17    Cough variant asthma 04/12/2017    Diabetes mellitus (HCC)     type 2, last assessed 4/12/2017    Hernia, umbilical     currently has    HL (hearing loss)     b/l hearing aids    Labyrinthitis     Moderate obstructive sleep apnea 04/11/2017    New onset a-fib (HCC) 01/10/2020    Obesity with body mass index 30 or greater 11/04/2019    Umbilical hernia       Past Surgical History:   Procedure Laterality Date    CATARACT EXTRACTION Right 04/04/2017    COLONOSCOPY      yrs ago    EYE SURGERY Bilateral     cataracts with IOL      Family History   Problem Relation Age of Onset    Cancer Mother         ovarian    Diabetes Father     Cancer Sister         stomach to brain    Substance Abuse Family     Substance Abuse Son     Alcohol abuse Son     Mental  illness Neg Hx      Social History     Tobacco Use    Smoking status: Former     Current packs/day: 0.00     Average packs/day: 0.5 packs/day for 15.0 years (7.5 ttl pk-yrs)     Types: Cigarettes     Start date: 1968     Quit date: 1983     Years since quittin.2     Passive exposure: Past    Smokeless tobacco: Never   Substance Use Topics    Alcohol use: Yes     Alcohol/week: 3.0 standard drinks of alcohol     Types: 3 Standard drinks or equivalent per week     Comment: socially     Allergies   Allergen Reactions    Ceftin [Cefuroxime] GI Intolerance and Vomiting         Current Outpatient Medications:     finasteride (PROSCAR) 5 mg tablet, TAKE ONE TABLET BY MOUTH EVERY DAY, Disp: 30 tablet, Rfl: 5    Glucosamine-Chondroitin (GLUCOSAMINE CHONDR COMPLEX PO), Take by mouth 2 (two) times a day, Disp: , Rfl:     lisinopril-hydrochlorothiazide (PRINZIDE,ZESTORETIC) 10-12.5 MG per tablet, Take 1 tablet by mouth daily, Disp: 30 tablet, Rfl: 2    metFORMIN (GLUCOPHAGE) 500 mg tablet, Take 2 tablets (1,000 mg total) by mouth 2 (two) times a day with meals, Disp: 360 tablet, Rfl: 3    metoprolol succinate (TOPROL-XL) 25 mg 24 hr tablet, TAKE ONE TABLET BY MOUTH EVERY MORNING, Disp: 90 tablet, Rfl: 1    montelukast (SINGULAIR) 10 mg tablet, Take 1 tablet (10 mg total) by mouth daily at bedtime, Disp: 90 tablet, Rfl: 3    rivaroxaban (XARELTO) 20 mg tablet, in the morning, Disp: , Rfl:     sertraline (ZOLOFT) 100 mg tablet, TAKE ONE TABLET BY MOUTH EVERY DAY, Disp: 90 tablet, Rfl: 1    tamsulosin (FLOMAX) 0.4 mg, TAKE 1 CAPSULE BY MOUTH EVERYDAY AT BEDTIME, Disp: 90 capsule, Rfl: 3    albuterol (2.5 mg/3 mL) 0.083 % nebulizer solution, Take 3 mL (2.5 mg total) by nebulization every 6 (six) hours as needed for wheezing or shortness of breath, Disp: 60 mL, Rfl: 6    albuterol (Ventolin HFA) 90 mcg/act inhaler, Inhale 2 puffs every 6 (six) hours as needed for wheezing, Disp: 18 g, Rfl: 3    fluorouracil (EFUDEX) 5 %  "cream, Apply topically 2 (two) times a day, Disp: 40 g, Rfl: 0    Review of Systems  See HPI.   All other systems reviewed and are negative.      Physical Exam:  Body mass index is 30.34 kg/m².  /80 (BP Location: Left arm, Patient Position: Sitting, Cuff Size: Large)   Pulse 82   Ht 6' 1\" (1.854 m)   Wt 104 kg (230 lb)   SpO2 98%   BMI 30.34 kg/m²    Wt Readings from Last 3 Encounters:   04/08/24 104 kg (230 lb)   03/15/24 105 kg (232 lb)   03/11/24 107 kg (235 lb 12.8 oz)       Physical Exam   Constitutional: He is oriented to person, place, and time. He appears well-developed and well-nourished. No distress.   HENT:   Head: Normocephalic and atraumatic.   Neck: Normal range of motion.   Pulmonary/Chest: Effort normal.   Musculoskeletal: Normal range of motion.   Neurological: He is alert and oriented to person, place, and time.   Skin: He is not diaphoretic.   Psychiatric: He has a normal mood and affect. His behavior is normal.       Labs:   Lab Results   Component Value Date    HGBA1C 7.0 (A) 02/27/2024    HGBA1C 6.9 (A) 11/13/2023    HGBA1C 7.6 (H) 07/25/2023     Lab Results   Component Value Date    CREATININE 1.06 11/13/2023    CREATININE 0.86 07/17/2023    CREATININE 0.85 07/16/2023    BUN 25 11/13/2023     03/31/2017    K 4.8 11/13/2023     11/13/2023    CO2 29 11/13/2023     eGFR   Date Value Ref Range Status   11/13/2023 65 ml/min/1.73sq m Final     Lab Results   Component Value Date    CHOL 177 03/29/2017    HDL 44 10/22/2020    TRIG 178 (H) 10/22/2020     Lab Results   Component Value Date    ALT 12 11/13/2023    AST 16 11/13/2023    ALKPHOS 53 11/13/2023     Lab Results   Component Value Date    KLV1LOLHPLBQ 0.588 11/13/2023    UWE3VYYFZCIP 0.831 10/22/2020    PNH1DUYECQKE 0.786 01/10/2020     Lab Results   Component Value Date    FREET4 0.83 11/13/2023       Discussed with the patient and all questioned fully answered. He will call me if any problems arise.    Follow-up " appointment in 3 months.     Counseled patient on diagnostic results, prognosis, risk and benefit of treatment options, instruction for management, importance of treatment compliance, Risk  factor reduction and impressions    LATANYA Garcia

## 2024-04-08 NOTE — ASSESSMENT & PLAN NOTE
Lab Results   Component Value Date    HGBA1C 7.0 (A) 02/27/2024     HGA1C close to goal.    BGL Reviewed: Recommend checking blood sugars 1-2 times per day at alternating times.    Treatment regimen: Continue metformin 1000 mg twice daily for now.  Recommend confirming he 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 taking Glipizide.     Discussed risks/complications associated with uncontrolled diabetes including organ involvement, heart attack, stroke, death.    Advised lifestyle modifications including attention to diet including the amount and types of carbohydrates consumed and regular activity.     Call for blood sugars less than 70 mg/dl or patterns over 250 mg/dl.     Discussed symptoms and treatment of hypoglycemia.  Reviewed risks associated with hypoglycemia. Always carry rapid acting carbohydrates and a glucometer (a way to check your blood sugar).    Recommendation for medical identification either bracelet, necklace.    Routine follow up for diabetic eye and foot exams.     Ordered blood work to complete prior to next visit.    Send glucose logs/CGM download in 1-2 weeks for review    Follow up in 3 months.

## 2024-04-15 DIAGNOSIS — R41.3 MEMORY LOSS: Primary | ICD-10-CM

## 2024-05-21 ENCOUNTER — RA CDI HCC (OUTPATIENT)
Dept: OTHER | Facility: HOSPITAL | Age: 79
End: 2024-05-21

## 2024-05-21 PROBLEM — Z23 ENCOUNTER FOR IMMUNIZATION: Status: RESOLVED | Noted: 2023-11-13 | Resolved: 2024-05-21

## 2024-05-23 ENCOUNTER — APPOINTMENT (OUTPATIENT)
Dept: LAB | Facility: CLINIC | Age: 79
End: 2024-05-23
Payer: MEDICARE

## 2024-05-23 DIAGNOSIS — I10 HYPERTENSION GOAL BP (BLOOD PRESSURE) < 140/90: ICD-10-CM

## 2024-05-23 DIAGNOSIS — E11.65 TYPE 2 DIABETES MELLITUS WITH HYPERGLYCEMIA, WITHOUT LONG-TERM CURRENT USE OF INSULIN (HCC): ICD-10-CM

## 2024-05-23 DIAGNOSIS — J45.40 MODERATE PERSISTENT ASTHMA WITHOUT COMPLICATION: ICD-10-CM

## 2024-05-23 DIAGNOSIS — D64.9 ANEMIA, UNSPECIFIED TYPE: ICD-10-CM

## 2024-05-23 DIAGNOSIS — E55.9 VITAMIN D DEFICIENCY: ICD-10-CM

## 2024-05-23 LAB
25(OH)D3 SERPL-MCNC: 29.2 NG/ML (ref 30–100)
ALBUMIN SERPL BCP-MCNC: 4 G/DL (ref 3.5–5)
ALP SERPL-CCNC: 44 U/L (ref 34–104)
ALT SERPL W P-5'-P-CCNC: 12 U/L (ref 7–52)
ANION GAP SERPL CALCULATED.3IONS-SCNC: 10 MMOL/L (ref 4–13)
AST SERPL W P-5'-P-CCNC: 16 U/L (ref 13–39)
BILIRUB SERPL-MCNC: 0.29 MG/DL (ref 0.2–1)
BUN SERPL-MCNC: 30 MG/DL (ref 5–25)
CALCIUM SERPL-MCNC: 9.1 MG/DL (ref 8.4–10.2)
CHLORIDE SERPL-SCNC: 107 MMOL/L (ref 96–108)
CHOLEST SERPL-MCNC: 185 MG/DL
CO2 SERPL-SCNC: 22 MMOL/L (ref 21–32)
CREAT SERPL-MCNC: 1.27 MG/DL (ref 0.6–1.3)
CREAT UR-MCNC: 98.5 MG/DL
ERYTHROCYTE [DISTWIDTH] IN BLOOD BY AUTOMATED COUNT: 14.9 % (ref 11.6–15.1)
GFR SERPL CREATININE-BSD FRML MDRD: 53 ML/MIN/1.73SQ M
GLUCOSE P FAST SERPL-MCNC: 160 MG/DL (ref 65–99)
HCT VFR BLD AUTO: 23.1 % (ref 36.5–49.3)
HDLC SERPL-MCNC: 41 MG/DL
HGB BLD-MCNC: 6.8 G/DL (ref 12–17)
LDLC SERPL CALC-MCNC: 103 MG/DL (ref 0–100)
MCH RBC QN AUTO: 26.2 PG (ref 26.8–34.3)
MCHC RBC AUTO-ENTMCNC: 29.4 G/DL (ref 31.4–37.4)
MCV RBC AUTO: 89 FL (ref 82–98)
MICROALBUMIN UR-MCNC: 20.2 MG/L
MICROALBUMIN/CREAT 24H UR: 21 MG/G CREATININE (ref 0–30)
PLATELET # BLD AUTO: 312 THOUSANDS/UL (ref 149–390)
PMV BLD AUTO: 10.2 FL (ref 8.9–12.7)
POTASSIUM SERPL-SCNC: 5.2 MMOL/L (ref 3.5–5.3)
PROT SERPL-MCNC: 7.3 G/DL (ref 6.4–8.4)
RBC # BLD AUTO: 2.6 MILLION/UL (ref 3.88–5.62)
SODIUM SERPL-SCNC: 139 MMOL/L (ref 135–147)
TRIGL SERPL-MCNC: 206 MG/DL
WBC # BLD AUTO: 6.98 THOUSAND/UL (ref 4.31–10.16)

## 2024-05-23 PROCEDURE — 82043 UR ALBUMIN QUANTITATIVE: CPT

## 2024-05-23 PROCEDURE — 80061 LIPID PANEL: CPT

## 2024-05-23 PROCEDURE — 80053 COMPREHEN METABOLIC PANEL: CPT

## 2024-05-23 PROCEDURE — 36415 COLL VENOUS BLD VENIPUNCTURE: CPT

## 2024-05-23 PROCEDURE — 85027 COMPLETE CBC AUTOMATED: CPT

## 2024-05-23 PROCEDURE — 82570 ASSAY OF URINE CREATININE: CPT

## 2024-05-23 PROCEDURE — 82306 VITAMIN D 25 HYDROXY: CPT

## 2024-05-24 ENCOUNTER — NURSE TRIAGE (OUTPATIENT)
Age: 79
End: 2024-05-24

## 2024-05-24 ENCOUNTER — APPOINTMENT (EMERGENCY)
Dept: RADIOLOGY | Facility: HOSPITAL | Age: 79
DRG: 811 | End: 2024-05-24
Payer: MEDICARE

## 2024-05-24 ENCOUNTER — TELEPHONE (OUTPATIENT)
Dept: ENDOCRINOLOGY | Facility: CLINIC | Age: 79
End: 2024-05-24

## 2024-05-24 ENCOUNTER — HOSPITAL ENCOUNTER (INPATIENT)
Facility: HOSPITAL | Age: 79
LOS: 2 days | Discharge: HOME | DRG: 811 | End: 2024-05-26
Attending: EMERGENCY MEDICINE | Admitting: INTERNAL MEDICINE
Payer: MEDICARE

## 2024-05-24 DIAGNOSIS — D64.9 SYMPTOMATIC ANEMIA: ICD-10-CM

## 2024-05-24 DIAGNOSIS — F33.1 MODERATE EPISODE OF RECURRENT MAJOR DEPRESSIVE DISORDER (HCC): ICD-10-CM

## 2024-05-24 DIAGNOSIS — R19.5 OCCULT GI BLEEDING: Primary | ICD-10-CM

## 2024-05-24 PROBLEM — K92.2 GIB (GASTROINTESTINAL BLEEDING): Status: ACTIVE | Noted: 2024-05-24

## 2024-05-24 LAB
2HR DELTA HS TROPONIN: 0 NG/L
4HR DELTA HS TROPONIN: -1 NG/L
ABO GROUP BLD: NORMAL
ALBUMIN SERPL BCP-MCNC: 4 G/DL (ref 3.5–5)
ALP SERPL-CCNC: 43 U/L (ref 34–104)
ALT SERPL W P-5'-P-CCNC: 11 U/L (ref 7–52)
ANION GAP SERPL CALCULATED.3IONS-SCNC: 11 MMOL/L (ref 4–13)
AST SERPL W P-5'-P-CCNC: 16 U/L (ref 13–39)
BASOPHILS # BLD AUTO: 0.07 THOUSANDS/ÂΜL (ref 0–0.1)
BASOPHILS NFR BLD AUTO: 1 % (ref 0–1)
BILIRUB SERPL-MCNC: 0.38 MG/DL (ref 0.2–1)
BLD GP AB SCN SERPL QL: NEGATIVE
BUN SERPL-MCNC: 31 MG/DL (ref 5–25)
CALCIUM SERPL-MCNC: 8.9 MG/DL (ref 8.4–10.2)
CARDIAC TROPONIN I PNL SERPL HS: 6 NG/L
CARDIAC TROPONIN I PNL SERPL HS: 7 NG/L
CARDIAC TROPONIN I PNL SERPL HS: 7 NG/L
CHLORIDE SERPL-SCNC: 106 MMOL/L (ref 96–108)
CO2 SERPL-SCNC: 19 MMOL/L (ref 21–32)
CREAT SERPL-MCNC: 1.22 MG/DL (ref 0.6–1.3)
EOSINOPHIL # BLD AUTO: 0.24 THOUSAND/ÂΜL (ref 0–0.61)
EOSINOPHIL NFR BLD AUTO: 3 % (ref 0–6)
ERYTHROCYTE [DISTWIDTH] IN BLOOD BY AUTOMATED COUNT: 15 % (ref 11.6–15.1)
EST. AVERAGE GLUCOSE BLD GHB EST-MCNC: 160 MG/DL
FERRITIN SERPL-MCNC: 4 NG/ML (ref 24–336)
GFR SERPL CREATININE-BSD FRML MDRD: 55 ML/MIN/1.73SQ M
GLUCOSE SERPL-MCNC: 119 MG/DL (ref 65–140)
GLUCOSE SERPL-MCNC: 146 MG/DL (ref 65–140)
GLUCOSE SERPL-MCNC: 153 MG/DL (ref 65–140)
HBA1C MFR BLD: 7.2 %
HCT VFR BLD AUTO: 23 % (ref 36.5–49.3)
HCT VFR BLD AUTO: 24.4 % (ref 36.5–49.3)
HGB BLD-MCNC: 6.6 G/DL (ref 12–17)
HGB BLD-MCNC: 6.6 G/DL (ref 12–17)
HGB BLD-MCNC: 7.3 G/DL (ref 12–17)
IMM GRANULOCYTES # BLD AUTO: 0.03 THOUSAND/UL (ref 0–0.2)
IMM GRANULOCYTES NFR BLD AUTO: 0 % (ref 0–2)
IRON SATN MFR SERPL: 8 % (ref 15–50)
IRON SERPL-MCNC: 30 UG/DL (ref 50–212)
LYMPHOCYTES # BLD AUTO: 2.31 THOUSANDS/ÂΜL (ref 0.6–4.47)
LYMPHOCYTES NFR BLD AUTO: 32 % (ref 14–44)
MCH RBC QN AUTO: 25.4 PG (ref 26.8–34.3)
MCHC RBC AUTO-ENTMCNC: 28.7 G/DL (ref 31.4–37.4)
MCV RBC AUTO: 89 FL (ref 82–98)
MONOCYTES # BLD AUTO: 0.58 THOUSAND/ÂΜL (ref 0.17–1.22)
MONOCYTES NFR BLD AUTO: 8 % (ref 4–12)
NEUTROPHILS # BLD AUTO: 3.89 THOUSANDS/ÂΜL (ref 1.85–7.62)
NEUTS SEG NFR BLD AUTO: 56 % (ref 43–75)
NRBC BLD AUTO-RTO: 0 /100 WBCS
PLATELET # BLD AUTO: 293 THOUSANDS/UL (ref 149–390)
PMV BLD AUTO: 9.3 FL (ref 8.9–12.7)
POTASSIUM SERPL-SCNC: 4.3 MMOL/L (ref 3.5–5.3)
PROT SERPL-MCNC: 7.2 G/DL (ref 6.4–8.4)
RBC # BLD AUTO: 2.6 MILLION/UL (ref 3.88–5.62)
RH BLD: POSITIVE
SODIUM SERPL-SCNC: 136 MMOL/L (ref 135–147)
SPECIMEN EXPIRATION DATE: NORMAL
TIBC SERPL-MCNC: 388 UG/DL (ref 250–450)
UIBC SERPL-MCNC: 358 UG/DL (ref 155–355)
WBC # BLD AUTO: 7.12 THOUSAND/UL (ref 4.31–10.16)

## 2024-05-24 PROCEDURE — 96365 THER/PROPH/DIAG IV INF INIT: CPT

## 2024-05-24 PROCEDURE — 86923 COMPATIBILITY TEST ELECTRIC: CPT

## 2024-05-24 PROCEDURE — 84484 ASSAY OF TROPONIN QUANT: CPT | Performed by: INTERNAL MEDICINE

## 2024-05-24 PROCEDURE — 99285 EMERGENCY DEPT VISIT HI MDM: CPT

## 2024-05-24 PROCEDURE — 96376 TX/PRO/DX INJ SAME DRUG ADON: CPT

## 2024-05-24 PROCEDURE — 93005 ELECTROCARDIOGRAM TRACING: CPT

## 2024-05-24 PROCEDURE — 86900 BLOOD TYPING SEROLOGIC ABO: CPT | Performed by: EMERGENCY MEDICINE

## 2024-05-24 PROCEDURE — 83540 ASSAY OF IRON: CPT | Performed by: INTERNAL MEDICINE

## 2024-05-24 PROCEDURE — 83550 IRON BINDING TEST: CPT | Performed by: INTERNAL MEDICINE

## 2024-05-24 PROCEDURE — 99223 1ST HOSP IP/OBS HIGH 75: CPT | Performed by: INTERNAL MEDICINE

## 2024-05-24 PROCEDURE — 85018 HEMOGLOBIN: CPT

## 2024-05-24 PROCEDURE — 36430 TRANSFUSION BLD/BLD COMPNT: CPT

## 2024-05-24 PROCEDURE — 86901 BLOOD TYPING SEROLOGIC RH(D): CPT | Performed by: EMERGENCY MEDICINE

## 2024-05-24 PROCEDURE — 82728 ASSAY OF FERRITIN: CPT | Performed by: INTERNAL MEDICINE

## 2024-05-24 PROCEDURE — 80053 COMPREHEN METABOLIC PANEL: CPT | Performed by: EMERGENCY MEDICINE

## 2024-05-24 PROCEDURE — 86850 RBC ANTIBODY SCREEN: CPT | Performed by: EMERGENCY MEDICINE

## 2024-05-24 PROCEDURE — 82948 REAGENT STRIP/BLOOD GLUCOSE: CPT

## 2024-05-24 PROCEDURE — P9016 RBC LEUKOCYTES REDUCED: HCPCS

## 2024-05-24 PROCEDURE — 99291 CRITICAL CARE FIRST HOUR: CPT | Performed by: EMERGENCY MEDICINE

## 2024-05-24 PROCEDURE — 85018 HEMOGLOBIN: CPT | Performed by: INTERNAL MEDICINE

## 2024-05-24 PROCEDURE — 99222 1ST HOSP IP/OBS MODERATE 55: CPT | Performed by: INTERNAL MEDICINE

## 2024-05-24 PROCEDURE — 71045 X-RAY EXAM CHEST 1 VIEW: CPT

## 2024-05-24 PROCEDURE — C9113 INJ PANTOPRAZOLE SODIUM, VIA: HCPCS | Performed by: EMERGENCY MEDICINE

## 2024-05-24 PROCEDURE — 85025 COMPLETE CBC W/AUTO DIFF WBC: CPT | Performed by: EMERGENCY MEDICINE

## 2024-05-24 PROCEDURE — 30233N1 TRANSFUSION OF NONAUTOLOGOUS RED BLOOD CELLS INTO PERIPHERAL VEIN, PERCUTANEOUS APPROACH: ICD-10-PCS | Performed by: EMERGENCY MEDICINE

## 2024-05-24 PROCEDURE — 36415 COLL VENOUS BLD VENIPUNCTURE: CPT

## 2024-05-24 PROCEDURE — 84484 ASSAY OF TROPONIN QUANT: CPT | Performed by: EMERGENCY MEDICINE

## 2024-05-24 PROCEDURE — 85014 HEMATOCRIT: CPT

## 2024-05-24 RX ORDER — HYDROCHLOROTHIAZIDE 12.5 MG/1
12.5 TABLET ORAL DAILY
Status: DISCONTINUED | OUTPATIENT
Start: 2024-05-25 | End: 2024-05-26 | Stop reason: HOSPADM

## 2024-05-24 RX ORDER — PANTOPRAZOLE SODIUM 40 MG/10ML
40 INJECTION, POWDER, LYOPHILIZED, FOR SOLUTION INTRAVENOUS ONCE
Status: DISCONTINUED | OUTPATIENT
Start: 2024-05-24 | End: 2024-05-24

## 2024-05-24 RX ORDER — SERTRALINE HYDROCHLORIDE 100 MG/1
100 TABLET, FILM COATED ORAL DAILY
Status: DISCONTINUED | OUTPATIENT
Start: 2024-05-24 | End: 2024-05-26 | Stop reason: HOSPADM

## 2024-05-24 RX ORDER — SODIUM CHLORIDE 9 MG/ML
100 INJECTION, SOLUTION INTRAVENOUS CONTINUOUS
Status: DISCONTINUED | OUTPATIENT
Start: 2024-05-24 | End: 2024-05-25

## 2024-05-24 RX ORDER — TAMSULOSIN HYDROCHLORIDE 0.4 MG/1
0.4 CAPSULE ORAL
Status: DISCONTINUED | OUTPATIENT
Start: 2024-05-24 | End: 2024-05-26 | Stop reason: HOSPADM

## 2024-05-24 RX ORDER — FINASTERIDE 5 MG/1
5 TABLET, FILM COATED ORAL DAILY
Status: DISCONTINUED | OUTPATIENT
Start: 2024-05-24 | End: 2024-05-26 | Stop reason: HOSPADM

## 2024-05-24 RX ORDER — LISINOPRIL 10 MG/1
10 TABLET ORAL DAILY
Status: DISCONTINUED | OUTPATIENT
Start: 2024-05-25 | End: 2024-05-26 | Stop reason: HOSPADM

## 2024-05-24 RX ORDER — PANTOPRAZOLE SODIUM 40 MG/10ML
40 INJECTION, POWDER, LYOPHILIZED, FOR SOLUTION INTRAVENOUS EVERY 12 HOURS
Status: DISCONTINUED | OUTPATIENT
Start: 2024-05-24 | End: 2024-05-26 | Stop reason: HOSPADM

## 2024-05-24 RX ORDER — MONTELUKAST SODIUM 10 MG/1
10 TABLET ORAL
Status: DISCONTINUED | OUTPATIENT
Start: 2024-05-24 | End: 2024-05-26 | Stop reason: HOSPADM

## 2024-05-24 RX ORDER — INSULIN LISPRO 100 [IU]/ML
1-6 INJECTION, SOLUTION INTRAVENOUS; SUBCUTANEOUS
Status: DISCONTINUED | OUTPATIENT
Start: 2024-05-24 | End: 2024-05-26 | Stop reason: HOSPADM

## 2024-05-24 RX ORDER — SERTRALINE HYDROCHLORIDE 100 MG/1
TABLET, FILM COATED ORAL
Qty: 90 TABLET | Refills: 1 | Status: SHIPPED | OUTPATIENT
Start: 2024-05-24

## 2024-05-24 RX ORDER — INSULIN LISPRO 100 [IU]/ML
1-5 INJECTION, SOLUTION INTRAVENOUS; SUBCUTANEOUS
Status: DISCONTINUED | OUTPATIENT
Start: 2024-05-24 | End: 2024-05-26 | Stop reason: HOSPADM

## 2024-05-24 RX ORDER — ALBUTEROL SULFATE 90 UG/1
2 AEROSOL, METERED RESPIRATORY (INHALATION) EVERY 6 HOURS PRN
Status: DISCONTINUED | OUTPATIENT
Start: 2024-05-24 | End: 2024-05-26 | Stop reason: HOSPADM

## 2024-05-24 RX ORDER — METOPROLOL SUCCINATE 25 MG/1
25 TABLET, EXTENDED RELEASE ORAL DAILY
Status: DISCONTINUED | OUTPATIENT
Start: 2024-05-25 | End: 2024-05-26 | Stop reason: HOSPADM

## 2024-05-24 RX ADMIN — SODIUM CHLORIDE 8 MG/HR: 9 INJECTION, SOLUTION INTRAVENOUS at 13:23

## 2024-05-24 RX ADMIN — SODIUM CHLORIDE 100 ML/HR: 0.9 INJECTION, SOLUTION INTRAVENOUS at 15:20

## 2024-05-24 RX ADMIN — MONTELUKAST 10 MG: 10 TABLET, FILM COATED ORAL at 22:11

## 2024-05-24 RX ADMIN — TAMSULOSIN HYDROCHLORIDE 0.4 MG: 0.4 CAPSULE ORAL at 22:11

## 2024-05-24 RX ADMIN — SODIUM CHLORIDE 80 MG: 9 INJECTION, SOLUTION INTRAVENOUS at 12:48

## 2024-05-24 NOTE — ASSESSMENT & PLAN NOTE
Presently normal sinus rhythm.  Rate controlled.  Continue patient's metoprolol.  Hold off patient's Xarelto, due to GI bleeding and symptomatic anemia.  This was discussed with him.

## 2024-05-24 NOTE — TELEPHONE ENCOUNTER
"Regarding: SOB and dizziness  ----- Message from Jania JONES sent at 5/24/2024  7:54 AM EDT -----  Xiomy calling to make an apt for [facility].  She stated he has been Short of breath and dizzy.  He had bloodwork done yesterday and results are \"all over the place\"    Tried to call triage nurse, all nurses are busy at time of call.  Please call to triage    "

## 2024-05-24 NOTE — TELEPHONE ENCOUNTER
----- Message from LATANYA Gallegos sent at 5/24/2024  8:13 AM EDT -----  Dr. Padron, please see the drop in Hemoglobin level to 6.8 with hematocrit of 23.1.     [facility] staff, please contact the patient. Please let him know that overall his lab work is fairly stable. The one abnormal level is a low hemoglobin/hematocrit. Please confirm he is not feeling dizzy, fatigue, shortness of breath or that he is not experiencing any bloody or dark stools. If he is experiencing any of those symptoms, he should go to the Emergency department. If he is not, he should contact his primary care this morning for further instructions.

## 2024-05-24 NOTE — TELEPHONE ENCOUNTER
Patient wife called in concerned patient is SOB, weak, & dizzy.  The SOB is constant. Patient is unable to walk a very short distance without weakness and becoming lightheaded.  Wife stated patient had blood work done yesterday and results are concerning.  Patient HGB 6.8.  Wife calling to make an appointment with Dr. Padron.      Call placed to the office clinical line.  Recommendation is ER evaluation now.  Patient and wife agreed and will head to [facility].

## 2024-05-24 NOTE — PLAN OF CARE
Problem: INFECTION - ADULT  Goal: Absence or prevention of progression during hospitalization  Description: INTERVENTIONS:  - Assess and monitor for signs and symptoms of infection  - Monitor lab/diagnostic results  - Monitor all insertion sites, i.e. indwelling lines, tubes, and drains  - Monitor endotracheal if appropriate and nasal secretions for changes in amount and color  - Dillon appropriate cooling/warming therapies per order  - Administer medications as ordered  - Instruct and encourage patient and family to use good hand hygiene technique  - Identify and instruct in appropriate isolation precautions for identified infection/condition  Outcome: Progressing  Goal: Absence of fever/infection during neutropenic period  Description: INTERVENTIONS:  - Monitor WBC    Outcome: Progressing     Problem: PAIN - ADULT  Goal: Verbalizes/displays adequate comfort level or baseline comfort level  Description: Interventions:  - Encourage patient to monitor pain and request assistance  - Assess pain using appropriate pain scale  - Administer analgesics based on type and severity of pain and evaluate response  - Implement non-pharmacological measures as appropriate and evaluate response  - Consider cultural and social influences on pain and pain management  - Notify physician/advanced practitioner if interventions unsuccessful or patient reports new pain  Outcome: Progressing     Problem: DISCHARGE PLANNING  Goal: Discharge to home or other facility with appropriate resources  Description: INTERVENTIONS:  - Identify barriers to discharge w/patient and caregiver  - Arrange for needed discharge resources and transportation as appropriate  - Identify discharge learning needs (meds, wound care, etc.)  - Arrange for interpretive services to assist at discharge as needed  - Refer to Case Management Department for coordinating discharge planning if the patient needs post-hospital services based on physician/advanced  practitioner order or complex needs related to functional status, cognitive ability, or social support system  Outcome: Progressing

## 2024-05-24 NOTE — ASSESSMENT & PLAN NOTE
ER fecal occult blood test was positive.  Patient had dark/black stools 2 months ago, that lasted for a month.  Continue Protonix drip.  GI doctor consult.  Likely for EGD tomorrow.    Clear liquid diet for now and n.p.o. postmidnight.  Patient Xarelto put on hold.  Discussed with patient.  IV fluids.  Consult gastroenterologist.

## 2024-05-24 NOTE — QUICK NOTE
Patient's nurse in the ER told me, that the unit of packed RBCs transfused was done at 4:45 PM and a repeat hemoglobin hematocrit was done at 4:47 PM revealing same hemoglobin level as that of this morning at 6.6.  Patient is asymptomatic.  Possibility that the hemoglobin blood draw was done very close to the termination of the blood transfusion and has not equilibrated yet.  Thus, we we will repeat hemoglobin at 7 PM.  I spoke to the patient's nurse about this.  Will follow-up the results.  Transfuse as needed.

## 2024-05-24 NOTE — ASSESSMENT & PLAN NOTE
Continue patient's lisinopril, hydrochlorothiazide and metoprolol.  Monitor blood pressures.  Adjust treatment accordingly.

## 2024-05-24 NOTE — ED NOTES
Transported patient to [place] [id number], receiving RN (Elise) was notified of arrival face to face.     Palmer Walker  05/24/24 0704

## 2024-05-24 NOTE — TELEPHONE ENCOUNTER
"Reason for Disposition  • MODERATE difficulty breathing (e.g., speaks in phrases, SOB even at rest, pulse 100-120) of new-onset or worse than normal    Answer Assessment - Initial Assessment Questions  1. RESPIRATORY STATUS: \"Describe your breathing?\" (e.g., wheezing, shortness of breath, unable to speak, severe coughing)          SOB and very fatigued       2. ONSET: \"When did this breathing problem begin?\"          Unsure      3. PATTERN \"Does the difficult breathing come and go, or has it been constant since it started?\"           Constant      4. SEVERITY: \"How bad is your breathing?\" (e.g., mild, moderate, severe)     - MILD: No SOB at rest, mild SOB with walking, speaks normally in sentences, can lay down, no retractions, pulse < 100.     - MODERATE: SOB at rest, SOB with minimal exertion and prefers to sit, cannot lie down flat, speaks in phrases, mild retractions, audible wheezing, pulse 100-120.     - SEVERE: Very SOB at rest, speaks in single words, struggling to breathe, sitting hunched forward, retractions, pulse > 120             Moderate      5. RECURRENT SYMPTOM: \"Have you had difficulty breathing before?\" If Yes, ask: \"When was the last time?\" and \"What happened that time?\"         No        6. CARDIAC HISTORY: \"Do you have any history of heart disease?\" (e.g., heart attack, angina, bypass surgery, angioplasty)           Unsure    7. LUNG HISTORY: \"Do you have any history of lung disease?\"  (e.g., pulmonary embolus, asthma, emphysema)          Unsure      8. CAUSE: \"What do you think is causing the breathing problem?\"           Unsure      9. OTHER SYMPTOMS: \"Do you have any other symptoms? (e.g., dizziness, runny nose, cough, chest pain, fever)          Yes dizziness and SOB    Protocols used: Breathing Difficulty-ADULT-[place]    "

## 2024-05-24 NOTE — H&P
"[facility]  H&P  Name: Kaiser Price 78 y.o. male I MRN: [id number]  Unit/Bed#: ED-25 I Date of Admission: 5/24/2024   Date of Service: 5/24/2024 I Hospital Day: 0      Assessment & Plan   * Symptomatic anemia  Assessment & Plan  Patient came in due to easy fatigability, tiredness, shortness of breath, on exertion for about 1 month.  Fecal occult blood test done in the emergency room was positive.  Patient also admitted that 2 months ago, he had episodes of dark/black stools that lasted for a month.  He told me, for the last 1 month, he did not have any more these dark/black stools.  Thus, likely from GI bleeding.  A unit of packed RBCs blood transfusion ordered in the emergency room.  Monitor hemoglobin/CBC.  Transfuse as needed.  Hold off patient's Xarelto.  Discussed with the patient.  Okay with this plan.  Please see plans under GI bleeding.    GIB (gastrointestinal bleeding)  Assessment & Plan  ER fecal occult blood test was positive.  Patient had dark/black stools 2 months ago, that lasted for a month.  Continue Protonix drip.  GI doctor consult.  Likely for EGD tomorrow.    Clear liquid diet for now and n.p.o. postmidnight.  Patient Xarelto put on hold.  Discussed with patient.  IV fluids.  Consult gastroenterologist.    Paroxysmal atrial fibrillation (HCC)  Assessment & Plan  Presently normal sinus rhythm.  Rate controlled.  Continue patient's metoprolol.  Hold off patient's Xarelto, due to GI bleeding and symptomatic anemia.  This was discussed with him.    Essential hypertension  Assessment & Plan  Continue patient's lisinopril, hydrochlorothiazide and metoprolol.  Monitor blood pressures.  Adjust treatment accordingly.    Type 2 diabetes mellitus with hyperglycemia, without long-term current use of insulin (HCC)  Assessment & Plan  Lab Results   Component Value Date    HGBA1C 7.2 (H) 05/23/2024       No results for input(s): \"POCGLU\" in the last 72 hours.    Blood Sugar " Average: Last 72 hrs:    Hold off patient's metformin.  Insulin sliding scale for now.  Monitor blood sugars.  Hypoglycemia protocol.  Adjust treatment accordingly.      Enlarged prostate  Assessment & Plan  Patient told me, at baseline, he has occasional initial difficulty urinating, especially if he forgot to take in his medications for his prostate.  However, after the initial difficulty urinating, he told me he will not have problems after that.  Patient denied any pain on urination or any burning sensation on urination.  Patient denied any other urinary symptoms.  No fever or chills.  Continue patient's finasteride and tamsulosin.         VTE Pharmacologic Prophylaxis: VTE Score: 4 Moderate Risk (Score 3-4) - Pharmacological DVT Prophylaxis Contraindicated. Sequential Compression Devices Ordered.  Code Status: Level 1 - Full Code   Discussion with family: Updated  (significant other) via phone.    Anticipated Length of Stay: Patient will be admitted on an inpatient basis with an anticipated length of stay of greater than 2 midnights secondary to above findings and plans.    Total Time Spent on Date of Encounter in care of patient: This time was spent on one or more of the following: performing physical exam; counseling and coordination of care; obtaining or reviewing history; documenting in the medical record; reviewing/ordering tests, medications or procedures; communicating with other healthcare professionals and discussing with patient's family/caregivers.    Chief Complaint: Shortness of breath on exertion.    History of Present Illness:  Kaiser Price is a 78 y.o. male with a PMH significant for atrial fibrillation on Xarelto, diabetes mellitus type 2, hypertension, obstructive sleep apnea (due to intolerance of CPAP, he does not wear it), obesity, BPH and arthritis who presents with shortness of breath on exertion.  According to the patient, this past 1 month, he had been having  shortness of breath on exertion, easy fatigability and feeling of tiredness.  According to him, lately even walking about 10 steps, would make him really winded and short of breath.  When asked, he told me that 2 months ago, he started noticing his stools to be dark/black and that lasted for about a month.  Patient also told me, that lately, he had been having poor appetite.  He told me, before he could finish his BLT sandwich, but now he can only eat half of it.  Patient also mentioned, that he had weight loss of about 6 to 7 pounds this 1 month.  Today, patient's hemoglobin was found to be low at 6.6.  Thus a unit of blood transfusion was given in the emergency room.  Patient was also found to have a fecal occult blood test in the ER which was positive.  When asked, patient had an episode of dizziness/lightheadedness when he stood up yesterday, but none since then.  Patient denied any abdominal pains, nausea or vomiting.  Patient denied any other symptoms other than ones mentioned above.    Review of Systems:  Review of Systems    10 point review of systems done and they were negative except for the ones I mentioned in my history of present illness and positive for occasional difficulty initiating urine, that he attributed to when he misses taking his prostate medications, but he told me also, that after the initial difficulty, he went to have any problems with his urine stream..  Patient denied any headaches, or any loss of consciousness.  Patient denied any chest pains.  Patient denied any fever, chills or any cough or colds.  Patient denied any abdominal pains or any nausea or vomiting.  Patient denied any pain on urination or any burning sensation urination.      Past Medical and Surgical History:   Past Medical History:   Diagnosis Date    Anxiety     Arthritis     fingers , knee    Asthma     BPH (benign prostatic hypertrophy)     Cataract     currently left eye- to have surgery on 4/10/17    Cough variant  asthma 04/12/2017    Diabetes mellitus (HCC)     type 2, last assessed 4/12/2017    Encounter for immunization 11/13/2023    Hernia, umbilical     currently has    HL (hearing loss)     b/l hearing aids    Labyrinthitis     Moderate obstructive sleep apnea 04/11/2017    New onset a-fib (HCC) 01/10/2020    Obesity with body mass index 30 or greater 11/04/2019    Umbilical hernia        Past Surgical History:   Procedure Laterality Date    CATARACT EXTRACTION Right 04/04/2017    COLONOSCOPY      yrs ago    EYE SURGERY Bilateral     cataracts with IOL       Meds/Allergies:  Prior to Admission medications    Medication Sig Start Date End Date Taking? Authorizing Provider   albuterol (2.5 mg/3 mL) 0.083 % nebulizer solution Take 3 mL (2.5 mg total) by nebulization every 6 (six) hours as needed for wheezing or shortness of breath 6/27/23   Richard Pollock MD   albuterol (Ventolin HFA) 90 mcg/act inhaler Inhale 2 puffs every 6 (six) hours as needed for wheezing 6/27/23   Richard Pollock MD   finasteride (PROSCAR) 5 mg tablet TAKE ONE TABLET BY MOUTH EVERY DAY 3/7/24   Shana Canada PA-C   fluorouracil (EFUDEX) 5 % cream Apply topically 2 (two) times a day 3/18/24   Carolyn Levy PA-C   Glucosamine-Chondroitin (GLUCOSAMINE CHONDR COMPLEX PO) Take by mouth 2 (two) times a day    Historical Provider, MD   lisinopril-hydrochlorothiazide (PRINZIDE,ZESTORETIC) 10-12.5 MG per tablet Take 1 tablet by mouth daily 3/13/24   Mary Jo Marroquin MD   metFORMIN (GLUCOPHAGE) 500 mg tablet Take 2 tablets (1,000 mg total) by mouth 2 (two) times a day with meals 9/8/23   Mel Daniel MD   metoprolol succinate (TOPROL-XL) 25 mg 24 hr tablet TAKE ONE TABLET BY MOUTH EVERY MORNING 10/13/23   Scott Padron MD   montelukast (SINGULAIR) 10 mg tablet Take 1 tablet (10 mg total) by mouth daily at bedtime 6/27/23   Richard Pollock MD   rivaroxaban (XARELTO) 20 mg tablet in the morning    Historical Provider, MD   sertraline (ZOLOFT) 100 mg tablet  TAKE ONE TABLET BY MOUTH EVERY DAY 24   Scott Padron MD   tamsulosin (FLOMAX) 0.4 mg TAKE 1 CAPSULE BY MOUTH EVERYDAY AT BEDTIME 23   LATANYA Motta   sertraline (ZOLOFT) 100 mg tablet TAKE ONE TABLET BY MOUTH EVERY DAY 10/13/23 5/24/24  Scott Padron MD     I have reviewed home medications using recent Epic encounter.    Allergies:   Allergies   Allergen Reactions    Ceftin [Cefuroxime] GI Intolerance and Vomiting       Social History:  Marital Status: /Civil Union   Patient Pre-hospital Living Situation: Home, With spouse/significant other.  Patient Pre-hospital Level of Mobility: walks  Substance Use History:   Social History     Substance and Sexual Activity   Alcohol Use Yes    Alcohol/week: 3.0 standard drinks of alcohol    Types: 3 Standard drinks or equivalent per week    Comment: socially     Social History     Tobacco Use   Smoking Status Former    Current packs/day: 0.00    Average packs/day: 0.5 packs/day for 15.0 years (7.5 ttl pk-yrs)    Types: Cigarettes    Start date: 1968    Quit date: 1983    Years since quittin.4    Passive exposure: Past   Smokeless Tobacco Never     Social History     Substance and Sexual Activity   Drug Use Yes    Types: Marijuana    Comment: most everyday        Family History:  Family History   Problem Relation Age of Onset    Cancer Mother         ovarian    Diabetes Father     Cancer Sister         stomach to brain    Substance Abuse Family     Substance Abuse Son     Alcohol abuse Son     Mental illness Neg Hx        Physical Exam:     Vitals:   Blood Pressure: 125/59 (24 1530)  Pulse: 64 (24 1530)  Temperature: 97.7 °F (36.5 °C) (24 1500)  Temp Source: Oral (24 1500)  Respirations: 18 (24 1530)  Weight - Scale: 102 kg (224 lb 13.9 oz) (24 1025)  SpO2: 96 % (24 1530)    Physical Exam  Vitals and nursing note reviewed.   Constitutional:       General: He is not in acute distress.      Appearance: He is not ill-appearing, toxic-appearing or diaphoretic.   HENT:      Mouth/Throat:      Mouth: Mucous membranes are dry.      Pharynx: Oropharynx is clear. No oropharyngeal exudate or posterior oropharyngeal erythema.      Comments: Positive for metal plate on patient's palate (chronic according to the patient).  Eyes:      General: No scleral icterus.        Right eye: No discharge.         Left eye: No discharge.      Conjunctiva/sclera: Conjunctivae normal.   Cardiovascular:      Rate and Rhythm: Normal rate and regular rhythm.      Heart sounds: Normal heart sounds. No murmur heard.     No friction rub. No gallop.   Pulmonary:      Effort: Pulmonary effort is normal. No respiratory distress.      Breath sounds: Normal breath sounds. No stridor. No wheezing, rhonchi or rales.   Abdominal:      General: Bowel sounds are normal. There is no distension.      Palpations: Abdomen is soft.      Tenderness: There is no abdominal tenderness. There is no guarding or rebound.   Musculoskeletal:      Right lower leg: No edema.      Left lower leg: No edema.   Skin:     General: Skin is warm and dry.      Coloration: Skin is not pale.      Findings: No erythema or rash.   Neurological:      General: No focal deficit present.      Mental Status: He is alert and oriented to person, place, and time. Mental status is at baseline.   Psychiatric:         Mood and Affect: Mood normal.         Behavior: Behavior normal.         Thought Content: Thought content normal.            Additional Data:     Lab Results:  Results from last 7 days   Lab Units 05/24/24  1034   WBC Thousand/uL 7.12   HEMOGLOBIN g/dL 6.6*   HEMATOCRIT % 23.0*   PLATELETS Thousands/uL 293   SEGS PCT % 56   LYMPHO PCT % 32   MONO PCT % 8   EOS PCT % 3     Results from last 7 days   Lab Units 05/24/24  1034   SODIUM mmol/L 136   POTASSIUM mmol/L 4.3   CHLORIDE mmol/L 106   CO2 mmol/L 19*   BUN mg/dL 31*   CREATININE mg/dL 1.22   ANION GAP mmol/L 11    CALCIUM mg/dL 8.9   ALBUMIN g/dL 4.0   TOTAL BILIRUBIN mg/dL 0.38   ALK PHOS U/L 43   ALT U/L 11   AST U/L 16   GLUCOSE RANDOM mg/dL 146*             Results from last 7 days   Lab Units 05/23/24  1356   HEMOGLOBIN A1C % 7.2*           Lines/Drains:  Invasive Devices       Peripheral Intravenous Line  Duration             Peripheral IV 05/24/24 Left Antecubital <1 day    Peripheral IV 05/24/24 Right;Dorsal (posterior) Forearm <1 day                        Imaging: Reviewed radiology reports from this admission including: chest xray  XR chest 1 view portable   ED Interpretation by Pastora Kat DO (05/24 1303)   No acute disease      Final Result by Ryder Leonard MD (05/24 1400)      No acute cardiopulmonary disease.            Workstation performed: [id number]             EKG and Other Studies Reviewed on Admission:   EKG: NSR. HR 97/min, right bundle branch block, no significant change as compared to previous EKGs..    ** Please Note: This note has been constructed using a voice recognition system. **

## 2024-05-24 NOTE — ASSESSMENT & PLAN NOTE
Patient came in due to easy fatigability, tiredness, shortness of breath, on exertion for about 1 month.  Fecal occult blood test done in the emergency room was positive.  Patient also admitted that 2 months ago, he had episodes of dark/black stools that lasted for a month.  He told me, for the last 1 month, he did not have any more these dark/black stools.  Thus, likely from GI bleeding.  A unit of packed RBCs blood transfusion ordered in the emergency room.  Monitor hemoglobin/CBC.  Transfuse as needed.  Hold off patient's Xarelto.  Discussed with the patient.  Okay with this plan.  Please see plans under GI bleeding.

## 2024-05-24 NOTE — ASSESSMENT & PLAN NOTE
"Lab Results   Component Value Date    HGBA1C 7.2 (H) 05/23/2024       No results for input(s): \"POCGLU\" in the last 72 hours.    Blood Sugar Average: Last 72 hrs:    Hold off patient's metformin.  Insulin sliding scale for now.  Monitor blood sugars.  Hypoglycemia protocol.  Adjust treatment accordingly.    "

## 2024-05-24 NOTE — CONSULTS
Consultation -  [facility]  Kaiser Price 78 y.o. male MRN: [id number]  Unit/Bed#: ED-25 Encounter: [id number]    ASSESSMENT/PLAN:     #1.  Symptomatic normocytic anemia with no gross GI bleeding; patient does have history of right-sided colon AVM requiring cauterization, possible patient may have slow bleeding, however he is anticoagulated with Xarelto    -Proceed with PRBC transfusions    -Monitor hemoglobin closely    -If patient has stability of hemoglobin post transfusion and no clinical signs of active GI bleeding, he can potentially be discharged with provisions for bidirectional endoscopic evaluation as outpatient    -Otherwise if patient does show signs of GI bleeding or instability of hemoglobin patient can be admitted over the weekend and he can be scoped as inpatient early next week    -Protonix twice daily IV; should not require drip at this time        Inpatient consult to gastroenterology  Consult performed by: Jef Joseph PA-C  Consult ordered by: Carlitos Monroy MD          Reason for Consult / Principal Problem: Symptomatic anemia    HPI: Kaiser Price is a 78 y.o. year old male with history of diabetes, sleep apnea who presented to the emergency room this morning with concerns for symptomatic anemia.  His family had called the office of his PCP as lab work the previous day indicated hemoglobin below 7, patient had also been reporting symptoms of shortness of breath, dyspnea on exertion and dizziness.    Last EGD and colonoscopy done last year in July 2023 saw the cauterization of 2 AVMs in the ascending colon; EGD appeared unremarkable.    Patient says he has not noticed any visible blood in his stools, he said he had black-colored stool once about a month ago but he thought this was attributed to a medication he was taking at the time, does not remember what this was.  He tells me he is on Xarelto and he took a dose this morning.  Denies any nausea or  vomiting or any abdominal pain.  He says his last food intake was last night.  Admits to only rare use of NSAIDs.    REVIEW OF SYSTEMS:    CONSTITUTIONAL: Denies any fever, chills, or rigors. Good appetite, and no recent weight loss.  HEENT: No earache or tinnitus. Denies hearing loss or visual disturbances.  CARDIOVASCULAR: No chest pain or palpitations.   RESPIRATORY: Denies any cough, hemoptysis, shortness of breath or dyspnea on exertion.  GASTROINTESTINAL: As noted in the History of Present Illness.   GENITOURINARY: No problems with urination. Denies any hematuria or dysuria.  NEUROLOGIC: No dizziness or vertigo, denies headaches.   MUSCULOSKELETAL: Denies any muscle or joint pain.   SKIN: Denies skin rashes or itching.   ENDOCRINE: Denies excessive thirst. Denies intolerance to heat or cold.  PSYCHOSOCIAL: Denies depression or anxiety. Denies any recent memory loss.       Historical Information   Past Medical History:   Diagnosis Date    Anxiety     Arthritis     fingers , knee    Asthma     BPH (benign prostatic hypertrophy)     Cataract     currently left eye- to have surgery on 4/10/17    Cough variant asthma 04/12/2017    Diabetes mellitus (HCC)     type 2, last assessed 4/12/2017    Encounter for immunization 11/13/2023    Hernia, umbilical     currently has    HL (hearing loss)     b/l hearing aids    Labyrinthitis     Moderate obstructive sleep apnea 04/11/2017    New onset a-fib (HCC) 01/10/2020    Obesity with body mass index 30 or greater 11/04/2019    Umbilical hernia      Past Surgical History:   Procedure Laterality Date    CATARACT EXTRACTION Right 04/04/2017    COLONOSCOPY      yrs ago    EYE SURGERY Bilateral     cataracts with IOL     Social History   Social History     Substance and Sexual Activity   Alcohol Use Yes    Alcohol/week: 3.0 standard drinks of alcohol    Types: 3 Standard drinks or equivalent per week    Comment: socially     Social History     Substance and Sexual Activity   Drug  Use Yes    Types: Marijuana    Comment: most everyday      Social History     Tobacco Use   Smoking Status Former    Current packs/day: 0.00    Average packs/day: 0.5 packs/day for 15.0 years (7.5 ttl pk-yrs)    Types: Cigarettes    Start date: 1968    Quit date: 1983    Years since quittin.4    Passive exposure: Past   Smokeless Tobacco Never     Family History   Problem Relation Age of Onset    Cancer Mother         ovarian    Diabetes Father     Cancer Sister         stomach to brain    Substance Abuse Family     Substance Abuse Son     Alcohol abuse Son     Mental illness Neg Hx        Meds/Allergies     Not in a hospital admission.  Current Facility-Administered Medications   Medication Dose Route Frequency    pantoprazole (PROTONIX) 80 mg in sodium chloride 0.9 % 100 mL infusion  8 mg/hr Intravenous Continuous       Allergies   Allergen Reactions    Ceftin [Cefuroxime] GI Intolerance and Vomiting           Objective     Blood pressure 127/60, pulse 62, temperature 98 °F (36.7 °C), temperature source Oral, resp. rate 18, weight 102 kg (224 lb 13.9 oz), SpO2 98%.      Intake/Output Summary (Last 24 hours) at 2024 1450  Last data filed at 2024 1323  Gross per 24 hour   Intake 100 ml   Output --   Net 100 ml         PHYSICAL EXAM     General Appearance:   Alert, cooperative, no distress, appears stated age    HEENT:   Normocephalic, atraumatic, anicteric.     Neck:  Supple, symmetrical, trachea midline, no adenopathy;    thyroid: no enlargement/tenderness/nodules; no carotid  bruit or JVD    Lungs:   Clear to auscultation bilaterally; no rales, rhonchi or wheezing; respirations unlabored    Heart::   S1 and S2 normal; regular rate and rhythm; no murmur, rub, or gallop.   Abdomen:   Soft, non-tender, non-distended; normal bowel sounds; no masses, no organomegaly    Genitalia:   Deferred    Rectal:   Deferred    Extremities:  No cyanosis, clubbing or edema    Pulses:  2+ and symmetric all  extremities    Skin:  Skin color, texture, turgor normal, no rashes or lesions    Lymph nodes:  No palpable cervical, axillary or inguinal lymphadenopathy        Lab Results:   Admission on 05/24/2024   Component Date Value    WBC 05/24/2024 7.12     RBC 05/24/2024 2.60 (L)     Hemoglobin 05/24/2024 6.6 (L)     Hematocrit 05/24/2024 23.0 (L)     MCV 05/24/2024 89     MCH 05/24/2024 25.4 (L)     MCHC 05/24/2024 28.7 (L)     RDW 05/24/2024 15.0     MPV 05/24/2024 9.3     Platelets 05/24/2024 293     nRBC 05/24/2024 0     Segmented % 05/24/2024 56     Immature Grans % 05/24/2024 0     Lymphocytes % 05/24/2024 32     Monocytes % 05/24/2024 8     Eosinophils Relative 05/24/2024 3     Basophils Relative 05/24/2024 1     Absolute Neutrophils 05/24/2024 3.89     Absolute Immature Grans 05/24/2024 0.03     Absolute Lymphocytes 05/24/2024 2.31     Absolute Monocytes 05/24/2024 0.58     Eosinophils Absolute 05/24/2024 0.24     Basophils Absolute 05/24/2024 0.07     Sodium 05/24/2024 136     Potassium 05/24/2024 4.3     Chloride 05/24/2024 106     CO2 05/24/2024 19 (L)     ANION GAP 05/24/2024 11     BUN 05/24/2024 31 (H)     Creatinine 05/24/2024 1.22     Glucose 05/24/2024 146 (H)     Calcium 05/24/2024 8.9     AST 05/24/2024 16     ALT 05/24/2024 11     Alkaline Phosphatase 05/24/2024 43     Total Protein 05/24/2024 7.2     Albumin 05/24/2024 4.0     Total Bilirubin 05/24/2024 0.38     eGFR 05/24/2024 55     hs TnI 0hr 05/24/2024 7     hs TnI 2hr 05/24/2024 7     Delta 2hr hsTnI 05/24/2024 0     ABO Grouping 05/24/2024 A     Rh Factor 05/24/2024 Positive     Antibody Screen 05/24/2024 Negative     Specimen Expiration Date 05/24/2024 [id number]     Unit Product Code 05/24/2024 [id number]     Unit Number 05/24/2024 [id number]     Unit ABO 05/24/2024 A     Unit RH 05/24/2024 POS     Crossmatch 05/24/2024 Compatible     Unit Dispense Status 05/24/2024 Issued     Unit Product Volume 05/24/2024 350        Imaging Studies: I have  personally reviewed pertinent reports.                The patient was seen and examined by Dr. Stevens, all causey medical decisions were made with Dr. Stevens.  Thank you for allowing us to participate in the care of this pleasant patient.  We will follow up with you closely.

## 2024-05-24 NOTE — ASSESSMENT & PLAN NOTE
Patient told me, at baseline, he has occasional initial difficulty urinating, especially if he forgot to take in his medications for his prostate.  However, after the initial difficulty urinating, he told me he will not have problems after that.  Patient denied any pain on urination or any burning sensation on urination.  Patient denied any other urinary symptoms.  No fever or chills.  Continue patient's finasteride and tamsulosin.

## 2024-05-25 LAB
ABO GROUP BLD BPU: NORMAL
ANION GAP SERPL CALCULATED.3IONS-SCNC: 6 MMOL/L (ref 4–13)
BPU ID: NORMAL
BUN SERPL-MCNC: 25 MG/DL (ref 5–25)
CALCIUM SERPL-MCNC: 8.7 MG/DL (ref 8.4–10.2)
CHLORIDE SERPL-SCNC: 110 MMOL/L (ref 96–108)
CO2 SERPL-SCNC: 21 MMOL/L (ref 21–32)
CREAT SERPL-MCNC: 1.13 MG/DL (ref 0.6–1.3)
CROSSMATCH: NORMAL
GFR SERPL CREATININE-BSD FRML MDRD: 61 ML/MIN/1.73SQ M
GLUCOSE SERPL-MCNC: 129 MG/DL (ref 65–140)
GLUCOSE SERPL-MCNC: 145 MG/DL (ref 65–140)
GLUCOSE SERPL-MCNC: 162 MG/DL (ref 65–140)
GLUCOSE SERPL-MCNC: 174 MG/DL (ref 65–140)
GLUCOSE SERPL-MCNC: 194 MG/DL (ref 65–140)
GLUCOSE SERPL-MCNC: 214 MG/DL (ref 65–140)
HCT VFR BLD AUTO: 28.2 % (ref 36.5–49.3)
HGB BLD-MCNC: 7 G/DL (ref 12–17)
HGB BLD-MCNC: 7.1 G/DL (ref 12–17)
HGB BLD-MCNC: 8.1 G/DL (ref 12–17)
POTASSIUM SERPL-SCNC: 4.6 MMOL/L (ref 3.5–5.3)
SODIUM SERPL-SCNC: 137 MMOL/L (ref 135–147)
UNIT DISPENSE STATUS: NORMAL
UNIT PRODUCT CODE: NORMAL
UNIT PRODUCT VOLUME: 350 ML
UNIT RH: NORMAL

## 2024-05-25 PROCEDURE — 85014 HEMATOCRIT: CPT | Performed by: INTERNAL MEDICINE

## 2024-05-25 PROCEDURE — C9113 INJ PANTOPRAZOLE SODIUM, VIA: HCPCS | Performed by: PHYSICIAN ASSISTANT

## 2024-05-25 PROCEDURE — P9016 RBC LEUKOCYTES REDUCED: HCPCS

## 2024-05-25 PROCEDURE — 99232 SBSQ HOSP IP/OBS MODERATE 35: CPT | Performed by: INTERNAL MEDICINE

## 2024-05-25 PROCEDURE — 85018 HEMOGLOBIN: CPT

## 2024-05-25 PROCEDURE — 85018 HEMOGLOBIN: CPT | Performed by: INTERNAL MEDICINE

## 2024-05-25 PROCEDURE — 80048 BASIC METABOLIC PNL TOTAL CA: CPT | Performed by: INTERNAL MEDICINE

## 2024-05-25 PROCEDURE — 82948 REAGENT STRIP/BLOOD GLUCOSE: CPT

## 2024-05-25 RX ADMIN — METOPROLOL SUCCINATE 25 MG: 25 TABLET, EXTENDED RELEASE ORAL at 08:32

## 2024-05-25 RX ADMIN — SERTRALINE 100 MG: 100 TABLET, FILM COATED ORAL at 08:31

## 2024-05-25 RX ADMIN — HYDROCHLOROTHIAZIDE 12.5 MG: 12.5 TABLET ORAL at 08:32

## 2024-05-25 RX ADMIN — FINASTERIDE 5 MG: 5 TABLET, FILM COATED ORAL at 08:31

## 2024-05-25 RX ADMIN — MONTELUKAST 10 MG: 10 TABLET, FILM COATED ORAL at 21:33

## 2024-05-25 RX ADMIN — INSULIN LISPRO 1 UNITS: 100 INJECTION, SOLUTION INTRAVENOUS; SUBCUTANEOUS at 16:42

## 2024-05-25 RX ADMIN — RIVAROXABAN 20 MG: 20 TABLET, FILM COATED ORAL at 16:41

## 2024-05-25 RX ADMIN — PANTOPRAZOLE SODIUM 40 MG: 40 INJECTION, POWDER, FOR SOLUTION INTRAVENOUS at 16:41

## 2024-05-25 RX ADMIN — INSULIN LISPRO 2 UNITS: 100 INJECTION, SOLUTION INTRAVENOUS; SUBCUTANEOUS at 13:09

## 2024-05-25 RX ADMIN — LISINOPRIL 10 MG: 10 TABLET ORAL at 08:32

## 2024-05-25 RX ADMIN — INSULIN LISPRO 1 UNITS: 100 INJECTION, SOLUTION INTRAVENOUS; SUBCUTANEOUS at 21:34

## 2024-05-25 RX ADMIN — PANTOPRAZOLE SODIUM 40 MG: 40 INJECTION, POWDER, FOR SOLUTION INTRAVENOUS at 05:23

## 2024-05-25 RX ADMIN — TAMSULOSIN HYDROCHLORIDE 0.4 MG: 0.4 CAPSULE ORAL at 21:33

## 2024-05-25 NOTE — ASSESSMENT & PLAN NOTE
Presently normal sinus rhythm.  Rate controlled.  Continue patient's metoprolol.  Resume Xarelto per GI and continue to follow hemoglobin closely on that.

## 2024-05-25 NOTE — PROGRESS NOTES
[facility]  Progress Note  Name: Kaiser Price I  MRN: [id number]  Unit/Bed#: W -01 I Date of Admission: 5/24/2024   Date of Service: 5/25/2024 I Hospital Day: 1    Assessment & Plan   * Symptomatic anemia  Assessment & Plan  Patient came in due to easy fatigability, tiredness, shortness of breath, on exertion for about 1 month.  Fecal occult blood test done in the emergency room was positive.  Patient also admitted that 2 months ago, he had episodes of dark/black stools that lasted for a month.  He told me, for the last 1 month, he did not have any more these dark/black stools.  Thus, likely from GI bleeding.  A unit of packed RBCs blood transfusion ordered in the emergency room.  Subsequent hemoglobin at 7.1.  Will transfuse with 1 additional unit of packed RBC.  Monitor hemoglobin/CBC.  Okay to resume Xarelto per GI.  Follow hemoglobin closely after resuming Xarelto.      GIB (gastrointestinal bleeding)  Assessment & Plan  ER fecal occult blood test was positive.  Patient had dark/black stools 2 months ago, that lasted for a month.  Continue Protonix   GI consult appreciated.  Plan to build up hemoglobin and resume Xarelto if stable.  Continue to monitor hemoglobin closely after resuming Xarelto overnight.  If stable patient will be discharged with outpatient EGD and colonoscopy.  However if hemoglobin continues to decline and or patient has active bleeding, will need to stay as an inpatient to get EGD and colonoscopy on Tuesday.   Resume regular diet.  Transfuse 1 unit of packed RBC today.  Resume Xarelto per GI recommendations.  Continue to follow H&H closely  Discussed with gastroenterology    Essential hypertension  Assessment & Plan  Continue patient's lisinopril, hydrochlorothiazide and metoprolol.  Monitor blood pressures.  Adjust treatment accordingly.    Type 2 diabetes mellitus with hyperglycemia, without long-term current use of insulin (HCC)  Assessment & Plan  Lab  Results   Component Value Date    HGBA1C 7.2 (H) 05/23/2024       Recent Labs     05/24/24  2101 05/25/24  0721 05/25/24  1052 05/25/24  1307   POCGLU 153* 145* 214* 194*       Blood Sugar Average: Last 72 hrs:  (P) 165  Hold off patient's metformin.  Insulin sliding scale for now.  Monitor blood sugars.  Hypoglycemia protocol.  Adjust treatment accordingly.      Paroxysmal atrial fibrillation (HCC)  Assessment & Plan  Presently normal sinus rhythm.  Rate controlled.  Continue patient's metoprolol.  Resume Xarelto per GI and continue to follow hemoglobin closely on that.    Enlarged prostate  Assessment & Plan  Patient told me, at baseline, he has occasional initial difficulty urinating, especially if he forgot to take in his medications for his prostate.  However, after the initial difficulty urinating, he told me he will not have problems after that.  Patient denied any pain on urination or any burning sensation on urination.  Patient denied any other urinary symptoms.  No fever or chills.  Continue patient's finasteride and tamsulosin.               VTE Pharmacologic Prophylaxis: VTE Score: 4 High Risk (Score >/= 5) - Pharmacological DVT Prophylaxis Ordered: rivaroxaban (Xarelto). Sequential Compression Devices Ordered.    Mobility:   Basic Mobility Inpatient Raw Score: 24  JH-HLM Goal: 8: Walk 250 feet or more  JH-HLM Achieved: 7: Walk 25 feet or more  JH-HLM Goal achieved. Continue to encourage appropriate mobility.    Patient Centered Rounds: I performed bedside rounds with nursing staff today.   Discussions with Specialists or Other Care Team Provider: Discussed with gastroenterology    Education and Discussions with Family / Patient: Updated  (significant other) at bedside.    Total Time Spent on Date of Encounter in care of patient: 30 mins. This time was spent on one or more of the following: performing physical exam; counseling and coordination of care; obtaining or reviewing history;  documenting in the medical record; reviewing/ordering tests, medications or procedures; communicating with other healthcare professionals and discussing with patient's family/caregivers.    Current Length of Stay: 1 day(s)  Current Patient Status: Inpatient   Certification Statement: The patient will continue to require additional inpatient hospital stay due to close monitoring of hemoglobin and need for blood transfusion  Discharge Plan: Anticipate discharge in 24-48 hrs to home.    Code Status: Level 1 - Full Code    Subjective:   Patient seen and examined at bedside.  Denies any further rectal bleeding.  Denies any lightheadedness dizziness or syncopal episode.  Denies any abdominal pain.    Objective:     Vitals:   Temp (24hrs), Av.9 °F (36.6 °C), Min:97.5 °F (36.4 °C), Max:98.1 °F (36.7 °C)    Temp:  [97.5 °F (36.4 °C)-98.1 °F (36.7 °C)] 98 °F (36.7 °C)  HR:  [56-77] 62  Resp:  [14-20] 20  BP: (117-139)/(51-63) 123/51  SpO2:  [96 %-99 %] 98 %  Body mass index is 29.16 kg/m².     Input and Output Summary (last 24 hours):     Intake/Output Summary (Last 24 hours) at 2024 1344  Last data filed at 2024 1213  Gross per 24 hour   Intake 590 ml   Output --   Net 590 ml       Physical Exam:   Physical Exam  Constitutional:       Appearance: He is obese.   HENT:      Head: Normocephalic.      Mouth/Throat:      Mouth: Mucous membranes are moist.   Eyes:      Pupils: Pupils are equal, round, and reactive to light.   Cardiovascular:      Rate and Rhythm: Normal rate. Rhythm irregular.   Pulmonary:      Effort: Pulmonary effort is normal.      Breath sounds: Normal breath sounds.   Abdominal:      General: Abdomen is flat. Bowel sounds are normal.      Palpations: Abdomen is soft.   Musculoskeletal:      Right lower leg: No edema.      Left lower leg: Edema present.   Skin:     General: Skin is warm.   Neurological:      General: No focal deficit present.      Mental Status: He is alert and oriented to  person, place, and time. Mental status is at baseline.          Additional Data:     Labs:  Results from last 7 days   Lab Units 05/25/24  0825 05/25/24  0532 05/24/24 2004 05/24/24  1647 05/24/24  1034   WBC Thousand/uL  --   --   --   --  7.12   HEMOGLOBIN g/dL 7.1*   < > 7.3*   < > 6.6*   HEMATOCRIT %  --   --  24.4*  --  23.0*   PLATELETS Thousands/uL  --   --   --   --  293   SEGS PCT %  --   --   --   --  56   LYMPHO PCT %  --   --   --   --  32   MONO PCT %  --   --   --   --  8   EOS PCT %  --   --   --   --  3    < > = values in this interval not displayed.     Results from last 7 days   Lab Units 05/25/24  0532 05/24/24  1034   SODIUM mmol/L 137 136   POTASSIUM mmol/L 4.6 4.3   CHLORIDE mmol/L 110* 106   CO2 mmol/L 21 19*   BUN mg/dL 25 31*   CREATININE mg/dL 1.13 1.22   ANION GAP mmol/L 6 11   CALCIUM mg/dL 8.7 8.9   ALBUMIN g/dL  --  4.0   TOTAL BILIRUBIN mg/dL  --  0.38   ALK PHOS U/L  --  43   ALT U/L  --  11   AST U/L  --  16   GLUCOSE RANDOM mg/dL 129 146*         Results from last 7 days   Lab Units 05/25/24  1307 05/25/24  1052 05/25/24  0721 05/24/24  2101 05/24/24  1711   POC GLUCOSE mg/dl 194* 214* 145* 153* 119     Results from last 7 days   Lab Units 05/23/24  1356   HEMOGLOBIN A1C % 7.2*           Lines/Drains:  Invasive Devices       Peripheral Intravenous Line  Duration             Peripheral IV 05/24/24 Left Antecubital 1 day                          Imaging: No pertinent imaging reviewed.    Recent Cultures (last 7 days):         Last 24 Hours Medication List:   Current Facility-Administered Medications   Medication Dose Route Frequency Provider Last Rate    albuterol  2 puff Inhalation Q6H PRN Carlitos Monroy MD      finasteride  5 mg Oral Daily Carlitos Monroy MD      lisinopril  10 mg Oral Daily Carlitos Monroy MD      And    hydroCHLOROthiazide  12.5 mg Oral Daily Carlitos Monroy MD      insulin lispro  1-5 Units Subcutaneous HS Carlitos  John Monroy MD      insulin lispro  1-6 Units Subcutaneous TID AC Carlitos Monroy MD      metoprolol succinate  25 mg Oral Daily Carlitos Monroy MD      montelukast  10 mg Oral HS Carlitos Monroy MD      pantoprazole  40 mg Intravenous Q12H Jef Joseph PA-C      sertraline  100 mg Oral Daily Carlitos Monroy MD      sodium chloride  100 mL/hr Intravenous Continuous Carlitos Monroy  mL/hr (05/24/24 1520)    tamsulosin  0.4 mg Oral HS Carlitos Monroy MD          Today, Patient Was Seen By: Katy Huerta MD    **Please Note: This note may have been constructed using a voice recognition system.**

## 2024-05-25 NOTE — ED PROVIDER NOTES
History  Chief Complaint   Patient presents with    Shortness of Breath     Pt presents to the ED with noticeable SOB with exertion over the last couple weeks. Family reports especially over the last week noticing him having great trouble with even just walking. Reports last hg 6.9.     78-year-old male presents to the emergency department accompanied by his significant other for evaluation of shortness of breath with exertion.  Patient states that his symptoms have been progressing gradually.  He notes that he becomes very dyspneic when walking from his house to his car.  Symptoms have gotten significantly worse over the past 1 week.  He reports episodes of dizziness described as feeling lightheaded.  He denies syncope.  No chest pain with exertion.  No palpitations.  Patient denies black or bloody stools.  He does take Xarelto for a history of atrial fibrillation.  He denies cough or fever.  Patient states that he will take NSAIDs intermittently for neck pain.  His significant other reports that he took Advil approximately 3 times last week.      History provided by:  Patient and medical records   used: No    Shortness of Breath  Severity:  Severe  Onset quality:  Gradual  Duration:  4 weeks  Timing:  Constant  Progression:  Worsening  Chronicity:  New  Context: activity    Relieved by:  Rest  Worsened by:  Activity  Ineffective treatments:  None tried  Associated symptoms: no abdominal pain, no chest pain, no cough, no diaphoresis, no fever, no hemoptysis, no sputum production and no vomiting        Prior to Admission Medications   Prescriptions Last Dose Informant Patient Reported? Taking?   Glucosamine-Chondroitin (GLUCOSAMINE CHONDR COMPLEX PO)  Self Yes No   Sig: Take by mouth 2 (two) times a day   albuterol (2.5 mg/3 mL) 0.083 % nebulizer solution  Self No No   Sig: Take 3 mL (2.5 mg total) by nebulization every 6 (six) hours as needed for wheezing or shortness of breath   albuterol  (Ventolin HFA) 90 mcg/act inhaler  Self No No   Sig: Inhale 2 puffs every 6 (six) hours as needed for wheezing   finasteride (PROSCAR) 5 mg tablet  Self No No   Sig: TAKE ONE TABLET BY MOUTH EVERY DAY   fluorouracil (EFUDEX) 5 % cream   No No   Sig: Apply topically 2 (two) times a day   lisinopril-hydrochlorothiazide (PRINZIDE,ZESTORETIC) 10-12.5 MG per tablet  Self No No   Sig: Take 1 tablet by mouth daily   metFORMIN (GLUCOPHAGE) 500 mg tablet  Self No No   Sig: Take 2 tablets (1,000 mg total) by mouth 2 (two) times a day with meals   metoprolol succinate (TOPROL-XL) 25 mg 24 hr tablet  Self No No   Sig: TAKE ONE TABLET BY MOUTH EVERY MORNING   montelukast (SINGULAIR) 10 mg tablet  Self No No   Sig: Take 1 tablet (10 mg total) by mouth daily at bedtime   rivaroxaban (XARELTO) 20 mg tablet  Self Yes No   Sig: in the morning   sertraline (ZOLOFT) 100 mg tablet   No No   Sig: TAKE ONE TABLET BY MOUTH EVERY DAY   tamsulosin (FLOMAX) 0.4 mg  Self No No   Sig: TAKE 1 CAPSULE BY MOUTH EVERYDAY AT BEDTIME      Facility-Administered Medications: None       Past Medical History:   Diagnosis Date    Anxiety     Arthritis     fingers , knee    Asthma     BPH (benign prostatic hypertrophy)     Cataract     currently left eye- to have surgery on 4/10/17    Cough variant asthma 04/12/2017    Diabetes mellitus (HCC)     type 2, last assessed 4/12/2017    Encounter for immunization 11/13/2023    Hernia, umbilical     currently has    HL (hearing loss)     b/l hearing aids    Labyrinthitis     Moderate obstructive sleep apnea 04/11/2017    New onset a-fib (HCC) 01/10/2020    Obesity with body mass index 30 or greater 11/04/2019    Umbilical hernia        Past Surgical History:   Procedure Laterality Date    CATARACT EXTRACTION Right 04/04/2017    COLONOSCOPY      yrs ago    EYE SURGERY Bilateral     cataracts with IOL       Family History   Problem Relation Age of Onset    Cancer Mother         ovarian    Diabetes Father     Cancer  Sister         stomach to brain    Substance Abuse Family     Substance Abuse Son     Alcohol abuse Son     Mental illness Neg Hx      I have reviewed and agree with the history as documented.    E-Cigarette/Vaping    E-Cigarette Use Never User      E-Cigarette/Vaping Substances    Nicotine No     THC No     CBD No     Flavoring No     Other No     Unknown No      Social History     Tobacco Use    Smoking status: Former     Current packs/day: 0.00     Average packs/day: 0.5 packs/day for 15.0 years (7.5 ttl pk-yrs)     Types: Cigarettes     Start date: 1968     Quit date: 1983     Years since quittin.4     Passive exposure: Past    Smokeless tobacco: Never   Vaping Use    Vaping status: Never Used   Substance Use Topics    Alcohol use: Yes     Alcohol/week: 3.0 standard drinks of alcohol     Types: 3 Standard drinks or equivalent per week     Comment: socially    Drug use: Yes     Types: Marijuana     Comment: most everyday        Review of Systems   Constitutional:  Negative for appetite change, diaphoresis and fever.   HENT:  Positive for dental problem.    Respiratory:  Positive for shortness of breath. Negative for cough, hemoptysis, sputum production and chest tightness.    Cardiovascular:  Negative for chest pain.   Gastrointestinal:  Negative for abdominal pain, diarrhea, nausea and vomiting.   Genitourinary:  Negative for dysuria.   Skin:  Positive for pallor.   Neurological:  Positive for light-headedness. Negative for weakness.   All other systems reviewed and are negative.      Physical Exam  Physical Exam  Vitals reviewed.   Constitutional:       General: He is not in acute distress.     Appearance: Normal appearance. He is well-developed. He is not ill-appearing or diaphoretic.   HENT:      Head: Normocephalic and atraumatic.      Mouth/Throat:      Mouth: Mucous membranes are moist.   Eyes:      Extraocular Movements: EOM normal.      Conjunctiva/sclera: Conjunctivae normal.      Pupils:  Pupils are equal, round, and reactive to light.   Cardiovascular:      Rate and Rhythm: Normal rate and regular rhythm.      Heart sounds: Normal heart sounds.   Pulmonary:      Effort: Pulmonary effort is normal. No accessory muscle usage or respiratory distress.      Breath sounds: Normal breath sounds. No decreased breath sounds, wheezing or rhonchi.   Chest:      Chest wall: No tenderness.   Abdominal:      General: Bowel sounds are normal. There is no distension.      Palpations: Abdomen is soft.      Tenderness: There is no abdominal tenderness. There is no guarding or rebound.   Genitourinary:     Rectum: Guaiac result positive. No tenderness. Normal anal tone.      Comments: Scant stool in rectal vault is dark in color, Hemoccult positive  Musculoskeletal:         General: No tenderness, deformity or edema. Normal range of motion.      Cervical back: Normal range of motion and neck supple.   Lymphadenopathy:      Cervical: No cervical adenopathy.   Skin:     General: Skin is warm, dry and intact.      Coloration: Skin is pale.      Findings: No rash.   Neurological:      General: No focal deficit present.      Mental Status: He is alert and oriented to person, place, and time.      Coordination: Coordination normal.      Deep Tendon Reflexes: Reflexes are normal and symmetric.   Psychiatric:         Mood and Affect: Mood and affect and mood normal.         Behavior: Behavior normal.         Thought Content: Thought content normal.         Judgment: Judgment normal.         Vital Signs  ED Triage Vitals   Temperature Pulse Respirations Blood Pressure SpO2   05/24/24 1025 05/24/24 1025 05/24/24 1025 05/24/24 1025 05/24/24 1025   97.8 °F (36.6 °C) 94 18 136/62 98 %      Temp Source Heart Rate Source Patient Position - Orthostatic VS BP Location FiO2 (%)   05/24/24 1025 05/24/24 1025 05/24/24 1025 05/24/24 1025 --   Oral Monitor Sitting Right arm       Pain Score       05/24/24 1840       No Pain            Vitals:    05/24/24 1840 05/24/24 2326 05/25/24 0724 05/25/24 1002   BP: 133/61 119/59 117/55 139/63   Pulse: 77 75 68 71   Patient Position - Orthostatic VS:             Visual Acuity      ED Medications  Medications   albuterol (PROVENTIL HFA,VENTOLIN HFA) inhaler 2 puff (has no administration in time range)   finasteride (PROSCAR) tablet 5 mg (5 mg Oral Given 5/25/24 0831)   lisinopril (ZESTRIL) tablet 10 mg (10 mg Oral Given 5/25/24 0832)     And   hydroCHLOROthiazide tablet 12.5 mg (12.5 mg Oral Given 5/25/24 0832)   metoprolol succinate (TOPROL-XL) 24 hr tablet 25 mg (25 mg Oral Given 5/25/24 0832)   montelukast (SINGULAIR) tablet 10 mg (10 mg Oral Given 5/24/24 2211)   sertraline (ZOLOFT) tablet 100 mg (100 mg Oral Given 5/25/24 0831)   tamsulosin (FLOMAX) capsule 0.4 mg (0.4 mg Oral Given 5/24/24 2211)   sodium chloride 0.9 % infusion (100 mL/hr Intravenous New Bag 5/24/24 1520)   insulin lispro (HumALOG/ADMELOG) 100 units/mL subcutaneous injection 1-6 Units ( Subcutaneous Not Given 5/25/24 0803)   insulin lispro (HumALOG/ADMELOG) 100 units/mL subcutaneous injection 1-5 Units ( Subcutaneous Not Given 5/24/24 2210)   pantoprazole (PROTONIX) injection 40 mg (40 mg Intravenous Given 5/25/24 0523)   pantoprazole (PROTONIX) 80 mg in sodium chloride 0.9 % 100 mL IVPB (0 mg Intravenous Stopped 5/24/24 1323)       Diagnostic Studies  Results Reviewed       Procedure Component Value Units Date/Time    Basic metabolic panel [[id number]]  (Abnormal) Collected: 05/25/24 0532    Lab Status: Final result Specimen: Blood from Hand, Left Updated: 05/25/24 0602     Sodium 137 mmol/L      Potassium 4.6 mmol/L      Chloride 110 mmol/L      CO2 21 mmol/L      ANION GAP 6 mmol/L      BUN 25 mg/dL      Creatinine 1.13 mg/dL      Glucose 129 mg/dL      Calcium 8.7 mg/dL      eGFR 61 ml/min/1.73sq m     Narrative:      National [employer] guidelines for Chronic Kidney Disease (CKD):     Stage 1 with normal or  high GFR (GFR > 90 mL/min/1.73 square meters)    Stage 2 Mild CKD (GFR = 60-89 mL/min/1.73 square meters)    Stage 3A Moderate CKD (GFR = 45-59 mL/min/1.73 square meters)    Stage 3B Moderate CKD (GFR = 30-44 mL/min/1.73 square meters)    Stage 4 Severe CKD (GFR = 15-29 mL/min/1.73 square meters)    Stage 5 End Stage CKD (GFR <15 mL/min/1.73 square meters)  Note: GFR calculation is accurate only with a steady state creatinine    Ferritin [[id number]]  (Abnormal) Collected: 05/24/24 1647    Lab Status: Final result Specimen: Blood from Arm, Left Updated: 05/24/24 1946     Ferritin 4 ng/mL     TIBC Panel (incl. Iron, TIBC, % Iron Saturation) [[id number]]  (Abnormal) Collected: 05/24/24 1647    Lab Status: Final result Specimen: Blood from Arm, Left Updated: 05/24/24 1926     Iron Saturation 8 %      TIBC 388 ug/dL      Iron 30 ug/dL      UIBC 358 ug/dL     HS Troponin I 4hr [[id number]]  (Normal) Collected: 05/24/24 1647    Lab Status: Final result Specimen: Blood from Arm, Left Updated: 05/24/24 1720     hs TnI 4hr 6 ng/L      Delta 4hr hsTnI -1 ng/L     Fingerstick Glucose (POCT) [[id number]]  (Normal) Collected: 05/24/24 1711    Lab Status: Final result Specimen: Blood Updated: 05/24/24 1712     POC Glucose 119 mg/dl     Serial Hemoglobin Q8hrs [[id number]]  (Abnormal) Collected: 05/24/24 1647    Lab Status: Final result Specimen: Blood from Arm, Left Updated: 05/24/24 1653     Hemoglobin 6.6 g/dL     HS Troponin I 2hr [[id number]]  (Normal) Collected: 05/24/24 1340    Lab Status: Final result Specimen: Blood from Line, Venous Updated: 05/24/24 1412     hs TnI 2hr 7 ng/L      Delta 2hr hsTnI 0 ng/L     HS Troponin 0hr (reflex protocol) [[id number]]  (Normal) Collected: 05/24/24 1034    Lab Status: Final result Specimen: Blood from Arm, Left Updated: 05/24/24 1115     hs TnI 0hr 7 ng/L     Comprehensive metabolic panel [[id number]]  (Abnormal) Collected: 05/24/24 1034    Lab Status: Final result Specimen: Blood from Arm,  Left Updated: 05/24/24 1112     Sodium 136 mmol/L      Potassium 4.3 mmol/L      Chloride 106 mmol/L      CO2 19 mmol/L      ANION GAP 11 mmol/L      BUN 31 mg/dL      Creatinine 1.22 mg/dL      Glucose 146 mg/dL      Calcium 8.9 mg/dL      AST 16 U/L      ALT 11 U/L      Alkaline Phosphatase 43 U/L      Total Protein 7.2 g/dL      Albumin 4.0 g/dL      Total Bilirubin 0.38 mg/dL      eGFR 55 ml/min/1.73sq m     Narrative:      National Kid[employer] Disease Foundation guidelines for Chronic Kidney Disease (CKD):     Stage 1 with normal or high GFR (GFR > 90 mL/min/1.73 square meters)    Stage 2 Mild CKD (GFR = 60-89 mL/min/1.73 square meters)    Stage 3A Moderate CKD (GFR = 45-59 mL/min/1.73 square meters)    Stage 3B Moderate CKD (GFR = 30-44 mL/min/1.73 square meters)    Stage 4 Severe CKD (GFR = 15-29 mL/min/1.73 square meters)    Stage 5 End Stage CKD (GFR <15 mL/min/1.73 square meters)  Note: GFR calculation is accurate only with a steady state creatinine    CBC and differential [[id number]]  (Abnormal) Collected: 05/24/24 1034    Lab Status: Final result Specimen: Blood from Arm, Left Updated: 05/24/24 1048     WBC 7.12 Thousand/uL      RBC 2.60 Million/uL      Hemoglobin 6.6 g/dL      Hematocrit 23.0 %      MCV 89 fL      MCH 25.4 pg      MCHC 28.7 g/dL      RDW 15.0 %      MPV 9.3 fL      Platelets 293 Thousands/uL      nRBC 0 /100 WBCs      Segmented % 56 %      Immature Grans % 0 %      Lymphocytes % 32 %      Monocytes % 8 %      Eosinophils Relative 3 %      Basophils Relative 1 %      Absolute Neutrophils 3.89 Thousands/µL      Absolute Immature Grans 0.03 Thousand/uL      Absolute Lymphocytes 2.31 Thousands/µL      Absolute Monocytes 0.58 Thousand/µL      Eosinophils Absolute 0.24 Thousand/µL      Basophils Absolute 0.07 Thousands/µL                    XR chest 1 view portable   ED Interpretation by Pastora Kat DO (05/24 1303)   No acute disease      Final Result by Ryder Leonard MD (05/24 1400)      No  acute cardiopulmonary disease.            Workstation performed: [id number]                    Procedures  CriticalCare Time    Date/Time: 5/24/2024 3:32 PM    Performed by: Pastora Kat DO  Authorized by: Pastora Kat DO    Critical care provider statement:     Critical care time (minutes):  35    Critical care time was exclusive of:  Separately billable procedures and treating other patients    Critical care was necessary to treat or prevent imminent or life-threatening deterioration of the following conditions:  Circulatory failure    Critical care was time spent personally by me on the following activities:  Blood draw for specimens, obtaining history from patient or surrogate, development of treatment plan with patient or surrogate, evaluation of patient's response to treatment, examination of patient, review of old charts, re-evaluation of patient's condition, ordering and review of radiographic studies, ordering and review of laboratory studies and ordering and performing treatments and interventions    I assumed direction of critical care for this patient from another provider in my specialty: no    ECG 12 Lead Documentation Only    Date/Time: 5/24/2024 2:01 PM    Performed by: Pastora Kat DO  Authorized by: Pastora Kat DO    Indications / Diagnosis:  Dyspnea on exertion  ECG reviewed by me, the ED Provider: yes    Patient location:  ED  Previous ECG:     Previous ECG:  Compared to current    Comparison ECG info:  7/2023    Similarity:  No change  Interpretation:     Interpretation: abnormal    Quality:     Tracing quality:  Limited by artifact  Rate:     ECG rate:  97    ECG rate assessment: normal    Rhythm:     Rhythm: sinus rhythm    Ectopy:     Ectopy: none    Conduction:     Conduction: abnormal      Abnormal conduction: complete RBBB    ST segments:     ST segments:  Normal           ED Course                                             Medical Decision Making  78-year-old male presents with dyspnea on  exertion.  Differential diagnosis includes but is not limited to acute symptomatic anemia, pneumonia, cardiac dysrhythmia, myocardial ischemia, pleural effusion, pneumothorax, electrolyte abnormality, COVID/flu/RSV.    Problems Addressed:  Occult GI bleeding: acute illness or injury  Symptomatic anemia: acute illness or injury    Amount and/or Complexity of Data Reviewed  Independent Historian:      Details: Significant other at bedside to help provide history  External Data Reviewed: labs and notes.     Details: Notes from most recent PCP office visit and labs obtained as an outpatient reviewed by me  Labs: ordered.     Details: Labs ordered and independently interpreted by me, patient's hemoglobin is 6.6, indices are concerning for iron deficiency anemia.  Suspect low hemoglobin is secondary to GI bleed based on history and physical exam.  Radiology: ordered and independent interpretation performed. Decision-making details documented in ED Course.     Details: Chest x-ray ordered and independently interpreted by me  ECG/medicine tests: ordered and independent interpretation performed. Decision-making details documented in ED Course.  Discussion of management or test interpretation with external provider(s): Case discussed with Dr. Acuna of internal medicine who agrees with plan to admit to the hospital for blood transfusion and treatment of suspected GI bleed.    Risk  Decision regarding hospitalization.             Disposition  Final diagnoses:   Occult GI bleeding   Symptomatic anemia     Time reflects when diagnosis was documented in both MDM as applicable and the Disposition within this note       Time User Action Codes Description Comment    5/24/2024  1:32 PM Pastora Kat Add [R19.5] Occult GI bleeding     5/24/2024  1:32 PM Pastora Kat Add [D64.9] Symptomatic anemia           ED Disposition       ED Disposition   Admit    Condition   Stable    Date/Time   Fri May 24, 2024  1:32 PM    Comment   Case was  discussed with Dr. Monroy and the patient's admission status was agreed to be Admission Status: inpatient status to the service of Dr. Monroy .               Follow-up Information    None         Current Discharge Medication List        CONTINUE these medications which have CHANGED    Details   sertraline (ZOLOFT) 100 mg tablet TAKE ONE TABLET BY MOUTH EVERY DAY  Qty: 90 tablet, Refills: 1    Associated Diagnoses: Moderate episode of recurrent major depressive disorder (HCC)           CONTINUE these medications which have NOT CHANGED    Details   albuterol (2.5 mg/3 mL) 0.083 % nebulizer solution Take 3 mL (2.5 mg total) by nebulization every 6 (six) hours as needed for wheezing or shortness of breath  Qty: 60 mL, Refills: 6    Associated Diagnoses: Mild persistent asthma with acute exacerbation      albuterol (Ventolin HFA) 90 mcg/act inhaler Inhale 2 puffs every 6 (six) hours as needed for wheezing  Qty: 18 g, Refills: 3    Comments: Substitution to a formulary equivalent within the same pharmaceutical class is authorized.  Associated Diagnoses: Mild persistent asthma with acute exacerbation      finasteride (PROSCAR) 5 mg tablet TAKE ONE TABLET BY MOUTH EVERY DAY  Qty: 30 tablet, Refills: 5    Associated Diagnoses: Benign prostatic hyperplasia, unspecified whether lower urinary tract symptoms present      fluorouracil (EFUDEX) 5 % cream Apply topically 2 (two) times a day  Qty: 40 g, Refills: 0    Associated Diagnoses: Actinic keratosis      Glucosamine-Chondroitin (GLUCOSAMINE CHONDR COMPLEX PO) Take by mouth 2 (two) times a day      lisinopril-hydrochlorothiazide (PRINZIDE,ZESTORETIC) 10-12.5 MG per tablet Take 1 tablet by mouth daily  Qty: 30 tablet, Refills: 2    Associated Diagnoses: Essential hypertension      metFORMIN (GLUCOPHAGE) 500 mg tablet Take 2 tablets (1,000 mg total) by mouth 2 (two) times a day with meals  Qty: 360 tablet, Refills: 3    Associated Diagnoses: Type 2 diabetes mellitus without  complication, without long-term current use of insulin (HCC)      metoprolol succinate (TOPROL-XL) 25 mg 24 hr tablet TAKE ONE TABLET BY MOUTH EVERY MORNING  Qty: 90 tablet, Refills: 1    Associated Diagnoses: New onset a-fib ([facility]); Type 2 diabetes mellitus with hyperglycemia, without long-term current use of insulin ([facility])      montelukast (SINGULAIR) 10 mg tablet Take 1 tablet (10 mg total) by mouth daily at bedtime  Qty: 90 tablet, Refills: 3    Associated Diagnoses: Mild persistent asthma with acute exacerbation      rivaroxaban (XARELTO) 20 mg tablet in the morning      tamsulosin (FLOMAX) 0.4 mg TAKE 1 CAPSULE BY MOUTH EVERYDAY AT BEDTIME  Qty: 90 capsule, Refills: 3    Associated Diagnoses: Benign prostatic hyperplasia with nocturia             No discharge procedures on file.    PDMP Review         Value Time User    PDMP Reviewed  Yes 5/24/2024  2:26 PM Carlitos Monroy MD            ED Provider  Electronically Signed by             Pastora Kat DO  05/25/24 7235

## 2024-05-25 NOTE — PROGRESS NOTES
"Progress Note - Kaiser Price 78 y.o. male MRN: [id number]    Unit/Bed#: W -01 Encounter: [id number]    Assessment and Plan:     78-year-old male with history of A-fib on Xarelto with history of colonic AVMs who presented to the hospital for severe dyspnea on exertion found to have acute drop in hemoglobin with hemoglobin of 6.6, previously 12.    Acute blood loss anemia  No overt signs of GI bleeding over the last several weeks.  Status post 1 unit of blood with hemoglobin 6.6-7.1.  He reports significant improvement of his weakness and dyspnea on exertion.  Patient had EGD and colonoscopy for similar presentation last year found to have colonic AVMs, suspected AVM as source.    -Discussed with patient recommendations again from yesterday.  Advised the patient could stay admitted over the weekend and plan for EGD and colonoscopy on Tuesday.  Alternative is to monitor on Xarelto and if hemoglobin is stable can have procedures performed soon as an outpatient.  He would prefer to have this done as an outpatient due to his family being up from [place].    -Discussed with primary team, recommend restarting Xarelto today and monitoring.  If hemoglobin remained stable overnight could consider discharging tomorrow for procedures to be done in the next few weeks.  - Okay for regular diet today.    -Transfusion as needed per primary team.  -Recheck hemoglobin tomorrow.    - Discussed with patient's wife over the phone, Xiomy.    ----------------------------------------------------------------------------------------------------------------    Subjective:     Patient reports he is feeling much better.  He reports improvement of severe weakness and dyspnea on exertion.    Objective:     Vitals: Blood pressure 139/63, pulse 71, temperature 97.7 °F (36.5 °C), resp. rate 16, height 6' 1\" (1.854 m), weight 100 kg (221 lb), SpO2 97%.,Body mass index is 29.16 kg/m².      Intake/Output Summary (Last 24 hours) at 5/25/2024 " "1117  Last data filed at 5/25/2024 0808  Gross per 24 hour   Intake 450 ml   Output --   Net 450 ml       Physical Exam:     General Appearance: Sitting comfortably in chair.  No distress.  Lungs: Clear to auscultation bilaterally, no rales or rhonchi, no labored breathing/accessory muscle use  Heart: Regular rate and rhythm, S1, S2 normal, no murmur, click, rub or gallop  Abdomen: Soft, non-tender, non-distended; bowel sounds normal; no masses or no organomegaly  Extremities: No cyanosis, clubbing, or edema    Invasive Devices       Peripheral Intravenous Line  Duration             Peripheral IV 05/24/24 Left Antecubital 1 day                    Lab Results:  Results from last 7 days   Lab Units 05/25/24  0825 05/25/24  0532 05/24/24 2004 05/24/24  1647 05/24/24  1034   WBC Thousand/uL  --   --   --   --  7.12   HEMOGLOBIN g/dL 7.1*   < > 7.3*   < > 6.6*   HEMATOCRIT %  --   --  24.4*  --  23.0*   PLATELETS Thousands/uL  --   --   --   --  293   SEGS PCT %  --   --   --   --  56   LYMPHO PCT %  --   --   --   --  32   MONO PCT %  --   --   --   --  8   EOS PCT %  --   --   --   --  3    < > = values in this interval not displayed.     Results from last 7 days   Lab Units 05/25/24 0532 05/24/24  1034   POTASSIUM mmol/L 4.6 4.3   CHLORIDE mmol/L 110* 106   CO2 mmol/L 21 19*   BUN mg/dL 25 31*   CREATININE mg/dL 1.13 1.22   CALCIUM mg/dL 8.7 8.9   ALK PHOS U/L  --  43   ALT U/L  --  11   AST U/L  --  16     Invalid input(s): \"BILI\"            Imaging Studies: I have personally reviewed pertinent imaging studies.    XR chest 1 view portable    Result Date: 5/24/2024  Impression: No acute cardiopulmonary disease. Workstation performed: [id number]                     "

## 2024-05-25 NOTE — ASSESSMENT & PLAN NOTE
Lab Results   Component Value Date    HGBA1C 7.2 (H) 05/23/2024       Recent Labs     05/24/24  2101 05/25/24  0721 05/25/24  1052 05/25/24  1307   POCGLU 153* 145* 214* 194*       Blood Sugar Average: Last 72 hrs:  (P) 165  Hold off patient's metformin.  Insulin sliding scale for now.  Monitor blood sugars.  Hypoglycemia protocol.  Adjust treatment accordingly.

## 2024-05-25 NOTE — ASSESSMENT & PLAN NOTE
ER fecal occult blood test was positive.  Patient had dark/black stools 2 months ago, that lasted for a month.  Continue Protonix   GI consult appreciated.  Plan to build up hemoglobin and resume Xarelto if stable.  Continue to monitor hemoglobin closely after resuming Xarelto overnight.  If stable patient will be discharged with outpatient EGD and colonoscopy.  However if hemoglobin continues to decline and or patient has active bleeding, will need to stay as an inpatient to get EGD and colonoscopy on Tuesday.   Resume regular diet.  Transfuse 1 unit of packed RBC today.  Resume Xarelto per GI recommendations.  Continue to follow H&H closely  Discussed with gastroenterology

## 2024-05-25 NOTE — PLAN OF CARE
Problem: PAIN - ADULT  Goal: Verbalizes/displays adequate comfort level or baseline comfort level  Description: Interventions:  - Encourage patient to monitor pain and request assistance  - Assess pain using appropriate pain scale  - Administer analgesics based on type and severity of pain and evaluate response  - Implement non-pharmacological measures as appropriate and evaluate response  - Consider cultural and social influences on pain and pain management  - Notify physician/advanced practitioner if interventions unsuccessful or patient reports new pain  Outcome: Progressing     Problem: INFECTION - ADULT  Goal: Absence or prevention of progression during hospitalization  Description: INTERVENTIONS:  - Assess and monitor for signs and symptoms of infection  - Monitor lab/diagnostic results  - Monitor all insertion sites, i.e. indwelling lines, tubes, and drains  - Monitor endotracheal if appropriate and nasal secretions for changes in amount and color  - [place] appropriate cooling/warming therapies per order  - Administer medications as ordered  - Instruct and encourage patient and family to use good hand hygiene technique  - Identify and instruct in appropriate isolation precautions for identified infection/condition  Outcome: Progressing  Goal: Absence of fever/infection during neutropenic period  Description: INTERVENTIONS:  - Monitor WBC    Outcome: Progressing     Problem: SAFETY ADULT  Goal: Patient will remain free of falls  Description: INTERVENTIONS:  - Educate patient/family on patient safety including physical limitations  - Instruct patient to call for assistance with activity   - Consult OT/PT to assist with strengthening/mobility   - Keep Call bell within reach  - Keep bed low and locked with side rails adjusted as appropriate  - Keep care items and personal belongings within reach  - Initiate and maintain comfort rounds  - Make Fall Risk Sign visible to staff  - Offer Toileting every  Hours,  in advance of need  - Initiate/Maintain alarm  - Obtain necessary fall risk management equipment:   - Apply yellow socks and bracelet for high fall risk patients  - Consider moving patient to room near nurses station  Outcome: Progressing  Goal: Maintain or return to baseline ADL function  Description: INTERVENTIONS:  -  Assess patient's ability to carry out ADLs; assess patient's baseline for ADL function and identify physical deficits which impact ability to perform ADLs (bathing, care of mouth/teeth, toileting, grooming, dressing, etc.)  - Assess/evaluate cause of self-care deficits   - Assess range of motion  - Assess patient's mobility; develop plan if impaired  - Assess patient's need for assistive devices and provide as appropriate  - Encourage maximum independence but intervene and supervise when necessary  - Involve family in performance of ADLs  - Assess for home care needs following discharge   - Consider OT consult to assist with ADL evaluation and planning for discharge  - Provide patient education as appropriate  Outcome: Progressing  Goal: Maintains/Returns to pre admission functional level  Description: INTERVENTIONS:  - Perform AM-PAC 6 Click Basic Mobility/ Daily Activity assessment daily.  - Set and communicate daily mobility goal to care team and patient/family/caregiver.   - Collaborate with rehabilitation services on mobility goals if consulted  - Perform Range of Motion  times a day.  - Reposition patient every  hours.  - Dangle patient  times a day  - Stand patient  times a day  - Ambulate patient  times a day  - Out of bed to chair  times a day   - Out of bed for meals  times a day  - Out of bed for toileting  - Record patient progress and toleration of activity level   Outcome: Progressing     Problem: DISCHARGE PLANNING  Goal: Discharge to home or other facility with appropriate resources  Description: INTERVENTIONS:  - Identify barriers to discharge w/patient and caregiver  - Arrange for  needed discharge resources and transportation as appropriate  - Identify discharge learning needs (meds, wound care, etc.)  - Arrange for interpretive services to assist at discharge as needed  - Refer to Case Management Department for coordinating discharge planning if the patient needs post-hospital services based on physician/advanced practitioner order or complex needs related to functional status, cognitive ability, or social support system  Outcome: Progressing     Problem: Knowledge Deficit  Goal: Patient/family/caregiver demonstrates understanding of disease process, treatment plan, medications, and discharge instructions  Description: Complete learning assessment and assess knowledge base.  Interventions:  - Provide teaching at level of understanding  - Provide teaching via preferred learning methods  Outcome: Progressing

## 2024-05-26 VITALS
WEIGHT: 221 LBS | BODY MASS INDEX: 29.29 KG/M2 | SYSTOLIC BLOOD PRESSURE: 127 MMHG | DIASTOLIC BLOOD PRESSURE: 50 MMHG | HEART RATE: 65 BPM | HEIGHT: 73 IN | RESPIRATION RATE: 16 BRPM | TEMPERATURE: 98.1 F | OXYGEN SATURATION: 95 %

## 2024-05-26 LAB
ABO GROUP BLD BPU: NORMAL
BPU ID: NORMAL
CROSSMATCH: NORMAL
ERYTHROCYTE [DISTWIDTH] IN BLOOD BY AUTOMATED COUNT: 14.8 % (ref 11.6–15.1)
GLUCOSE SERPL-MCNC: 168 MG/DL (ref 65–140)
GLUCOSE SERPL-MCNC: 209 MG/DL (ref 65–140)
HCT VFR BLD AUTO: 27.5 % (ref 36.5–49.3)
HGB BLD-MCNC: 8.4 G/DL (ref 12–17)
HGB BLD-MCNC: 8.5 G/DL (ref 12–17)
MCH RBC QN AUTO: 26.5 PG (ref 26.8–34.3)
MCHC RBC AUTO-ENTMCNC: 30.9 G/DL (ref 31.4–37.4)
MCV RBC AUTO: 86 FL (ref 82–98)
PLATELET # BLD AUTO: 219 THOUSANDS/UL (ref 149–390)
PMV BLD AUTO: 9.3 FL (ref 8.9–12.7)
RBC # BLD AUTO: 3.21 MILLION/UL (ref 3.88–5.62)
UNIT DISPENSE STATUS: NORMAL
UNIT PRODUCT CODE: NORMAL
UNIT PRODUCT VOLUME: 350 ML
UNIT RH: NORMAL
WBC # BLD AUTO: 7.33 THOUSAND/UL (ref 4.31–10.16)

## 2024-05-26 PROCEDURE — 82948 REAGENT STRIP/BLOOD GLUCOSE: CPT

## 2024-05-26 PROCEDURE — 85027 COMPLETE CBC AUTOMATED: CPT | Performed by: PHYSICIAN ASSISTANT

## 2024-05-26 PROCEDURE — 99232 SBSQ HOSP IP/OBS MODERATE 35: CPT | Performed by: INTERNAL MEDICINE

## 2024-05-26 PROCEDURE — 85018 HEMOGLOBIN: CPT | Performed by: PHYSICIAN ASSISTANT

## 2024-05-26 PROCEDURE — 99239 HOSP IP/OBS DSCHRG MGMT >30: CPT | Performed by: INTERNAL MEDICINE

## 2024-05-26 PROCEDURE — C9113 INJ PANTOPRAZOLE SODIUM, VIA: HCPCS | Performed by: PHYSICIAN ASSISTANT

## 2024-05-26 RX ORDER — FERROUS SULFATE 324(65)MG
324 TABLET, DELAYED RELEASE (ENTERIC COATED) ORAL EVERY OTHER DAY
Qty: 15 TABLET | Refills: 0 | Status: CANCELLED | OUTPATIENT
Start: 2024-05-26 | End: 2024-06-25

## 2024-05-26 RX ADMIN — IRON SUCROSE 300 MG: 20 INJECTION, SOLUTION INTRAVENOUS at 08:26

## 2024-05-26 RX ADMIN — PANTOPRAZOLE SODIUM 40 MG: 40 INJECTION, POWDER, FOR SOLUTION INTRAVENOUS at 05:15

## 2024-05-26 RX ADMIN — SERTRALINE 100 MG: 100 TABLET, FILM COATED ORAL at 08:26

## 2024-05-26 RX ADMIN — RIVAROXABAN 20 MG: 20 TABLET, FILM COATED ORAL at 11:30

## 2024-05-26 RX ADMIN — LISINOPRIL 10 MG: 10 TABLET ORAL at 08:26

## 2024-05-26 RX ADMIN — INSULIN LISPRO 2 UNITS: 100 INJECTION, SOLUTION INTRAVENOUS; SUBCUTANEOUS at 11:31

## 2024-05-26 RX ADMIN — HYDROCHLOROTHIAZIDE 12.5 MG: 12.5 TABLET ORAL at 08:26

## 2024-05-26 RX ADMIN — FINASTERIDE 5 MG: 5 TABLET, FILM COATED ORAL at 08:27

## 2024-05-26 RX ADMIN — INSULIN LISPRO 1 UNITS: 100 INJECTION, SOLUTION INTRAVENOUS; SUBCUTANEOUS at 08:27

## 2024-05-26 RX ADMIN — METOPROLOL SUCCINATE 25 MG: 25 TABLET, EXTENDED RELEASE ORAL at 08:27

## 2024-05-26 NOTE — PLAN OF CARE
Problem: PAIN - ADULT  Goal: Verbalizes/displays adequate comfort level or baseline comfort level  Description: Interventions:  - Encourage patient to monitor pain and request assistance  - Assess pain using appropriate pain scale  - Administer analgesics based on type and severity of pain and evaluate response  - Implement non-pharmacological measures as appropriate and evaluate response  - Consider cultural and social influences on pain and pain management  - Notify physician/advanced practitioner if interventions unsuccessful or patient reports new pain  Outcome: Progressing     Problem: INFECTION - ADULT  Goal: Absence or prevention of progression during hospitalization  Description: INTERVENTIONS:  - Assess and monitor for signs and symptoms of infection  - Monitor lab/diagnostic results  - Monitor all insertion sites, i.e. indwelling lines, tubes, and drains  - Monitor endotracheal if appropriate and nasal secretions for changes in amount and color  - [place] appropriate cooling/warming therapies per order  - Administer medications as ordered  - Instruct and encourage patient and family to use good hand hygiene technique  - Identify and instruct in appropriate isolation precautions for identified infection/condition  Outcome: Progressing  Goal: Absence of fever/infection during neutropenic period  Description: INTERVENTIONS:  - Monitor WBC    Outcome: Progressing     Problem: SAFETY ADULT  Goal: Patient will remain free of falls  Description: INTERVENTIONS:  - Educate patient/family on patient safety including physical limitations  - Instruct patient to call for assistance with activity   - Consult OT/PT to assist with strengthening/mobility   - Keep Call bell within reach  - Keep bed low and locked with side rails adjusted as appropriate  - Keep care items and personal belongings within reach  - Initiate and maintain comfort rounds  - Make Fall Risk Sign visible to staff  - Offer Toileting every  Hours,  in advance of need  - Initiate/Maintain alarm  - Obtain necessary fall risk management equipment:  - Apply yellow socks and bracelet for high fall risk patients  - Consider moving patient to room near nurses station  Outcome: Progressing  Goal: Maintain or return to baseline ADL function  Description: INTERVENTIONS:  -  Assess patient's ability to carry out ADLs; assess patient's baseline for ADL function and identify physical deficits which impact ability to perform ADLs (bathing, care of mouth/teeth, toileting, grooming, dressing, etc.)  - Assess/evaluate cause of self-care deficits   - Assess range of motion  - Assess patient's mobility; develop plan if impaired  - Assess patient's need for assistive devices and provide as appropriate  - Encourage maximum independence but intervene and supervise when necessary  - Involve family in performance of ADLs  - Assess for home care needs following discharge   - Consider OT consult to assist with ADL evaluation and planning for discharge  - Provide patient education as appropriate  Outcome: Progressing  Goal: Maintains/Returns to pre admission functional level  Description: INTERVENTIONS:  - Perform AM-PAC 6 Click Basic Mobility/ Daily Activity assessment daily.  - Set and communicate daily mobility goal to care team and patient/family/caregiver.   - Collaborate with rehabilitation services on mobility goals if consulted  - Perform Range of Motion  times a day.  - Reposition patient every  hours.  - Dangle patient  times a day  - Stand patient  times a day  - Ambulate patient  times a day  - Out of bed to chair  times a day   - Out of bed for meals  times a day  - Out of bed for toileting  - Record patient progress and toleration of activity level   Outcome: Progressing     Problem: DISCHARGE PLANNING  Goal: Discharge to home or other facility with appropriate resources  Description: INTERVENTIONS:  - Identify barriers to discharge w/patient and caregiver  - Arrange for  needed discharge resources and transportation as appropriate  - Identify discharge learning needs (meds, wound care, etc.)  - Arrange for interpretive services to assist at discharge as needed  - Refer to Case Management Department for coordinating discharge planning if the patient needs post-hospital services based on physician/advanced practitioner order or complex needs related to functional status, cognitive ability, or social support system  Outcome: Progressing     Problem: Knowledge Deficit  Goal: Patient/family/caregiver demonstrates understanding of disease process, treatment plan, medications, and discharge instructions  Description: Complete learning assessment and assess knowledge base.  Interventions:  - Provide teaching at level of understanding  - Provide teaching via preferred learning methods  Outcome: Progressing

## 2024-05-26 NOTE — DISCHARGE SUMMARY
[facility]  Discharge- Kaiser Price 1945, 78 y.o. male MRN: [id number]  Unit/Bed#: W -01 Encounter: [id number]  Primary Care Provider: Scott Padron MD   Date and time admitted to hospital: 5/24/2024 11:07 AM    * Symptomatic anemia  Assessment & Plan  Patient came in due to easy fatigability, tiredness, shortness of breath, on exertion for about 1 month.  Fecal occult blood test done in the emergency room was positive.  Patient also admitted that 2 months ago, he had episodes of dark/black stools that lasted for a month.  He told me, for the last 1 month, he did not have any more these dark/black stools.  Thus, likely from GI bleeding- prior AVMs.  Received 2U pRBC since admission.  Resumed Xarelto on 5/25 with rechecked Hgb 8.4- stable.  Iron study c/w ELLE- received 300mg Venofer.  Follow up with PCP for future's infusion.  Patient urged to be discharged- Medically stable for discharge based on stable Hgb currently with strict return precautions.    GIB (gastrointestinal bleeding)  Assessment & Plan  ER fecal occult blood test was positive.  Patient had dark/black stools 2 months ago, that lasted for a month.  Continue Protonix   GI consult appreciated.  Plan to build up hemoglobin and resume Xarelto if stable.  Continue to monitor hemoglobin closely after resuming Xarelto overnight.  If stable patient will be discharged with outpatient EGD and colonoscopy.  However if hemoglobin continues to decline and or patient has active bleeding, will need to stay as an inpatient to get EGD and colonoscopy on Tuesday.   Appreciate gastroenterology input.  5/26 AM- light brown BM- Hgb stable on Xarelto. Discussed with GI- patient can be discharged today.    Essential hypertension  Assessment & Plan  Continue patient's lisinopril, hydrochlorothiazide and metoprolol.    Type 2 diabetes mellitus with hyperglycemia, without long-term current use of insulin (HCC)  Assessment &  Plan  Lab Results   Component Value Date    HGBA1C 7.2 (H) 05/23/2024       Recent Labs     05/25/24  1307 05/25/24  1619 05/25/24 2055 05/26/24  0726   POCGLU 194* 174* 162* 168*       Blood Sugar Average: Last 72 hrs:  (P) 166.125  Continue patient's home DM meds.      Paroxysmal atrial fibrillation (HCC)  Assessment & Plan  Presently normal sinus rhythm.  Rate controlled.  Continue patient's metoprolol, Xarelto.    Enlarged prostate  Assessment & Plan  Patient told me, at baseline, he has occasional initial difficulty urinating, especially if he forgot to take in his medications for his prostate.  However, after the initial difficulty urinating, he told me he will not have problems after that.  Patient denied any pain on urination or any burning sensation on urination.  Patient denied any other urinary symptoms.  No fever or chills.  Continue patient's finasteride and tamsulosin.      Medical Problems       Resolved Problems  Date Reviewed: 5/26/2024   None       Discharging Resident: Mal Faust MD  Discharging Attending: Katy Huerta MD  PCP: Scott Padron MD  Admission Date:   Admission Orders (From admission, onward)       Ordered        05/24/24 1333  INPATIENT ADMISSION  Once                          Discharge Date: 05/26/24    Consultations During Hospital Stay:  GI    Procedures Performed:   Transfusion 2U pRBC.    Significant Findings / Test Results:   XR chest 1 view portable   ED Interpretation by Pastora Kat DO (05/24 1303)   No acute disease      Final Result by Ryder Leonard MD (05/24 1400)      No acute cardiopulmonary disease.            Workstation performed: [id number]           5/24: Hgb: 6.6    Incidental Findings:   None    Test Results Pending at Discharge (will require follow up):  None     Outpatient Tests Requested:  Repeat CBC within 1 week of discharge.    Complications:  None    Reason for Admission: Symptomatic anemia with dark stool    Hospital Course:   Kaiser Price  "is a 78 y.o. male patient with a PMH significant for atrial fibrillation on Xarelto, diabetes mellitus type 2, hypertension, obstructive sleep apnea (due to intolerance of CPAP, he does not wear it), obesity, BPH  who originally presented to the hospital on 5/24/2024 due to shortness of breath on exertion. Patient reported dark stool, easy fatigability and feeling of tiredness. Also endorsed poor appetite. Weight loss 6-7 lbs in 1 month. He was also on PRN NSAIDs for neck pain. In the ED, his Hgb was found to be 6.6, fecal occult test positive. GI was consulted during his hospital stay and felt this was 2/2 his known colonic AVMs bleeding. Patient received total 2U pRBC during his hospital stay. Patient was taking Xarelto for his PAF which was held initially which was also restarted on 5/25 by GI to monitor his Hgb and stool output. Fortunately, his Hgb has been stable after restarting his Xarelto- No inpatient EGD, COY were indicated. Patient urged to be discharged today as he has family visiting from [place]- which strict return precautions has been dicussed with patient. He will follow up with GI as outpatient for potential procedures.        Please see above list of diagnoses and related plan for additional information.     Condition at Discharge: stable    Discharge Day Visit / Exam:   Subjective:  Patient seen and examined at bedside today. No acute events overnight. Endorsed feeling better compared to yesterday. Denied headaches, chest pain, SOB, abdominal pain, nausea, vomiting, constipation, diarrhea. Reported BM color \"light brown\".    Vitals: Blood Pressure: 127/50 (05/26/24 0730)  Pulse: 65 (05/26/24 0730)  Temperature: 98.1 °F (36.7 °C) (05/26/24 0730)  Temp Source: Oral (05/25/24 1353)  Respirations: 16 (05/26/24 0730)  Height: 6' 1\" (185.4 cm) (05/24/24 1840)  Weight - Scale: 100 kg (221 lb) (05/24/24 1840)  SpO2: 95 % (05/26/24 0730)  Exam:   Physical Exam  Vitals and nursing note reviewed. "   Constitutional:       General: He is not in acute distress.     Appearance: He is well-developed. He is obese.   HENT:      Head: Normocephalic and atraumatic.      Nose: No rhinorrhea.      Mouth/Throat:      Mouth: Mucous membranes are moist.      Pharynx: No posterior oropharyngeal erythema.   Eyes:      General:         Right eye: No discharge.         Left eye: No discharge.      Extraocular Movements: Extraocular movements intact.      Conjunctiva/sclera: Conjunctivae normal.   Cardiovascular:      Rate and Rhythm: Normal rate. Rhythm irregular.      Pulses: Normal pulses.   Pulmonary:      Effort: Pulmonary effort is normal. No respiratory distress.      Breath sounds: Normal breath sounds. No wheezing, rhonchi or rales.   Chest:      Chest wall: No tenderness.   Abdominal:      General: Bowel sounds are normal.      Palpations: Abdomen is soft.      Tenderness: There is no abdominal tenderness. There is no guarding or rebound.   Musculoskeletal:         General: No tenderness.      Cervical back: Normal range of motion and neck supple. No rigidity or tenderness.      Right lower leg: No edema.      Left lower leg: Edema present.   Skin:     General: Skin is warm and dry.   Neurological:      Mental Status: He is alert and oriented to person, place, and time.   Psychiatric:         Mood and Affect: Mood normal.         Behavior: Behavior normal.         Thought Content: Thought content normal.         Judgment: Judgment normal.          Discussion with Family: Updated  (life partner Xiomy) via phone.    Discharge instructions/Information to patient and family:   See after visit summary for information provided to patient and family.      Provisions for Follow-Up Care:  See after visit summary for information related to follow-up care and any pertinent home health orders.      Mobility at time of Discharge:   Basic Mobility Inpatient Raw Score: 24  JH-HLM Goal: 8: Walk 250 feet or more  JH-HLM  Achieved: 6: Walk 10 steps or more  HLM Goal NOT achieved. Continue to encourage mobility in post discharge setting.     Disposition:   Home    Planned Readmission: None    Discharge Medications:  See after visit summary for reconciled discharge medications provided to patient and/or family.      **Please Note: This note may have been constructed using a voice recognition system**

## 2024-05-26 NOTE — ASSESSMENT & PLAN NOTE
Patient came in due to easy fatigability, tiredness, shortness of breath, on exertion for about 1 month.  Fecal occult blood test done in the emergency room was positive.  Patient also admitted that 2 months ago, he had episodes of dark/black stools that lasted for a month.  He told me, for the last 1 month, he did not have any more these dark/black stools.  Thus, likely from GI bleeding- prior AVMs.  Received 2U pRBC since admission.  Resumed Xarelto on 5/25 with rechecked Hgb 8.4- stable.  Iron study c/w ELLE- received 300mg Venofer.  Follow up with PCP for future's infusion.  Patient urged to be discharged- Medically stable for discharge based on stable Hgb currently with strict return precautions.

## 2024-05-26 NOTE — PROGRESS NOTES
"Progress Note - Kaiser Price 78 y.o. male MRN: [id number]    Unit/Bed#: W -01 Encounter: [id number]    Assessment and Plan:     78-year-old male with history of A-fib on Xarelto with history of colonic AVMs who presented to the hospital for severe dyspnea on exertion found to have acute drop in hemoglobin with hemoglobin of 6.6, previously 12.     Acute blood loss anemia  Patient status post 1 unit of blood and responded appropriately.  Hemoglobin currently 8.5.  Xarelto restarted yesterday.  He reports he is feeling well and has no symptoms.  No bowel movements overnight.  Suspect in the setting of AVMs, previously had colonic AVMs on colonoscopy last year.    -Discussed options of patient staying for another 48 hours for procedures versus being discharged to have them done as an outpatient.  He would like to have them done as an outpatient as his family is currently up from [place].  In the setting of stable hemoglobin while on Xarelto, will plan for procedures as an outpatient.    - I recommend CBC later this week. Order placed.  -I sent a message to our office to try to get patient in for urgent procedures in the next few weeks.  - Patient is aware of alarm symptoms when to present back to the emergency room including signs of GI bleeding as well as dyspnea on exertion, shortness of breath, etc.    - Discussed with patient's wife over the phone in the room, Xiomy.    ----------------------------------------------------------------------------------------------------------------    Subjective:     Patient reports he is feeling great.  He has been tolerating diet without difficulty.  He denies any bowel movements overnight.    Objective:     Vitals: Blood pressure 127/50, pulse 65, temperature 98.1 °F (36.7 °C), resp. rate 16, height 6' 1\" (1.854 m), weight 100 kg (221 lb), SpO2 95%.,Body mass index is 29.16 kg/m².      Intake/Output Summary (Last 24 hours) at 5/26/2024 1221  Last data filed at 5/25/2024 " "1739  Gross per 24 hour   Intake 1094.17 ml   Output --   Net 1094.17 ml       Physical Exam:     General Appearance: Alert, appears stated age and cooperative  Lungs: Clear to auscultation bilaterally, no rales or rhonchi, no labored breathing/accessory muscle use  Heart: Regular rate and rhythm, S1, S2 normal, no murmur, click, rub or gallop  Abdomen: Soft, non-tender, non-distended; bowel sounds normal; no masses or no organomegaly  Extremities: No cyanosis, clubbing, or edema    Invasive Devices       None                   Lab Results:  Results from last 7 days   Lab Units 05/26/24  0854 05/24/24  1647 05/24/24  1034   WBC Thousand/uL 7.33  --  7.12   HEMOGLOBIN g/dL 8.5*  8.4*   < > 6.6*   HEMATOCRIT % 27.5*   < > 23.0*   PLATELETS Thousands/uL 219  --  293   SEGS PCT %  --   --  56   LYMPHO PCT %  --   --  32   MONO PCT %  --   --  8   EOS PCT %  --   --  3    < > = values in this interval not displayed.     Results from last 7 days   Lab Units 05/25/24  0532 05/24/24  1034   POTASSIUM mmol/L 4.6 4.3   CHLORIDE mmol/L 110* 106   CO2 mmol/L 21 19*   BUN mg/dL 25 31*   CREATININE mg/dL 1.13 1.22   CALCIUM mg/dL 8.7 8.9   ALK PHOS U/L  --  43   ALT U/L  --  11   AST U/L  --  16     Invalid input(s): \"BILI\"            Imaging Studies: I have personally reviewed pertinent imaging studies.    XR chest 1 view portable    Result Date: 5/24/2024  Impression: No acute cardiopulmonary disease. Workstation performed: [id number]                     "

## 2024-05-26 NOTE — DISCHARGE INSTR - AVS FIRST PAGE
Dear Kaiser Price,     It was our pleasure to care for you here at [facility].  It is our hope that we were always able to exceed the expected standards for your care during your stay.  You were hospitalized due to symptomatic anemia.  You were cared for on the [facility] fourth floor by Mal Faust MD under the service of Katy Huerta MD with the [facility] who covers for your primary care physician (PCP), Scott Padron MD, while you were hospitalized.  If you have any questions or concerns related to this hospitalization, you may contact us at .  For follow up as well as any medication refills, we recommend that you follow up with your primary care physician.  A registered nurse will reach out to you by phone within a few days after your discharge to answer any additional questions that you may have after going home.  However, at this time we provide for you here, the most important instructions / recommendations at discharge:     Notable Medication Adjustments -   Please continue to take all of your medications as prescribed.  Testing Required after Discharge -   Repeat CBC with you in 1 week of discharge  ** Please contact your PCP to request testing orders for any of the testing recommended here **  Important follow up information -   Please follow-up with your PCP within 1 week of discharge.  You can discuss with your family doctor regarding future's IV iron infusion for your iron deficiency anemia.  Please follow-up with your primary gastroenterologist as outpatient.  Other Instructions -   Please return back to ED if you are experiencing any worsening lightheadedness, dizziness, shortness of breath, chest pain, fatigue or worsening dark/bloody bowel movement.  Please get and stay well.  It is my pleasure to take care of you in the hospital.  Please review this entire after visit summary as additional general instructions including  medication list, appointments, activity, diet, any pertinent wound care, and other additional recommendations from your care team that may be provided for you.      Sincerely,     Mal Faust MD

## 2024-05-26 NOTE — ASSESSMENT & PLAN NOTE
ER fecal occult blood test was positive.  Patient had dark/black stools 2 months ago, that lasted for a month.  Continue Protonix   GI consult appreciated.  Plan to build up hemoglobin and resume Xarelto if stable.  Continue to monitor hemoglobin closely after resuming Xarelto overnight.  If stable patient will be discharged with outpatient EGD and colonoscopy.  However if hemoglobin continues to decline and or patient has active bleeding, will need to stay as an inpatient to get EGD and colonoscopy on Tuesday.   Appreciate gastroenterology input.  5/26 AM- light brown BM- Hgb stable on Xarelto. Discussed with GI- patient can be discharged today.

## 2024-05-26 NOTE — ASSESSMENT & PLAN NOTE
Lab Results   Component Value Date    HGBA1C 7.2 (H) 05/23/2024       Recent Labs     05/25/24  1307 05/25/24  1619 05/25/24 2055 05/26/24  0726   POCGLU 194* 174* 162* 168*       Blood Sugar Average: Last 72 hrs:  (P) 166.125  Continue patient's home DM meds.

## 2024-05-27 LAB
ATRIAL RATE: 97 BPM
PR INTERVAL: 208 MS
QRS AXIS: 73 DEGREES
QRSD INTERVAL: 142 MS
QT INTERVAL: 396 MS
QTC INTERVAL: 502 MS
T WAVE AXIS: 39 DEGREES
VENTRICULAR RATE: 97 BPM

## 2024-05-27 PROCEDURE — 93010 ELECTROCARDIOGRAM REPORT: CPT | Performed by: INTERNAL MEDICINE

## 2024-05-28 ENCOUNTER — TRANSITIONAL CARE MANAGEMENT (OUTPATIENT)
Dept: FAMILY MEDICINE CLINIC | Facility: CLINIC | Age: 79
End: 2024-05-28

## 2024-05-28 ENCOUNTER — TELEPHONE (OUTPATIENT)
Dept: GASTROENTEROLOGY | Facility: AMBULARY SURGERY CENTER | Age: 79
End: 2024-05-28

## 2024-05-28 ENCOUNTER — PREP FOR PROCEDURE (OUTPATIENT)
Dept: GASTROENTEROLOGY | Facility: CLINIC | Age: 79
End: 2024-05-28

## 2024-05-28 DIAGNOSIS — D50.0 ANEMIA, BLOOD LOSS: Primary | ICD-10-CM

## 2024-05-28 NOTE — TELEPHONE ENCOUNTER
Clearance for Colonoscopy & EGD    ----- Message from Scott Padron MD sent at 5/28/2024 12:30 PM EDT -----  Regarding: RE: Medication Hold  YES IT CAN BE STOPPED  RESUME THE DAY AFTER    THANKS  ----- Message -----  From: Kadi Haji  Sent: 5/28/2024   8:56 AM EDT  To: Scott Padron MD  Subject: Medication Hold                                  Our mutual patient is scheduled for procedure: colonoscopy and EGD    On: TBD     With: Dr. Vin Ontiveros MD    He/She is taking the following blood thinner: Xarelto (Rivaroxaban)    Can this be stopped  2 days prior to the procedure    Physician Approving clearance: Dr. Padron

## 2024-05-29 ENCOUNTER — TELEPHONE (OUTPATIENT)
Age: 79
End: 2024-05-29

## 2024-05-29 ENCOUNTER — TELEPHONE (OUTPATIENT)
Dept: GASTROENTEROLOGY | Facility: AMBULARY SURGERY CENTER | Age: 79
End: 2024-05-29

## 2024-05-31 DIAGNOSIS — N40.1 BENIGN PROSTATIC HYPERPLASIA WITH NOCTURIA: ICD-10-CM

## 2024-05-31 DIAGNOSIS — R35.1 BENIGN PROSTATIC HYPERPLASIA WITH NOCTURIA: ICD-10-CM

## 2024-05-31 RX ORDER — TAMSULOSIN HYDROCHLORIDE 0.4 MG/1
CAPSULE ORAL
Qty: 90 CAPSULE | Refills: 1 | Status: SHIPPED | OUTPATIENT
Start: 2024-05-31

## 2024-06-03 ENCOUNTER — APPOINTMENT (OUTPATIENT)
Dept: LAB | Facility: CLINIC | Age: 79
End: 2024-06-03
Payer: MEDICARE

## 2024-06-03 DIAGNOSIS — D64.9 SYMPTOMATIC ANEMIA: ICD-10-CM

## 2024-06-03 LAB
ERYTHROCYTE [DISTWIDTH] IN BLOOD BY AUTOMATED COUNT: 16.7 % (ref 11.6–15.1)
HCT VFR BLD AUTO: 32.1 % (ref 36.5–49.3)
HGB BLD-MCNC: 9.6 G/DL (ref 12–17)
MCH RBC QN AUTO: 27 PG (ref 26.8–34.3)
MCHC RBC AUTO-ENTMCNC: 29.9 G/DL (ref 31.4–37.4)
MCV RBC AUTO: 90 FL (ref 82–98)
PLATELET # BLD AUTO: 258 THOUSANDS/UL (ref 149–390)
PMV BLD AUTO: 10.5 FL (ref 8.9–12.7)
RBC # BLD AUTO: 3.56 MILLION/UL (ref 3.88–5.62)
WBC # BLD AUTO: 5.96 THOUSAND/UL (ref 4.31–10.16)

## 2024-06-03 PROCEDURE — 85027 COMPLETE CBC AUTOMATED: CPT

## 2024-06-03 PROCEDURE — 36415 COLL VENOUS BLD VENIPUNCTURE: CPT

## 2024-06-10 ENCOUNTER — ANESTHESIA EVENT (OUTPATIENT)
Dept: GASTROENTEROLOGY | Facility: AMBULARY SURGERY CENTER | Age: 79
End: 2024-06-10

## 2024-06-10 ENCOUNTER — ANESTHESIA (OUTPATIENT)
Dept: GASTROENTEROLOGY | Facility: AMBULARY SURGERY CENTER | Age: 79
End: 2024-06-10

## 2024-06-10 ENCOUNTER — HOSPITAL ENCOUNTER (OUTPATIENT)
Dept: GASTROENTEROLOGY | Facility: AMBULARY SURGERY CENTER | Age: 79
Setting detail: OUTPATIENT SURGERY
Discharge: HOME | End: 2024-06-10
Attending: INTERNAL MEDICINE
Payer: MEDICARE

## 2024-06-10 VITALS
SYSTOLIC BLOOD PRESSURE: 138 MMHG | BODY MASS INDEX: 29.22 KG/M2 | DIASTOLIC BLOOD PRESSURE: 79 MMHG | HEIGHT: 73 IN | TEMPERATURE: 98.1 F | HEART RATE: 62 BPM | RESPIRATION RATE: 18 BRPM | WEIGHT: 220.46 LBS | OXYGEN SATURATION: 99 %

## 2024-06-10 DIAGNOSIS — D50.0 ANEMIA, BLOOD LOSS: ICD-10-CM

## 2024-06-10 LAB — GLUCOSE SERPL-MCNC: 112 MG/DL (ref 65–140)

## 2024-06-10 PROCEDURE — 82948 REAGENT STRIP/BLOOD GLUCOSE: CPT

## 2024-06-10 PROCEDURE — 45388 COLONOSCOPY W/ABLATION: CPT | Performed by: INTERNAL MEDICINE

## 2024-06-10 PROCEDURE — 88305 TISSUE EXAM BY PATHOLOGIST: CPT | Performed by: SPECIALIST

## 2024-06-10 PROCEDURE — 43239 EGD BIOPSY SINGLE/MULTIPLE: CPT | Performed by: INTERNAL MEDICINE

## 2024-06-10 RX ORDER — LIDOCAINE HYDROCHLORIDE 10 MG/ML
INJECTION, SOLUTION EPIDURAL; INFILTRATION; INTRACAUDAL; PERINEURAL AS NEEDED
Status: DISCONTINUED | OUTPATIENT
Start: 2024-06-10 | End: 2024-06-10

## 2024-06-10 RX ORDER — SODIUM CHLORIDE, SODIUM LACTATE, POTASSIUM CHLORIDE, CALCIUM CHLORIDE 600; 310; 30; 20 MG/100ML; MG/100ML; MG/100ML; MG/100ML
INJECTION, SOLUTION INTRAVENOUS CONTINUOUS PRN
Status: DISCONTINUED | OUTPATIENT
Start: 2024-06-10 | End: 2024-06-10

## 2024-06-10 RX ORDER — PROPOFOL 10 MG/ML
INJECTION, EMULSION INTRAVENOUS AS NEEDED
Status: DISCONTINUED | OUTPATIENT
Start: 2024-06-10 | End: 2024-06-10

## 2024-06-10 RX ORDER — SODIUM CHLORIDE, SODIUM LACTATE, POTASSIUM CHLORIDE, CALCIUM CHLORIDE 600; 310; 30; 20 MG/100ML; MG/100ML; MG/100ML; MG/100ML
125 INJECTION, SOLUTION INTRAVENOUS CONTINUOUS
Status: DISCONTINUED | OUTPATIENT
Start: 2024-06-10 | End: 2024-06-14 | Stop reason: HOSPADM

## 2024-06-10 RX ADMIN — SODIUM CHLORIDE, SODIUM LACTATE, POTASSIUM CHLORIDE, AND CALCIUM CHLORIDE: .6; .31; .03; .02 INJECTION, SOLUTION INTRAVENOUS at 08:33

## 2024-06-10 RX ADMIN — PROPOFOL 30 MG: 10 INJECTION, EMULSION INTRAVENOUS at 08:39

## 2024-06-10 RX ADMIN — PROPOFOL 30 MG: 10 INJECTION, EMULSION INTRAVENOUS at 08:46

## 2024-06-10 RX ADMIN — SODIUM CHLORIDE, SODIUM LACTATE, POTASSIUM CHLORIDE, AND CALCIUM CHLORIDE 125 ML/HR: .6; .31; .03; .02 INJECTION, SOLUTION INTRAVENOUS at 08:09

## 2024-06-10 RX ADMIN — PROPOFOL 100 MG: 10 INJECTION, EMULSION INTRAVENOUS at 08:38

## 2024-06-10 RX ADMIN — PROPOFOL 50 MG: 10 INJECTION, EMULSION INTRAVENOUS at 08:41

## 2024-06-10 RX ADMIN — LIDOCAINE HYDROCHLORIDE 50 MG: 10 INJECTION, SOLUTION EPIDURAL; INFILTRATION; INTRACAUDAL; PERINEURAL at 08:38

## 2024-06-10 NOTE — H&P
History and Physical -  [facility]  Kaiser Price 78 y.o. male MRN: [id number]    HPI: Kaiser Price is a 78 y.o. year old male who presents for evaluation of symptomatic anemia for which she was recently hospitalized.  Anticoagulation has been held.      Review of Systems    Historical Information   Past Medical History:   Diagnosis Date    Anxiety     Arthritis     fingers , knee    Asthma     BPH (benign prostatic hypertrophy)     Cataract     currently left eye- to have surgery on 4/10/17    Cough variant asthma 2017    Diabetes mellitus (HCC)     type 2, last assessed 2017    Encounter for immunization 2023    Hernia, umbilical     currently has    HL (hearing loss)     b/l hearing aids    Labyrinthitis     Moderate obstructive sleep apnea 2017    New onset a-fib (HCC) 01/10/2020    Obesity with body mass index 30 or greater 2019    Umbilical hernia      Past Surgical History:   Procedure Laterality Date    CATARACT EXTRACTION Right 2017    COLONOSCOPY      yrs ago    EYE SURGERY Bilateral     cataracts with IOL     Social History   Social History     Substance and Sexual Activity   Alcohol Use Yes    Alcohol/week: 3.0 standard drinks of alcohol    Types: 3 Standard drinks or equivalent per week    Comment: socially     Social History     Substance and Sexual Activity   Drug Use Yes    Types: Marijuana    Comment: most everyday      Social History     Tobacco Use   Smoking Status Former    Current packs/day: 0.00    Average packs/day: 0.5 packs/day for 15.0 years (7.5 ttl pk-yrs)    Types: Cigarettes    Start date: 1968    Quit date: 1983    Years since quittin.4    Passive exposure: Past   Smokeless Tobacco Never     Family History   Problem Relation Age of Onset    Cancer Mother         ovarian    Diabetes Father     Cancer Sister         stomach to brain    Substance Abuse Family     Substance Abuse Son     Alcohol abuse Son      "Mental illness Neg Hx        Meds/Allergies     Not in a hospital admission.    Allergies   Allergen Reactions    Ceftin [Cefuroxime] GI Intolerance and Vomiting       Objective     /73   Pulse 60   Temp 98.1 °F (36.7 °C) (Skin)   Resp 18   Ht 6' 1\" (1.854 m)   Wt 100 kg (220 lb 7.4 oz)   SpO2 97%   BMI 29.09 kg/m²       PHYSICAL EXAM    Gen: NAD  CV: RRR  CHEST: Clear  ABD: soft, NT/ND  EXT: no edema  Neuro: AAO      ASSESSMENT/PLAN:  This is a 78 y.o. year old male here for evaluation of symptomatic anemia.    PLAN:   Procedure: EGD and colonoscopy.      "

## 2024-06-10 NOTE — ANESTHESIA PREPROCEDURE EVALUATION
Procedure:  EGD  COLONOSCOPY    Relevant Problems   CARDIO   (+) Essential hypertension   (+) Paroxysmal atrial fibrillation (HCC)      ENDO   (+) Type 2 diabetes mellitus with hyperglycemia, without long-term current use of insulin (HCC)      GI/HEPATIC   (+) GIB (gastrointestinal bleeding)      HEMATOLOGY   (+) Anemia   (+) Symptomatic anemia      PULMONARY   (+) Moderate obstructive sleep apnea   (+) Moderate persistent asthma without complication        Physical Exam    Airway    Mallampati score: II  TM Distance: >3 FB  Neck ROM: full     Dental   No notable dental hx     Cardiovascular  Cardiovascular exam normal    Pulmonary  Pulmonary exam normal     Other Findings        Anesthesia Plan  ASA Score- 3     Anesthesia Type- IV sedation with anesthesia with ASA Monitors.         Additional Monitors:     Airway Plan:            Plan Factors-Exercise tolerance (METS): >4 METS.    Chart reviewed.   Existing labs reviewed. Patient summary reviewed.    Patient is not a current smoker.              Induction-     Postoperative Plan-     Perioperative Resuscitation Plan - Level 1 - Full Code.       Informed Consent- Anesthetic plan and risks discussed with patient.  I personally reviewed this patient with the CRNA. Discussed and agreed on the Anesthesia Plan with the CRNA..

## 2024-06-10 NOTE — ANESTHESIA POSTPROCEDURE EVALUATION
Post-Op Assessment Note    CV Status:  Stable  Pain Score: 0    Pain management: adequate       Mental Status:  Sleepy and arousable   Hydration Status:  Stable   Airway Patency:  Patent  Two or more mitigation strategies used for obstructive sleep apnea   Post Op Vitals Reviewed: Yes    No anethesia notable event occurred.    Staff: CRNA               BP   124/68   Temp  /    Pulse  72   Resp   14   SpO2   99

## 2024-06-12 ENCOUNTER — APPOINTMENT (OUTPATIENT)
Dept: LAB | Facility: CLINIC | Age: 79
End: 2024-06-12

## 2024-06-12 PROCEDURE — 88305 TISSUE EXAM BY PATHOLOGIST: CPT | Performed by: SPECIALIST

## 2024-06-13 ENCOUNTER — APPOINTMENT (OUTPATIENT)
Dept: LAB | Facility: CLINIC | Age: 79
End: 2024-06-13
Payer: MEDICARE

## 2024-06-13 DIAGNOSIS — D50.0 ANEMIA, BLOOD LOSS: ICD-10-CM

## 2024-06-13 LAB
BASOPHILS # BLD AUTO: 0.07 THOUSANDS/ÂΜL (ref 0–0.1)
BASOPHILS NFR BLD AUTO: 1 % (ref 0–1)
EOSINOPHIL # BLD AUTO: 0.27 THOUSAND/ÂΜL (ref 0–0.61)
EOSINOPHIL NFR BLD AUTO: 4 % (ref 0–6)
ERYTHROCYTE [DISTWIDTH] IN BLOOD BY AUTOMATED COUNT: 17.4 % (ref 11.6–15.1)
HCT VFR BLD AUTO: 30.5 % (ref 36.5–49.3)
HGB BLD-MCNC: 9.2 G/DL (ref 12–17)
IMM GRANULOCYTES # BLD AUTO: 0.03 THOUSAND/UL (ref 0–0.2)
IMM GRANULOCYTES NFR BLD AUTO: 1 % (ref 0–2)
LYMPHOCYTES # BLD AUTO: 2.13 THOUSANDS/ÂΜL (ref 0.6–4.47)
LYMPHOCYTES NFR BLD AUTO: 32 % (ref 14–44)
MCH RBC QN AUTO: 27.3 PG (ref 26.8–34.3)
MCHC RBC AUTO-ENTMCNC: 30.2 G/DL (ref 31.4–37.4)
MCV RBC AUTO: 91 FL (ref 82–98)
MONOCYTES # BLD AUTO: 0.46 THOUSAND/ÂΜL (ref 0.17–1.22)
MONOCYTES NFR BLD AUTO: 7 % (ref 4–12)
NEUTROPHILS # BLD AUTO: 3.69 THOUSANDS/ÂΜL (ref 1.85–7.62)
NEUTS SEG NFR BLD AUTO: 55 % (ref 43–75)
NRBC BLD AUTO-RTO: 0 /100 WBCS
PLATELET # BLD AUTO: 305 THOUSANDS/UL (ref 149–390)
PMV BLD AUTO: 10.7 FL (ref 8.9–12.7)
RBC # BLD AUTO: 3.37 MILLION/UL (ref 3.88–5.62)
WBC # BLD AUTO: 6.65 THOUSAND/UL (ref 4.31–10.16)

## 2024-06-13 PROCEDURE — 36415 COLL VENOUS BLD VENIPUNCTURE: CPT

## 2024-06-13 PROCEDURE — 85025 COMPLETE CBC W/AUTO DIFF WBC: CPT

## 2024-06-14 ENCOUNTER — TELEPHONE (OUTPATIENT)
Age: 79
End: 2024-06-14

## 2024-06-17 DIAGNOSIS — I10 ESSENTIAL HYPERTENSION: ICD-10-CM

## 2024-06-17 RX ORDER — LISINOPRIL AND HYDROCHLOROTHIAZIDE 12.5; 1 MG/1; MG/1
1 TABLET ORAL DAILY
Qty: 30 TABLET | Refills: 5 | Status: SHIPPED | OUTPATIENT
Start: 2024-06-17

## 2024-06-19 ENCOUNTER — NURSE TRIAGE (OUTPATIENT)
Dept: GASTROENTEROLOGY | Facility: CLINIC | Age: 79
End: 2024-06-19

## 2024-06-19 DIAGNOSIS — D64.9 ANEMIA, UNSPECIFIED TYPE: Primary | ICD-10-CM

## 2024-06-19 NOTE — TELEPHONE ENCOUNTER
Called to review results but patients contact answered. Unable to review results due to no medical communication consent on file. Advised to have patient call back to review results.     Vin Ontiveros MD  [facility]  Good morning,  Your hemoglobin appears to be stable at 9.2, was 9.6 few weeks ago, it was between 7-8 in the hospital.  Please let us know if you are having any signs of bleeding including black stools or bloody stools if not, please follow-up with the PCP in regards to iron supplementation.  Thank you  Dr. Ontiveros

## 2024-06-19 NOTE — TELEPHONE ENCOUNTER
"Pts partner Xiomy calling in, pt provided communication consent to ERIC Walsh and pt with Xiomy. They read results on [employer] but wanted to know if Dr. Ontiveros could place another CBC to have done in a few weeks to trend hgb. Pt also has appt with PCP on 6/24 to discuss iron supplementation.   Reason for Disposition   Information only question and nurse able to answer    Answer Assessment - Initial Assessment Questions  1. REASON FOR CALL or QUESTION: \"What is your reason for calling today?\" or \"How can I best help you?\" or \"What question do you have that I can help answer?\"      See notes    Protocols used: Information Only Call - No Triage-ADULT-[place]    "

## 2024-06-24 ENCOUNTER — OFFICE VISIT (OUTPATIENT)
Dept: FAMILY MEDICINE CLINIC | Facility: CLINIC | Age: 79
End: 2024-06-24
Payer: MEDICARE

## 2024-06-24 ENCOUNTER — APPOINTMENT (OUTPATIENT)
Dept: LAB | Facility: CLINIC | Age: 79
End: 2024-06-24
Payer: MEDICARE

## 2024-06-24 VITALS
WEIGHT: 230 LBS | RESPIRATION RATE: 18 BRPM | SYSTOLIC BLOOD PRESSURE: 164 MMHG | HEART RATE: 88 BPM | OXYGEN SATURATION: 96 % | BODY MASS INDEX: 30.48 KG/M2 | DIASTOLIC BLOOD PRESSURE: 80 MMHG | TEMPERATURE: 97.2 F | HEIGHT: 73 IN

## 2024-06-24 DIAGNOSIS — D64.9 LOW HEMOGLOBIN: Primary | ICD-10-CM

## 2024-06-24 DIAGNOSIS — D64.9 LOW HEMOGLOBIN: ICD-10-CM

## 2024-06-24 LAB
ERYTHROCYTE [DISTWIDTH] IN BLOOD BY AUTOMATED COUNT: 17.2 % (ref 11.6–15.1)
FERRITIN SERPL-MCNC: 8 NG/ML (ref 24–336)
HCT VFR BLD AUTO: 33.3 % (ref 36.5–49.3)
HGB BLD-MCNC: 9.9 G/DL (ref 12–17)
IRON SATN MFR SERPL: 7 % (ref 15–50)
IRON SERPL-MCNC: 30 UG/DL (ref 50–212)
MCH RBC QN AUTO: 26.5 PG (ref 26.8–34.3)
MCHC RBC AUTO-ENTMCNC: 29.7 G/DL (ref 31.4–37.4)
MCV RBC AUTO: 89 FL (ref 82–98)
PLATELET # BLD AUTO: 305 THOUSANDS/UL (ref 149–390)
PMV BLD AUTO: 11.1 FL (ref 8.9–12.7)
RBC # BLD AUTO: 3.74 MILLION/UL (ref 3.88–5.62)
TIBC SERPL-MCNC: 407 UG/DL (ref 250–450)
UIBC SERPL-MCNC: 377 UG/DL (ref 155–355)
WBC # BLD AUTO: 6.94 THOUSAND/UL (ref 4.31–10.16)

## 2024-06-24 PROCEDURE — 85027 COMPLETE CBC AUTOMATED: CPT

## 2024-06-24 PROCEDURE — 83540 ASSAY OF IRON: CPT

## 2024-06-24 PROCEDURE — 82728 ASSAY OF FERRITIN: CPT

## 2024-06-24 PROCEDURE — G2211 COMPLEX E/M VISIT ADD ON: HCPCS | Performed by: STUDENT IN AN ORGANIZED HEALTH CARE EDUCATION/TRAINING PROGRAM

## 2024-06-24 PROCEDURE — 99213 OFFICE O/P EST LOW 20 MIN: CPT | Performed by: STUDENT IN AN ORGANIZED HEALTH CARE EDUCATION/TRAINING PROGRAM

## 2024-06-24 PROCEDURE — 36415 COLL VENOUS BLD VENIPUNCTURE: CPT

## 2024-06-24 PROCEDURE — 83550 IRON BINDING TEST: CPT

## 2024-06-24 NOTE — PROGRESS NOTES
"Ambulatory Visit  Name: Kaiser Price      : 1945      MRN: [id number]  Encounter Provider: Mary Jo Marroquin MD  Encounter Date: 2024   Encounter department: [facility]    Assessment & Plan   1. Low hemoglobin  -     CBC and Platelet; Future  -     Iron Panel (Includes Ferritin, Iron Sat%, Iron, and TIBC); Future   Will consider iron supplementation once bloodwork has resulted  History of Present Illness     HPI    Patient presents for followup. He had low hemoglobin due to a gastric bleed. Notes he is feeling a lot better. Notes he would like to hold off on starting iron supplements until he gets his bloodwork done. He denies feeling dizzy or lightheaded. He denies black stools. Tolerating diet appropriately.       Review of Systems   Constitutional:  Negative for activity change, appetite change, chills, fatigue and fever.   HENT:  Negative for congestion.    Respiratory:  Negative for cough, shortness of breath and wheezing.    Cardiovascular:  Negative for chest pain, palpitations and leg swelling.   Gastrointestinal:  Negative for abdominal pain, constipation, diarrhea, nausea and vomiting.   Skin:  Negative for rash.   Neurological:  Negative for dizziness, light-headedness and headaches.   Psychiatric/Behavioral:  The patient is not nervous/anxious.        Objective     /80   Pulse 88   Temp (!) 97.2 °F (36.2 °C) (Temporal)   Resp 18   Ht 6' 1\" (1.854 m)   Wt 104 kg (230 lb)   SpO2 96%   BMI 30.34 kg/m²     Physical Exam  Constitutional:       Appearance: Normal appearance.   HENT:      Head: Normocephalic and atraumatic.   Cardiovascular:      Rate and Rhythm: Normal rate and regular rhythm.      Pulses: Normal pulses.      Heart sounds: Normal heart sounds.   Pulmonary:      Effort: Pulmonary effort is normal.      Breath sounds: Normal breath sounds.   Neurological:      General: No focal deficit present.      Mental Status: He is alert and oriented " to person, place, and time.   Psychiatric:         Mood and Affect: Mood normal.         Behavior: Behavior normal.         Thought Content: Thought content normal.         Judgment: Judgment normal.       Administrative Statements

## 2024-07-01 ENCOUNTER — TELEMEDICINE (OUTPATIENT)
Dept: FAMILY MEDICINE CLINIC | Facility: CLINIC | Age: 79
End: 2024-07-01
Payer: MEDICARE

## 2024-07-01 VITALS — BODY MASS INDEX: 30.48 KG/M2 | HEIGHT: 73 IN | WEIGHT: 230 LBS

## 2024-07-01 DIAGNOSIS — D50.9 IRON DEFICIENCY ANEMIA, UNSPECIFIED IRON DEFICIENCY ANEMIA TYPE: Primary | ICD-10-CM

## 2024-07-01 PROCEDURE — 99442 PR PHYS/QHP TELEPHONE EVALUATION 11-20 MIN: CPT | Performed by: STUDENT IN AN ORGANIZED HEALTH CARE EDUCATION/TRAINING PROGRAM

## 2024-07-01 RX ORDER — FERROUS SULFATE 324(65)MG
324 TABLET, DELAYED RELEASE (ENTERIC COATED) ORAL
Qty: 14 TABLET | Refills: 0 | Status: SHIPPED | OUTPATIENT
Start: 2024-07-01 | End: 2024-07-08

## 2024-07-02 NOTE — PROGRESS NOTES
Virtual Brief Visit    This Visit is being completed by telephone. The Patient is located at Home and in the following state in which I hold an active license [place]    The patient was identified by name and date of birth. Kaiser Price was informed that this is a telemedicine visit and that the visit is being conducted through Telephone.  My office door was closed. No one else was in the room.  He acknowledged consent and understanding of privacy and security of the video platform. The patient has agreed to participate and understands they can discontinue the visit at any time.    Patient is aware this is a billable service.       Assessment/Plan:    Problem List Items Addressed This Visit     Anemia - Primary    Relevant Medications    ferrous sulfate 324 (65 Fe) mg    Other Relevant Orders    CBC and Platelet     1. Iron deficiency anemia, unspecified iron deficiency anemia type  Hemoglobin stable at 9.9, iron panel reviewed with patient  -Discussed diet modifications  - ferrous sulfate 324 (65 Fe) mg; Take 1 tablet (324 mg total) by mouth 2 (two) times a day before meals for 7 days  Dispense: 14 tablet; Refill: 0  - CBC and Platelet; Future      Patient presents for follow up. He notes he is feeling a lot better, denies weakness and fatigue. He notes he is trying to incorporate more red meat and leafy green vegetables into his diet.       Recent Visits  Date Type Provider Dept   07/01/24 Telemedicine Mary Jo Marroquin MD [facility]   Showing recent visits within past 7 days and meeting all other requirements  Future Appointments  No visits were found meeting these conditions.  Showing future appointments within next 150 days and meeting all other requirements         Visit Time  Total Visit Duration: 20

## 2024-07-09 ENCOUNTER — OFFICE VISIT (OUTPATIENT)
Dept: ENDOCRINOLOGY | Facility: CLINIC | Age: 79
End: 2024-07-09
Payer: MEDICARE

## 2024-07-09 VITALS
DIASTOLIC BLOOD PRESSURE: 71 MMHG | HEIGHT: 73 IN | HEART RATE: 72 BPM | OXYGEN SATURATION: 98 % | WEIGHT: 224.8 LBS | SYSTOLIC BLOOD PRESSURE: 142 MMHG | BODY MASS INDEX: 29.79 KG/M2

## 2024-07-09 DIAGNOSIS — E11.65 TYPE 2 DIABETES MELLITUS WITH HYPERGLYCEMIA, WITHOUT LONG-TERM CURRENT USE OF INSULIN (HCC): ICD-10-CM

## 2024-07-09 DIAGNOSIS — I10 ESSENTIAL HYPERTENSION: Primary | ICD-10-CM

## 2024-07-09 PROCEDURE — 99214 OFFICE O/P EST MOD 30 MIN: CPT | Performed by: NURSE PRACTITIONER

## 2024-07-09 NOTE — PROGRESS NOTES
Established Patient Progress Note    Chief Complaint:  Diabetes follow up visit    Impression & Plan:    Problem List Items Addressed This Visit          Cardiovascular and Mediastinum    Essential hypertension - Primary     BP stable.   Continues on ACE-I.             Endocrine    Type 2 diabetes mellitus with hyperglycemia, without long-term current use of insulin ([facility])       Lab Results   Component Value Date    HGBA1C 7.2 (H) 05/23/2024     HGA1C close to goal.    Treatment regimen: Continues on metformin 1000 mg twice daily    Discussed risks/complications associated with uncontrolled diabetes including organ involvement, heart attack, stroke, death.    Advised lifestyle modifications including attention to diet including the amount and types of carbohydrates consumed and regular activity.     Call for blood sugars less than 70 mg/dl or patterns over 250 mg/dl.     Discussed symptoms and treatment of hypoglycemia.  Reviewed risks associated with hypoglycemia. Always carry rapid acting carbohydrates and a glucometer (a way to check your blood sugar).    Recommendation for medical identification either bracelet, necklace.    Routine follow up for diabetic eye and foot exams.     Ordered blood work to complete prior to next visit.    Send glucose logs/CGM download in 1-2 weeks for review    Follow up in 3 months.            Relevant Orders    Hemoglobin A1C    Comprehensive metabolic panel    CBC and differential    Fructosamine    Lipid panel       History of Present Illness:   Kaiser Price is a 78 y.o. male with a history of type 2 diabetes mellitus on metformin therapy. Has a history of pancreatitis, hyperlipidemia, hypertension, heart failure, atrial fibrillation, and TRISTIN.  Denies polyuria, polydipsia, or blurry vision. Denies CP or SOB.      Home blood glucose readings: Not checking regularly.      Current regimen:  Metformin 1000 mg twice daily     Has hypertension: Taking ACE   Has hyperlipidemia:  None  Thyroid disorders: None       Patient Active Problem List   Diagnosis    Adjustment disorder with anxious mood    Cataract of both eyes    Chronic allergic rhinitis    Enlarged prostate    Moderate persistent asthma without complication    Moderate obstructive sleep apnea    Obesity with body mass index 30 or greater    Pes anserinus tendinitis or bursitis    Restrictive lung disease    Tear of medial meniscus of knee    Tinnitus    Paroxysmal atrial fibrillation (HCC)    Type 2 diabetes mellitus with hyperglycemia, without long-term current use of insulin (HCC)    Arthralgia    Essential hypertension    Anemia    Symptomatic anemia    GIB (gastrointestinal bleeding)      Past Medical History:   Diagnosis Date    Anxiety     Arthritis     fingers , knee    Asthma     BPH (benign prostatic hypertrophy)     Cataract     currently left eye- to have surgery on 4/10/17    Cough variant asthma 04/12/2017    Diabetes mellitus (HCC)     type 2, last assessed 4/12/2017    Encounter for immunization 11/13/2023    Hernia, umbilical     currently has    HL (hearing loss)     b/l hearing aids    Labyrinthitis     Moderate obstructive sleep apnea 04/11/2017    New onset a-fib (HCC) 01/10/2020    Obesity with body mass index 30 or greater 11/04/2019    Umbilical hernia       Past Surgical History:   Procedure Laterality Date    CATARACT EXTRACTION Right 04/04/2017    COLONOSCOPY      yrs ago    EYE SURGERY Bilateral     cataracts with IOL      Family History   Problem Relation Age of Onset    Cancer Mother         ovarian    Diabetes Father     Cancer Sister         stomach to brain    Substance Abuse Family     Substance Abuse Son     Alcohol abuse Son     Mental illness Neg Hx      Social History     Tobacco Use    Smoking status: Former     Current packs/day: 0.00     Average packs/day: 0.5 packs/day for 15.0 years (7.5 ttl pk-yrs)     Types: Cigarettes     Start date: 1/1/1968     Quit date: 1/1/1983     Years since  quittin.5     Passive exposure: Past    Smokeless tobacco: Never   Substance Use Topics    Alcohol use: Not Currently     Alcohol/week: 3.0 standard drinks of alcohol     Types: 3 Standard drinks or equivalent per week     Comment: socially     Allergies   Allergen Reactions    Ceftin [Cefuroxime] GI Intolerance and Vomiting         Current Outpatient Medications:     albuterol (2.5 mg/3 mL) 0.083 % nebulizer solution, Take 3 mL (2.5 mg total) by nebulization every 6 (six) hours as needed for wheezing or shortness of breath, Disp: 60 mL, Rfl: 6    albuterol (Ventolin HFA) 90 mcg/act inhaler, Inhale 2 puffs every 6 (six) hours as needed for wheezing, Disp: 18 g, Rfl: 3    ferrous sulfate 324 (65 Fe) mg, Take 1 tablet (324 mg total) by mouth 2 (two) times a day before meals for 7 days, Disp: 14 tablet, Rfl: 0    finasteride (PROSCAR) 5 mg tablet, TAKE ONE TABLET BY MOUTH EVERY DAY, Disp: 30 tablet, Rfl: 5    Glucosamine-Chondroitin (GLUCOSAMINE CHONDR COMPLEX PO), Take by mouth 2 (two) times a day, Disp: , Rfl:     lisinopril-hydrochlorothiazide (PRINZIDE,ZESTORETIC) 10-12.5 MG per tablet, Take 1 tablet by mouth daily, Disp: 30 tablet, Rfl: 5    metFORMIN (GLUCOPHAGE) 500 mg tablet, Take 2 tablets (1,000 mg total) by mouth 2 (two) times a day with meals, Disp: 360 tablet, Rfl: 3    montelukast (SINGULAIR) 10 mg tablet, Take 1 tablet (10 mg total) by mouth daily at bedtime, Disp: 90 tablet, Rfl: 3    Probiotic Product (PROBIOTIC BLEND PO), Take 1 tablet by mouth daily, Disp: , Rfl:     rivaroxaban (XARELTO) 20 mg tablet, in the morning, Disp: , Rfl:     sertraline (ZOLOFT) 100 mg tablet, TAKE ONE TABLET BY MOUTH EVERY DAY, Disp: 90 tablet, Rfl: 1    tamsulosin (FLOMAX) 0.4 mg, TAKE 1 CAPSULE BY MOUTH EVERYDAY AT BEDTIME, Disp: 90 capsule, Rfl: 1    polyethylene glycol (GOLYTELY) 4000 mL solution, Take 4,000 mL by mouth once for 1 dose Take as directed by office prior to procedure., Disp: 4000 mL, Rfl: 0    Review  "of Systems  Constitutional: Negative for activity change, appetite change, fatigue and unexpected weight change.   HENT: Negative for ear pain, sore throat, trouble swallowing and voice change.    Eyes: Negative for visual disturbance.   Respiratory: Negative for cough and shortness of breath.    Cardiovascular: Negative for chest pain and palpitations.   Gastrointestinal: Negative for abdominal distention, abdominal pain, constipation, diarrhea and vomiting.   Endocrine: Negative for cold intolerance, heat intolerance, polydipsia and polyuria.   Musculoskeletal: Negative for arthralgias and back pain.   Skin: Negative for color change and rash.   Neurological: Negative for weakness or tremors.   All other systems reviewed and are negative.      Physical Exam:  Body mass index is 29.66 kg/m².  /71 (BP Location: Right arm, Patient Position: Sitting, Cuff Size: Large)   Pulse 72   Ht 6' 1\" (1.854 m)   Wt 102 kg (224 lb 12.8 oz)   SpO2 98%   BMI 29.66 kg/m²    Wt Readings from Last 3 Encounters:   07/09/24 102 kg (224 lb 12.8 oz)   07/01/24 104 kg (230 lb)   06/24/24 104 kg (230 lb)       Physical Exam   Constitutional: He is oriented to person, place, and time. He appears well-developed and well-nourished. No distress.   HENT:   Head: Normocephalic and atraumatic.   Neck: Normal range of motion.   Pulmonary/Chest: Effort normal.   Musculoskeletal: Normal range of motion.   Neurological: He is alert and oriented to person, place, and time.   Skin: He is not diaphoretic.   Psychiatric: He has a normal mood and affect. His behavior is normal.       Labs:   Lab Results   Component Value Date    HGBA1C 7.2 (H) 05/23/2024    HGBA1C 7.0 (A) 02/27/2024    HGBA1C 6.9 (A) 11/13/2023     Lab Results   Component Value Date    CREATININE 1.13 05/25/2024    CREATININE 1.22 05/24/2024    CREATININE 1.27 05/23/2024    BUN 25 05/25/2024     03/31/2017    K 4.6 05/25/2024     (H) 05/25/2024    CO2 21 05/25/2024 "     eGFR   Date Value Ref Range Status   05/25/2024 61 ml/min/1.73sq m Final     Lab Results   Component Value Date    CHOL 177 03/29/2017    HDL 41 05/23/2024    TRIG 206 (H) 05/23/2024     Lab Results   Component Value Date    ALT 11 05/24/2024    AST 16 05/24/2024    ALKPHOS 43 05/24/2024     Lab Results   Component Value Date    WGL4EZDXGYSP 0.588 11/13/2023    COB8UOZDRRNO 0.831 10/22/2020    WPX2PZZSVSFG 0.786 01/10/2020     Lab Results   Component Value Date    FREET4 0.83 11/13/2023       Discussed with the patient and all questioned fully answered. He will call me if any problems arise.    Follow-up appointment in 3 months.     Counseled patient on diagnostic results, prognosis, risk and benefit of treatment options, instruction for management, importance of treatment compliance, Risk  factor reduction and impressions    LATANYA Garcia

## 2024-07-09 NOTE — ASSESSMENT & PLAN NOTE
Lab Results   Component Value Date    HGBA1C 7.2 (H) 05/23/2024     HGA1C close to goal.    Treatment regimen: Continues on metformin 1000 mg twice daily    Discussed risks/complications associated with uncontrolled diabetes including organ involvement, heart attack, stroke, death.    Advised lifestyle modifications including attention to diet including the amount and types of carbohydrates consumed and regular activity.     Call for blood sugars less than 70 mg/dl or patterns over 250 mg/dl.     Discussed symptoms and treatment of hypoglycemia.  Reviewed risks associated with hypoglycemia. Always carry rapid acting carbohydrates and a glucometer (a way to check your blood sugar).    Recommendation for medical identification either bracelet, necklace.    Routine follow up for diabetic eye and foot exams.     Ordered blood work to complete prior to next visit.    Send glucose logs/CGM download in 1-2 weeks for review    Follow up in 3 months.

## 2024-07-12 ENCOUNTER — APPOINTMENT (EMERGENCY)
Dept: CT IMAGING | Facility: HOSPITAL | Age: 79
End: 2024-07-12
Payer: MEDICARE

## 2024-07-12 ENCOUNTER — HOSPITAL ENCOUNTER (EMERGENCY)
Facility: HOSPITAL | Age: 79
Discharge: HOME | End: 2024-07-12
Attending: EMERGENCY MEDICINE
Payer: MEDICARE

## 2024-07-12 VITALS
SYSTOLIC BLOOD PRESSURE: 162 MMHG | DIASTOLIC BLOOD PRESSURE: 72 MMHG | HEART RATE: 65 BPM | TEMPERATURE: 98 F | OXYGEN SATURATION: 98 % | RESPIRATION RATE: 18 BRPM
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
## 2024-07-12 DIAGNOSIS — G93.0 ARACHNOID CYST: ICD-10-CM

## 2024-07-12 PROCEDURE — 71250 CT THORAX DX C-: CPT

## 2024-07-12 PROCEDURE — 70450 CT HEAD/BRAIN W/O DYE: CPT

## 2024-07-12 PROCEDURE — 99285 EMERGENCY DEPT VISIT HI MDM: CPT

## 2024-07-12 PROCEDURE — 72125 CT NECK SPINE W/O DYE: CPT

## 2024-07-12 PROCEDURE — 99285 EMERGENCY DEPT VISIT HI MDM: CPT | Performed by: EMERGENCY MEDICINE

## 2024-07-12 NOTE — ED PROVIDER NOTES
History  Chief Complaint   Patient presents with    Fall     Back, rib, and head pain since July third since he fell, + blood thinners, + head strike      Patient is an 78-year-old male with a history of atrial fibrillation on Xarelto who presents after a fall.  Patient states that he fell on 7/3/2024.  He states that the rug in his home slipped out from underneath him and he fell onto his left side.  He does admit to head injury but denies loss of consciousness.  He denies any preceding symptoms, including but not limited to chest pain, shortness of breath, lightheadedness, palpitations.  He states that he did not develop headaches initially but has had intermittent headaches starting several days after the fall.  He also admits to mild thoracic back pain.  He states that he had his wife rubbed his back and it was tender right of midline.  He also noticed bruising to his right upper arm and is unsure how he developed that, since he fell on his left side.  He has taken Tylenol with some relief of the headaches.  He denies visual changes, speech difficulty, weakness, numbness, tingling, other concerns.  He has an unsteady gait at baseline and states that his gait is unchanged since the fall.  He also admits to baseline neck pain which is slightly worse than baseline.  It is primarily paraspinal and not midline.      History provided by:  Patient  Fall  Mechanism of injury: fall    Injury location:  Head/neck and torso  Head/neck injury location:  Head  Torso injury location:  Back  Incident location:  Home  Time since incident:  9 days  Arrived directly from scene: no    Suspicion of alcohol use: no    Suspicion of drug use: no    Associated symptoms: back pain, headaches and neck pain    Associated symptoms: no abdominal pain, no chest pain, no loss of consciousness, no seizures and no vomiting        Prior to Admission Medications   Prescriptions Last Dose Informant Patient Reported? Taking?    Glucosamine-Chondroitin (GLUCOSAMINE CHONDR COMPLEX PO)  Care Giver Yes No   Sig: Take by mouth 2 (two) times a day   Probiotic Product (PROBIOTIC BLEND PO)  Care Giver Yes No   Sig: Take 1 tablet by mouth daily   albuterol (2.5 mg/3 mL) 0.083 % nebulizer solution  Care Giver No No   Sig: Take 3 mL (2.5 mg total) by nebulization every 6 (six) hours as needed for wheezing or shortness of breath   albuterol (Ventolin HFA) 90 mcg/act inhaler  Care Giver No No   Sig: Inhale 2 puffs every 6 (six) hours as needed for wheezing   ferrous sulfate 324 (65 Fe) mg   No No   Sig: Take 1 tablet (324 mg total) by mouth 2 (two) times a day before meals for 7 days   finasteride (PROSCAR) 5 mg tablet  Care Giver No No   Sig: TAKE ONE TABLET BY MOUTH EVERY DAY   lisinopril-hydrochlorothiazide (PRINZIDE,ZESTORETIC) 10-12.5 MG per tablet  Care Giver No No   Sig: Take 1 tablet by mouth daily   metFORMIN (GLUCOPHAGE) 500 mg tablet  Care Giver No No   Sig: Take 2 tablets (1,000 mg total) by mouth 2 (two) times a day with meals   montelukast (SINGULAIR) 10 mg tablet  Care Giver No No   Sig: Take 1 tablet (10 mg total) by mouth daily at bedtime   polyethylene glycol (GOLYTELY) 4000 mL solution   No No   Sig: Take 4,000 mL by mouth once for 1 dose Take as directed by office prior to procedure.   rivaroxaban (XARELTO) 20 mg tablet  Care Giver Yes No   Sig: in the morning   sertraline (ZOLOFT) 100 mg tablet  Care Giver No No   Sig: TAKE ONE TABLET BY MOUTH EVERY DAY   tamsulosin (FLOMAX) 0.4 mg  Care Giver No No   Sig: TAKE 1 CAPSULE BY MOUTH EVERYDAY AT BEDTIME      Facility-Administered Medications: None       Past Medical History:   Diagnosis Date    Anxiety     Arthritis     fingers , knee    Asthma     BPH (benign prostatic hypertrophy)     Cataract     currently left eye- to have surgery on 4/10/17    Cough variant asthma 04/12/2017    Diabetes mellitus (HCC)     type 2, last assessed 4/12/2017    Encounter for immunization 11/13/2023     Hernia, umbilical     currently has    HL (hearing loss)     b/l hearing aids    Labyrinthitis     Moderate obstructive sleep apnea 2017    New onset a-fib (HCC) 01/10/2020    Obesity with body mass index 30 or greater 2019    Umbilical hernia        Past Surgical History:   Procedure Laterality Date    CATARACT EXTRACTION Right 2017    COLONOSCOPY      yrs ago    EYE SURGERY Bilateral     cataracts with IOL       Family History   Problem Relation Age of Onset    Cancer Mother         ovarian    Diabetes Father     Cancer Sister         stomach to brain    Substance Abuse Family     Substance Abuse Son     Alcohol abuse Son     Mental illness Neg Hx      I have reviewed and agree with the history as documented.    E-Cigarette/Vaping    E-Cigarette Use Never User      E-Cigarette/Vaping Substances    Nicotine No     THC No     CBD No     Flavoring No     Other No     Unknown No      Social History     Tobacco Use    Smoking status: Former     Current packs/day: 0.00     Average packs/day: 0.5 packs/day for 15.0 years (7.5 ttl pk-yrs)     Types: Cigarettes     Start date: 1968     Quit date: 1983     Years since quittin.5     Passive exposure: Past    Smokeless tobacco: Never   Vaping Use    Vaping status: Never Used   Substance Use Topics    Alcohol use: Not Currently     Alcohol/week: 3.0 standard drinks of alcohol     Types: 3 Standard drinks or equivalent per week     Comment: socially    Drug use: Yes     Types: Marijuana     Comment: most everyday        Review of Systems   Constitutional:  Negative for chills and fever.   HENT:  Negative for ear pain and sore throat.    Eyes:  Negative for pain and visual disturbance.   Respiratory:  Negative for cough and shortness of breath.    Cardiovascular:  Negative for chest pain and palpitations.   Gastrointestinal:  Negative for abdominal pain and vomiting.   Genitourinary:  Negative for dysuria and hematuria.   Musculoskeletal:   Positive for back pain and neck pain. Negative for arthralgias.   Skin:  Negative for color change and rash.   Neurological:  Positive for headaches. Negative for seizures, loss of consciousness and syncope.   All other systems reviewed and are negative.      Physical Exam  Physical Exam  Vitals and nursing note reviewed.   Constitutional:       General: He is not in acute distress.     Appearance: He is well-developed.   HENT:      Head: Normocephalic and atraumatic.   Eyes:      Conjunctiva/sclera: Conjunctivae normal.   Cardiovascular:      Rate and Rhythm: Normal rate and regular rhythm.      Heart sounds: No murmur heard.  Pulmonary:      Effort: Pulmonary effort is normal. No respiratory distress.      Breath sounds: Normal breath sounds.   Abdominal:      Palpations: Abdomen is soft.      Tenderness: There is no abdominal tenderness.   Musculoskeletal:         General: No swelling.      Cervical back: Neck supple. Spinous process tenderness and muscular tenderness (primarily right > left paraspinal tenderness) present.      Thoracic back: Tenderness (right thoracic back medial to scapula) present.      Comments: Hips nontender.  Pelvis stable.   Skin:     General: Skin is warm and dry.      Capillary Refill: Capillary refill takes less than 2 seconds.      Findings: Ecchymosis (right upper arm) present.   Neurological:      Mental Status: He is alert.   Psychiatric:         Mood and Affect: Mood normal.         Vital Signs  ED Triage Vitals   Temperature Pulse Respirations Blood Pressure SpO2   07/12/24 1447 07/12/24 1447 07/12/24 1447 07/12/24 1447 07/12/24 1447   98 °F (36.7 °C) 65 18 162/72 98 %      Temp src Heart Rate Source Patient Position - Orthostatic VS BP Location FiO2 (%)   -- -- -- -- --             Pain Score       07/12/24 1454       4           Vitals:    07/12/24 1447   BP: 162/72   Pulse: 65         Visual Acuity  Visual Acuity      Flowsheet Row Most Recent Value   L Pupil Size (mm) 2   R  Pupil Size (mm) 2            ED Medications  Medications - No data to display    Diagnostic Studies  Results Reviewed       None                   CT head without contrast   Final Result by Oniel Day MD (07/12 1656)      No acute intracranial abnormality.      Chronic appearing lacunar infarct in left corona radiata with mild chronic microangiopathy.      Small arachnoid cyst in left middle cranial fossa with mild compressive mass effect on adjacent left anterior temporal lobe.                  Workstation performed: [id number]         CT spine cervical without contrast   Final Result by Oniel Day MD (07/12 1701)      No acute cervical spine fracture or traumatic malalignment.      Additional chronic/incidental findings as detailed above.      Please see same-day CT head and CT chest without contrast for further evaluation.                  Workstation performed: [id number]         CT chest without contrast   Final Result by Sherwin Chu MD (07/12 1659)      No acute finding in the chest. No acute rib fracture.               Workstation performed: [id number]                    Procedures  Procedures         ED Course                                 SBIRT 20yo+      Flowsheet Row Most Recent Value   Initial Alcohol Screen: US AUDIT-C     1. How often do you have a drink containing alcohol? 0 Filed at: 07/12/2024 1455   2. How many drinks containing alcohol do you have on a typical day you are drinking?  0 Filed at: 07/12/2024 1455   3a. Male UNDER 65: How often do you have five or more drinks on one occasion? 0 Filed at: 07/12/2024 1455   3b. FEMALE Any Age, or MALE 65+: How often do you have 4 or more drinks on one occassion? 0 Filed at: 07/12/2024 1455   Audit-C Score 0 Filed at: 07/12/2024 1455   DAVON: How many times in the past year have you...    Used an illegal drug or used a prescription medication for non-medical reasons? Never Filed at: 07/12/2024 1455       "                Medical Decision Making  Patient presents with headache, neck pain and back pain after fall. GCS = 15. CT head reveals no acute intracranial injury. It does reveal an arachnoid cyst. I discussed case with neurosurgery who recommends outpatient follow up. Patient expresses understanding.     CT cervical spine and chest negative for traumatic injury. Do not suspect vascular, cardipulmonary causes of pain in neck and back. Suspect contusion given recent trauma, tenderness to palpation and evidence of ecchymosis elsewhere. Patient is well appearing and stable for outpatient follow up. Return to ED if symptoms worsen or persist.     Portions of the above record have been created with voice recognition software.  Occasional wrong word or \"sound alike\" substitutions may have occurred due to the inherent limitations of voice recognition software.  Read the chart carefully and recognize, using context, where substitutions may have occurred.      Problems Addressed:  Arachnoid cyst:     Details: No need for inpatient workup. Will follow up with neurosurgery as outpatient.   Fall, initial encounter:     Details: No significant traumatic injury. Patient is well appearing and stable for discharge. Recommend symptomatic treatment with tylenol, lidoderm patch, heating pad. Follow up with PCP.     Amount and/or Complexity of Data Reviewed  Independent Historian: spouse  External Data Reviewed: notes.  Radiology: ordered.    Risk  OTC drugs.                 Disposition  Final diagnoses:   Fall, initial encounter   Arachnoid cyst     Time reflects when diagnosis was documented in both MDM as applicable and the Disposition within this note       Time User Action Codes Description Comment    7/12/2024  5:29 PM Charles Moreau Add [W19.XXXA] Fall, initial encounter     7/12/2024  5:29 PM Charles Moreau Add [G93.0] Arachnoid cyst           ED Disposition       ED Disposition   Discharge    Condition   Stable    " Date/Time   Fri Jul 12, 2024 1728    Comment   [facility]arigobertozah[facility] discharge to home/self care.                   Follow-up Information       Follow up With Specialties Details Why Contact Info Additional Information    Scott Padron MD Family Medicine Schedule an appointment as soon as possible for a visit  Return to ED sooner if worsening neck pain, back pain or development of vomiting, change in behavior, other concerns [address] [facility] [address]  [phone]       [facility] Neurosurgery Schedule an appointment as soon as possible for a visit   [address]  [address]  [facility] [address] [phone] [facility], [facility] [address] [facility] [address] [place] [address], [address] [phone]            Discharge Medication List as of 7/12/2024  5:30 PM        CONTINUE these medications which have NOT CHANGED    Details   albuterol (2.5 mg/3 mL) 0.083 % nebulizer solution Take 3 mL (2.5 mg total) by nebulization every 6 (six) hours as needed for wheezing or shortness of breath, Starting Tue 6/27/2023, Normal      albuterol (Ventolin HFA) 90 mcg/act inhaler Inhale 2 puffs every 6 (six) hours as needed for wheezing, Starting Tue 6/27/2023, Normal      ferrous sulfate 324 (65 Fe) mg Take 1 tablet (324 mg total) by mouth 2 (two) times a day before meals for 7 days, Starting Mon 7/1/2024, Until Tue 7/9/2024, Normal      finasteride (PROSCAR) 5 mg tablet TAKE ONE TABLET BY MOUTH EVERY DAY, Normal      Glucosamine-Chondroitin (GLUCOSAMINE CHONDR COMPLEX PO) Take by mouth 2 (two) times a day, Historical Med      lisinopril-hydrochlorothiazide (PRINZIDE,ZESTORETIC) 10-12.5 MG per tablet Take 1 tablet by mouth daily, Starting Mon 6/17/2024, Normal      metFORMIN (GLUCOPHAGE) 500 mg tablet Take 2 tablets (1,000 mg total) by mouth 2 (two) times a day with meals, Starting Fri 9/8/2023, Normal      montelukast (SINGULAIR) 10 mg tablet  Take 1 tablet (10 mg total) by mouth daily at bedtime, Starting Tue 6/27/2023, Normal      polyethylene glycol (GOLYTELY) 4000 mL solution Take 4,000 mL by mouth once for 1 dose Take as directed by office prior to procedure., Starting Tue 5/28/2024, Normal      Probiotic Product (PROBIOTIC BLEND PO) Take 1 tablet by mouth daily, Historical Med      rivaroxaban (XARELTO) 20 mg tablet in the morning, Historical Med      sertraline (ZOLOFT) 100 mg tablet TAKE ONE TABLET BY MOUTH EVERY DAY, Normal      tamsulosin (FLOMAX) 0.4 mg TAKE 1 CAPSULE BY MOUTH EVERYDAY AT BEDTIME, Normal             No discharge procedures on file.    PDMP Review         Value Time User    PDMP Reviewed  Yes 5/24/2024  2:26 PM Carlitos Monroy MD            ED Provider  Electronically Signed by             Charles Moreau DO  07/13/24 0916

## 2024-07-15 ENCOUNTER — VBI (OUTPATIENT)
Dept: FAMILY MEDICINE CLINIC | Facility: CLINIC | Age: 79
End: 2024-07-15

## 2024-07-15 NOTE — TELEPHONE ENCOUNTER
07/15/24 1:13 PM    Patient contacted post ED visit, [facility] department spoke with patient/caregiver and outreach was successful.    Thank you.  Cee Downing MA  [facility]

## 2024-08-20 DIAGNOSIS — J45.31 MILD PERSISTENT ASTHMA WITH ACUTE EXACERBATION: ICD-10-CM

## 2024-08-20 RX ORDER — MONTELUKAST SODIUM 10 MG/1
10 TABLET ORAL
Qty: 90 TABLET | Refills: 3 | Status: SHIPPED | OUTPATIENT
Start: 2024-08-20

## 2024-08-20 NOTE — TELEPHONE ENCOUNTER
.Reason for call:   [x] Refill   [] Prior Auth  [] Other:     Office:   [] PCP/Provider -   [x] Specialty/Provider - [facility]     Medication: montelukast (SINGULAIR) 10 mg tablet     Dose/Frequency: Take 1 tablet (10 mg total) by mouth daily at bedtime     Quantity: 100    Pharmacy: [id number] - [address]     Does the patient have enough for 3 days?   [] Yes   [x] No - Send as HP to POD  Patient has been out for 3-4 days

## 2024-08-23 RX ORDER — MONTELUKAST SODIUM 10 MG/1
10 TABLET ORAL
Qty: 100 TABLET | Refills: 1 | OUTPATIENT
Start: 2024-08-23

## 2024-08-27 ENCOUNTER — LAB (OUTPATIENT)
Dept: LAB | Facility: CLINIC | Age: 79
End: 2024-08-27
Payer: MEDICARE

## 2024-08-27 DIAGNOSIS — E11.65 TYPE 2 DIABETES MELLITUS WITH HYPERGLYCEMIA, WITHOUT LONG-TERM CURRENT USE OF INSULIN (HCC): ICD-10-CM

## 2024-08-27 DIAGNOSIS — D64.9 ANEMIA, UNSPECIFIED TYPE: ICD-10-CM

## 2024-08-27 LAB
ALBUMIN SERPL BCG-MCNC: 4.1 G/DL (ref 3.5–5)
ALP SERPL-CCNC: 67 U/L (ref 34–104)
ALT SERPL W P-5'-P-CCNC: 17 U/L (ref 7–52)
ANION GAP SERPL CALCULATED.3IONS-SCNC: 12 MMOL/L (ref 4–13)
AST SERPL W P-5'-P-CCNC: 21 U/L (ref 13–39)
BASOPHILS # BLD AUTO: 0.1 THOUSANDS/ÂΜL (ref 0–0.1)
BASOPHILS NFR BLD AUTO: 1 % (ref 0–1)
BILIRUB SERPL-MCNC: 0.43 MG/DL (ref 0.2–1)
BUN SERPL-MCNC: 25 MG/DL (ref 5–25)
CALCIUM SERPL-MCNC: 9.7 MG/DL (ref 8.4–10.2)
CHLORIDE SERPL-SCNC: 105 MMOL/L (ref 96–108)
CO2 SERPL-SCNC: 22 MMOL/L (ref 21–32)
CREAT SERPL-MCNC: 1.24 MG/DL (ref 0.6–1.3)
EOSINOPHIL # BLD AUTO: 0.26 THOUSAND/ÂΜL (ref 0–0.61)
EOSINOPHIL NFR BLD AUTO: 4 % (ref 0–6)
ERYTHROCYTE [DISTWIDTH] IN BLOOD BY AUTOMATED COUNT: 17.8 % (ref 11.6–15.1)
GFR SERPL CREATININE-BSD FRML MDRD: 54 ML/MIN/1.73SQ M
GLUCOSE SERPL-MCNC: 174 MG/DL (ref 65–140)
HCT VFR BLD AUTO: 35.9 % (ref 36.5–49.3)
HGB BLD-MCNC: 10.9 G/DL (ref 12–17)
IMM GRANULOCYTES # BLD AUTO: 0.03 THOUSAND/UL (ref 0–0.2)
IMM GRANULOCYTES NFR BLD AUTO: 0 % (ref 0–2)
LYMPHOCYTES # BLD AUTO: 2.04 THOUSANDS/ÂΜL (ref 0.6–4.47)
LYMPHOCYTES NFR BLD AUTO: 27 % (ref 14–44)
MCH RBC QN AUTO: 27.5 PG (ref 26.8–34.3)
MCHC RBC AUTO-ENTMCNC: 30.4 G/DL (ref 31.4–37.4)
MCV RBC AUTO: 90 FL (ref 82–98)
MONOCYTES # BLD AUTO: 0.59 THOUSAND/ÂΜL (ref 0.17–1.22)
MONOCYTES NFR BLD AUTO: 8 % (ref 4–12)
NEUTROPHILS # BLD AUTO: 4.51 THOUSANDS/ÂΜL (ref 1.85–7.62)
NEUTS SEG NFR BLD AUTO: 60 % (ref 43–75)
NRBC BLD AUTO-RTO: 0 /100 WBCS
PLATELET # BLD AUTO: 281 THOUSANDS/UL (ref 149–390)
PMV BLD AUTO: 10.2 FL (ref 8.9–12.7)
POTASSIUM SERPL-SCNC: 5.6 MMOL/L (ref 3.5–5.3)
PROT SERPL-MCNC: 7.9 G/DL (ref 6.4–8.4)
RBC # BLD AUTO: 3.97 MILLION/UL (ref 3.88–5.62)
SODIUM SERPL-SCNC: 139 MMOL/L (ref 135–147)
WBC # BLD AUTO: 7.53 THOUSAND/UL (ref 4.31–10.16)

## 2024-08-27 PROCEDURE — 83036 HEMOGLOBIN GLYCOSYLATED A1C: CPT

## 2024-08-27 PROCEDURE — 36415 COLL VENOUS BLD VENIPUNCTURE: CPT

## 2024-08-27 PROCEDURE — 80053 COMPREHEN METABOLIC PANEL: CPT

## 2024-08-27 PROCEDURE — 85025 COMPLETE CBC W/AUTO DIFF WBC: CPT

## 2024-08-27 PROCEDURE — 82985 ASSAY OF GLYCATED PROTEIN: CPT

## 2024-08-28 LAB
EST. AVERAGE GLUCOSE BLD GHB EST-MCNC: 174 MG/DL
HBA1C MFR BLD: 7.7 %

## 2024-08-29 ENCOUNTER — OFFICE VISIT (OUTPATIENT)
Dept: FAMILY MEDICINE CLINIC | Facility: CLINIC | Age: 79
End: 2024-08-29
Payer: MEDICARE

## 2024-08-29 VITALS
SYSTOLIC BLOOD PRESSURE: 138 MMHG | WEIGHT: 226 LBS | OXYGEN SATURATION: 97 % | HEART RATE: 106 BPM | DIASTOLIC BLOOD PRESSURE: 80 MMHG | TEMPERATURE: 97.5 F | RESPIRATION RATE: 16 BRPM | HEIGHT: 73 IN | BODY MASS INDEX: 29.95 KG/M2

## 2024-08-29 DIAGNOSIS — E87.5 HYPERKALEMIA: Primary | ICD-10-CM

## 2024-08-29 DIAGNOSIS — I10 ESSENTIAL HYPERTENSION: ICD-10-CM

## 2024-08-29 DIAGNOSIS — E11.9 ENCOUNTER FOR DIABETIC FOOT EXAM (HCC): ICD-10-CM

## 2024-08-29 DIAGNOSIS — E11.65 TYPE 2 DIABETES MELLITUS WITH HYPERGLYCEMIA, WITHOUT LONG-TERM CURRENT USE OF INSULIN (HCC): ICD-10-CM

## 2024-08-29 DIAGNOSIS — D50.9 IRON DEFICIENCY ANEMIA, UNSPECIFIED IRON DEFICIENCY ANEMIA TYPE: ICD-10-CM

## 2024-08-29 PROBLEM — K92.2 GIB (GASTROINTESTINAL BLEEDING): Status: RESOLVED | Noted: 2024-05-24 | Resolved: 2024-08-29

## 2024-08-29 LAB — FRUCTOSAMINE SERPL-SCNC: 316 UMOL/L (ref 0–285)

## 2024-08-29 PROCEDURE — 99214 OFFICE O/P EST MOD 30 MIN: CPT | Performed by: STUDENT IN AN ORGANIZED HEALTH CARE EDUCATION/TRAINING PROGRAM

## 2024-08-29 PROCEDURE — G2211 COMPLEX E/M VISIT ADD ON: HCPCS | Performed by: STUDENT IN AN ORGANIZED HEALTH CARE EDUCATION/TRAINING PROGRAM

## 2024-08-29 RX ORDER — FERROUS SULFATE 324(65)MG
324 TABLET, DELAYED RELEASE (ENTERIC COATED) ORAL
Qty: 60 TABLET | Refills: 2 | Status: SHIPPED | OUTPATIENT
Start: 2024-08-29 | End: 2024-11-27

## 2024-08-29 NOTE — PROGRESS NOTES
"Ambulatory Visit  Name: Kaiser Price      : 1945      MRN: [id number]  Encounter Provider: Mary Jo Marroquin MD  Encounter Date: 2024   Encounter department: [facility]    Assessment & Plan   1. Hyperkalemia  -     Comprehensive metabolic panel; Future; Expected date: 2024  2. Type 2 diabetes mellitus with hyperglycemia, without long-term current use of insulin (HCC)  Assessment & Plan:    Lab Results   Component Value Date    HGBA1C 7.7 (H) 2024     -Stable, continue metformin 1000 mg BID with meals  3. Iron deficiency anemia, unspecified iron deficiency anemia type  -     CBC and differential; Future  -     ferrous sulfate 324 (65 Fe) mg; Take 1 tablet (324 mg total) by mouth 2 (two) times a day before meals  4. Essential hypertension  Assessment & Plan:  -/80  -Continue lisinopril-HCTZ 10-12.5 mg daily  -Denies headache, chest pain, SOB, dizziness  5. Encounter for diabetic foot exam ([facility])  -Most recent value 5.6- mild hyperkalemia  -Maybe secondary to diet and/or ACEI  -Recheck CMP in 7-10 days  -Discussed limiting consumption of potassium rich foods     History of Present Illness     HPI    Review of Systems    Objective     /80   Pulse (!) 106   Temp 97.5 °F (36.4 °C) (Temporal)   Resp 16   Ht 6' 1\" (1.854 m)   Wt 103 kg (226 lb)   SpO2 97%   BMI 29.82 kg/m²     Physical Exam  Cardiovascular:      Pulses: no weak pulses.           Dorsalis pedis pulses are 2+ on the right side and 2+ on the left side.        Posterior tibial pulses are 2+ on the right side and 2+ on the left side.   Feet:      Right foot:      Skin integrity: No ulcer, skin breakdown, erythema, warmth, callus or dry skin.      Left foot:      Skin integrity: No ulcer, skin breakdown, erythema, warmth, callus or dry skin.             Patient's shoes and socks removed.    Right Foot/Ankle   Right Foot Inspection  Skin Exam: skin normal and skin intact. No dry skin, no " warmth, no callus, no erythema, no maceration, no abnormal color, no pre-ulcer, no ulcer and no callus.     Toe Exam: ROM and strength within normal limits.     Sensory   Vibration: intact  Proprioception: intact  Monofilament testing: intact    Vascular  Capillary refills: < 3 seconds  The right DP pulse is 2+. The right PT pulse is 2+.     Left Foot/Ankle  Left Foot Inspection  Skin Exam: skin normal and skin intact. No dry skin, no warmth, no erythema, no maceration, normal color, no pre-ulcer, no ulcer and no callus.     Toe Exam: ROM and strength within normal limits.     Sensory   Vibration: intact  Proprioception: intact  Monofilament testing: intact    Vascular  Capillary refills: < 3 seconds  The left DP pulse is 2+. The left PT pulse is 2+.     Assign Risk Category  No deformity present  No loss of protective sensation  No weak pulses  Risk: 0      Administrative Statements

## 2024-08-29 NOTE — ASSESSMENT & PLAN NOTE
Lab Results   Component Value Date    HGBA1C 7.7 (H) 08/27/2024     -Stable, continue metformin 1000 mg BID with meals

## 2024-09-13 DIAGNOSIS — E11.9 TYPE 2 DIABETES MELLITUS WITHOUT COMPLICATION, WITHOUT LONG-TERM CURRENT USE OF INSULIN (HCC): ICD-10-CM

## 2024-09-17 DIAGNOSIS — I48.0 PAROXYSMAL ATRIAL FIBRILLATION (HCC): Primary | ICD-10-CM

## 2024-09-17 NOTE — TELEPHONE ENCOUNTER
Pt's life partner Xiomy called stating that pt will no longer be getting his Xarelto from [facility].     Pt would like Xarelto to go to [facility].    Medication: Xarelto     Dose/Frequency: 20mg daily     Quantity: 90    Pharmacy: [facility]    Office:   [] PCP/Provider -   [x] Speciality/Provider - Dr. Castillo     Does the patient have enough for 3 days?   [] Yes   [x] No - Send as HP to POD

## 2024-09-19 NOTE — TELEPHONE ENCOUNTER
Refill must be reviewed and completed by the office or provider. The refill is unable to be approved or denied by the medication management team.    Failed last HCT and HGB

## 2024-09-19 NOTE — TELEPHONE ENCOUNTER
Patient called to request a refill for their Xarelto advised a refill was requested on 09/17/2024 and is pending approval. Patient verbalized understanding and is in agreement.

## 2024-09-19 NOTE — TELEPHONE ENCOUNTER
Requested medication(s) are due for refill today: Yes  Patient has already received a courtesy refill: No  Other reason request has been forwarded to provider:    HCT in normal range and within 360 days    HGB in normal range and within 360 days    eGFR is 60 or above and within 360 days

## 2024-09-23 ENCOUNTER — TELEPHONE (OUTPATIENT)
Dept: CARDIOLOGY CLINIC | Facility: CLINIC | Age: 79
End: 2024-09-23

## 2024-09-23 DIAGNOSIS — I48.0 PAROXYSMAL ATRIAL FIBRILLATION (HCC): Primary | ICD-10-CM

## 2024-09-23 NOTE — TELEPHONE ENCOUNTER
Dr Castillo's pt-  Since pt has prescription plan they were going to stop using [facility] and had it sent to [facility] but the cost here will be over $600. Can someone call to discuss this-- regarding the best way to currently obtain Xarelto ?

## 2024-09-23 NOTE — TELEPHONE ENCOUNTER
Spoke with patient's Wife-Xiomy. Discussed with her there may be help Xarelto is offering direct patient billing (not going through the insurance) provided patient phone number to contact Xarelto to inquire.

## 2024-11-11 ENCOUNTER — OFFICE VISIT (OUTPATIENT)
Dept: ENDOCRINOLOGY | Facility: CLINIC | Age: 79
End: 2024-11-11
Payer: MEDICARE

## 2024-11-11 ENCOUNTER — TELEPHONE (OUTPATIENT)
Dept: UROLOGY | Facility: CLINIC | Age: 79
End: 2024-11-11

## 2024-11-11 VITALS
HEART RATE: 91 BPM | SYSTOLIC BLOOD PRESSURE: 139 MMHG | OXYGEN SATURATION: 96 % | HEIGHT: 73 IN | DIASTOLIC BLOOD PRESSURE: 80 MMHG | WEIGHT: 232.6 LBS | BODY MASS INDEX: 30.83 KG/M2

## 2024-11-11 DIAGNOSIS — E11.65 TYPE 2 DIABETES MELLITUS WITH HYPERGLYCEMIA, WITHOUT LONG-TERM CURRENT USE OF INSULIN (HCC): ICD-10-CM

## 2024-11-11 DIAGNOSIS — I10 ESSENTIAL HYPERTENSION: Primary | ICD-10-CM

## 2024-11-11 DIAGNOSIS — N40.0 BENIGN PROSTATIC HYPERPLASIA, UNSPECIFIED WHETHER LOWER URINARY TRACT SYMPTOMS PRESENT: ICD-10-CM

## 2024-11-11 PROCEDURE — 99214 OFFICE O/P EST MOD 30 MIN: CPT | Performed by: NURSE PRACTITIONER

## 2024-11-11 PROCEDURE — 99204 OFFICE O/P NEW MOD 45 MIN: CPT | Performed by: NURSE PRACTITIONER

## 2024-11-11 NOTE — TELEPHONE ENCOUNTER
Reason for call:   [x] Refill   [] Prior Auth  [] Other:     Office:   [] PCP/Provider -   [x] Specialty/Provider - [facility] / Shana Canada PA-C     Medication: finasteride (PROSCAR) 5 mg tablet     Dose/Frequency: TAKE ONE TABLET BY MOUTH EVERY DAY     Quantity: 30 tablet + 5 refills    Pharmacy: [id number] [address] [address]     Does the patient have enough for 3 days?   [] Yes   [x] No - Send as HP to POD

## 2024-11-11 NOTE — PROGRESS NOTES
Ambulatory Visit  Name: Kaiser Price      : 1945      MRN: [id number]  Encounter Provider: LATANYA Garcia  Encounter Date: 2024   Encounter department: [facility]    Assessment & Plan  Type 2 diabetes mellitus with hyperglycemia, without long-term current use of insulin ([facility])    Lab Results   Component Value Date    HGBA1C 7.7 (H) 2024     HGA1C from 2024. Recommend recheck level end of 2024. Recommend continue current regimen for now.     Discussed risks/complications associated with uncontrolled diabetes including organ involvement, heart attack, stroke, death.    Advised lifestyle modifications including attention to diet including the amount and types of carbohydrates consumed and regular activity.     Call for blood sugars less than 70 mg/dl or patterns over 200 mg/dl.     Monitor blood glucose levels at least 1-2 times a day.    Recommendation for medical identification either bracelet, necklace.    Routine follow up for diabetic eye and foot exams.     Ordered blood work to complete prior to next visit.    Follow up in 3 months.       Orders:    Hemoglobin A1C; Future    Comprehensive metabolic panel; Future    Lipid panel; Future    Albumin / creatinine urine ratio; Future    Essential hypertension  BP stable. Continues on ACE.           History of Present Illness     Kaiser Price is a 79 y.o. male with a history of type 2 diabetes mellitus on metformin therapy.     Has a history of pancreatitis.      Denies recent hospitalization or illness.     UTD diabetic foot exam.  Due for eye exam.     Walking regularly.     Denies polyuria, polydipsia, or blurry vision.     Home blood glucose readings checked periodically ranging 120-130 mg/dL     Current regimen:  Metformin 1000 mg twice daily    No side effects.      Continues on ACE. Not on statin.   No thyroid disease.       History obtained from :  "patient    Review of Systems  See HPI.   All other systems reviewed and are negative.    Medical History Reviewed by provider this encounter:  Tobacco  Allergies  Meds  Problems  Med Hx  Surg Hx  Fam Hx       Current Outpatient Medications on File Prior to Visit   Medication Sig Dispense Refill    ferrous sulfate 324 (65 Fe) mg Take 1 tablet (324 mg total) by mouth 2 (two) times a day before meals 60 tablet 2    finasteride (PROSCAR) 5 mg tablet TAKE ONE TABLET BY MOUTH EVERY DAY 30 tablet 5    Glucosamine-Chondroitin (GLUCOSAMINE CHONDR COMPLEX PO) Take by mouth 2 (two) times a day      lisinopril-hydrochlorothiazide (PRINZIDE,ZESTORETIC) 10-12.5 MG per tablet Take 1 tablet by mouth daily 30 tablet 5    metFORMIN (GLUCOPHAGE) 500 mg tablet TAKE TWO TABLETS BY MOUTH TWICE A DAY WITH MEALS (GENERIC FOR GLUCOPHAGE) 360 tablet 1    montelukast (SINGULAIR) 10 mg tablet Take 1 tablet (10 mg total) by mouth daily at bedtime 90 tablet 3    Probiotic Product (PROBIOTIC BLEND PO) Take 1 tablet by mouth daily      rivaroxaban (XARELTO) 20 mg tablet Take 1 tablet (20 mg total) by mouth in the morning 90 tablet 1    sertraline (ZOLOFT) 100 mg tablet TAKE ONE TABLET BY MOUTH EVERY DAY 90 tablet 1    tamsulosin (FLOMAX) 0.4 mg TAKE 1 CAPSULE BY MOUTH EVERYDAY AT BEDTIME 90 capsule 1    rivaroxaban (Xarelto) 20 mg tablet Take 1 tablet (20 mg total) by mouth daily with breakfast for 14 days 14 tablet 0    [DISCONTINUED] albuterol (Ventolin HFA) 90 mcg/act inhaler Inhale 2 puffs every 6 (six) hours as needed for wheezing (Patient not taking: Reported on 11/11/2024) 18 g 3     No current facility-administered medications on file prior to visit.          Objective     /80 (BP Location: Left arm, Patient Position: Sitting, Cuff Size: Large)   Pulse 91   Ht 6' 1\" (1.854 m)   Wt 106 kg (232 lb 9.6 oz)   SpO2 96%   BMI 30.69 kg/m²        Physical Exam  Vitals reviewed.   Constitutional:       Appearance: Normal " appearance.   Cardiovascular:      Rate and Rhythm: Normal rate and regular rhythm.      Pulses: Normal pulses.      Heart sounds: Normal heart sounds.   Pulmonary:      Effort: Pulmonary effort is normal.      Breath sounds: Normal breath sounds.   Skin:     General: Skin is warm and dry.      Capillary Refill: Capillary refill takes less than 2 seconds.   Neurological:      General: No focal deficit present.      Mental Status: He is alert and oriented to person, place, and time.   Psychiatric:         Mood and Affect: Mood normal.         Behavior: Behavior normal.     Administrative Statements

## 2024-11-11 NOTE — ASSESSMENT & PLAN NOTE
Lab Results   Component Value Date    HGBA1C 7.7 (H) 08/27/2024     HGA1C from August 2024. Recommend recheck level end of November 2024. Recommend continue current regimen for now.     Discussed risks/complications associated with uncontrolled diabetes including organ involvement, heart attack, stroke, death.    Advised lifestyle modifications including attention to diet including the amount and types of carbohydrates consumed and regular activity.     Call for blood sugars less than 70 mg/dl or patterns over 200 mg/dl.     Monitor blood glucose levels at least 1-2 times a day.    Recommendation for medical identification either bracelet, necklace.    Routine follow up for diabetic eye and foot exams.     Ordered blood work to complete prior to next visit.    Follow up in 3 months.       Orders:    Hemoglobin A1C; Future    Comprehensive metabolic panel; Future    Lipid panel; Future    Albumin / creatinine urine ratio; Future

## 2024-11-15 NOTE — TELEPHONE ENCOUNTER
Xiomy called the RX Refill Line. Message is being forwarded to the office.     Xiomy is requesting a message be sent to the office regarding the patients medication request. She was informed the patient is due for an appointment, she expressed that the patient was not informed an appointment was needed. Call was warm transferred to the scheduling department.     Please contact patient at [phone]

## 2024-11-15 NOTE — TELEPHONE ENCOUNTER
Xiomy was warm transferred from RX line.  Xiomy sts that she was informed that pt needs appt for medication review.  Xiomy sts that pt has been out of medication for 5 days.  Pt scheduled for f/u appt with the AP on 2/6/2025, also placed pt on wait list.  Xiomy is requesting that medication to be sent to [facility] in [place].    Pt call back: [phone]

## 2024-11-18 DIAGNOSIS — N40.1 BENIGN PROSTATIC HYPERPLASIA WITH NOCTURIA: ICD-10-CM

## 2024-11-18 DIAGNOSIS — R35.1 BENIGN PROSTATIC HYPERPLASIA WITH NOCTURIA: ICD-10-CM

## 2024-11-18 DIAGNOSIS — N40.0 BENIGN PROSTATIC HYPERPLASIA, UNSPECIFIED WHETHER LOWER URINARY TRACT SYMPTOMS PRESENT: ICD-10-CM

## 2024-11-18 RX ORDER — FINASTERIDE 5 MG/1
5 TABLET, FILM COATED ORAL DAILY
Qty: 30 TABLET | Refills: 5 | Status: SHIPPED | OUTPATIENT
Start: 2024-11-18 | End: 2024-11-18 | Stop reason: SDUPTHER

## 2024-11-18 RX ORDER — FINASTERIDE 5 MG/1
5 TABLET, FILM COATED ORAL DAILY
Qty: 90 TABLET | Refills: 3 | Status: SHIPPED | OUTPATIENT
Start: 2024-11-18

## 2024-11-18 RX ORDER — TAMSULOSIN HYDROCHLORIDE 0.4 MG/1
0.4 CAPSULE ORAL
Qty: 90 CAPSULE | Refills: 3 | Status: SHIPPED | OUTPATIENT
Start: 2024-11-18

## 2024-12-03 ENCOUNTER — IMMUNIZATIONS (OUTPATIENT)
Dept: FAMILY MEDICINE CLINIC | Facility: CLINIC | Age: 79
End: 2024-12-03
Payer: MEDICARE

## 2024-12-03 DIAGNOSIS — Z23 ENCOUNTER FOR IMMUNIZATION: Primary | ICD-10-CM

## 2024-12-03 PROCEDURE — 90662 IIV NO PRSV INCREASED AG IM: CPT

## 2024-12-03 PROCEDURE — 90471 IMMUNIZATION ADMIN: CPT

## 2024-12-16 DIAGNOSIS — I10 ESSENTIAL HYPERTENSION: ICD-10-CM

## 2024-12-17 RX ORDER — LISINOPRIL AND HYDROCHLOROTHIAZIDE 10; 12.5 MG/1; MG/1
TABLET ORAL
Qty: 30 TABLET | Refills: 5 | Status: SHIPPED | OUTPATIENT
Start: 2024-12-17

## 2024-12-18 DIAGNOSIS — F33.1 MODERATE EPISODE OF RECURRENT MAJOR DEPRESSIVE DISORDER (HCC): ICD-10-CM

## 2024-12-19 RX ORDER — SERTRALINE HYDROCHLORIDE 100 MG/1
100 TABLET, FILM COATED ORAL DAILY
Qty: 90 TABLET | Refills: 1 | Status: SHIPPED | OUTPATIENT
Start: 2024-12-19

## 2025-02-03 DIAGNOSIS — D50.9 IRON DEFICIENCY ANEMIA, UNSPECIFIED IRON DEFICIENCY ANEMIA TYPE: ICD-10-CM

## 2025-02-04 RX ORDER — FERROUS SULFATE 324(65)MG
324 TABLET, DELAYED RELEASE (ENTERIC COATED) ORAL
Qty: 60 TABLET | Refills: 3 | Status: SHIPPED | OUTPATIENT
Start: 2025-02-04 | End: 2025-06-04

## 2025-02-06 ENCOUNTER — OFFICE VISIT (OUTPATIENT)
Dept: UROLOGY | Facility: CLINIC | Age: 80
End: 2025-02-06
Payer: MEDICARE

## 2025-02-06 VITALS
DIASTOLIC BLOOD PRESSURE: 82 MMHG | WEIGHT: 233 LBS | HEART RATE: 75 BPM | SYSTOLIC BLOOD PRESSURE: 144 MMHG | OXYGEN SATURATION: 96 % | HEIGHT: 73 IN | BODY MASS INDEX: 30.88 KG/M2

## 2025-02-06 DIAGNOSIS — N40.0 BENIGN PROSTATIC HYPERPLASIA, UNSPECIFIED WHETHER LOWER URINARY TRACT SYMPTOMS PRESENT: Primary | ICD-10-CM

## 2025-02-06 PROCEDURE — 99213 OFFICE O/P EST LOW 20 MIN: CPT

## 2025-02-06 NOTE — PROGRESS NOTES
2/6/2025      No chief complaint on file.        Assessment and Plan    79 y.o. male     1. BPH with LUTS  - continue tamsulosin and finasteride dual therapy  - s/p in office vargasine with Dr. Rodriguez on 1- showing bilobar hyperplasia of the prostate.  Normal bladder.  Normal urethra.  -Patient states he is very content on medications.  He denies voiding symptoms.  He states he has a strong stream.  He denies recurrent UTIs.  Denies dysuria, flank pain, frequency, urgency, gross hematuria.    -Patient will return in 1 year for follow-up.  -All questions addressed.  Please do not hesitate to reach out with any further questions or concerns.      History of Present Illness  Kaiser Price is a pleasant 79 y.o. male here with history of BPH with lower urinary tract symptoms presenting today for follow-up.    Patient had a cystoscopy completed by Dr. Rodriguez on 1- showing bilobar hyperplasia prostate.  Normal urethra.  Normal bladder.    Has been managed on 0.4 mg daily tamsulosin as well as 5 mg daily finasteride.  Patient is content and denies voiding complaints today.    Review of Systems   Constitutional:  Negative for activity change, chills, fatigue and fever.   HENT:  Negative for congestion, rhinorrhea and sore throat.    Eyes:  Negative for photophobia, redness and visual disturbance.   Respiratory:  Negative for cough, shortness of breath and wheezing.    Cardiovascular:  Negative for chest pain, palpitations and leg swelling.   Gastrointestinal:  Negative for abdominal pain, diarrhea, nausea and vomiting.   Genitourinary:  Negative for difficulty urinating, dysuria, flank pain, frequency, hematuria and urgency.   Neurological:  Negative for weakness, light-headedness and headaches.           AUA SYMPTOM SCORE      Flowsheet Row Most Recent Value   AUA SYMPTOM SCORE    How often have you had a sensation of not emptying your bladder completely after you finished urinating? 2 (P)     How  "often have you had to urinate again less than two hours after you finished urinating? 2 (P)     How often have you found you stopped and started again several times when you urinate? 2 (P)     How often have you found it difficult to postpone urination? 2 (P)     How often have you had a weak urinary stream? 3 (P)     How often have you had to push or strain to begin urination? 2 (P)     How many times did you most typically get up to urinate from the time you went to bed at night until the time you got up in the morning? 5 (P)     Quality of Life: If you were to spend the rest of your life with your urinary condition just the way it is now, how would you feel about that? 3 (P)     AUA SYMPTOM SCORE 18 (P)               Vitals  Vitals:    02/06/25 1400   BP: 144/82   BP Location: Left arm   Patient Position: Sitting   Cuff Size: Standard   Pulse: 75   SpO2: 96%   Weight: 106 kg (233 lb)   Height: 6' 1\" (1.854 m)       Physical Exam  Constitutional:       Appearance: Normal appearance. He is not toxic-appearing.   HENT:      Head: Normocephalic.      Mouth/Throat:      Pharynx: Oropharynx is clear.   Eyes:      Extraocular Movements: Extraocular movements intact.      Pupils: Pupils are equal, round, and reactive to light.   Pulmonary:      Effort: Pulmonary effort is normal. No respiratory distress.   Musculoskeletal:         General: Normal range of motion.      Cervical back: Normal range of motion.   Neurological:      Mental Status: He is alert and oriented to person, place, and time. Mental status is at baseline.      Gait: Gait normal.   Psychiatric:         Mood and Affect: Mood normal.         Behavior: Behavior normal.         Thought Content: Thought content normal.         Judgment: Judgment normal.           Past History  Past Medical History:   Diagnosis Date    Anxiety     Arthritis     fingers , knee    Asthma     BPH (benign prostatic hypertrophy)     Cataract     currently left eye- to have surgery " on 4/10/17    Cough variant asthma 2017    Diabetes mellitus (HCC)     type 2, last assessed 2017    Encounter for immunization 2023    Hernia, umbilical     currently has    HL (hearing loss)     b/l hearing aids    Labyrinthitis     Moderate obstructive sleep apnea 2017    New onset a-fib (HCC) 01/10/2020    Obesity with body mass index 30 or greater 2019    Umbilical hernia      Social History     Socioeconomic History    Marital status: /Civil Union     Spouse name: None    Number of children: 2    Years of education: None    Highest education level: None   Occupational History    None   Tobacco Use    Smoking status: Former     Current packs/day: 0.00     Average packs/day: 0.5 packs/day for 15.0 years (7.5 ttl pk-yrs)     Types: Cigarettes     Start date: 1968     Quit date: 1983     Years since quittin.1     Passive exposure: Past    Smokeless tobacco: Never   Vaping Use    Vaping status: Never Used   Substance and Sexual Activity    Alcohol use: Not Currently     Alcohol/week: 3.0 standard drinks of alcohol     Types: 3 Standard drinks or equivalent per week     Comment: socially    Drug use: Yes     Types: Marijuana     Comment: most everyday     Sexual activity: Not Currently     Partners: Female   Other Topics Concern    None   Social History Narrative    Active advance directive    Caffeine use    Dental care, regularly    Drinks coffee     Social Drivers of Health     Financial Resource Strain: Low Risk  (2023)    Overall Financial Resource Strain (CARDIA)     Difficulty of Paying Living Expenses: Not hard at all   Food Insecurity: Not on file   Transportation Needs: No Transportation Needs (2023)    PRAPARE - Transportation     Lack of Transportation (Medical): No     Lack of Transportation (Non-Medical): No   Physical Activity: Not on file   Stress: Not on file   Social Connections: Not on file   Intimate Partner Violence: Not on file    Housing Stability: Not on file     Social History     Tobacco Use   Smoking Status Former    Current packs/day: 0.00    Average packs/day: 0.5 packs/day for 15.0 years (7.5 ttl pk-yrs)    Types: Cigarettes    Start date: 1968    Quit date: 1983    Years since quittin.1    Passive exposure: Past   Smokeless Tobacco Never     Family History   Problem Relation Age of Onset    Cancer Mother         ovarian    Diabetes Father     Cancer Sister         stomach to brain    Substance Abuse Family     Substance Abuse Son     Alcohol abuse Son     Mental illness Neg Hx        The following portions of the patient's history were reviewed and updated as appropriate: allergies, current medications, past medical history, past social history, past surgical history and problem list.    Results  No results found for this or any previous visit (from the past hour).]  Lab Results   Component Value Date    PSA 2.1 11/15/2019    PSA 4.8 (H) 2017     Lab Results   Component Value Date    GLUCOSE 132 (H) 2017    CALCIUM 9.7 2024     2017    K 5.6 (H) 2024    CO2 22 2024     2024    BUN 25 2024    CREATININE 1.24 2024     Lab Results   Component Value Date    WBC 7.53 2024    HGB 10.9 (L) 2024    HCT 35.9 (L) 2024    MCV 90 2024     2024       LATANYA Howard

## 2025-03-10 ENCOUNTER — APPOINTMENT (OUTPATIENT)
Dept: LAB | Facility: CLINIC | Age: 80
End: 2025-03-10
Payer: MEDICARE

## 2025-03-10 DIAGNOSIS — E11.65 TYPE 2 DIABETES MELLITUS WITH HYPERGLYCEMIA, WITHOUT LONG-TERM CURRENT USE OF INSULIN (HCC): ICD-10-CM

## 2025-03-10 DIAGNOSIS — E87.5 HYPERKALEMIA: ICD-10-CM

## 2025-03-10 LAB
ALBUMIN SERPL BCG-MCNC: 4.2 G/DL (ref 3.5–5)
ALP SERPL-CCNC: 54 U/L (ref 34–104)
ALT SERPL W P-5'-P-CCNC: 15 U/L (ref 7–52)
ANION GAP SERPL CALCULATED.3IONS-SCNC: 9 MMOL/L (ref 4–13)
AST SERPL W P-5'-P-CCNC: 20 U/L (ref 13–39)
BILIRUB SERPL-MCNC: 0.34 MG/DL (ref 0.2–1)
BUN SERPL-MCNC: 33 MG/DL (ref 5–25)
CALCIUM SERPL-MCNC: 9.5 MG/DL (ref 8.4–10.2)
CHLORIDE SERPL-SCNC: 107 MMOL/L (ref 96–108)
CHOLEST SERPL-MCNC: 195 MG/DL (ref ?–200)
CO2 SERPL-SCNC: 22 MMOL/L (ref 21–32)
CREAT SERPL-MCNC: 1.19 MG/DL (ref 0.6–1.3)
CREAT UR-MCNC: 101.5 MG/DL
EST. AVERAGE GLUCOSE BLD GHB EST-MCNC: 160 MG/DL
GFR SERPL CREATININE-BSD FRML MDRD: 56 ML/MIN/1.73SQ M
GLUCOSE P FAST SERPL-MCNC: 193 MG/DL (ref 65–99)
HBA1C MFR BLD: 7.2 %
HDLC SERPL-MCNC: 44 MG/DL
LDLC SERPL CALC-MCNC: 100 MG/DL (ref 0–100)
MICROALBUMIN UR-MCNC: 25.1 MG/L
MICROALBUMIN/CREAT 24H UR: 25 MG/G CREATININE (ref 0–30)
NONHDLC SERPL-MCNC: 151 MG/DL
POTASSIUM SERPL-SCNC: 5.3 MMOL/L (ref 3.5–5.3)
PROT SERPL-MCNC: 7.5 G/DL (ref 6.4–8.4)
SODIUM SERPL-SCNC: 138 MMOL/L (ref 135–147)
TRIGL SERPL-MCNC: 254 MG/DL (ref ?–150)

## 2025-03-10 PROCEDURE — 36415 COLL VENOUS BLD VENIPUNCTURE: CPT

## 2025-03-10 PROCEDURE — 82570 ASSAY OF URINE CREATININE: CPT

## 2025-03-10 PROCEDURE — 82043 UR ALBUMIN QUANTITATIVE: CPT

## 2025-03-10 PROCEDURE — 83036 HEMOGLOBIN GLYCOSYLATED A1C: CPT

## 2025-03-10 PROCEDURE — 80053 COMPREHEN METABOLIC PANEL: CPT

## 2025-03-11 ENCOUNTER — OFFICE VISIT (OUTPATIENT)
Dept: ENDOCRINOLOGY | Facility: CLINIC | Age: 80
End: 2025-03-11
Payer: MEDICARE

## 2025-03-11 ENCOUNTER — RESULTS FOLLOW-UP (OUTPATIENT)
Dept: ENDOCRINOLOGY | Facility: CLINIC | Age: 80
End: 2025-03-11

## 2025-03-11 VITALS
OXYGEN SATURATION: 98 % | SYSTOLIC BLOOD PRESSURE: 130 MMHG | WEIGHT: 225.8 LBS | DIASTOLIC BLOOD PRESSURE: 61 MMHG | BODY MASS INDEX: 29.93 KG/M2 | HEART RATE: 96 BPM | HEIGHT: 73 IN

## 2025-03-11 DIAGNOSIS — D64.9 ANEMIA, UNSPECIFIED TYPE: Primary | ICD-10-CM

## 2025-03-11 DIAGNOSIS — I10 ESSENTIAL HYPERTENSION: ICD-10-CM

## 2025-03-11 DIAGNOSIS — E11.65 TYPE 2 DIABETES MELLITUS WITH HYPERGLYCEMIA, WITHOUT LONG-TERM CURRENT USE OF INSULIN (HCC): ICD-10-CM

## 2025-03-11 PROBLEM — E66.9 OBESITY WITH BODY MASS INDEX 30 OR GREATER: Status: RESOLVED | Noted: 2019-11-04 | Resolved: 2025-03-11

## 2025-03-11 PROCEDURE — 99214 OFFICE O/P EST MOD 30 MIN: CPT | Performed by: NURSE PRACTITIONER

## 2025-03-11 PROCEDURE — 99204 OFFICE O/P NEW MOD 45 MIN: CPT | Performed by: NURSE PRACTITIONER

## 2025-03-11 NOTE — ASSESSMENT & PLAN NOTE
Currently on iron supplementation. Last CBC from August 2024. Managed by PCP.   Orders:    CBC and differential; Future    Iron Panel (Includes Ferritin, Iron Sat%, Iron, and TIBC); Future

## 2025-03-11 NOTE — PROGRESS NOTES
Name: Kaiser Price      : 1945      MRN: [id number]  Encounter Provider: LATANYA Garcia  Encounter Date: 3/11/2025   Encounter department: [facility]  :  Assessment & Plan  Type 2 diabetes mellitus with hyperglycemia, without long-term current use of insulin ([facility])    Lab Results   Component Value Date    HGBA1C 7.2 (H) 03/10/2025     HGA1C at goal reasonable for age and comorbidity. No changes to current regimen.     Discussed risks/complications associated with uncontrolled diabetes including organ involvement, heart attack, stroke, death.    Advised lifestyle modifications including attention to diet including the amount and types of carbohydrates consumed and regular activity.     Call for blood sugars less than 70 mg/dl or patterns over 250 mg/dl.     Discussed symptoms and treatment of hypoglycemia.  Reviewed risks associated with hypoglycemia. Always carry rapid acting carbohydrates and a glucometer (a way to check your blood sugar).    Recommendation for medical identification either bracelet, necklace.    Routine follow up for diabetic eye and foot exams.     Ordered blood work to complete prior to next visit.    Follow up in 6 months.          Anemia, unspecified type  Currently on iron supplementation. Last CBC from 2024. Managed by PCP.   Orders:    CBC and differential; Future    Iron Panel (Includes Ferritin, Iron Sat%, Iron, and TIBC); Future    Essential hypertension  BP under good control on regimen that includes ACE.              History of Present Illness   HPI  Kaiser Price is a 79 y.o. male who presents with a history of type 2 diabetes mellitus on metformin therapy.      Has a history of pancreatitis.       Denies recent hospitalization or illness since last office visit.      UTD diabetic foot exam and eye exam.      Walking regularly.      Denies polyuria, polydipsia, or blurry vision.     Home blood glucose  readings not checking regularly.     Current regimen:  Metformin 1000 mg twice daily     No side effects.      Continues on ACE. Not on statin.     No thyroid disease.     History obtained from : patient    Review of Systems  See HPI.   All other systems reviewed and are negative.    Medical History Reviewed by provider this encounter:  Tobacco  Allergies  Meds  Problems  Med Hx  Surg Hx  Fam Hx     .  Current Outpatient Medications on File Prior to Visit   Medication Sig Dispense Refill    ferrous sulfate 324 (65 Fe) mg Take 1 tablet (324 mg total) by mouth 2 (two) times a day before meals 60 tablet 3    finasteride (PROSCAR) 5 mg tablet Take 1 tablet (5 mg total) by mouth daily 90 tablet 3    Glucosamine-Chondroitin (GLUCOSAMINE CHONDR COMPLEX PO) Take by mouth 2 (two) times a day      lisinopril-hydrochlorothiazide (PRINZIDE,ZESTORETIC) 10-12.5 MG per tablet TAKE ONE TABLET BY MOUTH EVERY DAY (GENERIC FOR ZESTORETIC) 30 tablet 5    Misc Natural Products (PRO NUTRIENTS FRUIT & VEGGIE PO) Take by mouth      montelukast (SINGULAIR) 10 mg tablet Take 1 tablet (10 mg total) by mouth daily at bedtime 90 tablet 3    Probiotic Product (PROBIOTIC BLEND PO) Take 1 tablet by mouth daily      rivaroxaban (XARELTO) 20 mg tablet Take 1 tablet (20 mg total) by mouth in the morning 90 tablet 1    sertraline (ZOLOFT) 100 mg tablet TAKE ONE TABLET BY MOUTH EVERY DAY 90 tablet 1    tamsulosin (FLOMAX) 0.4 mg Take 1 capsule (0.4 mg total) by mouth daily at bedtime 90 capsule 3    [DISCONTINUED] metFORMIN (GLUCOPHAGE) 500 mg tablet TAKE TWO TABLETS BY MOUTH TWICE A DAY WITH MEALS (GENERIC FOR GLUCOPHAGE) 360 tablet 1    rivaroxaban (Xarelto) 20 mg tablet Take 1 tablet (20 mg total) by mouth daily with breakfast for 14 days 14 tablet 0     No current facility-administered medications on file prior to visit.         Objective   /61 (BP Location: Right arm, Patient Position: Sitting, Cuff Size: Large)   Pulse 96   Ht 6'  "1\" (1.854 m)   Wt 102 kg (225 lb 12.8 oz)   SpO2 98%   BMI 29.79 kg/m²         Physical Exam  Vitals reviewed.   Constitutional:       Appearance: Normal appearance.   Cardiovascular:      Rate and Rhythm: Normal rate and regular rhythm.      Pulses: Normal pulses.      Heart sounds: Normal heart sounds.   Pulmonary:      Effort: Pulmonary effort is normal.      Breath sounds: Normal breath sounds.   Skin:     General: Skin is warm and dry.      Capillary Refill: Capillary refill takes less than 2 seconds.   Neurological:      General: No focal deficit present.      Mental Status: He is alert and oriented to person, place, and time.   Psychiatric:         Mood and Affect: Mood normal.         Behavior: Behavior normal.     Labs:     Component      Latest Ref Rng 3/10/2025   Sodium      135 - 147 mmol/L 138    Potassium      3.5 - 5.3 mmol/L 5.3    Chloride      96 - 108 mmol/L 107    Carbon Dioxide      21 - 32 mmol/L 22    ANION GAP      4 - 13 mmol/L 9    BUN      5 - 25 mg/dL 33 (H)    Creatinine      0.60 - 1.30 mg/dL 1.19    GLUCOSE, FASTING      65 - 99 mg/dL 193 (H)    Calcium      8.4 - 10.2 mg/dL 9.5    AST      13 - 39 U/L 20    ALT      7 - 52 U/L 15    ALK PHOS      34 - 104 U/L 54    Total Protein      6.4 - 8.4 g/dL 7.5    Albumin      3.5 - 5.0 g/dL 4.2    Total Bilirubin      0.20 - 1.00 mg/dL 0.34    GFR, Calculated      ml/min/1.73sq m 56    Cholesterol      See Comment mg/dL 195    Triglycerides      See Comment mg/dL 254 (H)    HDL      >=40 mg/dL 44    LDL Calculated      0 - 100 mg/dL 100    Non-HDL Cholesterol      mg/dl 151    EXT Creatinine Urine      Reference range not established. mg/dL 101.5    Albumin,U,Random      <20.0 mg/L 25.1 (H)    Albumin Creat Ratio      0 - 30 mg/g creatinine 25    Hemoglobin A1C      Normal 4.0-5.6%; PreDiabetic 5.7-6.4%; Diabetic >=6.5%; Glycemic control for adults with diabetes <7.0% % 7.2 (H)    eAG, EST AVG Glucose      mg/dl 160      Administrative " Statements

## 2025-03-11 NOTE — ASSESSMENT & PLAN NOTE
Lab Results   Component Value Date    HGBA1C 7.2 (H) 03/10/2025     HGA1C at goal reasonable for age and comorbidity. No changes to current regimen.     Discussed risks/complications associated with uncontrolled diabetes including organ involvement, heart attack, stroke, death.    Advised lifestyle modifications including attention to diet including the amount and types of carbohydrates consumed and regular activity.     Call for blood sugars less than 70 mg/dl or patterns over 250 mg/dl.     Discussed symptoms and treatment of hypoglycemia.  Reviewed risks associated with hypoglycemia. Always carry rapid acting carbohydrates and a glucometer (a way to check your blood sugar).    Recommendation for medical identification either bracelet, necklace.    Routine follow up for diabetic eye and foot exams.     Ordered blood work to complete prior to next visit.    Follow up in 6 months.

## 2025-03-20 ENCOUNTER — OFFICE VISIT (OUTPATIENT)
Dept: CARDIOLOGY CLINIC | Facility: CLINIC | Age: 80
End: 2025-03-20
Payer: MEDICARE

## 2025-03-20 ENCOUNTER — TELEPHONE (OUTPATIENT)
Age: 80
End: 2025-03-20

## 2025-03-20 VITALS
WEIGHT: 224 LBS | OXYGEN SATURATION: 96 % | DIASTOLIC BLOOD PRESSURE: 70 MMHG | SYSTOLIC BLOOD PRESSURE: 128 MMHG | HEART RATE: 70 BPM | BODY MASS INDEX: 29.69 KG/M2 | HEIGHT: 73 IN

## 2025-03-20 DIAGNOSIS — I48.0 PAROXYSMAL ATRIAL FIBRILLATION (HCC): Primary | ICD-10-CM

## 2025-03-20 DIAGNOSIS — E78.2 MIXED HYPERLIPIDEMIA: ICD-10-CM

## 2025-03-20 DIAGNOSIS — I10 ESSENTIAL HYPERTENSION: ICD-10-CM

## 2025-03-20 PROCEDURE — 93000 ELECTROCARDIOGRAM COMPLETE: CPT | Performed by: INTERNAL MEDICINE

## 2025-03-20 PROCEDURE — 99214 OFFICE O/P EST MOD 30 MIN: CPT | Performed by: INTERNAL MEDICINE

## 2025-03-20 RX ORDER — ATORVASTATIN CALCIUM 20 MG/1
20 TABLET, FILM COATED ORAL
Qty: 90 TABLET | Refills: 3 | Status: SHIPPED | OUTPATIENT
Start: 2025-03-20

## 2025-03-20 RX ORDER — OMEGA-3-ACID ETHYL ESTERS 1 G/1
2 CAPSULE, LIQUID FILLED ORAL 2 TIMES DAILY
Qty: 360 CAPSULE | Refills: 3 | Status: SHIPPED | COMMUNITY
Start: 2025-03-20

## 2025-03-20 NOTE — TELEPHONE ENCOUNTER
Please update ICD code to reflect why patient is taking Lovaza. Currently it is attached to Afib and hypertension. Route back to the pod once updated for prior authorization to be submitted

## 2025-03-20 NOTE — PROGRESS NOTES
[facility]  [address]   [place] [address]  Cardiology Consultation  [facility]  1945  [id number]  [facility]  [address]  [facility] [id number] [phone]    1. Paroxysmal atrial fibrillation (HCC)  POCT ECG      2. Essential hypertension  POCT ECG         Discussion/Summary:   Paroxysmal afib-xarelto 20mg with food + [id number] in [facility].  He understands his elevated chads Vasc score-4  Elevated trigycerides with hld- add atorvastatin 20mg. Omega 3 2000mg bid.cut carb 100grams a day  Dm2- tight glucose control  TRISTIN- moderate sleep apnea.consider weight loss with GLP-1  Htn- lisinopril-hctz    Interval History:   75 yo gentleman with recent hospitalization for upper respiratory infection found to have paroxysmal atrial fibrillation.  He had successful cardioversion and remains in normal sinus rhythm.  His heart rates have been adequately controlled.  He denies having any significant exertional dyspnea.  Denies having significant bleeding or bruising.  Denies feeling dizziness or lightheadedness.  His heart rate and blood pressure controlled.  He is currently in sinus bradycardia 57 beats per minute.    10/27/2020:  He will follow up with a sleep specialist.  We discussed about need for continued anticoagulation with his paroxysmal atrial fibrillation.  Currently his heart rate is suppressed.  His blood pressure is mildly elevated.  He will start back his ramipril.    12/02/2021:  We reviewed through his last sleep study.  He denies having major palpitations.  We reviewed his medications.  He understands about the increased risk of stroke with atrial fibrillation.  He is willing to try CPAP.    1/17/2023: Reviewed through his last abnormal sleep study.  He is compliant with medications.    Recent Visit: Denies having chest pain or major change in breathing. Denies  significant palpitations. Compliant with therapy. Reviewed labwork together. Cannot tolerate cpap. Trying to lose weight    Past Medical History:   Diagnosis Date    Anxiety     Arthritis     fingers , knee    Asthma     BPH (benign prostatic hypertrophy)     Cataract     currently left eye- to have surgery on 4/10/17    Cough variant asthma 2017    Diabetes mellitus (HCC)     type 2, last assessed 2017    Encounter for immunization 2023    Hernia, umbilical     currently has    HL (hearing loss)     b/l hearing aids    Labyrinthitis     Moderate obstructive sleep apnea 2017    New onset a-fib (HCC) 01/10/2020    Obesity with body mass index 30 or greater 2019    Umbilical hernia      Social History     Socioeconomic History    Marital status: /Civil Union     Spouse name: Not on file    Number of children: 2    Years of education: Not on file    Highest education level: Not on file   Occupational History    Not on file   Tobacco Use    Smoking status: Former     Current packs/day: 0.00     Average packs/day: 0.5 packs/day for 15.0 years (7.5 ttl pk-yrs)     Types: Cigarettes     Start date: 1968     Quit date: 1983     Years since quittin.2     Passive exposure: Past    Smokeless tobacco: Never   Vaping Use    Vaping status: Never Used   Substance and Sexual Activity    Alcohol use: Not Currently     Alcohol/week: 3.0 standard drinks of alcohol     Types: 3 Standard drinks or equivalent per week     Comment: socially    Drug use: Yes     Types: Marijuana     Comment: most everyday     Sexual activity: Not Currently     Partners: Female   Other Topics Concern    Not on file   Social History Narrative    Active advance directive    Caffeine use    Dental care, regularly    Drinks coffee     Social Drivers of Health     Financial Resource Strain: Low Risk  (2023)    Overall Financial Resource Strain (CARDIA)     Difficulty of Paying Living Expenses: Not hard at  all   Food Insecurity: Not on file   Transportation Needs: No Transportation Needs (11/27/2023)    PRAPARE - Transportation     Lack of Transportation (Medical): No     Lack of Transportation (Non-Medical): No   Physical Activity: Not on file   Stress: Not on file   Social Connections: Not on file   Intimate Partner Violence: Not on file   Housing Stability: Not on file      Family History   Problem Relation Age of Onset    Cancer Mother         ovarian    Diabetes Father     Cancer Sister         stomach to brain    Substance Abuse Family     Substance Abuse Son     Alcohol abuse Son     Mental illness Neg Hx      Past Surgical History:   Procedure Laterality Date    CATARACT EXTRACTION Right 04/04/2017    COLONOSCOPY      yrs ago    EYE SURGERY Bilateral     cataracts with IOL       Current Outpatient Medications:     ferrous sulfate 324 (65 Fe) mg, Take 1 tablet (324 mg total) by mouth 2 (two) times a day before meals, Disp: 60 tablet, Rfl: 3    finasteride (PROSCAR) 5 mg tablet, Take 1 tablet (5 mg total) by mouth daily, Disp: 90 tablet, Rfl: 3    Glucosamine-Chondroitin (GLUCOSAMINE CHONDR COMPLEX PO), Take by mouth 2 (two) times a day, Disp: , Rfl:     lisinopril-hydrochlorothiazide (PRINZIDE,ZESTORETIC) 10-12.5 MG per tablet, TAKE ONE TABLET BY MOUTH EVERY DAY (GENERIC FOR ZESTORETIC), Disp: 30 tablet, Rfl: 5    metFORMIN (GLUCOPHAGE) 500 mg tablet, Take 2 tablets (1,000 mg total) by mouth 2 (two) times a day with meals, Disp: 360 tablet, Rfl: 3    Misc Natural Products (PRO NUTRIENTS FRUIT & VEGGIE PO), Take by mouth, Disp: , Rfl:     montelukast (SINGULAIR) 10 mg tablet, Take 1 tablet (10 mg total) by mouth daily at bedtime, Disp: 90 tablet, Rfl: 3    Probiotic Product (PROBIOTIC BLEND PO), Take 1 tablet by mouth daily, Disp: , Rfl:     rivaroxaban (XARELTO) 20 mg tablet, Take 1 tablet (20 mg total) by mouth in the morning, Disp: 90 tablet, Rfl: 1    sertraline (ZOLOFT) 100 mg tablet, TAKE ONE TABLET BY  "MOUTH EVERY DAY, Disp: 90 tablet, Rfl: 1    tamsulosin (FLOMAX) 0.4 mg, Take 1 capsule (0.4 mg total) by mouth daily at bedtime, Disp: 90 capsule, Rfl: 3    rivaroxaban (Xarelto) 20 mg tablet, Take 1 tablet (20 mg total) by mouth daily with breakfast for 14 days, Disp: 14 tablet, Rfl: 0  Allergies   Allergen Reactions    Ceftin [Cefuroxime] GI Intolerance and Vomiting     Vitals:    03/20/25 1449   BP: 128/70   BP Location: Right arm   Patient Position: Sitting   Cuff Size: Standard   Pulse: 70   SpO2: 96%   Weight: 102 kg (224 lb)   Height: 6' 1\" (1.854 m)       Review of Systems:   Review of Systems   Constitutional: Negative.    HENT: Negative.     Eyes: Negative.    Respiratory: Negative.     Cardiovascular:  Positive for palpitations.   Gastrointestinal: Negative.    Endocrine: Negative.    Genitourinary: Negative.    Musculoskeletal: Negative.    Skin: Negative.    Allergic/Immunologic: Negative.    Neurological: Negative.    Hematological: Negative.    Psychiatric/Behavioral: Negative.         Vitals:    03/20/25 1449   BP: 128/70   BP Location: Right arm   Patient Position: Sitting   Cuff Size: Standard   Pulse: 70   SpO2: 96%   Weight: 102 kg (224 lb)   Height: 6' 1\" (1.854 m)     Physical Examination:   Physical Exam  Constitutional:       General: He is not in acute distress.     Appearance: He is well-developed. He is not diaphoretic.   HENT:      Head: Normocephalic and atraumatic.      Right Ear: External ear normal.      Left Ear: External ear normal.   Eyes:      General: No scleral icterus.        Right eye: No discharge.         Left eye: No discharge.      Conjunctiva/sclera: Conjunctivae normal.      Pupils: Pupils are equal, round, and reactive to light.   Neck:      Thyroid: No thyromegaly.      Vascular: No JVD.      Trachea: No tracheal deviation.   Cardiovascular:      Rate and Rhythm: Normal rate. Rhythm irregular.      Heart sounds: Murmur heard.      No friction rub. Gallop present. "   Pulmonary:      Effort: Pulmonary effort is normal. No respiratory distress.      Breath sounds: Normal breath sounds. No stridor. No wheezing or rales.   Chest:      Chest wall: No tenderness.   Abdominal:      General: Bowel sounds are normal. There is no distension.      Palpations: Abdomen is soft. There is no mass.      Tenderness: There is no abdominal tenderness. There is no guarding or rebound.   Musculoskeletal:         General: No tenderness or deformity. Normal range of motion.      Cervical back: Normal range of motion and neck supple.   Skin:     General: Skin is warm and dry.      Coloration: Skin is not pale.      Findings: No erythema or rash.   Neurological:      Mental Status: He is alert and oriented to person, place, and time.      Cranial Nerves: No cranial nerve deficit.      Motor: No abnormal muscle tone.      Coordination: Coordination normal.      Deep Tendon Reflexes: Reflexes are normal and symmetric. Reflexes normal.   Psychiatric:         Behavior: Behavior normal.         Thought Content: Thought content normal.         Judgment: Judgment normal.         Labs:     Lab Results   Component Value Date    WBC 7.53 08/27/2024    HGB 10.9 (L) 08/27/2024    HCT 35.9 (L) 08/27/2024    MCV 90 08/27/2024    RDW 17.8 (H) 08/27/2024     08/27/2024     BMP:  Lab Results   Component Value Date    SODIUM 138 03/10/2025    K 5.3 03/10/2025     03/10/2025    CO2 22 03/10/2025    BUN 33 (H) 03/10/2025    CREATININE 1.19 03/10/2025    GLUC 174 (H) 08/27/2024    GLUF 193 (H) 03/10/2025    CALCIUM 9.5 03/10/2025    EGFR 56 03/10/2025    MG 1.6 01/13/2020     LFT:  Lab Results   Component Value Date    AST 20 03/10/2025    ALT 15 03/10/2025    ALKPHOS 54 03/10/2025    TP 7.5 03/10/2025    ALB 4.2 03/10/2025      Lab Results   Component Value Date    TEQ4BSQEHPJO 0.588 11/13/2023     Lab Results   Component Value Date    HGBA1C 7.2 (H) 03/10/2025     Lipid Profile:   Lab Results   Component  Value Date    CHOLESTEROL 195 03/10/2025    HDL 44 03/10/2025    LDLCALC 100 03/10/2025    TRIG 254 (H) 03/10/2025     Lab Results   Component Value Date    CHOLESTEROL 195 03/10/2025    CHOLESTEROL 185 05/23/2024     Lab Results   Component Value Date    CKTOTAL 65 09/03/2022    TROPONINI <0.02 01/10/2020     Lab Results   Component Value Date    NTBNP 1,781 (H) 01/10/2020      Recent Results (from the past 4 weeks)   Lipid panel    Collection Time: 03/10/25 11:57 AM   Result Value Ref Range    Cholesterol 195 See Comment mg/dL    Triglycerides 254 (H) See Comment mg/dL    HDL, Direct 44 >=40 mg/dL    LDL Calculated 100 0 - 100 mg/dL    Non-HDL-Chol (CHOL-HDL) 151 mg/dl   Comprehensive metabolic panel    Collection Time: 03/10/25 11:57 AM   Result Value Ref Range    Sodium 138 135 - 147 mmol/L    Potassium 5.3 3.5 - 5.3 mmol/L    Chloride 107 96 - 108 mmol/L    CO2 22 21 - 32 mmol/L    ANION GAP 9 4 - 13 mmol/L    BUN 33 (H) 5 - 25 mg/dL    Creatinine 1.19 0.60 - 1.30 mg/dL    Glucose, Fasting 193 (H) 65 - 99 mg/dL    Calcium 9.5 8.4 - 10.2 mg/dL    AST 20 13 - 39 U/L    ALT 15 7 - 52 U/L    Alkaline Phosphatase 54 34 - 104 U/L    Total Protein 7.5 6.4 - 8.4 g/dL    Albumin 4.2 3.5 - 5.0 g/dL    Total Bilirubin 0.34 0.20 - 1.00 mg/dL    eGFR 56 ml/min/1.73sq m   Hemoglobin A1C    Collection Time: 03/10/25 11:57 AM   Result Value Ref Range    Hemoglobin A1C 7.2 (H) Normal 4.0-5.6%; PreDiabetic 5.7-6.4%; Diabetic >=6.5%; Glycemic control for adults with diabetes <7.0% %     mg/dl   Albumin / creatinine urine ratio    Collection Time: 03/10/25 11:57 AM   Result Value Ref Range    Creatinine, Ur 101.5 Reference range not established. mg/dL    Albumin,U,Random 25.1 (H) <20.0 mg/L    Albumin Creat Ratio 25 0 - 30 mg/g creatinine       Imaging & Testing   I have personally reviewed pertinent reports.      Cardiac Testing     Results for orders placed during the hospital encounter of 01/10/20   Echo complete with  contrast if indicated    [address] [address]  [phone]    Transthoracic Echocardiogram  2D, M-mode, Doppler, and Color Doppler    Study date:  2020    Patient: FRANKLIN BRIDGES  MR number: [id number]  Account number: [id number]  : 1945  Age: 74 years  Gender: Male  Status: Inpatient  Location: Bedside  Height: 72 in  Weight: 249.5 lb  BP: 139/ 73 mmHg    Indications: Atrial Fibrillation    Diagnoses: I48.0 - Atrial fibrillation    Sonographer:  Giselle Bain RDCS  Referring Physician:  Christin Erwin MD  Group:  [facility]  Interpreting Physician:  Nicola Castillo MD    SUMMARY    LEFT VENTRICLE:  Systolic function was mildly reduced. Ejection fraction was estimated in the range of 45 % to 50 %.  There was mild diffuse hypokinesis.  Wall thickness was mildly increased.    LEFT ATRIUM:  The atrium was moderately dilated.    RIGHT ATRIUM:  The atrium was moderately dilated.    AORTIC VALVE:  Although there was no diagnostic evidence for vegetation, this study is not adequate to completely exclude the possibility.    HISTORY: PRIOR HISTORY: Diabetes Mellitus, Obstructive Sleep Apnea, Asthma    PROCEDURE: The procedure was performed at the bedside. This was a routine study. The transthoracic approach was used. The study included complete 2D imaging, M-mode, complete spectral Doppler, and color Doppler. The heart rate was 88 bpm,  at the start of the study. Intravenous contrast ( 1.2 ml Definity in NSS, 1 ml) was administered to opacify the left ventricle. Echocardiographic views were limited due to decreased penetration and lung interference. This was a technically  difficult study.    LEFT VENTRICLE: Size was normal. Systolic function was mildly reduced. Ejection fraction was estimated in the range of 45 % to 50 %. There was mild diffuse hypokinesis. Wall thickness was mildly increased. No evidence of apical  thrombus.  DOPPLER: The study was not technically sufficient to allow evaluation of LV diastolic function.    RIGHT VENTRICLE: The size was normal. Systolic function was normal with TAPSE-2.2cm Wall thickness was normal.    LEFT ATRIUM: The atrium was moderately dilated.    RIGHT ATRIUM: The atrium was moderately dilated.    MITRAL VALVE: Valve structure was normal. There was normal leaflet separation. DOPPLER: The transmitral velocity was within the normal range. There was no evidence for stenosis. There was no significant regurgitation.    AORTIC VALVE: Leaflets exhibited mildly increased thickness, mild calcification, lower normal cuspal separation, and sclerosis. Although there was no diagnostic evidence for vegetation, this study is not adequate to completely exclude the  possibility. DOPPLER: Transaortic velocity was within the normal range. There was no evidence for stenosis. There was no significant regurgitation.    TRICUSPID VALVE: The valve structure was normal. There was normal leaflet separation. DOPPLER: The transtricuspid velocity was within the normal range. There was no evidence for stenosis. There was no significant regurgitation.    PULMONIC VALVE: Leaflets exhibited normal thickness, no calcification, and normal cuspal separation. DOPPLER: The transpulmonic velocity was within the normal range. There was no significant regurgitation.    PERICARDIUM: There was no pericardial effusion. The pericardium was normal in appearance.    AORTA: The root exhibited normal size.    SYSTEMIC VEINS: IVC: The inferior vena cava was normal in size.    SYSTEM MEASUREMENT TABLES    2D mode  AoR Diam 2D: 3.4 cm  LA Diam (2D): 3.7 cm  LA/Ao (2D): 1.09  FS (2D Teich): 22.7 %  IVSd (2D): 1.17 cm  LVDEV: 98.8 cmï¾³  LVESV: 53.7 cmï¾³  LVIDd(2D): 4.63 cm  LVISd (2D): 3.58 cm  LVOT Area 2D: 3.14 cmï¾²  LVPWd (2D): 1.15 cm  SV (Teich): 45.1 cmï¾³    Apical four chamber  LVEF A4C: 46 %    Unspecified Scan Mode  KENTON Cont Eq  (Peak Josh): 2.21 cmï¾²  LVOT Diam.: 2 cm  LVOT Vmax: 824 mm/s  LVOT Vmax; Mean: 824 mm/s  Peak Grad.; Mean: 3 mm[Hg]  MV Peak A Josh: 326 mm/s  MV Peak E Josh. Mean: 928 mm/s  MVA (PHT): 2.97 cmï¾²  PHT: 74 ms  Max P mm[Hg]  V Max: 2460 mm/s  Vmax: 2310 mm/s  RA Area: 17 cmï¾²  RA Volume: 45.1 cmï¾³  TAPSE: 2.2 cm    [facility]    Prepared and electronically signed by    Nicola Castillo MD  Signed 2020 13:46:20       Results for orders placed during the hospital encounter of 01/10/20   ANGELA    Narrative [address] [address]  [phone]    Transesophageal Echocardiogram    Study date:  2020    Patient: FRANKLIN BRIDGES  MR number: [id number]  Account number: [id number]  : 1945  Age: 74 years  Gender: Male  Status: Inpatient  Location: Cath lab  Height: 72 in  Weight: 250 lb  BP: 118/ 60 mmHg    Indications: Atrial Fibrillation    Diagnoses: 427.31 - ATRIAL FIBRILLATION    Sonographer:  JAGUAR Calloway  Primary Physician:  LATANYA Motta  Referring Physician:  Nicola Castillo MD  Group:  [facility]  RN:  Kb Wynn RN  Interpreting Physician:  Nicola Castillo MD    SUMMARY    LEFT VENTRICLE:  Systolic function was mildly reduced. Ejection fraction was estimated in the range of 45 % to 50 % to be 50 %.  There was mild diffuse hypokinesis.    LEFT ATRIUM:  The atrium was dilated.    ATRIAL SEPTUM:  No defect or patent foramen ovale was identified.  Contrast injection was performed. There was no right-to-left shunt, with provocative maneuvers to increase right atrial pressure.    RIGHT ATRIUM:  The atrium was dilated.    MITRAL VALVE:  There was mild regurgitation.  There was no echocardiographic evidence of vegetation.    AORTIC VALVE:  There was no echocardiographic evidence of vegetation.    HISTORY: PRIOR HISTORY: DM, TRISTIN, Asthma    PROCEDURE: The  procedure was performed in the catheterization laboratory. This was a routine study. The risks and alternatives of the procedure were explained to the patient and informed consent was obtained. The transesophageal approach  was used. The heart rate was 92 bpm, at the start of the study. An adult omniplane probe was inserted by the attending cardiologist. Intubated with ease. One intubation attempt(s). There was no blood detected on the probe. Intravenous  contrast (agitated saline, 10 ml) was administered to evaluate shunting.    LEFT VENTRICLE: Size was normal. Systolic function was mildly reduced. Ejection fraction was estimated in the range of 45 % to 50 % to be 50 %. There was mild diffuse hypokinesis. Wall thickness was normal. No evidence of apical thrombus.  DOPPLER: Left ventricular diastolic function parameters were normal.    RIGHT VENTRICLE: The size was normal. Systolic function was normal. Wall thickness was normal.    LEFT ATRIUM: The atrium was dilated.    ATRIAL SEPTUM: No defect or patent foramen ovale was identified. Contrast injection was performed. There was no right-to-left shunt, with provocative maneuvers to increase right atrial pressure.    RIGHT ATRIUM: The atrium was dilated.    MITRAL VALVE: Valve structure was normal. There was normal leaflet separation. There was no echocardiographic evidence of vegetation. DOPPLER: The transmitral velocity was within the normal range. There was no evidence for stenosis. There  was mild regurgitation.    AORTIC VALVE: The valve was trileaflet. Leaflets exhibited mildly increased thickness, calcification, and normal cuspal separation. There was no echocardiographic evidence of vegetation. DOPPLER: Transaortic velocity was within the normal  range. There was no evidence for stenosis. There was no significant regurgitation.    TRICUSPID VALVE: The valve structure was normal. There was normal leaflet separation. DOPPLER: The transtricuspid velocity was  "within the normal range. There was no evidence for stenosis. There was no significant regurgitation.    PULMONIC VALVE: Leaflets exhibited normal thickness, no calcification, and normal cuspal separation. DOPPLER: The transpulmonic velocity was within the normal range. There was no significant regurgitation.    PERICARDIUM: There was no pericardial effusion. The pericardium was normal in appearance.    AORTA: The root exhibited normal size. There was no significant atheroma noted of the transverse and descending aorta    SYSTEMIC VEINS: IVC: The inferior vena cava was normal in size.    [facility] Accredited [facility]ography Laboratory    Prepared and electronically signed by    Nicola Castillo MD  Signed 14-Jan-2020 10:49:20         EKG: Personally reviewed.    Sinus bradycardia no acute st/t wave changes    AHI- 19 time an hour      Nicola Castillo MD [facility]  [phone]  Please call with any questions or suggestions    Counseling :  A description of the counseling:   Goals and Barriers:  Patient's ability to self care:  Medication side effect reviewed with patient in detail and all their questions answered.    \"Portions of the record may have been created with voice recognition software. Occasional wrong word or \"sound a like\" substitutions may have occurred due to the inherent limitations of voice recognition software. Read the chart carefully and recognize, using context, where substitutions have occurred. Please call if you have any questions. \"    "

## 2025-04-28 ENCOUNTER — OFFICE VISIT (OUTPATIENT)
Dept: FAMILY MEDICINE CLINIC | Facility: CLINIC | Age: 80
End: 2025-04-28
Payer: MEDICARE

## 2025-04-28 VITALS
TEMPERATURE: 98 F | OXYGEN SATURATION: 97 % | DIASTOLIC BLOOD PRESSURE: 80 MMHG | HEART RATE: 101 BPM | SYSTOLIC BLOOD PRESSURE: 140 MMHG | WEIGHT: 218.4 LBS | HEIGHT: 73 IN | RESPIRATION RATE: 18 BRPM | BODY MASS INDEX: 28.94 KG/M2

## 2025-04-28 DIAGNOSIS — I10 ESSENTIAL HYPERTENSION: ICD-10-CM

## 2025-04-28 DIAGNOSIS — I48.0 PAROXYSMAL ATRIAL FIBRILLATION (HCC): ICD-10-CM

## 2025-04-28 DIAGNOSIS — J01.40 ACUTE NON-RECURRENT PANSINUSITIS: Primary | ICD-10-CM

## 2025-04-28 DIAGNOSIS — K42.9 UMBILICAL HERNIA WITHOUT OBSTRUCTION AND WITHOUT GANGRENE: ICD-10-CM

## 2025-04-28 PROCEDURE — 99214 OFFICE O/P EST MOD 30 MIN: CPT | Performed by: STUDENT IN AN ORGANIZED HEALTH CARE EDUCATION/TRAINING PROGRAM

## 2025-04-28 PROCEDURE — G2211 COMPLEX E/M VISIT ADD ON: HCPCS | Performed by: STUDENT IN AN ORGANIZED HEALTH CARE EDUCATION/TRAINING PROGRAM

## 2025-04-28 RX ORDER — FLUTICASONE PROPIONATE 50 MCG
1 SPRAY, SUSPENSION (ML) NASAL DAILY
Qty: 16 ML | Refills: 1 | Status: SHIPPED | OUTPATIENT
Start: 2025-04-28

## 2025-04-28 RX ORDER — DOXYCYCLINE 100 MG/1
100 CAPSULE ORAL EVERY 12 HOURS SCHEDULED
Qty: 14 CAPSULE | Refills: 0 | Status: SHIPPED | OUTPATIENT
Start: 2025-04-28 | End: 2025-05-05

## 2025-04-28 NOTE — PROGRESS NOTES
Name: Kaiser Price      : 1945      MRN: [id number]  Encounter Provider: Mary Jo Marroquin MD  Encounter Date: 2025   Encounter department: [facility]  :  Assessment & Plan  Acute non-recurrent pansinusitis    Orders:  •  doxycycline hyclate (VIBRAMYCIN) 100 mg capsule; Take 1 capsule (100 mg total) by mouth every 12 (twelve) hours for 7 days  •  fluticasone (FLONASE) 50 mcg/act nasal spray; 1 spray into each nostril daily    Umbilical hernia without obstruction and without gangrene    Orders:  •  Ambulatory Referral to General Surgery; Future    Essential hypertension  -/80  - Continue Prinzide 10-12.5 mg daily       Paroxysmal atrial fibrillation (HCC)  - Continue Xarelto 20 mg daily              History of Present Illness   HPI    Patient presents with ongoing postnasal drip, nasal congestion and cough.  He notes the cough is productive sometimes with whitish-yellowish sputum.  He does have seasonal allergies.  He has been taking Zyrtec, NyQuil with minimal relief.  He also has been taking Singulair.  Patient also has a umbilical hernia that has been ongoing.  It is retractable and not painful.  He was seen for this in the past but did not require surgery at the time.  He notes lately he has been mindful of it.    Review of Systems   Constitutional:  Negative for activity change, appetite change, chills, fatigue and fever.   HENT:  Positive for congestion, postnasal drip, rhinorrhea and sinus pressure. Negative for ear pain and sore throat.    Respiratory:  Positive for cough. Negative for shortness of breath and wheezing.    Cardiovascular:  Negative for chest pain, palpitations and leg swelling.   Gastrointestinal:  Negative for abdominal pain, constipation, diarrhea, nausea and vomiting.   Neurological:  Positive for headaches. Negative for light-headedness.   Psychiatric/Behavioral:  The patient is not nervous/anxious.        Objective   /80   Pulse 101   " Temp 98 °F (36.7 °C)   Resp 18   Ht 6' 1\" (1.854 m)   Wt 99.1 kg (218 lb 6.4 oz)   SpO2 97%   BMI 28.81 kg/m²      Physical Exam  Constitutional:       Appearance: Normal appearance.   HENT:      Head: Normocephalic and atraumatic.      Right Ear: Tympanic membrane, ear canal and external ear normal.      Left Ear: Tympanic membrane, ear canal and external ear normal.      Nose: Congestion and rhinorrhea present.      Mouth/Throat:      Pharynx: Posterior oropharyngeal erythema present.   Cardiovascular:      Rate and Rhythm: Normal rate and regular rhythm.      Pulses: Normal pulses.      Heart sounds: Normal heart sounds.   Pulmonary:      Effort: Pulmonary effort is normal.      Breath sounds: Normal breath sounds. No wheezing.   Abdominal:      Hernia: A hernia is present. Hernia is present in the umbilical area.   Neurological:      General: No focal deficit present.      Mental Status: He is alert and oriented to person, place, and time.   Psychiatric:         Mood and Affect: Mood normal.         Behavior: Behavior normal.         Thought Content: Thought content normal.         Judgment: Judgment normal.         "

## 2025-04-29 ENCOUNTER — RA CDI HCC (OUTPATIENT)
Dept: OTHER | Facility: HOSPITAL | Age: 80
End: 2025-04-29

## 2025-05-02 ENCOUNTER — TELEPHONE (OUTPATIENT)
Age: 80
End: 2025-05-02

## 2025-05-02 DIAGNOSIS — J01.40 ACUTE NON-RECURRENT PANSINUSITIS: Primary | ICD-10-CM

## 2025-05-02 NOTE — TELEPHONE ENCOUNTER
Left message on machine informing that medication has been sent into the pharmacy.  Questions,  please call our office. No further action required, Fran/TY

## 2025-05-02 NOTE — TELEPHONE ENCOUNTER
Patient was seen by Dr. Marroquin this week for congestion and prescribed doxy.    Wife is calling stating he is nausea and vomiting from the medication.  She would like to know if Augmentin can be called into pharmacy?    Dary Hitchcock

## 2025-05-05 ENCOUNTER — TELEPHONE (OUTPATIENT)
Age: 80
End: 2025-05-05

## 2025-05-05 NOTE — TELEPHONE ENCOUNTER
Xiomy called and cancelled the appointment for this morning   the patient has only been on the new medication a day so the visit is not necessary she will call back if she needs to reschedule

## 2025-05-12 ENCOUNTER — OFFICE VISIT (OUTPATIENT)
Dept: FAMILY MEDICINE CLINIC | Facility: CLINIC | Age: 80
End: 2025-05-12
Payer: MEDICARE

## 2025-05-12 VITALS
SYSTOLIC BLOOD PRESSURE: 138 MMHG | TEMPERATURE: 97.8 F | BODY MASS INDEX: 28.71 KG/M2 | RESPIRATION RATE: 18 BRPM | HEIGHT: 73 IN | HEART RATE: 73 BPM | OXYGEN SATURATION: 96 % | WEIGHT: 216.6 LBS | DIASTOLIC BLOOD PRESSURE: 70 MMHG

## 2025-05-12 DIAGNOSIS — J40 BRONCHITIS: Primary | ICD-10-CM

## 2025-05-12 DIAGNOSIS — E11.65 TYPE 2 DIABETES MELLITUS WITH HYPERGLYCEMIA, WITHOUT LONG-TERM CURRENT USE OF INSULIN (HCC): ICD-10-CM

## 2025-05-12 DIAGNOSIS — R53.83 OTHER FATIGUE: ICD-10-CM

## 2025-05-12 DIAGNOSIS — J45.40 MODERATE PERSISTENT ASTHMA WITHOUT COMPLICATION: ICD-10-CM

## 2025-05-12 DIAGNOSIS — I48.0 PAROXYSMAL ATRIAL FIBRILLATION (HCC): ICD-10-CM

## 2025-05-12 PROCEDURE — G2211 COMPLEX E/M VISIT ADD ON: HCPCS | Performed by: FAMILY MEDICINE

## 2025-05-12 PROCEDURE — 99214 OFFICE O/P EST MOD 30 MIN: CPT | Performed by: FAMILY MEDICINE

## 2025-05-12 RX ORDER — LEVOFLOXACIN 250 MG/1
250 TABLET, FILM COATED ORAL DAILY
Qty: 10 TABLET | Refills: 0 | Status: SHIPPED | OUTPATIENT
Start: 2025-05-12 | End: 2025-05-22

## 2025-05-12 RX ORDER — PREDNISONE 20 MG/1
TABLET ORAL
Qty: 14 TABLET | Refills: 0 | Status: SHIPPED | OUTPATIENT
Start: 2025-05-12

## 2025-05-12 NOTE — ASSESSMENT & PLAN NOTE
Lab Results   Component Value Date    HGBA1C 7.2 (H) 03/10/2025       Orders:  •  Lipid panel; Future  •  Hemoglobin A1C; Future

## 2025-05-12 NOTE — PATIENT INSTRUCTIONS
PLENTY FLUIDS  REST  MUCINEX  MEDICATION AS DIRECTED  IF SYMPTOMS PERSIST OR WORSEN, PLEASE CALL  CHEST X RAY  BW    RV 2 WEEKS

## 2025-05-12 NOTE — PROGRESS NOTES
Name: Kaiser Price      : 1945      MRN: [id number]  Encounter Provider: Scott Padron MD  Encounter Date: 2025   Encounter department: [facility]    Assessment & Plan  Bronchitis    COUGH AND CONGESTION CONTINUE  FINISHED AUGMENTIN TODAY  NO FEVER OR CHILLS  COUGH PRODUCTIVE  NO NVD  A LOT OF CONGESTION    Orders:  •  levofloxacin (LEVAQUIN) 250 mg tablet; Take 1 tablet (250 mg total) by mouth daily for 10 days  •  predniSONE 20 mg tablet; 2 PO QD X 4 DAYS, THEN 1 PO QD X 4 DAYS, THEN 1/2 PO QD X 4 DAYS  •  XR chest pa and lateral; Future    Paroxysmal atrial fibrillation (HCC)    Orders:  •  TSH, 3rd generation; Future    Moderate persistent asthma without complication         Type 2 diabetes mellitus with hyperglycemia, without long-term current use of insulin (HCC)    Lab Results   Component Value Date    HGBA1C 7.2 (H) 03/10/2025       Orders:  •  Lipid panel; Future  •  Hemoglobin A1C; Future    Other fatigue    C/O A LOT OF FATIGUE  SOME INCREASED ACHINESS  NO FEVER OR CHILLS  HX OF ANEMIA    Orders:  •  CBC and differential; Future  •  Comprehensive metabolic panel; Future  •  Lipid panel; Future  •  Hemoglobin A1C; Future  •  TSH, 3rd generation; Future  •  Sedimentation rate, automated; Future  •  C-reactive protein; Future  •  Albumin / creatinine urine ratio; Future         History of Present Illness     COUGH AND FATIGUE      Review of Systems   Constitutional:  Positive for activity change, appetite change and fatigue. Negative for chills and fever.   HENT:  Negative for congestion, ear discharge, ear pain, mouth sores, postnasal drip, sore throat and trouble swallowing.    Eyes:  Negative for pain, discharge and visual disturbance.   Respiratory:  Negative for cough, shortness of breath and wheezing.    Cardiovascular:  Negative for chest pain, palpitations and leg swelling.   Gastrointestinal:  Negative for abdominal distention, abdominal pain,  blood in stool, diarrhea and nausea.   Endocrine: Negative for polydipsia, polyphagia and polyuria.   Genitourinary:  Negative for dysuria, frequency, hematuria and urgency.   Musculoskeletal:  Negative for arthralgias, gait problem and joint swelling.   Skin:  Negative for pallor and rash.   Neurological:  Negative for dizziness, syncope, speech difficulty, weakness, light-headedness, numbness and headaches.   Hematological:  Negative for adenopathy.   Psychiatric/Behavioral:  Negative for behavioral problems, confusion and sleep disturbance. The patient is not nervous/anxious.      Past Medical History:   Diagnosis Date   • Anxiety    • Arthritis     fingers , knee   • Asthma    • BPH (benign prostatic hypertrophy)    • Cataract     currently left eye- to have surgery on 4/10/17   • Cough variant asthma 2017   • Diabetes mellitus (HCC)     type 2, last assessed 2017   • Encounter for immunization 2023   • Hernia, umbilical     currently has   • HL (hearing loss)     b/l hearing aids   • Labyrinthitis    • Moderate obstructive sleep apnea 2017   • New onset a-fib (HCC) 01/10/2020   • Obesity with body mass index 30 or greater 2019   • Umbilical hernia      Past Surgical History:   Procedure Laterality Date   • CATARACT EXTRACTION Right 2017   • COLONOSCOPY      yrs ago   • EYE SURGERY Bilateral     cataracts with IOL     Family History   Problem Relation Age of Onset   • Cancer Mother         ovarian   • Diabetes Father    • Cancer Sister         stomach to brain   • Substance Abuse Family    • Substance Abuse Son    • Alcohol abuse Son    • Mental illness Neg Hx      Social History     Tobacco Use   • Smoking status: Former     Current packs/day: 0.00     Average packs/day: 0.5 packs/day for 15.0 years (7.5 ttl pk-yrs)     Types: Cigarettes     Start date: 1968     Quit date: 1983     Years since quittin.3     Passive exposure: Past   • Smokeless tobacco: Never    Vaping Use   • Vaping status: Never Used   Substance and Sexual Activity   • Alcohol use: Not Currently     Alcohol/week: 3.0 standard drinks of alcohol     Types: 3 Standard drinks or equivalent per week     Comment: socially   • Drug use: Yes     Types: Marijuana     Comment: most everyday    • Sexual activity: Not Currently     Partners: Female     Current Outpatient Medications on File Prior to Visit   Medication Sig   • atorvastatin (LIPITOR) 20 mg tablet Take 1 tablet (20 mg total) by mouth daily at bedtime   • ferrous sulfate 324 (65 Fe) mg Take 1 tablet (324 mg total) by mouth 2 (two) times a day before meals   • finasteride (PROSCAR) 5 mg tablet Take 1 tablet (5 mg total) by mouth daily   • fluticasone (FLONASE) 50 mcg/act nasal spray 1 spray into each nostril daily   • Glucosamine-Chondroitin (GLUCOSAMINE CHONDR COMPLEX PO) Take by mouth 2 (two) times a day   • lisinopril-hydrochlorothiazide (PRINZIDE,ZESTORETIC) 10-12.5 MG per tablet TAKE ONE TABLET BY MOUTH EVERY DAY (GENERIC FOR ZESTORETIC)   • metFORMIN (GLUCOPHAGE) 500 mg tablet Take 2 tablets (1,000 mg total) by mouth 2 (two) times a day with meals   • Misc Natural Products (PRO NUTRIENTS FRUIT & VEGGIE PO) Take by mouth   • montelukast (SINGULAIR) 10 mg tablet Take 1 tablet (10 mg total) by mouth daily at bedtime   • Probiotic Product (PROBIOTIC BLEND PO) Take 1 tablet by mouth daily   • rivaroxaban (XARELTO) 20 mg tablet Take 1 tablet (20 mg total) by mouth in the morning   • sertraline (ZOLOFT) 100 mg tablet TAKE ONE TABLET BY MOUTH EVERY DAY   • tamsulosin (FLOMAX) 0.4 mg Take 1 capsule (0.4 mg total) by mouth daily at bedtime     Allergies   Allergen Reactions   • Ceftin [Cefuroxime] GI Intolerance and Vomiting     Immunization History   Administered Date(s) Administered   • COVID-19 PFIZER VACCINE 0.3 ML IM 02/23/2021, 03/16/2021   • H1N1, All Formulations 12/03/2009   • INFLUENZA 01/05/2018, 09/02/2019   • Influenza Split High Dose  "Preservative Free IM 09/07/2016, 01/10/2018, 12/03/2024   • Influenza, high dose seasonal 0.7 mL 12/09/2022, 11/13/2023   • Influenza, seasonal, injectable 11/03/2010, 10/06/2015   • Meningococcal C/Y-HIB PRP 12/03/2009   • Pneumococcal Conjugate 13-Valent 09/07/2016   • Pneumococcal Polysaccharide PPV23 11/03/2010   • Tdap 11/03/2010     Objective   /70   Pulse 73   Temp 97.8 °F (36.6 °C)   Resp 18   Ht 6' 1\" (1.854 m)   Wt 98.2 kg (216 lb 9.6 oz)   SpO2 96%   BMI 28.58 kg/m²     Physical Exam  Vitals reviewed.   Constitutional:       General: He is not in acute distress.     Appearance: Normal appearance. He is well-developed and normal weight. He is not ill-appearing.   HENT:      Head: Normocephalic and atraumatic.      Nose: Congestion present.      Mouth/Throat:      Mouth: Mucous membranes are moist.      Pharynx: Posterior oropharyngeal erythema present.   Eyes:      General:         Right eye: No discharge.         Left eye: No discharge.      Conjunctiva/sclera: Conjunctivae normal.      Pupils: Pupils are equal, round, and reactive to light.   Neck:      Thyroid: No thyromegaly.      Vascular: No JVD.   Cardiovascular:      Rate and Rhythm: Normal rate and regular rhythm.      Heart sounds: Normal heart sounds. No murmur heard.  Pulmonary:      Effort: Pulmonary effort is normal.      Breath sounds: Wheezing, rhonchi and rales present.   Abdominal:      General: Bowel sounds are normal.      Palpations: Abdomen is soft. There is no mass.      Tenderness: There is no abdominal tenderness. There is no guarding or rebound.   Musculoskeletal:         General: No tenderness or deformity.      Cervical back: Neck supple.      Comments: MILD DJD CHANGES   Lymphadenopathy:      Cervical: No cervical adenopathy.   Skin:     General: Skin is warm and dry.      Findings: No erythema or rash.   Neurological:      General: No focal deficit present.      Mental Status: He is alert and oriented to person, " place, and time.   Psychiatric:         Mood and Affect: Mood normal.         Behavior: Behavior normal.         Thought Content: Thought content normal.         Judgment: Judgment normal.

## 2025-05-19 ENCOUNTER — CONSULT (OUTPATIENT)
Age: 80
End: 2025-05-19
Attending: STUDENT IN AN ORGANIZED HEALTH CARE EDUCATION/TRAINING PROGRAM
Payer: MEDICARE

## 2025-05-19 VITALS
HEIGHT: 73 IN | BODY MASS INDEX: 28.76 KG/M2 | OXYGEN SATURATION: 98 % | TEMPERATURE: 96.7 F | HEART RATE: 82 BPM | SYSTOLIC BLOOD PRESSURE: 122 MMHG | DIASTOLIC BLOOD PRESSURE: 72 MMHG | WEIGHT: 217 LBS

## 2025-05-19 DIAGNOSIS — Z01.818 PREOPERATIVE EXAMINATION: Primary | ICD-10-CM

## 2025-05-19 DIAGNOSIS — K42.9 UMBILICAL HERNIA: ICD-10-CM

## 2025-05-19 PROCEDURE — 99204 OFFICE O/P NEW MOD 45 MIN: CPT | Performed by: SURGERY

## 2025-05-19 NOTE — H&P
Name: Kaiser Price      : 1945      MRN: [id number]  Encounter Provider: Bryn Campbell MD  Encounter Date: 2025   Encounter department: [facility]  :  Assessment & Plan    1.  CMP 10 March 2025 largely unremarkable.  Hemoglobin A1c 7.2.  2.  I will order a CBC to complete preop workup.  3.  EKG and cardiology notes reviewed 2025  4.  Plan umbilical hernia repair will likely require mesh due to 2.5 cm in greatest diameter  5.  Stop Xarelto 2 days prior to surgery    History of Present Illness  HPI  Kaiser Price is a 79 y.o. male who presents with a referral from Dr. Marroquin for his umbilical hernia.  Patient states has been there for quite some time.  He was seen previously by us and now desires repair.  Medical issues include paroxysmal A-fib on Xarelto, recent episode of bronchitis, and type 2 diabetes.    Patient just had cardiology follow-up 2025 with no interval change      Review of Systems   Constitutional:  Negative for chills and fever.   Respiratory:          Recovering bronchitis.  3 more days of steroids and antibiotics   Cardiovascular:         Paroxysmal A-fib on Xarelto.  No significant change   Gastrointestinal: Negative.    Genitourinary: Negative.    Musculoskeletal: Negative.    Neurological: Negative.    Hematological:         On Xarelto   All other systems reviewed and are negative.    Medical History Reviewed by provider this encounter:     .  Past Medical History  Past Medical History:   Diagnosis Date    Anxiety     Arthritis     fingers , knee    Asthma     BPH (benign prostatic hypertrophy)     Cataract     currently left eye- to have surgery on 4/10/17    Cough variant asthma 2017    Diabetes mellitus (HCC)     type 2, last assessed 2017    Encounter for immunization 2023    Hernia, umbilical     currently has    HL (hearing loss)     b/l hearing aids    Labyrinthitis     Moderate obstructive sleep apnea  04/11/2017    New onset a-fib (HCC) 01/10/2020    Obesity with body mass index 30 or greater 11/04/2019    Umbilical hernia      Past Surgical History:   Procedure Laterality Date    CATARACT EXTRACTION Right 04/04/2017    COLONOSCOPY      yrs ago    EYE SURGERY Bilateral     cataracts with IOL     Family History   Problem Relation Age of Onset    Cancer Mother         ovarian    Diabetes Father     Cancer Sister         stomach to brain    Substance Abuse Family     Substance Abuse Son     Alcohol abuse Son     Mental illness Neg Hx       reports that he quit smoking about 42 years ago. His smoking use included cigarettes. He started smoking about 57 years ago. He has a 7.5 pack-year smoking history. He has been exposed to tobacco smoke. He has never used smokeless tobacco. He reports that he does not currently use alcohol after a past usage of about 3.0 standard drinks of alcohol per week. He reports current drug use. Drug: Marijuana.  Current Outpatient Medications   Medication Instructions    atorvastatin (LIPITOR) 20 mg, Oral, Daily at bedtime    ferrous sulfate 324 mg, Oral, 2 times daily before meals    finasteride (PROSCAR) 5 mg, Oral, Daily    fluticasone (FLONASE) 50 mcg/act nasal spray 1 spray, Nasal, Daily    Glucosamine-Chondroitin (GLUCOSAMINE CHONDR COMPLEX PO) 2 times daily    levofloxacin (LEVAQUIN) 250 mg, Oral, Daily    lisinopril-hydrochlorothiazide (PRINZIDE,ZESTORETIC) 10-12.5 MG per tablet TAKE ONE TABLET BY MOUTH EVERY DAY (GENERIC FOR ZESTORETIC)    metFORMIN (GLUCOPHAGE) 1,000 mg, Oral, 2 times daily with meals    Misc Natural Products (PRO NUTRIENTS FRUIT & VEGGIE PO) Take by mouth    montelukast (SINGULAIR) 10 mg, Oral, Daily at bedtime    predniSONE 20 mg tablet 2 PO QD X 4 DAYS, THEN 1 PO QD X 4 DAYS, THEN 1/2 PO QD X 4 DAYS    Probiotic Product (PROBIOTIC BLEND PO) 1 tablet, Daily    rivaroxaban (XARELTO) 20 mg, Oral, Daily    sertraline (ZOLOFT) 100 mg, Oral, Daily    tamsulosin  (FLOMAX) 0.4 mg, Oral, Daily at bedtime   Allergies[1]   Medications Ordered Prior to Encounter[2]   Social History     Tobacco Use    Smoking status: Former     Current packs/day: 0.00     Average packs/day: 0.5 packs/day for 15.0 years (7.5 ttl pk-yrs)     Types: Cigarettes     Start date: 1968     Quit date: 1983     Years since quittin.4     Passive exposure: Past    Smokeless tobacco: Never   Vaping Use    Vaping status: Never Used   Substance and Sexual Activity    Alcohol use: Not Currently     Alcohol/week: 3.0 standard drinks of alcohol     Types: 3 Standard drinks or equivalent per week     Comment: socially    Drug use: Yes     Types: Marijuana     Comment: most everyday     Sexual activity: Not Currently     Partners: Female        Objective  There were no vitals taken for this visit.     Physical Exam  Vitals reviewed.   Constitutional:       General: He is not in acute distress.     Appearance: Normal appearance.     Cardiovascular:      Rate and Rhythm: Normal rate.   Pulmonary:      Effort: Pulmonary effort is normal.   Abdominal:      General: There is no distension.      Palpations: Abdomen is soft.      Tenderness: There is no abdominal tenderness.      Comments: Reducible umbilical hernia.  Defect is 2.5 cm in greatest diameter     Musculoskeletal:         General: Normal range of motion.     Skin:     General: Skin is warm and dry.     Neurological:      Mental Status: He is alert.                    [1]   Allergies  Allergen Reactions    Ceftin [Cefuroxime] GI Intolerance and Vomiting   [2]   Current Outpatient Medications on File Prior to Visit   Medication Sig Dispense Refill    atorvastatin (LIPITOR) 20 mg tablet Take 1 tablet (20 mg total) by mouth daily at bedtime 90 tablet 3    ferrous sulfate 324 (65 Fe) mg Take 1 tablet (324 mg total) by mouth 2 (two) times a day before meals 60 tablet 3    finasteride (PROSCAR) 5 mg tablet Take 1 tablet (5 mg total) by mouth daily 90  tablet 3    fluticasone (FLONASE) 50 mcg/act nasal spray 1 spray into each nostril daily 16 mL 1    Glucosamine-Chondroitin (GLUCOSAMINE CHONDR COMPLEX PO) Take by mouth 2 (two) times a day      levofloxacin (LEVAQUIN) 250 mg tablet Take 1 tablet (250 mg total) by mouth daily for 10 days 10 tablet 0    lisinopril-hydrochlorothiazide (PRINZIDE,ZESTORETIC) 10-12.5 MG per tablet TAKE ONE TABLET BY MOUTH EVERY DAY (GENERIC FOR ZESTORETIC) 30 tablet 5    metFORMIN (GLUCOPHAGE) 500 mg tablet Take 2 tablets (1,000 mg total) by mouth 2 (two) times a day with meals 360 tablet 3    Misc Natural Products (PRO NUTRIENTS FRUIT & VEGGIE PO) Take by mouth      montelukast (SINGULAIR) 10 mg tablet Take 1 tablet (10 mg total) by mouth daily at bedtime 90 tablet 3    predniSONE 20 mg tablet 2 PO QD X 4 DAYS, THEN 1 PO QD X 4 DAYS, THEN 1/2 PO QD X 4 DAYS 14 tablet 0    Probiotic Product (PROBIOTIC BLEND PO) Take 1 tablet by mouth daily      rivaroxaban (XARELTO) 20 mg tablet Take 1 tablet (20 mg total) by mouth in the morning 90 tablet 1    sertraline (ZOLOFT) 100 mg tablet TAKE ONE TABLET BY MOUTH EVERY DAY 90 tablet 1    tamsulosin (FLOMAX) 0.4 mg Take 1 capsule (0.4 mg total) by mouth daily at bedtime 90 capsule 3     No current facility-administered medications on file prior to visit.

## 2025-05-19 NOTE — PROGRESS NOTES
Name: Kaiser Price      : 1945      MRN: [id number]  Encounter Provider: Bryn Campbell MD  Encounter Date: 2025   Encounter department: [facility] GENERAL SURGERY [facility]  :  Assessment & Plan    1.  CMP 10 March 2025 largely unremarkable.  Hemoglobin A1c 7.2.  2.  I will order a CBC to complete preop workup.  3.  EKG and cardiology notes reviewed 2025  4.  Plan umbilical hernia repair will likely require mesh due to 2.5 cm in greatest diameter  5.  Stop Xarelto 2 days prior to surgery    History of Present Illness   HPI  Kaiser Price is a 79 y.o. male who presents with a referral from Dr. Marroquin for his umbilical hernia.  Patient states has been there for quite some time.  He was seen previously by us and now desires repair.  Medical issues include paroxysmal A-fib on Xarelto, recent episode of bronchitis, and type 2 diabetes.    Patient just had cardiology follow-up 2025 with no interval change      Review of Systems   Constitutional:  Negative for chills and fever.   Respiratory:          Recovering bronchitis.  3 more days of steroids and antibiotics   Cardiovascular:         Paroxysmal A-fib on Xarelto.  No significant change   Gastrointestinal: Negative.    Genitourinary: Negative.    Musculoskeletal: Negative.    Neurological: Negative.    Hematological:         On Xarelto   All other systems reviewed and are negative.    Medical History Reviewed by provider this encounter:     .  Past Medical History   Past Medical History:   Diagnosis Date    Anxiety     Arthritis     fingers , knee    Asthma     BPH (benign prostatic hypertrophy)     Cataract     currently left eye- to have surgery on 4/10/17    Cough variant asthma 2017    Diabetes mellitus (HCC)     type 2, last assessed 2017    Encounter for immunization 2023    Hernia, umbilical     currently has    HL (hearing loss)     b/l hearing aids    Labyrinthitis     Moderate obstructive sleep apnea  04/11/2017    New onset a-fib (HCC) 01/10/2020    Obesity with body mass index 30 or greater 11/04/2019    Umbilical hernia      Past Surgical History:   Procedure Laterality Date    CATARACT EXTRACTION Right 04/04/2017    COLONOSCOPY      yrs ago    EYE SURGERY Bilateral     cataracts with IOL     Family History   Problem Relation Age of Onset    Cancer Mother         ovarian    Diabetes Father     Cancer Sister         stomach to brain    Substance Abuse Family     Substance Abuse Son     Alcohol abuse Son     Mental illness Neg Hx       reports that he quit smoking about 42 years ago. His smoking use included cigarettes. He started smoking about 57 years ago. He has a 7.5 pack-year smoking history. He has been exposed to tobacco smoke. He has never used smokeless tobacco. He reports that he does not currently use alcohol after a past usage of about 3.0 standard drinks of alcohol per week. He reports current drug use. Drug: Marijuana.  Current Outpatient Medications   Medication Instructions    atorvastatin (LIPITOR) 20 mg, Oral, Daily at bedtime    ferrous sulfate 324 mg, Oral, 2 times daily before meals    finasteride (PROSCAR) 5 mg, Oral, Daily    fluticasone (FLONASE) 50 mcg/act nasal spray 1 spray, Nasal, Daily    Glucosamine-Chondroitin (GLUCOSAMINE CHONDR COMPLEX PO) 2 times daily    levofloxacin (LEVAQUIN) 250 mg, Oral, Daily    lisinopril-hydrochlorothiazide (PRINZIDE,ZESTORETIC) 10-12.5 MG per tablet TAKE ONE TABLET BY MOUTH EVERY DAY (GENERIC FOR ZESTORETIC)    metFORMIN (GLUCOPHAGE) 1,000 mg, Oral, 2 times daily with meals    Misc Natural Products (PRO NUTRIENTS FRUIT & VEGGIE PO) Take by mouth    montelukast (SINGULAIR) 10 mg, Oral, Daily at bedtime    predniSONE 20 mg tablet 2 PO QD X 4 DAYS, THEN 1 PO QD X 4 DAYS, THEN 1/2 PO QD X 4 DAYS    Probiotic Product (PROBIOTIC BLEND PO) 1 tablet, Daily    rivaroxaban (XARELTO) 20 mg, Oral, Daily    sertraline (ZOLOFT) 100 mg, Oral, Daily    tamsulosin  (FLOMAX) 0.4 mg, Oral, Daily at bedtime   Allergies[1]   Medications Ordered Prior to Encounter[2]   Social History     Tobacco Use    Smoking status: Former     Current packs/day: 0.00     Average packs/day: 0.5 packs/day for 15.0 years (7.5 ttl pk-yrs)     Types: Cigarettes     Start date: 1968     Quit date: 1983     Years since quittin.4     Passive exposure: Past    Smokeless tobacco: Never   Vaping Use    Vaping status: Never Used   Substance and Sexual Activity    Alcohol use: Not Currently     Alcohol/week: 3.0 standard drinks of alcohol     Types: 3 Standard drinks or equivalent per week     Comment: socially    Drug use: Yes     Types: Marijuana     Comment: most everyday     Sexual activity: Not Currently     Partners: Female        Objective   There were no vitals taken for this visit.     Physical Exam  Vitals reviewed.   Constitutional:       General: He is not in acute distress.     Appearance: Normal appearance.     Cardiovascular:      Rate and Rhythm: Normal rate.   Pulmonary:      Effort: Pulmonary effort is normal.   Abdominal:      General: There is no distension.      Palpations: Abdomen is soft.      Tenderness: There is no abdominal tenderness.      Comments: Reducible umbilical hernia.  Defect is 2.5 cm in greatest diameter     Musculoskeletal:         General: Normal range of motion.     Skin:     General: Skin is warm and dry.     Neurological:      Mental Status: He is alert.                  [1]   Allergies  Allergen Reactions    Ceftin [Cefuroxime] GI Intolerance and Vomiting   [2]   Current Outpatient Medications on File Prior to Visit   Medication Sig Dispense Refill    atorvastatin (LIPITOR) 20 mg tablet Take 1 tablet (20 mg total) by mouth daily at bedtime 90 tablet 3    ferrous sulfate 324 (65 Fe) mg Take 1 tablet (324 mg total) by mouth 2 (two) times a day before meals 60 tablet 3    finasteride (PROSCAR) 5 mg tablet Take 1 tablet (5 mg total) by mouth daily 90 tablet  3    fluticasone (FLONASE) 50 mcg/act nasal spray 1 spray into each nostril daily 16 mL 1    Glucosamine-Chondroitin (GLUCOSAMINE CHONDR COMPLEX PO) Take by mouth 2 (two) times a day      levofloxacin (LEVAQUIN) 250 mg tablet Take 1 tablet (250 mg total) by mouth daily for 10 days 10 tablet 0    lisinopril-hydrochlorothiazide (PRINZIDE,ZESTORETIC) 10-12.5 MG per tablet TAKE ONE TABLET BY MOUTH EVERY DAY (GENERIC FOR ZESTORETIC) 30 tablet 5    metFORMIN (GLUCOPHAGE) 500 mg tablet Take 2 tablets (1,000 mg total) by mouth 2 (two) times a day with meals 360 tablet 3    Misc Natural Products (PRO NUTRIENTS FRUIT & VEGGIE PO) Take by mouth      montelukast (SINGULAIR) 10 mg tablet Take 1 tablet (10 mg total) by mouth daily at bedtime 90 tablet 3    predniSONE 20 mg tablet 2 PO QD X 4 DAYS, THEN 1 PO QD X 4 DAYS, THEN 1/2 PO QD X 4 DAYS 14 tablet 0    Probiotic Product (PROBIOTIC BLEND PO) Take 1 tablet by mouth daily      rivaroxaban (XARELTO) 20 mg tablet Take 1 tablet (20 mg total) by mouth in the morning 90 tablet 1    sertraline (ZOLOFT) 100 mg tablet TAKE ONE TABLET BY MOUTH EVERY DAY 90 tablet 1    tamsulosin (FLOMAX) 0.4 mg Take 1 capsule (0.4 mg total) by mouth daily at bedtime 90 capsule 3     No current facility-administered medications on file prior to visit.

## 2025-05-21 ENCOUNTER — DOCUMENTATION (OUTPATIENT)
Dept: ADMINISTRATIVE | Facility: OTHER | Age: 80
End: 2025-05-21

## 2025-05-21 NOTE — PROGRESS NOTES
05/21/25 1:00 PM    Annual Wellness Visit outreach is not required, patient seen in last 3 months.     Thank you.  Thomas Marie MA  [facility]

## 2025-05-27 ENCOUNTER — APPOINTMENT (OUTPATIENT)
Dept: RADIOLOGY | Facility: CLINIC | Age: 80
End: 2025-05-27
Payer: MEDICARE

## 2025-05-27 ENCOUNTER — APPOINTMENT (OUTPATIENT)
Dept: LAB | Facility: CLINIC | Age: 80
End: 2025-05-27
Payer: MEDICARE

## 2025-05-27 DIAGNOSIS — Z01.818 PREOPERATIVE EXAMINATION: ICD-10-CM

## 2025-05-27 DIAGNOSIS — J40 BRONCHITIS: ICD-10-CM

## 2025-05-27 DIAGNOSIS — E11.65 TYPE 2 DIABETES MELLITUS WITH HYPERGLYCEMIA, WITHOUT LONG-TERM CURRENT USE OF INSULIN (HCC): ICD-10-CM

## 2025-05-27 DIAGNOSIS — R53.83 OTHER FATIGUE: ICD-10-CM

## 2025-05-27 DIAGNOSIS — I48.0 PAROXYSMAL ATRIAL FIBRILLATION (HCC): ICD-10-CM

## 2025-05-27 LAB
ALBUMIN SERPL BCG-MCNC: 4.1 G/DL (ref 3.5–5)
ALP SERPL-CCNC: 49 U/L (ref 34–104)
ALT SERPL W P-5'-P-CCNC: 19 U/L (ref 7–52)
ANION GAP SERPL CALCULATED.3IONS-SCNC: 8 MMOL/L (ref 4–13)
AST SERPL W P-5'-P-CCNC: 21 U/L (ref 13–39)
BASOPHILS # BLD AUTO: 0.07 THOUSANDS/ÂΜL (ref 0–0.1)
BASOPHILS NFR BLD AUTO: 1 % (ref 0–1)
BILIRUB SERPL-MCNC: 0.42 MG/DL (ref 0.2–1)
BUN SERPL-MCNC: 35 MG/DL (ref 5–25)
CALCIUM SERPL-MCNC: 9.5 MG/DL (ref 8.4–10.2)
CHLORIDE SERPL-SCNC: 107 MMOL/L (ref 96–108)
CHOLEST SERPL-MCNC: 151 MG/DL (ref ?–200)
CO2 SERPL-SCNC: 27 MMOL/L (ref 21–32)
CREAT SERPL-MCNC: 1.02 MG/DL (ref 0.6–1.3)
CREAT UR-MCNC: 54.9 MG/DL
CRP SERPL QL: 3.4 MG/L
EOSINOPHIL # BLD AUTO: 0.21 THOUSAND/ÂΜL (ref 0–0.61)
EOSINOPHIL NFR BLD AUTO: 2 % (ref 0–6)
ERYTHROCYTE [DISTWIDTH] IN BLOOD BY AUTOMATED COUNT: 14.5 % (ref 11.6–15.1)
ERYTHROCYTE [SEDIMENTATION RATE] IN BLOOD: 37 MM/HOUR (ref 0–19)
EST. AVERAGE GLUCOSE BLD GHB EST-MCNC: 163 MG/DL
GFR SERPL CREATININE-BSD FRML MDRD: 68 ML/MIN/1.73SQ M
GLUCOSE P FAST SERPL-MCNC: 164 MG/DL (ref 65–99)
HBA1C MFR BLD: 7.3 %
HCT VFR BLD AUTO: 35.4 % (ref 36.5–49.3)
HDLC SERPL-MCNC: 65 MG/DL
HGB BLD-MCNC: 10.9 G/DL (ref 12–17)
IMM GRANULOCYTES # BLD AUTO: 0.04 THOUSAND/UL (ref 0–0.2)
IMM GRANULOCYTES NFR BLD AUTO: 0 % (ref 0–2)
LDLC SERPL CALC-MCNC: 57 MG/DL (ref 0–100)
LYMPHOCYTES # BLD AUTO: 2.68 THOUSANDS/ÂΜL (ref 0.6–4.47)
LYMPHOCYTES NFR BLD AUTO: 30 % (ref 14–44)
MCH RBC QN AUTO: 30.4 PG (ref 26.8–34.3)
MCHC RBC AUTO-ENTMCNC: 30.8 G/DL (ref 31.4–37.4)
MCV RBC AUTO: 99 FL (ref 82–98)
MICROALBUMIN UR-MCNC: 13.9 MG/L
MICROALBUMIN/CREAT 24H UR: 25 MG/G CREATININE (ref 0–30)
MONOCYTES # BLD AUTO: 0.62 THOUSAND/ÂΜL (ref 0.17–1.22)
MONOCYTES NFR BLD AUTO: 7 % (ref 4–12)
NEUTROPHILS # BLD AUTO: 5.29 THOUSANDS/ÂΜL (ref 1.85–7.62)
NEUTS SEG NFR BLD AUTO: 60 % (ref 43–75)
NONHDLC SERPL-MCNC: 86 MG/DL
NRBC BLD AUTO-RTO: 0 /100 WBCS
PLATELET # BLD AUTO: 246 THOUSANDS/UL (ref 149–390)
PMV BLD AUTO: 10.7 FL (ref 8.9–12.7)
POTASSIUM SERPL-SCNC: 5.8 MMOL/L (ref 3.5–5.3)
PROT SERPL-MCNC: 7 G/DL (ref 6.4–8.4)
RBC # BLD AUTO: 3.58 MILLION/UL (ref 3.88–5.62)
SODIUM SERPL-SCNC: 142 MMOL/L (ref 135–147)
TRIGL SERPL-MCNC: 147 MG/DL (ref ?–150)
TSH SERPL DL<=0.05 MIU/L-ACNC: 1.73 UIU/ML (ref 0.45–4.5)
WBC # BLD AUTO: 8.91 THOUSAND/UL (ref 4.31–10.16)

## 2025-05-27 PROCEDURE — 82043 UR ALBUMIN QUANTITATIVE: CPT

## 2025-05-27 PROCEDURE — 85652 RBC SED RATE AUTOMATED: CPT

## 2025-05-27 PROCEDURE — 84443 ASSAY THYROID STIM HORMONE: CPT

## 2025-05-27 PROCEDURE — 36415 COLL VENOUS BLD VENIPUNCTURE: CPT

## 2025-05-27 PROCEDURE — 83036 HEMOGLOBIN GLYCOSYLATED A1C: CPT

## 2025-05-27 PROCEDURE — 71046 X-RAY EXAM CHEST 2 VIEWS: CPT

## 2025-05-27 PROCEDURE — 85025 COMPLETE CBC W/AUTO DIFF WBC: CPT

## 2025-05-27 PROCEDURE — 82570 ASSAY OF URINE CREATININE: CPT

## 2025-05-27 PROCEDURE — 80061 LIPID PANEL: CPT

## 2025-05-27 PROCEDURE — 80053 COMPREHEN METABOLIC PANEL: CPT

## 2025-05-27 PROCEDURE — 86140 C-REACTIVE PROTEIN: CPT

## 2025-05-29 ENCOUNTER — OFFICE VISIT (OUTPATIENT)
Dept: FAMILY MEDICINE CLINIC | Facility: CLINIC | Age: 80
End: 2025-05-29
Payer: MEDICARE

## 2025-05-29 VITALS
RESPIRATION RATE: 20 BRPM | WEIGHT: 220 LBS | HEART RATE: 108 BPM | HEIGHT: 73 IN | TEMPERATURE: 96.4 F | DIASTOLIC BLOOD PRESSURE: 72 MMHG | BODY MASS INDEX: 29.16 KG/M2 | SYSTOLIC BLOOD PRESSURE: 132 MMHG | OXYGEN SATURATION: 97 %

## 2025-05-29 DIAGNOSIS — D64.9 ANEMIA, UNSPECIFIED TYPE: ICD-10-CM

## 2025-05-29 DIAGNOSIS — J98.4 RESTRICTIVE LUNG DISEASE: ICD-10-CM

## 2025-05-29 DIAGNOSIS — I48.0 PAROXYSMAL ATRIAL FIBRILLATION (HCC): ICD-10-CM

## 2025-05-29 DIAGNOSIS — J40 BRONCHITIS: Primary | ICD-10-CM

## 2025-05-29 DIAGNOSIS — J45.40 MODERATE PERSISTENT ASTHMA WITHOUT COMPLICATION: ICD-10-CM

## 2025-05-29 PROCEDURE — G2211 COMPLEX E/M VISIT ADD ON: HCPCS | Performed by: FAMILY MEDICINE

## 2025-05-29 PROCEDURE — 99213 OFFICE O/P EST LOW 20 MIN: CPT | Performed by: FAMILY MEDICINE

## 2025-05-29 RX ORDER — FLUTICASONE PROPIONATE AND SALMETEROL 250; 50 UG/1; UG/1
1 POWDER RESPIRATORY (INHALATION) 2 TIMES DAILY
Qty: 60 BLISTER | Refills: 1 | Status: SHIPPED | OUTPATIENT
Start: 2025-05-29

## 2025-05-29 NOTE — PROGRESS NOTES
"Name: Kaiser Price      : 1945      MRN: [id number]  Encounter Provider: Scott Padron MD  Encounter Date: 2025   Encounter department: [facility]  :  Assessment & Plan  Bronchitis    COUGH MUCH IMPROVED  STILL SOME MILD SPUTUM PRODUCTION  SOME MILD LACKEY  NO FEVER OR CHILLS  NO NVD      REVIEWED CXR RESULTS      Orders:  •  Fluticasone-Salmeterol (Advair Diskus) 250-50 mcg/dose inhaler; Inhale 1 puff 2 (two) times a day Rinse mouth after use.    Moderate persistent asthma without complication    Orders:  •  Fluticasone-Salmeterol (Advair Diskus) 250-50 mcg/dose inhaler; Inhale 1 puff 2 (two) times a day Rinse mouth after use.    Restrictive lung disease    Orders:  •  Fluticasone-Salmeterol (Advair Diskus) 250-50 mcg/dose inhaler; Inhale 1 puff 2 (two) times a day Rinse mouth after use.    Anemia, unspecified type  HGB SL IMPROVED    CONTINUE IRON SUPPLEMENT  RE CHECK 4 WEEKS               Paroxysmal atrial fibrillation (HCC)                History of Present Illness   FOLLOW UP      Review of Systems   Constitutional:  Negative for chills, fatigue and fever.   HENT:  Negative for sore throat.    Eyes:  Negative for discharge.   Respiratory:  Positive for cough and shortness of breath. Negative for chest tightness.    Cardiovascular:  Negative for chest pain and palpitations.   Gastrointestinal:  Negative for abdominal pain, diarrhea, nausea and vomiting.   Musculoskeletal:  Negative for arthralgias and gait problem.   Neurological:  Negative for dizziness, weakness and headaches.   Hematological:  Negative for adenopathy.   Psychiatric/Behavioral:  The patient is not nervous/anxious.        Objective   /72   Pulse (!) 108   Temp (!) 96.4 °F (35.8 °C) (Temporal)   Resp 20   Ht 6' 1\" (1.854 m)   Wt 99.8 kg (220 lb)   SpO2 97%   BMI 29.03 kg/m²      Physical Exam  Vitals reviewed.   Constitutional:       General: He is not in acute distress.     " Appearance: Normal appearance. He is well-developed. He is not ill-appearing.   HENT:      Head: Normocephalic and atraumatic.      Nose: Nose normal.      Mouth/Throat:      Mouth: Mucous membranes are moist.     Eyes:      General:         Right eye: No discharge.         Left eye: No discharge.      Conjunctiva/sclera: Conjunctivae normal.      Pupils: Pupils are equal, round, and reactive to light.     Neck:      Thyroid: No thyromegaly.      Vascular: No JVD.     Cardiovascular:      Rate and Rhythm: Normal rate and regular rhythm.      Heart sounds: Murmur heard.   Pulmonary:      Effort: Pulmonary effort is normal.      Breath sounds: No wheezing or rales.      Comments: COARSE BS  SOME SCATTERED RHONCHI  PROLONGED EXP PHASE - NO WHEEZE  Abdominal:      General: Bowel sounds are normal.      Palpations: Abdomen is soft. There is no mass.      Tenderness: There is no abdominal tenderness. There is no guarding or rebound.     Musculoskeletal:         General: No tenderness or deformity.      Cervical back: Neck supple.      Right lower leg: No edema.      Left lower leg: No edema.      Comments: MILD DJD CHANGES   Lymphadenopathy:      Cervical: No cervical adenopathy.     Skin:     General: Skin is warm and dry.      Findings: No erythema or rash.     Neurological:      General: No focal deficit present.      Mental Status: He is alert and oriented to person, place, and time.     Psychiatric:         Mood and Affect: Mood normal.         Behavior: Behavior normal.         Thought Content: Thought content normal.         Judgment: Judgment normal.

## 2025-05-29 NOTE — PATIENT INSTRUCTIONS
PLENTY OF FLUIDS  HYDRATION  INCREASE PHYSICAL ACTIVITY  TRIAL OF ADVAIR     PULMONARY CONSULT PENDING    REPEAT CBC IN 4 WEEKS    CALL SOONER PRN

## 2025-06-09 ENCOUNTER — OFFICE VISIT (OUTPATIENT)
Dept: PULMONOLOGY | Facility: MEDICAL CENTER | Age: 80
End: 2025-06-09
Payer: MEDICARE

## 2025-06-09 DIAGNOSIS — D50.9 IRON DEFICIENCY ANEMIA, UNSPECIFIED IRON DEFICIENCY ANEMIA TYPE: ICD-10-CM

## 2025-06-09 DIAGNOSIS — R05.3 CHRONIC COUGH: Primary | ICD-10-CM

## 2025-06-09 PROCEDURE — 99213 OFFICE O/P EST LOW 20 MIN: CPT | Performed by: STUDENT IN AN ORGANIZED HEALTH CARE EDUCATION/TRAINING PROGRAM

## 2025-06-09 NOTE — PATIENT INSTRUCTIONS
It was a pleasure seeing you today!    Stop Lisinoeugenia Reeder MD  Pulmonary and Critical Care Medicine

## 2025-06-09 NOTE — PROGRESS NOTES
Pulmonary Outpatient Consultation Note   Kaiser Price 79 y.o. male MRN: [id number]  6/9/2025    Referring provider:   Scott Padron Md  [address] [place] [address]     Reason for Consultation: Cough with phlegm production    Assessment:    Cough with phlegm production, vague dyspnea, unclear etiology apparently this has been going on for the past 2 months.  In December he was started on lisinopril which may be the culprit here.  Other than that he has sinusitis and follows up with ENT, has been on doxycycline Levaquin Augmentin, I do not think this is anything infectious and would avoid further antimicrobial therapy.  He takes allergy pills, Flonase, Advair.  He has had decent amount of medications regarding this above issue.  For now I will hold off on further workup and hold lisinopril.  Moderate TRISTIN follows with sleep medicine, they had questions about inspire device, I will route this to their primary sleep medicine physician Dr Pollock   Nicotine dependence in remission no CT indicated given his age      Plan:    Stop lisinopril, I will send a message to patient's PCP regarding this.  This may be the culprit regarding his cough over the last few months as this was started in December  He can continue with Advair along with Singulair  We spoke with gabapentin and opioid cough suppression such as Phenergan.  Could consider that in the future if needed.  This has been going on for about 20 years and will be difficult to actually treat      History of Present Illness   HPI:    79-year-old gentleman here for evaluation, past medical history of mild TRISTIN with sleep-related hypoxia, history of paroxysmal atrial fibrillation on Xarelto and metoprolol, type 2 diabetes, hypertension.  Patient followed up with Dr. Pollock from sleep medicine, nicotine dependence in remission quit smoking about 35 years ago after 30-pack-year history.      Patient recently saw primary care for bronchitis was placed on Advair  250.  Previously has seen sleep medicine, pulmonary at [facility] and at [facility].    Patient states over the past few months he has been complaining of worsening cough, with phlegm production, no fevers, saw ENT was prescribed doxycycline Levaquin Augmentin.  He has had no significant proven in his cough.  He has very mild postnasal drip.  Primarily a cough with mild dyspnea.  He lives in a house with his wife and has other risk factors such as environmental exposures to pets.    Echocardiogram September 2023 with normal RVSP, EF 55%  CT chest July 2024 normal  Chest x-ray May 2025 normal with elevation of the right diaphragm  Pulmonary function testing in 2018 show decreased vital capacity, mildly decreased diffusion.  This was consistent with moderate restrictive defect, appears extrapulmonary in nature.      Review of Systems   Constitutional: Negative.    HENT:  Positive for congestion, postnasal drip and voice change.    Eyes: Negative.    Respiratory:  Positive for cough, choking and shortness of breath.    Cardiovascular: Negative.    Gastrointestinal: Negative.    Endocrine: Negative.    Genitourinary: Negative.    Musculoskeletal: Negative.    Skin: Negative.    Allergic/Immunologic: Negative.    Neurological: Negative.    Hematological: Negative.    Psychiatric/Behavioral: Negative.           Historical Information   Past Medical History[1]  Past Surgical History[2]  Family History[3]    Occupational History: retired, sings [employer]    Tobacco Use History[4]    Meds/Allergies   Current Medications[5]  Allergies[6]    Vitals: There were no vitals taken for this visit., There is no height or weight on file to calculate BMI.      Physical Exam:    GEN: alert and oriented x 3, pleasant and cooperative   HEENT:  Normocephalic, atraumatic  NECK: No JVD   HEART: Rate, normal S1 and S2  LUNGS: Clear to auscultation bilaterally; no wheezes, rales, or rhonchi; respiration nonlabored   ABDOMEN:  Normoactive bowel  sounds, soft, no tenderness, no distention  EXTREMITIES: no edema  NEURO: no gross focal findings  SKIN:  Dry, intact, warm to touch    Labs: I have personally reviewed pertinent lab results.    Imaging and other studies: Results Review Statement: I personally reviewed the following image studies in PACS and associated radiology reports: chest xray and CT chest. My interpretation of the radiology images/reports is: normal.    Pulmonary function testing:  Personally interpreted by me      Other Studies: Results Review Statement: No pertinent imaging studies reviewed.    Lidya Reeder MD  Pulmonary and Critical Care Medicine            [1]  Past Medical History:  Diagnosis Date   • Anxiety    • Arthritis     fingers , knee   • Asthma    • BPH (benign prostatic hypertrophy)    • Cataract     currently left eye- to have surgery on 4/10/17   • Cough variant asthma 04/12/2017   • Diabetes mellitus (HCC)     type 2, last assessed 4/12/2017   • Encounter for immunization 11/13/2023   • Hernia, umbilical     currently has   • HL (hearing loss)     b/l hearing aids   • Labyrinthitis    • Moderate obstructive sleep apnea 04/11/2017   • New onset a-fib (HCC) 01/10/2020   • Obesity with body mass index 30 or greater 11/04/2019   • Umbilical hernia    [2]  Past Surgical History:  Procedure Laterality Date   • CATARACT EXTRACTION Right 04/04/2017   • COLONOSCOPY      yrs ago   • EYE SURGERY Bilateral     cataracts with IOL   [3]  Family History  Problem Relation Name Age of Onset   • Cancer Mother Skylar D'Wells         ovarian   • Diabetes Father Lex    • Cancer Sister Norah         stomach to brain   • Substance Abuse Family     • Substance Abuse Son     • Alcohol abuse Son     • Mental illness Neg Hx     [4]  Social History  Tobacco Use   Smoking Status Former   • Current packs/day: 0.00   • Average packs/day: 0.5 packs/day for 15.0 years (7.5 ttl pk-yrs)   • Types: Cigarettes   • Start date: 1/1/1968   • Quit date:  1983   • Years since quittin.4   • Passive exposure: Past   Smokeless Tobacco Never   [5]    Current Outpatient Medications:   •  atorvastatin (LIPITOR) 20 mg tablet, Take 1 tablet (20 mg total) by mouth daily at bedtime, Disp: 90 tablet, Rfl: 3  •  finasteride (PROSCAR) 5 mg tablet, Take 1 tablet (5 mg total) by mouth daily, Disp: 90 tablet, Rfl: 3  •  fluticasone (FLONASE) 50 mcg/act nasal spray, 1 spray into each nostril daily, Disp: 16 mL, Rfl: 1  •  Fluticasone-Salmeterol (Advair Diskus) 250-50 mcg/dose inhaler, Inhale 1 puff 2 (two) times a day Rinse mouth after use., Disp: 60 blister, Rfl: 1  •  Glucosamine-Chondroitin (GLUCOSAMINE CHONDR COMPLEX PO), Take by mouth in the morning and in the evening., Disp: , Rfl:   •  lisinopril-hydrochlorothiazide (PRINZIDE,ZESTORETIC) 10-12.5 MG per tablet, TAKE ONE TABLET BY MOUTH EVERY DAY (GENERIC FOR ZESTORETIC), Disp: 30 tablet, Rfl: 5  •  metFORMIN (GLUCOPHAGE) 500 mg tablet, Take 2 tablets (1,000 mg total) by mouth 2 (two) times a day with meals, Disp: 360 tablet, Rfl: 3  •  Misc Natural Products (PRO NUTRIENTS FRUIT & VEGGIE PO), Take by mouth, Disp: , Rfl:   •  montelukast (SINGULAIR) 10 mg tablet, Take 1 tablet (10 mg total) by mouth daily at bedtime, Disp: 90 tablet, Rfl: 3  •  Probiotic Product (PROBIOTIC BLEND PO), Take 1 tablet by mouth in the morning., Disp: , Rfl:   •  rivaroxaban (XARELTO) 20 mg tablet, Take 1 tablet (20 mg total) by mouth in the morning, Disp: 90 tablet, Rfl: 1  •  sertraline (ZOLOFT) 100 mg tablet, TAKE ONE TABLET BY MOUTH EVERY DAY, Disp: 90 tablet, Rfl: 1  •  tamsulosin (FLOMAX) 0.4 mg, Take 1 capsule (0.4 mg total) by mouth daily at bedtime, Disp: 90 capsule, Rfl: 3  •  ferrous sulfate 324 (65 Fe) mg, Take 1 tablet (324 mg total) by mouth 2 (two) times a day before meals, Disp: 60 tablet, Rfl: 3[6]  Allergies  Allergen Reactions   • Ceftin [Cefuroxime] GI Intolerance and Vomiting

## 2025-06-09 NOTE — Clinical Note
Hi team,  I wonder if his cough is from his newly started Lisinopril combo tablet with HCTZ. I advised him to reach out to you to try off the ACE part and try just HCTZ to see if this works.  Thanks Gigi

## 2025-06-10 RX ORDER — FERROUS SULFATE 324(65)MG
324 TABLET, DELAYED RELEASE (ENTERIC COATED) ORAL
Qty: 60 TABLET | Refills: 5 | Status: SHIPPED | OUTPATIENT
Start: 2025-06-10 | End: 2025-10-08

## 2025-06-18 ENCOUNTER — TELEPHONE (OUTPATIENT)
Age: 80
End: 2025-06-18

## 2025-06-24 ENCOUNTER — TELEPHONE (OUTPATIENT)
Age: 80
End: 2025-06-24

## 2025-06-24 NOTE — TELEPHONE ENCOUNTER
Margareth - pre-admissions - wants to let Dr. Campbell/ surgery coordinator know that patient was recently ill, and it was recommended by pcp/anesthesia team that patient be rescheduled for surgery. Please indicate through patient's chart or call back to confirm, because patient is still on OR schedule and they have not been able to reach office.       # [phone] or indicate through chart.

## 2025-07-09 DIAGNOSIS — I10 ESSENTIAL HYPERTENSION: ICD-10-CM

## 2025-07-10 DIAGNOSIS — I48.0 PAROXYSMAL ATRIAL FIBRILLATION (HCC): ICD-10-CM

## 2025-07-10 RX ORDER — LISINOPRIL AND HYDROCHLOROTHIAZIDE 10; 12.5 MG/1; MG/1
TABLET ORAL
Qty: 30 TABLET | Refills: 5 | Status: SHIPPED | OUTPATIENT
Start: 2025-07-10

## 2025-07-10 NOTE — TELEPHONE ENCOUNTER
Reason for call:   [x] Refill   [] Prior Auth  [] Other:     Office:   [] PCP/Provider -   [x] Specialty/Provider - Nicola Castillo MD    Medication: rivaroxaban (XARELTO) 20 mg tablet    Dose/Frequency: Take 1 tablet (20 mg total) by mouth in the morning,    Quantity: 90    Pharmacy: [id number] - [place] [address]     Local Pharmacy   Does the patient have enough for 3 days?   [x] Yes   [] No - Send as HP to POD    Mail Away Pharmacy   Does the patient have enough for 10 days?   [] Yes   [] No - Send as HP to POD

## 2025-07-16 ENCOUNTER — NURSE TRIAGE (OUTPATIENT)
Dept: OTHER | Facility: OTHER | Age: 80
End: 2025-07-16

## 2025-07-16 NOTE — TELEPHONE ENCOUNTER
I called [facility] cell phone and spoke with Xiomy (Wife).  She stated both ankles, tops of both feet, and toe have been swollen for 4 days.  He has been elevating both feet but it has not helped.  She also stated this is the first time his feet and ankles have been this swollen.  We have no availability in our schedule today to be seen,  advised to go to the ER or Urgent Care. Wife agreed to take Kaiser to the ER today.  Fran/TY

## 2025-07-16 NOTE — TELEPHONE ENCOUNTER
Regarding: swollen feet  ----- Message from Jania JONES sent at 7/16/2025  7:26 AM EDT -----  Feet and ankles swelling and getting worse.  Wife, Xiomy calling to make a same day appointment  Please call wife Xiomy   [phone]    [facility] - no avail apts for today

## 2025-07-27 DIAGNOSIS — F33.1 MODERATE EPISODE OF RECURRENT MAJOR DEPRESSIVE DISORDER (HCC): ICD-10-CM

## 2025-07-28 DIAGNOSIS — J98.4 RESTRICTIVE LUNG DISEASE: ICD-10-CM

## 2025-07-28 DIAGNOSIS — J40 BRONCHITIS: ICD-10-CM

## 2025-07-28 DIAGNOSIS — J45.40 MODERATE PERSISTENT ASTHMA WITHOUT COMPLICATION: ICD-10-CM

## 2025-07-29 RX ORDER — FLUTICASONE PROPIONATE AND SALMETEROL 250; 50 UG/1; UG/1
POWDER RESPIRATORY (INHALATION)
Qty: 60 BLISTER | Refills: 1 | Status: SHIPPED | OUTPATIENT
Start: 2025-07-29

## 2025-07-29 RX ORDER — SERTRALINE HYDROCHLORIDE 100 MG/1
100 TABLET, FILM COATED ORAL DAILY
Qty: 90 TABLET | Refills: 1 | Status: SHIPPED | OUTPATIENT
Start: 2025-07-29

## 2025-08-06 ENCOUNTER — TELEPHONE (OUTPATIENT)
Age: 80
End: 2025-08-06

## 2025-08-07 ENCOUNTER — VBI (OUTPATIENT)
Dept: ADMINISTRATIVE | Facility: OTHER | Age: 80
End: 2025-08-07